# Patient Record
Sex: MALE | Race: WHITE | NOT HISPANIC OR LATINO | Employment: FULL TIME | URBAN - METROPOLITAN AREA
[De-identification: names, ages, dates, MRNs, and addresses within clinical notes are randomized per-mention and may not be internally consistent; named-entity substitution may affect disease eponyms.]

---

## 2017-12-18 ENCOUNTER — APPOINTMENT (OUTPATIENT)
Dept: RADIOLOGY | Facility: MEDICAL CENTER | Age: 47
End: 2017-12-18
Attending: ORTHOPAEDIC SURGERY
Payer: COMMERCIAL

## 2017-12-18 DIAGNOSIS — M75.122 COMPLETE ROTATOR CUFF TEAR OF LEFT SHOULDER: ICD-10-CM

## 2017-12-21 ENCOUNTER — HOSPITAL ENCOUNTER (OUTPATIENT)
Dept: RADIOLOGY | Facility: MEDICAL CENTER | Age: 47
End: 2017-12-21
Attending: ORTHOPAEDIC SURGERY
Payer: COMMERCIAL

## 2017-12-21 PROCEDURE — 73221 MRI JOINT UPR EXTREM W/O DYE: CPT | Mod: LT

## 2018-02-06 ENCOUNTER — APPOINTMENT (OUTPATIENT)
Dept: ADMISSIONS | Facility: MEDICAL CENTER | Age: 48
End: 2018-02-06
Attending: ORTHOPAEDIC SURGERY
Payer: COMMERCIAL

## 2018-02-06 RX ORDER — ZOLPIDEM TARTRATE 10 MG/1
10 TABLET ORAL NIGHTLY PRN
COMMUNITY
Start: 2018-10-08 | End: 2018-10-28

## 2018-02-07 ENCOUNTER — HOSPITAL ENCOUNTER (OUTPATIENT)
Facility: MEDICAL CENTER | Age: 48
End: 2018-02-07
Attending: ORTHOPAEDIC SURGERY | Admitting: ORTHOPAEDIC SURGERY
Payer: COMMERCIAL

## 2018-02-07 VITALS
WEIGHT: 205.25 LBS | OXYGEN SATURATION: 94 % | TEMPERATURE: 97.2 F | HEART RATE: 82 BPM | HEIGHT: 69 IN | SYSTOLIC BLOOD PRESSURE: 119 MMHG | DIASTOLIC BLOOD PRESSURE: 81 MMHG | BODY MASS INDEX: 30.4 KG/M2 | RESPIRATION RATE: 14 BRPM

## 2018-02-07 PROCEDURE — 500123 HCHG BOVIE, CONTROL W/BLADE: Performed by: ORTHOPAEDIC SURGERY

## 2018-02-07 PROCEDURE — 160029 HCHG SURGERY MINUTES - 1ST 30 MINS LEVEL 4: Performed by: ORTHOPAEDIC SURGERY

## 2018-02-07 PROCEDURE — 700102 HCHG RX REV CODE 250 W/ 637 OVERRIDE(OP)

## 2018-02-07 PROCEDURE — A6222 GAUZE <=16 IN NO W/SAL W/O B: HCPCS | Performed by: ORTHOPAEDIC SURGERY

## 2018-02-07 PROCEDURE — 502240 HCHG MISC OR SUPPLY RC 0272: Performed by: ORTHOPAEDIC SURGERY

## 2018-02-07 PROCEDURE — 700111 HCHG RX REV CODE 636 W/ 250 OVERRIDE (IP)

## 2018-02-07 PROCEDURE — 502581 HCHG PACK, SHOULDER ARTHROSCOPY: Performed by: ORTHOPAEDIC SURGERY

## 2018-02-07 PROCEDURE — 160035 HCHG PACU - 1ST 60 MINS PHASE I: Performed by: ORTHOPAEDIC SURGERY

## 2018-02-07 PROCEDURE — 160002 HCHG RECOVERY MINUTES (STAT): Performed by: ORTHOPAEDIC SURGERY

## 2018-02-07 PROCEDURE — 700105 HCHG RX REV CODE 258: Performed by: ORTHOPAEDIC SURGERY

## 2018-02-07 PROCEDURE — C1713 ANCHOR/SCREW BN/BN,TIS/BN: HCPCS | Performed by: ORTHOPAEDIC SURGERY

## 2018-02-07 PROCEDURE — 160036 HCHG PACU - EA ADDL 30 MINS PHASE I: Performed by: ORTHOPAEDIC SURGERY

## 2018-02-07 PROCEDURE — 160048 HCHG OR STATISTICAL LEVEL 1-5: Performed by: ORTHOPAEDIC SURGERY

## 2018-02-07 PROCEDURE — 502000 HCHG MISC OR IMPLANTS RC 0278: Performed by: ORTHOPAEDIC SURGERY

## 2018-02-07 PROCEDURE — 160041 HCHG SURGERY MINUTES - EA ADDL 1 MIN LEVEL 4: Performed by: ORTHOPAEDIC SURGERY

## 2018-02-07 PROCEDURE — 160022 HCHG BLOCK: Performed by: ORTHOPAEDIC SURGERY

## 2018-02-07 PROCEDURE — 160046 HCHG PACU - 1ST 60 MINS PHASE II: Performed by: ORTHOPAEDIC SURGERY

## 2018-02-07 PROCEDURE — A9270 NON-COVERED ITEM OR SERVICE: HCPCS

## 2018-02-07 PROCEDURE — 501838 HCHG SUTURE GENERAL: Performed by: ORTHOPAEDIC SURGERY

## 2018-02-07 PROCEDURE — 160025 RECOVERY II MINUTES (STATS): Performed by: ORTHOPAEDIC SURGERY

## 2018-02-07 PROCEDURE — 700101 HCHG RX REV CODE 250

## 2018-02-07 PROCEDURE — 160009 HCHG ANES TIME/MIN: Performed by: ORTHOPAEDIC SURGERY

## 2018-02-07 DEVICE — ANCHOR ICONIX 1-#2 FORCEFIBER 1.4MM (5EA/BX): Type: IMPLANTABLE DEVICE | Status: FUNCTIONAL

## 2018-02-07 DEVICE — ANCHOR ICONIX 2 TT W/2 2MM (5EA/BX): Type: IMPLANTABLE DEVICE | Status: FUNCTIONAL

## 2018-02-07 RX ORDER — SCOLOPAMINE TRANSDERMAL SYSTEM 1 MG/1
1 PATCH, EXTENDED RELEASE TRANSDERMAL
Status: DISCONTINUED | OUTPATIENT
Start: 2018-02-07 | End: 2018-02-07 | Stop reason: HOSPADM

## 2018-02-07 RX ORDER — DIPHENHYDRAMINE HYDROCHLORIDE 50 MG/ML
25 INJECTION INTRAMUSCULAR; INTRAVENOUS EVERY 6 HOURS PRN
Status: DISCONTINUED | OUTPATIENT
Start: 2018-02-07 | End: 2018-02-07 | Stop reason: HOSPADM

## 2018-02-07 RX ORDER — MORPHINE SULFATE 4 MG/ML
4 INJECTION, SOLUTION INTRAMUSCULAR; INTRAVENOUS
Status: DISCONTINUED | OUTPATIENT
Start: 2018-02-07 | End: 2018-02-07 | Stop reason: HOSPADM

## 2018-02-07 RX ORDER — GABAPENTIN 300 MG/1
CAPSULE ORAL
Status: COMPLETED
Start: 2018-02-07 | End: 2018-02-07

## 2018-02-07 RX ORDER — POLYETHYLENE GLYCOL 3350 17 G/17G
1 POWDER, FOR SOLUTION ORAL 2 TIMES DAILY PRN
Status: DISCONTINUED | OUTPATIENT
Start: 2018-02-07 | End: 2018-02-07 | Stop reason: HOSPADM

## 2018-02-07 RX ORDER — ENEMA 19; 7 G/133ML; G/133ML
1 ENEMA RECTAL
Status: DISCONTINUED | OUTPATIENT
Start: 2018-02-07 | End: 2018-02-07 | Stop reason: HOSPADM

## 2018-02-07 RX ORDER — HALOPERIDOL 5 MG/ML
1 INJECTION INTRAMUSCULAR EVERY 6 HOURS PRN
Status: DISCONTINUED | OUTPATIENT
Start: 2018-02-07 | End: 2018-02-07 | Stop reason: HOSPADM

## 2018-02-07 RX ORDER — ROPIVACAINE HYDROCHLORIDE 5 MG/ML
INJECTION, SOLUTION EPIDURAL; INFILTRATION; PERINEURAL
Status: DISCONTINUED | OUTPATIENT
Start: 2018-02-07 | End: 2018-02-07 | Stop reason: HOSPADM

## 2018-02-07 RX ORDER — AMOXICILLIN 250 MG
1 CAPSULE ORAL NIGHTLY
Status: DISCONTINUED | OUTPATIENT
Start: 2018-02-07 | End: 2018-02-07 | Stop reason: HOSPADM

## 2018-02-07 RX ORDER — DOCUSATE SODIUM 100 MG/1
100 CAPSULE, LIQUID FILLED ORAL 2 TIMES DAILY
Status: DISCONTINUED | OUTPATIENT
Start: 2018-02-07 | End: 2018-02-07 | Stop reason: HOSPADM

## 2018-02-07 RX ORDER — ACETAMINOPHEN 500 MG
TABLET ORAL
Status: COMPLETED
Start: 2018-02-07 | End: 2018-02-07

## 2018-02-07 RX ORDER — SODIUM CHLORIDE, SODIUM LACTATE, POTASSIUM CHLORIDE, CALCIUM CHLORIDE 600; 310; 30; 20 MG/100ML; MG/100ML; MG/100ML; MG/100ML
INJECTION, SOLUTION INTRAVENOUS CONTINUOUS
Status: DISCONTINUED | OUTPATIENT
Start: 2018-02-07 | End: 2018-02-07 | Stop reason: HOSPADM

## 2018-02-07 RX ORDER — EPINEPHRINE 1 MG/ML(1)
AMPUL (ML) INJECTION
Status: DISCONTINUED | OUTPATIENT
Start: 2018-02-07 | End: 2018-02-07 | Stop reason: HOSPADM

## 2018-02-07 RX ORDER — BISACODYL 10 MG
10 SUPPOSITORY, RECTAL RECTAL
Status: DISCONTINUED | OUTPATIENT
Start: 2018-02-07 | End: 2018-02-07 | Stop reason: HOSPADM

## 2018-02-07 RX ORDER — BACITRACIN 50000 [IU]/1
INJECTION, POWDER, FOR SOLUTION INTRAMUSCULAR
Status: DISCONTINUED | OUTPATIENT
Start: 2018-02-07 | End: 2018-02-07 | Stop reason: HOSPADM

## 2018-02-07 RX ORDER — DEXAMETHASONE SODIUM PHOSPHATE 4 MG/ML
4 INJECTION, SOLUTION INTRA-ARTICULAR; INTRALESIONAL; INTRAMUSCULAR; INTRAVENOUS; SOFT TISSUE
Status: DISCONTINUED | OUTPATIENT
Start: 2018-02-07 | End: 2018-02-07 | Stop reason: HOSPADM

## 2018-02-07 RX ORDER — AMOXICILLIN 250 MG
1 CAPSULE ORAL
Status: DISCONTINUED | OUTPATIENT
Start: 2018-02-07 | End: 2018-02-07 | Stop reason: HOSPADM

## 2018-02-07 RX ORDER — CELECOXIB 200 MG/1
CAPSULE ORAL
Status: COMPLETED
Start: 2018-02-07 | End: 2018-02-07

## 2018-02-07 RX ORDER — ONDANSETRON 2 MG/ML
4 INJECTION INTRAMUSCULAR; INTRAVENOUS EVERY 4 HOURS PRN
Status: DISCONTINUED | OUTPATIENT
Start: 2018-02-07 | End: 2018-02-07 | Stop reason: HOSPADM

## 2018-02-07 RX ORDER — ACETAMINOPHEN 500 MG
1000 TABLET ORAL EVERY 6 HOURS
Status: DISCONTINUED | OUTPATIENT
Start: 2018-02-07 | End: 2018-02-07 | Stop reason: HOSPADM

## 2018-02-07 RX ADMIN — SODIUM CHLORIDE, POTASSIUM CHLORIDE, SODIUM LACTATE AND CALCIUM CHLORIDE 1000 ML: 600; 310; 30; 20 INJECTION, SOLUTION INTRAVENOUS at 10:45

## 2018-02-07 RX ADMIN — GABAPENTIN 600 MG: 300 CAPSULE ORAL at 10:15

## 2018-02-07 RX ADMIN — CELECOXIB 400 MG: 200 CAPSULE ORAL at 10:15

## 2018-02-07 RX ADMIN — ACETAMINOPHEN 1000 MG: 500 TABLET, COATED ORAL at 10:15

## 2018-02-07 ASSESSMENT — PAIN SCALES - GENERAL
PAINLEVEL_OUTOF10: 0
PAINLEVEL_OUTOF10: ASSUMED PAIN PRESENT
PAINLEVEL_OUTOF10: 3
PAINLEVEL_OUTOF10: 0

## 2018-02-07 NOTE — OP REPORT
DATE OF SERVICE:  02/07/2018    PREOPERATIVE DIAGNOSES:  Left shoulder superior labral anterior posterior   tear, left shoulder rotator cuff tear.    POSTOPERATIVE DIAGNOSES:  Left shoulder superior labral anterior posterior   tear, left shoulder rotator cuff tear.    PROCEDURES PERFORMED:  Evaluation of shoulder under anesthesia, arthroscopic   evaluation of the shoulder, arthroscopic rotator cuff repair, arthroscopic   biceps tenotomy with subsequent open biceps tenodesis, arthroscopic   debridement of bicipital stump.    SURGEON:  Kiet Ramirez MD    ASSISTANT:  ZOIE Thapa    ANESTHESIA:  General.  There was also regional anesthesia to the general   anesthesia for postoperative analgesia.    ANESTHESIOLOGIST:  Hang Beasley MD    DRAINS:  None.    SPECIMENS:  None.    COMPLICATIONS:  None known.    HISTORY:  Active 47-year-old gentleman who has had a traction-type injury to   his arm while falling some 3-4 months ago.  He went on to have persistent pain   and weakness in the shoulder.  Subsequent history, physical, and MRI raised   the possibility of labral and biceps tendon tear and rotator cuff tear.  Given   those findings and his significant and persistent symptoms, he is felt to be   a candidate for shoulder arthroscopy with arthroscopic versus open repairs   and/or debridement as indicated.  Therefore, appropriate laboratory studies   and consents, he was brought today to the operating room, placed supine on the   operating table where general anesthesia obtained.  Ancef was administered   preoperatively.  The patient was ultimately placed in lateral decubitus   position.  All bony prominences padded and axillary roll placed.  His forward   elevation appeared to be full passively, abduction to full passively.  Load   and shift revealed no gross instability about the glenohumeral joint.  The   limb was then sterilely prepped and draped in the usual manner, supported in   arthroscopic  padded arm burrell.  Standard arthroscopy portals were then   created.  Scope was introduced in the glenohumeral joint.  The superior labrum   had tearing and wide separation off the glenoid rim.  Gentle traction across   the biceps revealed wide separation not only in the superior labrum, but into   the bicipital anchor.    The undersurface of the supraspinatus and back towards the infraspinatus had   undersurface fraying.  That area was marked with a PDS suture.  Subscap tendon   was inspected and probed and did not appear at least on it is lateral   superior edge to have significant fraying, tearing or retraction.  Anterior   labrum appeared to be intact.  Anterior inferior glenohumeral ligaments   intact.  Articular surface of the glenoid and humeral head without significant   softening or fissuring.  Posterior and posterior inferior labrum appeared to   be intact.  Given the disease around the superior labrum and the biceps tendon   anchor, the biceps tendon was tenotomized using arthroscopic scissors.    Tendon was allowed to retract into the proximal arm.  That bicipital stump was   then debrided back to a smooth edge using a shaver.  Redundant fluid was   milked from the glenohumeral joint.  The joint was injected with ropivacaine.    Scope was removed and inserted in the subacromial space.  Thickened prominent   bursal tissue was resected using a shaver.  Accessory lateral portals were   created.  The area of the PDS suture marker and just posterior and inferior   to, was visualized.  There were disorganized fibers of the cuff tendon at that   level with easy penetration of the cuff remnant at that level using a blunt   tipped obturator.  As such that diseased tendon and that area was debrided   back to more healthy tendon edge.  The articular margin of the humerus was   identified.  The bone lateral to it was roughened and then multiple   perforations were created to promote bleeding bone.    An [employer]  suture anchor was placed on that edge of the articular margin.  This   was loaded with 2 pairs of sutures.  Each pair was passed into the leading   edge of the rotator cuff tear, in addition a single cinch stitch was also   placed free into the central leading edge of the tear.  Larger diameter tape   tails were then brought out laterally along with the cinch stitch.  They were   fixed to an Apollo suture anchor.  Perforation was created in the lateral base   of the greater tuberosity.  Perforation was then filled with the Apollo   anchor and sutures.  This created lateral row repair and ultimately a 2-row   rotator cuff repair.  Attention was then directed medially.  The remaining 2   strands of suture were mated.  Arthroscopic sliding knots were then tied to   secure the medial row portion of the repair.  Repair was then visualized and   probed, felt to be acceptable position and tension.  Scope was removed,   briefly reinserted in the glenohumeral joint.  The undersurface of the rotator   cuff repair was inspected, felt to have good position.  Scope was removed.    Arthroscopy portals closed in usual manner.  The arm was then removed from the   arthroscopic arm burrell and placed in a mechanical arm burrell.  A   longitudinal incision in the proximal medial arm was created.  Skin,   subcutaneous tissue incised sharply, then blunt dissection down to the   inferior border of the pectoralis major tendon.  That tendon was traced to its   insertion on the proximal humerus.  Carefully placed Hohmann retractor on the   lateral cortex of the proximal humerus performed by myself.  An [employer]   retractor was used to gently pull the tissue medially.  Lower portion of the   bicipital groove was identified.  The fascia over the biceps was incised with   scissors.  Biceps tendon remnant was then delivered in the mouth of the   incision.  Roughly at the level of the pec tendon insertion lower border, 2    holes were  created and then filled with [employer] suture anchor.  Those   suture anchors had their sutures passed in the biceps tendon remnant in a   locking stitch configuration.  Multiple knots were then tied securing the   biceps up against the proximal humerus.  The biceps tendon was incised   sharply.  The retractors were carefully removed by myself and then copious   irrigation using antibiotic treated saline was performed.  Skin edges were   then approximated in the usual manner and skin closed in the usual manner.    Sterile dressing was then applied and the arm was placed in a shoulder   immobilizer and the patient was transferred from the OR table to his hospital   bed and now taken to recovery room in stable condition.       ____________________________________     MD BRITTNI MISHRA / LAURA    DD:  02/07/2018 13:30:58  DT:  02/07/2018 14:22:00    D#:  [id number]  Job#:  [id number]

## 2018-02-07 NOTE — OR NURSING
1329- Pt to pacu via gurney with side rails up. Pt arouses to voice.  VSS.  Respirations spontaneous and unlabored.  L shoulder dressing cdi, LUE in immobilizer.  L radial pulse 2+, brisk cap refill to <3sec to fingers. Unable to assess sensation/movement, pt too sleepy.  1342- Sensation/movement intact L fingers. Denies pain/nausea.  1414- Pt sleeping, arouses to voice.  Denies pain/nausea.  1430-Pt more alert, VSS.  Denies pain/nausea. Sitting up in bed. Wife updated via phone.  1453- Pt meets stage 2 criteria, report to Heather HUSAIN.

## 2018-02-07 NOTE — DISCHARGE INSTRUCTIONS
ACTIVITY: Rest and take it easy for the first 24 hours.  A responsible adult is recommended to remain with you during that time.  It is normal to feel sleepy.  We encourage you to not do anything that requires balance, judgment or coordination.    MILD FLU-LIKE SYMPTOMS ARE NORMAL. YOU MAY EXPERIENCE GENERALIZED MUSCLE ACHES, THROAT IRRITATION, HEADACHE AND/OR SOME NAUSEA.    FOR 24 HOURS DO NOT:  Drive, operate machinery or run household appliances.  Drink beer or alcoholic beverages.   Make important decisions or sign legal documents.    SPECIAL INSTRUCTIONS: Non weight bearing Left upper extremity.  Wear immobilizer at all times until advised otherwise by your surgeon.  Ice pack when in bed or in chair to Left shoulder. Cough and deep breath every hour while awake.    DIET: To avoid nausea, slowly advance diet as tolerated, avoiding spicy or greasy foods for the first day.  Add more substantial food to your diet according to your physician's instructions.  Babies can be fed formula or breast milk as soon as they are hungry.  INCREASE FLUIDS AND FIBER TO AVOID CONSTIPATION.    SURGICAL DRESSING/BATHING: May shower Post op day next Wednesday - covr dressings well to shower, DO NOT REMOVE DRESSINGS    FOLLOW-UP APPOINTMENT:  A follow-up appointment should be arranged with your doctor; call to schedule.    You should CALL YOUR PHYSICIAN if you develop:  Fever greater than 101 degrees F.  Pain not relieved by medication, or persistent nausea or vomiting.  Excessive bleeding (blood soaking through dressing) or unexpected drainage from the wound.  Extreme redness or swelling around the incision site, drainage of pus or foul smelling drainage.  Inability to urinate or empty your bladder within 8 hours.  Problems with breathing or chest pain.    You should call 911 if you develop problems with breathing or chest pain.  If you are unable to contact your doctor or surgical center, you should go to the nearest emergency  room or urgent care center.  Physician's telephone #: Dr Ramirez [phone]    If any questions arise, call your doctor.  If your doctor is not available, please feel free to call the Surgical Center at [phone].  The Center is open Monday through Friday from 7AM to 7PM.  You can also call the HEALTH HOTLINE open 24 hours/day, 7 days/week and speak to a nurse at [phone], or toll free at [phone].    A registered nurse may call you a few days after your surgery to see how you are doing after your procedure.    MEDICATIONS: Resume taking daily medication.  Take prescribed pain medication with food.  If no medication is prescribed, you may take non-aspirin pain medication if needed.  PAIN MEDICATION CAN BE VERY CONSTIPATING.  Take a stool softener or laxative such as senokot, pericolace, or milk of magnesia if needed.    Prescription given for N/A.  Last pain medication given at N/A.    If your physician has prescribed pain medication that includes Acetaminophen (Tylenol), do not take additional Acetaminophen (Tylenol) while taking the prescribed medication.    Depression / Suicide Risk    As you are discharged from this [facility] facility, it is important to learn how to keep safe from harming yourself.    Recognize the warning signs:  · Abrupt changes in personality, positive or negative- including increase in energy   · Giving away possessions  · Change in eating patterns- significant weight changes-  positive or negative  · Change in sleeping patterns- unable to sleep or sleeping all the time   · Unwillingness or inability to communicate  · Depression  · Unusual sadness, discouragement and loneliness  · Talk of wanting to die  · Neglect of personal appearance   · Rebelliousness- reckless behavior  · Withdrawal from people/activities they love  · Confusion- inability to concentrate     If you or a loved one observes any of these behaviors or has concerns about self-harm, here's what you can  do:  · Talk about it- your feelings and reasons for harming yourself  · Remove any means that you might use to hurt yourself (examples: pills, rope, extension cords, firearm)  · Get professional help from the community (Mental Health, Substance Abuse, psychological counseling)  · Do not be alone:Call your Safe Contact- someone whom you trust who will be there for you.  · Call your local CRISIS HOTLINE 718-2130 or 260-581-8110  · Call your local Children's Mobile Crisis Response Team [place] (832) 877-1834 or [employer]  · Call the toll free National Suicide Prevention Hotlines   · National Suicide Prevention Lifeline 371-966-OGUT (7356)  · National [employer] Line Network 800-SUICIDE (611-5346)        Peripheral Nerve Block Discharge Instructions from Same Day Surgery and Inpatient :    What to Expect - Upper Extremity  · You may experience numbness and weakness in arm and hand  on the same side as your surgery  · This is normal. For some people, this may be an unpleasant sensation. Be very careful with your numb limb  · Ask for help when you need it  Shoulder Surgery Side Effects  · In addition to numbness and weakness you may experience other symptoms  · Other nerves that are close to those nerves injected can also be affected by local anesthesia  · You may experience a hoarseness in your voice  · Your breathing may feel different  · You may also notice drooping of your eyelid, pupil constriction, and decreased sweating, on the side of your surgery  · All of these side effects are normal and will resolve when the local anesthetic wears off   Prevent Injury  · Protect the limb like a baby  · Beware of exposing your limb to extreme heat or cold or trauma  · The limb may be injured without you noticing because it is numb  · Keep the limb elevated whenever possible  · Do not sleep on the limb  · Change the position of the limb regularly  · Avoid putting pressure on your surgical limb  Pain Control  · The  "initial block on the day of surgery will make your extremity feel \"numb\"  · Any consecutive injection including prior to discharge from the hospital will make your extremity feel \"numb\"  · You may feel an aching or burning when the local anesthesia starts to wear off  · Take pain pills as prescribed by your surgeon  · Call your surgeon or anesthesiologist if you do not have adequate pain control      Discharge Education for patients on KACY (Obstructive Sleep Apnea) Protocol    Prior to receiving sedation or anesthesia, we screen all patients for Obstructive Sleep Apnea.  During your screening, you were identified as having suspected, but not confirmed Obstructive Sleep Apnea(KACY).    What is Obstructive Sleep Apnea?  Sleep apnea (AP-ne-ah) is a common disorder which involves breathing pauses that occur during sleep.  These can last from 10 seconds to a minute or longer.  Normal breathing resumes often with a loud snort or choking sound.    Sleep apnea occurs in all age groups and both genders but is more common in men and people over 40 years of age.  It has been estimated that as many as 18 million Americans have sleep apnea.  Most people who have sleep apnea don’t know they have it because it only occurs during sleep.  A family member and/or bed partner may first notice the signs of sleep apnea.  Sleep apnea is a chronic (ongoing) condition that disrupts the quality and quantity of your sleep repeatedly throughout the night.  This often results in excessive daytime sleepiness or fatigue during the day.  It may also contribute to high blood pressure, heart problems, and complications following medications used for surgery and procedures.    To establish a definitive diagnosis, further testing from a specialist would be needed.  We recommend that you follow up with your primary care physician.    We recommend that you should be with an adult observer for at least 24 hours after your sedation/anesthesia.  If you have " a CPAP machine, you should wear it during any sleep period (day or night) for the week following your procedure.  We encourage you to sleep on your side or in a sitting position, even with napping.  Lying flat on your back increases the risk of apnea and airway obstruction during your post procedure recovery period.    It is important to prevent over-sedation that could increase your risk for apnea.  Please take all pain medication as directed by your physician.  If you are not getting pain relief, please contact your physician to discuss possible approaches to relieving pain while minimizing medications that can affect your breathing and oxygen levels.

## 2018-02-09 ENCOUNTER — APPOINTMENT (OUTPATIENT)
Dept: SLEEP MEDICINE | Facility: MEDICAL CENTER | Age: 48
End: 2018-02-09
Payer: COMMERCIAL

## 2018-05-11 ENCOUNTER — SLEEP CENTER VISIT (OUTPATIENT)
Dept: SLEEP MEDICINE | Facility: MEDICAL CENTER | Age: 48
End: 2018-05-11
Payer: COMMERCIAL

## 2018-05-11 VITALS
OXYGEN SATURATION: 97 % | SYSTOLIC BLOOD PRESSURE: 122 MMHG | DIASTOLIC BLOOD PRESSURE: 82 MMHG | HEART RATE: 74 BPM | BODY MASS INDEX: 30.36 KG/M2 | HEIGHT: 69 IN | WEIGHT: 205 LBS | TEMPERATURE: 98.2 F | RESPIRATION RATE: 16 BRPM

## 2018-05-11 DIAGNOSIS — G47.33 OSA (OBSTRUCTIVE SLEEP APNEA): ICD-10-CM

## 2018-05-11 DIAGNOSIS — F51.01 PRIMARY INSOMNIA: ICD-10-CM

## 2018-05-11 PROCEDURE — 99245 OFF/OP CONSLTJ NEW/EST HI 55: CPT | Performed by: INTERNAL MEDICINE

## 2018-05-11 RX ORDER — NAPROXEN 500 MG/1
TABLET ORAL
COMMUNITY
Start: 2018-03-31 | End: 2018-10-28

## 2018-05-11 RX ORDER — HYDROCODONE BITARTRATE AND ACETAMINOPHEN 5; 325 MG/1; MG/1
TABLET ORAL
COMMUNITY
Start: 2018-02-15 | End: 2018-10-28

## 2018-05-11 RX ORDER — CYCLOBENZAPRINE HCL 10 MG
TABLET ORAL
Refills: 0 | COMMUNITY
Start: 2018-03-24 | End: 2018-10-28

## 2018-05-11 RX ORDER — TRAZODONE HYDROCHLORIDE 100 MG/1
TABLET ORAL
COMMUNITY
Start: 2018-02-14 | End: 2018-10-28

## 2018-05-11 NOTE — PROGRESS NOTES
Chief Complaint   Patient presents with   • New Patient     Evaluation for KACY       HPI:    The patient is a 48-year-old man who has obstructive sleep apnea and a component of insomnia.  He has had a previous UPPP.  Prior sleep studies in 2011 and 2012 showed evidence of obstructive sleep apnea with an apnea hypopnea index in the teens and evidence of oxygen desaturation down to 81%.  A titration study during that time showed an excellent response to CPAP at 8 cm of water pressure.  However, the patient could not tolerate CPAP and stopped using it.  He is still troubled with poor sleep quality and excessive daytime somnolence.  He continues to use Ambien 10 mg every night to get to sleep.  His sleep continues to be non-refreshing.  He says that he is tired all the time.  His wife confirms snoring and apneic episodes.  He feels that his obstructive sleep apnea is getting worse.  He is willing to once again try airway pressurization therapy.    Past Medical History:   Diagnosis Date   • Apnea, sleep    • Back pain    • Chickenpox    • Cough    • Daytime sleepiness    • Dizziness    • Frequent headaches    • Gasping for breath    • Heart burn    • Heartburn    • Morning headache    • Pain     right knee   • Shortness of breath    • Sleep apnea     no CPAP had ENT surgery   • Snoring    • Sore muscles    • Swelling of lower extremity    • Wears glasses    • Weight loss        ROS:   Constitutional: Denies fevers, chills, night sweats, fatigue or weight loss  Eyes: Denies vision loss, pain, drainage, double vision  Ears, Nose, Throat: Denies earache, tinnitus, hoarseness  Cardiovascular: Denies chest pain, tightness, palpitations  Respiratory: Denies shortness of breath, sputum production, cough, hemoptysis  Sleep: See HPI  GI: Denies abdominal pain, nausea, vomiting, diarrhea  : Denies frequent urination, hematuria, painful urination  Musculoskeletal: Denies back pain, painful joints, sore muscles  Neurological:  "Denies headaches, seizures  Skin: Denies rashes, color changes  Psychiatric: Denies depression or thoughts of suicide  Hematologic: Denies bleeding tendency or clotting tendency  Allergic/Immunologic: Denies rhinitis, skin sensitivity    Social History     Social History   • Marital status:      Spouse name: N/A   • Number of children: N/A   • Years of education: N/A     Occupational History   • Not on file.     Social History Main Topics   • Smoking status: Former Smoker     Packs/day: 0.50     Years: 17.00     Types: Cigarettes     Quit date: 9/1/2011   • Smokeless tobacco: Never Used      Comment: 1/2 ffor 22yrs   • Alcohol use Yes      Comment: 5/month social   • Drug use: No   • Sexual activity: Yes     Partners: Female     Birth control/ protection: Condom     Other Topics Concern   • Not on file     Social History Narrative   • No narrative on file     Iodine and Percocet [oxycodone-acetaminophen]  Current Outpatient Prescriptions on File Prior to Visit   Medication Sig Dispense Refill   • zolpidem (AMBIEN) 10 MG Tab Take 10 mg by mouth at bedtime as needed for Sleep.       No current facility-administered medications on file prior to visit.      Blood pressure 122/82, pulse 74, temperature 36.8 °C (98.2 °F), resp. rate 16, height 1.753 m (5' 9\"), weight 93 kg (205 lb), SpO2 97 %.  History reviewed. No pertinent family history.    Physical Exam:    HEENT: PERRLA, EOMI, no scleral icterus, no nasal or oral lesions  Neck: No thyromegaly, no adenopathy, no bruits  [place]atti: Status post UPPP  Lungs: Equal breath sounds, no wheezes or crackles  Heart: Regular rate and rhythm, no gallops or murmurs  Abdomen: Soft, benign, no organomegaly  Extremities: No clubbing, cyanosis, or edema  Neurologic: Cranial nerve, motor, and sensory exam are normal    1. KACY (obstructive sleep apnea)    2. Primary insomnia    3. BMI 30.0-30.9,adult        He has an established diagnosis of obstructive sleep apnea.  He has not " been on CPAP in a number of years.  We will obtain a home sleep study to requalify him for CPAP therapy.  Previously he had been on CPAP at about 8 cm of water pressure.  If his home study redemonstrates obstructive sleep apnea we should be able to start him on auto CPAP in the range of 6-12 cm of water pressure and then follow-up clinically, with overnight oximetry, and with compliance data.  A separate issue will be trying to treat his insomnia once his obstructive sleep apnea has been treated.

## 2018-05-29 ENCOUNTER — HOME STUDY (OUTPATIENT)
Dept: SLEEP MEDICINE | Facility: MEDICAL CENTER | Age: 48
End: 2018-05-29
Attending: INTERNAL MEDICINE
Payer: COMMERCIAL

## 2018-05-29 DIAGNOSIS — G47.33 OSA (OBSTRUCTIVE SLEEP APNEA): ICD-10-CM

## 2018-05-29 PROCEDURE — 95806 SLEEP STUDY UNATT&RESP EFFT: CPT | Performed by: FAMILY MEDICINE

## 2018-05-30 NOTE — PROCEDURES
DIAGNOSTIC HOME SLEEP TEST (HST) REPORT       PATIENT ID:  NAME:  Abhishek Pacheco  MRN:               [id number]  YOB: 1970  DATE OF STUDY: 5/29/18      Impression:     This study shows evidence of:     1.Sever obstructive sleep apnea with  Respiratory Event Index (LACEY) of 67.8 per hour and worse in supine sleep with LACEY at 70/hr. These findings are based on the recording time (flow evaluation time). It is not possible with this device to determine a traditional apnea+hypopnea index (AHI) for total sleep time since EEG channels are not available.   2. Sever Nocturnal hypoxia: O2 Sat. lynette was 71% and mean O2 sat was 85% and baseline O2 at 90 %. O2 sat was below 90% for 90% of the flow evaluation time. Oxygen Desaturation (>=3%) Index was elevated at 70/hr.       TECHNICAL DESCRIPTION:  Data[employer]ia Device used was a type-III home study device. Home sleep study recording included: Airflow recording by nasal pressure transducer; Respiratory Effort by abdominal Respiratory Bands; O2 by finger oximetry. A position sensor and a snore channel was also used.    Scoring Criteria: A modification of the the AASM Manual for the Scoring of Sleep and Associated Events, 2012, was used.   Obstructive apnea was scored by cessation of airflow for at least 10 seconds with continuing respiratory effort.  Central apnea was scored by cessation of airflow for at least 10 seconds with no effort.  Hypopnea was scored by a 30% or more reduction in airflow for at least 10 seconds accompanied by an arterial oxygen desaturation of 3% or more.  (For Medicare patients, hypopneas were scored by a 30% or more reduction in airflow for at least 10 seconds accompanied by an arterial oxygen saturation of 4% or more, as required by their insurance, CMS.        General sleep summary: . Total recording time is 9 hours and 55 minutes and total flow evaluation time is 4 hours and 06 minutes. The patient spent 0 hours and 28 minutes in  the supine position and 2 hours and 58 minutes in the nonsupine position.    Respiratory events:    Apneas: 42 (Obstructive apnea index 7.2/hr, Central apnea index 2.4 /hr, mixed 0 /hour)  Hypopneas: 237    Recommendations:    1. CPAP titration study vs Auto CPAP trial .   2.   In general patients with sleep apnea are advised to avoid alcohol and sedatives and to not operate a motor vehicle while drowsy. In some cases alternative treatment options may prove effective in resolving sleep apnea in these options include upper airway surgery, the use of a dental orthotic or weight loss and positional therapy. Clinical correlation is required.         Kenton Alonso MD

## 2018-06-26 ENCOUNTER — SLEEP CENTER VISIT (OUTPATIENT)
Dept: SLEEP MEDICINE | Facility: MEDICAL CENTER | Age: 48
End: 2018-06-26
Payer: COMMERCIAL

## 2018-06-26 VITALS
WEIGHT: 209 LBS | SYSTOLIC BLOOD PRESSURE: 134 MMHG | HEART RATE: 74 BPM | DIASTOLIC BLOOD PRESSURE: 92 MMHG | OXYGEN SATURATION: 94 % | RESPIRATION RATE: 16 BRPM | HEIGHT: 69 IN | BODY MASS INDEX: 30.96 KG/M2

## 2018-06-26 DIAGNOSIS — G47.33 OSA (OBSTRUCTIVE SLEEP APNEA): ICD-10-CM

## 2018-06-26 DIAGNOSIS — R63.8 INCREASED BMI: ICD-10-CM

## 2018-06-26 DIAGNOSIS — Z87.891 HISTORY OF TOBACCO ABUSE: ICD-10-CM

## 2018-06-26 PROCEDURE — 99214 OFFICE O/P EST MOD 30 MIN: CPT | Performed by: INTERNAL MEDICINE

## 2018-06-26 RX ORDER — ZOLPIDEM TARTRATE 5 MG/1
5 TABLET ORAL NIGHTLY PRN
Qty: 3 TAB | Refills: 0 | Status: SHIPPED | OUTPATIENT
Start: 2018-06-26 | End: 2018-06-26

## 2018-07-16 ENCOUNTER — SLEEP STUDY (OUTPATIENT)
Dept: SLEEP MEDICINE | Facility: MEDICAL CENTER | Age: 48
End: 2018-07-16
Attending: INTERNAL MEDICINE
Payer: COMMERCIAL

## 2018-07-16 DIAGNOSIS — G47.33 OSA (OBSTRUCTIVE SLEEP APNEA): ICD-10-CM

## 2018-07-16 PROCEDURE — 95811 POLYSOM 6/>YRS CPAP 4/> PARM: CPT | Performed by: FAMILY MEDICINE

## 2018-07-17 NOTE — PROCEDURES
Comments:  The patient underwent a diagnostic polysomnogram using the standard montage for measurement of parameters of sleep, respiratory events, movement abnormalities, and heart rate and rhythm.    A microphone was used to monitor snoring.  Interpretation:  Study start time was 09:35:26 PM.  Diagnostic recording time was 8h 16.0m with a total sleep time of 7h 8.5m resulting in a sleep efficiency of 86.39%%.    Sleep latency from the start of the study was 60 minutes and the latency from sleep to REM was 173 minutes.  In total,79  arousals were scored for an arousal index of 11.1.  Respiratory:  There were a total of 17 apneas consisting of 13 obstructive apneas, 0 mixed apneas, and 4 central apneas.  A total of 39 hypopneas were scored.  The apnea index was 2.38 per hour and the hypopnea index was 5.46 per hour resulting in an overall AHI of 7.84.  AHI during rem was 4.3 and AHI while supine was 36.84.  Oximetry:  There was a mean oxygen saturation of 94.0% with a minimum oxygen saturation of 82.0%.  Time spent with oxygen saturations below 89% was 4.0 minutes.  Cardiac:  The highest heart rate seen while awake was 87 BPM while the highest heart rate during sleep was 86 BPM with an average sleeping heart rate of 63 BPM.  Limb Movements:  There were a total of 85 PLMs during sleep, of which 21 were PLMS arousals.  This resulted in a PLMS index of 11.9 and a PLMS arousal index of 2.9.    CPAP was tried from 5 to 9 cm H2O.    Technical summary: The patient underwent a CPAP titration.  This was a 16 channel montage study to include a 6 channel EEG, a 2 channel EOG, and chin EMG, left and right leg EMG, a snore channel, and a CFLOW pressure transducer.   Respiratory effort was assessed with the use of a thoracic and abdominal monitor and overnight oximetry was obtained. Audio and video recordings were reviewed. This was a fully attended study and sleep stage scoring was performed. The test was technically  adequate.    General sleep summary:  During the overnight study, the sleep latency was 60 min which is prolonged. The REM latency was 173 min which is  increased. The total sleep time was 428 min and sleep efficiency was 86 % which is normal.  Sleep stage proportions showed normal sleep architecture except increased WASo of 67 min.  In regards to sleep quality there was a mild degree of sleep fragmentation as shown by the arousal index of 11 an hour. The arousals were due to PLMS and respiratory events.    CPAP Titration:  The PAP titration was initiated with CPAP 5 cm of water and the pressure which was slowly titrated up in an attempt to eliminate sleep disordered breathing and snoring. The final pressure tested during the study was CPAP 9 cm water and at this final pressure the patient was observed in the non supine REM sleep stage. The apnea hypopnea index improved to 4.1 per hour and O2 lynette 91%. The average O2 stauration was 94%. he spent 2 % of sleep time below 89% O2 saturation. Snoring was resolved. There were no significant periodic limb movements.  The patient demonstrated NSR and an average heart rate of 63 beats per minute.  There was no ventricular ectopy or tachyarrhythmias. The patient utilized medium F20 mask with heated humidification. The CPAP was well-tolerated and there were minimal air leaks. No supplemental oxygen was required.    Impression:  1.  KACY  2.  Successful CPAP titration       Recommendations:  I recommend CPAP 9 cm with medium F20 mask. Recommended 30 day compliance download to assess the efficacy of the recommended pressure and compliance for further outpatient monitoring and management of CPAP therapy. In some cases alternative treatment options may prove effective in resolving sleep apnea and these options include upper airway surgery, the use of a dental orthotic or weight loss and positional therapy. Clinical correlation is required. In general patients with sleep apnea are  advised to avoid alcohol and sedatives and to not operate a motor vehicle while drowsy and are at a greater risk for cardiovascular disease.

## 2018-07-26 NOTE — PROGRESS NOTES
CC: Follow up for PAP titration results    HPI:  Mr. Abhishek Pacheco is a 48-year-old man who was last seen on 6/26/2018.   His note from that day:  Mr. Abhishek Pacheco is a 49 y/o male who was last seen on 5/11/2018.  At that time Dr. Boyer elicited a history of obstructive sleep apnea, insomnia, and a prior UPPP.  His prior sleep studies in 2011 and 2012 showed a moderate degree of obstructive sleep apnea with a saturation as low as 81%.  He had a CPAP titration and was titrated to 8 cm but was CPAP intolerant and therefore stopped using it.  He uses Ambien nightly to assist with sleep onset and maintenance.  He complained of nonrestorative sleep and excessive tiredness.  His wife confirms snoring and apnea.      His home sleep test was performed 5/29/2018 and showed severe obstructive sleep apnea with an RDI of 67.8 and a lynette saturation of 71%.  The saturations were less than 90% for 90% of the flow evaluation time      He had a Pap titration PSG done 7/16/2018:  The PAP titration was initiated with CPAP 5 cm of water and the pressure which was slowly titrated up in an attempt to eliminate sleep disordered breathing and snoring. The final pressure tested during the study was CPAP 9 cm water and at this final pressure the patient was observed in the non supine REM sleep stage. The apnea hypopnea index improved to 4.1 per hour and O2 lynette 91%. The average O2 stauration was 94%. he spent 2 % of sleep time below 89% O2 saturation. Snoring was resolved. There were no significant periodic limb movements.  The patient demonstrated NSR and an average heart rate of 63 beats per minute.  There was no ventricular ectopy or tachyarrhythmias. The patient utilized medium F20 mask with heated humidification. The CPAP was well-tolerated and there were minimal air leaks. No supplemental oxygen was required.      There are no active problems to display for this patient.      Past Medical History:   Diagnosis Date   •  Apnea, sleep    • Back pain    • Chickenpox    • Cough    • Daytime sleepiness    • Dizziness    • Frequent headaches    • Gasping for breath    • Heart burn    • Heartburn    • Morning headache    • Pain     right knee   • Shortness of breath    • Sleep apnea     no CPAP had ENT surgery   • Snoring    • Sore muscles    • Swelling of lower extremity    • Wears glasses    • Weight loss         Past Surgical History:   Procedure Laterality Date   • SHOULDER ARTHROSCOPY W/ ROTATOR CUFF REPAIR Left 2/7/2018    Procedure: SHOULDER ARTHROSCOPY W/ ROTATOR CUFF REPAIR;  Surgeon: Kiet Ramirez M.D.;  Location: SURGERY [facility];  Service: Orthopedics   • SHOULDER ARTHROSCOPY W/ BICIPITAL TENODESIS REPAIR  2/7/2018    Procedure: SHOULDER ARTHROSCOPY with open BICIPITAL TENODESIS REPAIR;  Surgeon: Kiet Ramirez M.D.;  Location: SURGERY [facility];  Service: Orthopedics   • TONSILLECTOMY AND ADENOIDECTOMY  10/12/2011    Performed by ROBERTO ALEMAN at SURGERY SAME DAY [facility] ORS   • SEPTOTURBINOPLASTY  10/12/2011    Performed by ROBERTO ALEMAN at SURGERY SAME DAY [facility] ORS   • UVULOPHARYNGOPALATOPLASTY  10/12/2011    Performed by ROBERTO ALEMAN at SURGERY SAME DAY [facility] ORS   • DEBRIDEMENT  11/2/2010    Performed by BAUTISTA KURTZ at SURGERY SAME DAY [facility] ORS   • CYST EXCISION  11/2/2010    Performed by BAUTISTA KURTZ at SURGERY SAME DAY [facility] ORS   • KNEE ARTHROSCOPY  11/2/2010    Performed by BAUTISTA KURTZ at SURGERY SAME DAY [facility] ORS   • APPENDECTOMY  2009   • OTHER ORTHOPEDIC SURGERY  2007    right shoulder   • ACL RECONSTRUCTION Right 2003   • NERVE ULNAR TRANSFER  2001   • ARTHROSCOPY, KNEE     • LAMINOTOMY     • OTHER ORTHOPEDIC SURGERY      spine c2 c3 nerves clipped   • TONSILLECTOMY         History reviewed. No pertinent family history.    Social History     Social History   • Marital status:      Spouse name: N/A   • Number of children: N/A   • Years of  "education: N/A     Occupational History   • Not on file.     Social History Main Topics   • Smoking status: Former Smoker     Packs/day: 0.50     Years: 17.00     Types: Cigarettes     Quit date: 9/1/2011   • Smokeless tobacco: Never Used      Comment: 1/2 ffor 22yrs   • Alcohol use Yes      Comment: 5/month social   • Drug use: No   • Sexual activity: Yes     Partners: Female     Birth control/ protection: Condom     Other Topics Concern   • Not on file     Social History Narrative   • No narrative on file       Current Outpatient Prescriptions   Medication Sig Dispense Refill   • zolpidem (AMBIEN) 10 MG Tab Take 10 mg by mouth at bedtime as needed for Sleep.     • cyclobenzaprine (FLEXERIL) 10 MG Tab TK 1/2 TO 1 T PO QHS PRN FOR MUSCLE SPASM/SLEEP  0   • HYDROcodone-acetaminophen (NORCO) 5-325 MG Tab per tablet      • naproxen (NAPROSYN) 500 MG Tab      • traZODone (DESYREL) 100 MG Tab        No current facility-administered medications for this visit.     \"CURRENT RX\"    ALLERGIES: Iodine and Percocet [oxycodone-acetaminophen]    ROS    Constitutional: Denies fever, chills, sweats,  weight loss, fatigue  Cardiovascular: Denies chest pain, tightness, palpitations, swelling in legs/feet, fainting, difficulty breathing when lying down but gets better when sitting up.   Respiratory: Denies shortness of breath, cough, sputum, wheezing, painful breathing, coughing up blood.   Sleep: per HPI  Gastrointestinal: Denies  difficulty swallowing, nausea, abdominal pain, diarrhea, constipation, heartburn..   Musculoskeletal: Denies painful joints, sore muscles, back pain.        PHYSICAL EXAM      /88   Pulse 75   Resp 16   Ht 1.753 m (5' 9\")   Wt 94.8 kg (209 lb)   SpO2 96%   BMI 30.86 kg/m²   Appearance: Well-nourished, well-developed, no acute distress  Eyes:  PERRLA, EOMI  ENMT: without lesions, deformities;hearing grossly intact; tongue normal, posterior pharynx without erythema or exudate; Mallampati " classification:   Neck: Supple, trachea midline, no masses  Respiratory effort:  No intercostal retractions or use of accessory muscles  Lung auscultation:  No wheezes rhonchi rubs or rales  Cardiac: No murmurs, rubs, or gallops; regular rhythm, normal rate; no edema  Abdomen:  No tenderness, no organomegaly  Musculoskeletal:  Grossly normal; gait and station normal; digits and nails normal  Skin:  No rashes, petechiae, cyanosis  Neurologic: without focal signs; oriented to person, time, place, and purpose; judgement intact  Psychiatric:  No depression, anxiety, agitation        PROBLEMS:  1. KACY (obstructive sleep apnea)    - DME CPAP    2. History of tobacco abuse      3. Class 1 obesity without serious comorbidity with body mass index (BMI) of 30.0 to 30.9 in adult, unspecified obesity type  - OBESITY COUNSELING (No Charge): Patient identified as having weight management issue.  Appropriate orders and counseling given.      PLAN:     He had a Pap titration PSG done 7/16/2018:  The PAP titration was initiated with CPAP 5 cm of water and the pressure which was slowly titrated up in an attempt to eliminate sleep disordered breathing and snoring. The final pressure tested during the study was CPAP 9 cm water and at this final pressure the patient was observed in the non supine REM sleep stage. The apnea hypopnea index improved to 4.1 per hour and O2 lynette 91%. The average O2 stauration was 94%. he spent 2 % of sleep time below 89% O2 saturation. Snoring was resolved. There were no significant periodic limb movements.  The patient demonstrated NSR and an average heart rate of 63 beats per minute.  There was no ventricular ectopy or tachyarrhythmias. The patient utilized medium F20 mask with heated humidification. The CPAP was well-tolerated and there were minimal air leaks. No supplemental oxygen was required.    We will prescribe CPAP 9 cm H2O using a medium air fit f 20 mask and heated humidification followed by  clinical and compliance card review and 2-3 months. He requested ambien to assist with getting used to the unit and mask. Will give him 15 5 mg ambien pills.    Return in about 3 months (around 10/27/2018).

## 2018-07-27 ENCOUNTER — SLEEP CENTER VISIT (OUTPATIENT)
Dept: SLEEP MEDICINE | Facility: MEDICAL CENTER | Age: 48
End: 2018-07-27
Payer: COMMERCIAL

## 2018-07-27 VITALS
HEIGHT: 69 IN | RESPIRATION RATE: 16 BRPM | OXYGEN SATURATION: 96 % | DIASTOLIC BLOOD PRESSURE: 88 MMHG | BODY MASS INDEX: 30.96 KG/M2 | SYSTOLIC BLOOD PRESSURE: 128 MMHG | HEART RATE: 75 BPM | WEIGHT: 209 LBS

## 2018-07-27 DIAGNOSIS — Z87.891 HISTORY OF TOBACCO ABUSE: ICD-10-CM

## 2018-07-27 DIAGNOSIS — G47.33 OSA (OBSTRUCTIVE SLEEP APNEA): ICD-10-CM

## 2018-07-27 DIAGNOSIS — E66.9 CLASS 1 OBESITY WITHOUT SERIOUS COMORBIDITY WITH BODY MASS INDEX (BMI) OF 30.0 TO 30.9 IN ADULT, UNSPECIFIED OBESITY TYPE: ICD-10-CM

## 2018-07-27 PROCEDURE — 99214 OFFICE O/P EST MOD 30 MIN: CPT | Performed by: INTERNAL MEDICINE

## 2018-07-27 RX ORDER — ZOLPIDEM TARTRATE 5 MG/1
5 TABLET ORAL NIGHTLY PRN
Qty: 30 TAB | Refills: 0 | Status: SHIPPED | OUTPATIENT
Start: 2018-07-27 | End: 2018-08-27

## 2018-10-28 PROBLEM — G47.33 OSA (OBSTRUCTIVE SLEEP APNEA): Status: ACTIVE | Noted: 2018-10-28

## 2018-10-28 PROBLEM — E66.9 OBESITY: Status: ACTIVE | Noted: 2018-10-28

## 2018-10-28 NOTE — PROGRESS NOTES
CC: Follow up for sleep disturbance    HPI: Mr.Richard Carole Pacheco is a 49 y/o M last seen 7/27/18. His HST doen 5/29/18 showed severe kacy, ahi 67.8, lynette saturation of 71% with sats <90% for 90% of the recording. Cpap 9 cm initiated.    He is 30-day compliance is 100% for 4 hours and 40 minutes.  He has a residual apnea hypopnea index of 7.6 his average time in large leak per day is 4 minutes and 24 seconds.  During his CPAP titration he had an apnea hypopnea index of 4.1 on CPAP at 9 cm H2O.    He continues to generate a significant benefit from the use of his CPAP.    He is not having any significant problems with mask fit, mask leak, pressure tolerance, excessive airway dryness, aerophagia, or nasal stuffiness.    Main issue is that the water reservoir is too small and the water runs out by the morning.          Patient Active Problem List    Diagnosis Date Noted   • KACY (obstructive sleep apnea) 10/28/2018   • Obesity 10/28/2018       Past Medical History:   Diagnosis Date   • Apnea, sleep    • Back pain    • Chickenpox    • Cough    • Daytime sleepiness    • Dizziness    • Frequent headaches    • Gasping for breath    • Heart burn    • Heartburn    • Morning headache    • Pain     right knee   • Shortness of breath    • Sleep apnea     no CPAP had ENT surgery   • Snoring    • Sore muscles    • Swelling of lower extremity    • Wears glasses    • Weight loss         Past Surgical History:   Procedure Laterality Date   • SHOULDER ARTHROSCOPY W/ ROTATOR CUFF REPAIR Left 2/7/2018    Procedure: SHOULDER ARTHROSCOPY W/ ROTATOR CUFF REPAIR;  Surgeon: Kiet Ramirez M.D.;  Location: SURGERY [facility];  Service: Orthopedics   • SHOULDER ARTHROSCOPY W/ BICIPITAL TENODESIS REPAIR  2/7/2018    Procedure: SHOULDER ARTHROSCOPY with open BICIPITAL TENODESIS REPAIR;  Surgeon: Kiet Ramirez M.D.;  Location: [facility];  Service: Orthopedics   • TONSILLECTOMY AND ADENOIDECTOMY  10/12/2011     "Performed by ROBERTO ALEMAN at SURGERY SAME DAY [facility] ORS   • SEPTOTURBINOPLASTY  10/12/2011    Performed by ROBERTO ALEMAN at SURGERY SAME DAY [facility] ORS   • UVULOPHARYNGOPALATOPLASTY  10/12/2011    Performed by ROBERTO ALEMAN at SURGERY SAME DAY [facility] ORS   • DEBRIDEMENT  11/2/2010    Performed by BAUTISTA KURTZ at SURGERY SAME DAY [facility] ORS   • CYST EXCISION  11/2/2010    Performed by BAUTISTA KURTZ at SURGERY SAME DAY [facility] ORS   • KNEE ARTHROSCOPY  11/2/2010    Performed by BAUTISTA KURTZ at SURGERY SAME DAY [facility] ORS   • APPENDECTOMY  2009   • OTHER ORTHOPEDIC SURGERY  2007    right shoulder   • ACL RECONSTRUCTION Right 2003   • NERVE ULNAR TRANSFER  2001   • ARTHROSCOPY, KNEE     • LAMINOTOMY     • OTHER ORTHOPEDIC SURGERY      spine c2 c3 nerves clipped   • TONSILLECTOMY         History reviewed. No pertinent family history.    Social History     Social History   • Marital status:      Spouse name: N/A   • Number of children: N/A   • Years of education: N/A     Occupational History   • Not on file.     Social History Main Topics   • Smoking status: Former Smoker     Packs/day: 0.50     Years: 17.00     Types: Cigarettes     Quit date: 9/1/2011   • Smokeless tobacco: Never Used      Comment: 1/2 ffor 22yrs   • Alcohol use Yes      Comment: 5/month social   • Drug use: No   • Sexual activity: Yes     Partners: Female     Birth control/ protection: Condom     Other Topics Concern   • Not on file     Social History Narrative   • No narrative on file       Current Outpatient Prescriptions   Medication Sig Dispense Refill   • temazepam (RESTORIL) 30 MG capsule Take 30 mg by mouth at bedtime as needed for Sleep.       No current facility-administered medications for this visit.     \"CURRENT RX\"    ALLERGIES: Iodine and Percocet [oxycodone-acetaminophen]    ROS    Constitutional: Denies fever, chills, sweats,  weight loss, fatigue  Cardiovascular: Denies chest pain, " "tightness, palpitations, swelling in legs/feet, fainting, difficulty breathing when lying down but gets better when sitting up.   Respiratory: Denies shortness of breath, cough, sputum, wheezing, painful breathing, coughing up blood.   Sleep: per HPI  Gastrointestinal: Denies  difficulty swallowing, nausea, abdominal pain, diarrhea, constipation, heartburn..   Musculoskeletal: Denies painful joints, sore muscles, back pain.        PHYSICAL EXAM  MSEW    /72 (BP Location: Right arm, Patient Position: Sitting, BP Cuff Size: Adult)   Pulse 82   Resp 16   Ht 1.753 m (5' 9\")   Wt 95.3 kg (210 lb)   SpO2 95%   BMI 31.01 kg/m²   Appearance: Well-nourished, well-developed, no acute distress  Eyes:  PERRLA, EOMI  ENMT: without lesions, deformities;hearing grossly intact; tongue normal, posterior pharynx without erythema or exudate; Mallampati classification:   Neck: Supple, trachea midline, no masses  Respiratory effort:  No intercostal retractions or use of accessory muscles  Lung auscultation:  No wheezes rhonchi rubs or rales  Cardiac: No murmurs, rubs, or gallops; regular rhythm, normal rate; no edema  Abdomen:  No tenderness, no organomegaly.  Obese  Musculoskeletal:  Grossly normal; gait and station normal; digits and nails normal  Skin:  No rashes, petechiae, cyanosis  Neurologic: without focal signs; oriented to person, time, place, and purpose; judgement intact  Psychiatric:  No depression, anxiety, agitation        PROBLEMS:  1. KACY (obstructive sleep apnea)      2. Class 1 obesity without serious comorbidity with body mass index (BMI) of 32.0 to 32.9 in adult, unspecified obesity type      3. History of tobacco abuse      4. Need for influenza vaccination - got his shot already        PLAN:     Return in about 6 months (around 4/29/2019).    Has been advised to continue the current CPAP 9 cm H2O, clean equipment frequently, and get new mask and supplies as allowed by insurance and DME. Advised about " potential problems including nasal obstruction/stuffiness, mask leak or discomfort , frequent awakenings, chill or dryness of the upper airway, noise, inconvenience, and lack of benefit. Recommend an earlier appointment, if significant treatment barriers develop.

## 2018-10-29 ENCOUNTER — SLEEP CENTER VISIT (OUTPATIENT)
Dept: SLEEP MEDICINE | Facility: MEDICAL CENTER | Age: 48
End: 2018-10-29
Payer: COMMERCIAL

## 2018-10-29 VITALS
RESPIRATION RATE: 16 BRPM | BODY MASS INDEX: 31.1 KG/M2 | SYSTOLIC BLOOD PRESSURE: 116 MMHG | WEIGHT: 210 LBS | OXYGEN SATURATION: 95 % | HEIGHT: 69 IN | HEART RATE: 82 BPM | DIASTOLIC BLOOD PRESSURE: 72 MMHG

## 2018-10-29 DIAGNOSIS — Z23 NEED FOR INFLUENZA VACCINATION: ICD-10-CM

## 2018-10-29 DIAGNOSIS — E66.9 CLASS 1 OBESITY WITHOUT SERIOUS COMORBIDITY WITH BODY MASS INDEX (BMI) OF 32.0 TO 32.9 IN ADULT, UNSPECIFIED OBESITY TYPE: ICD-10-CM

## 2018-10-29 DIAGNOSIS — G47.33 OSA (OBSTRUCTIVE SLEEP APNEA): ICD-10-CM

## 2018-10-29 DIAGNOSIS — Z87.891 HISTORY OF TOBACCO ABUSE: ICD-10-CM

## 2018-10-29 PROCEDURE — 99214 OFFICE O/P EST MOD 30 MIN: CPT | Performed by: INTERNAL MEDICINE

## 2018-10-29 RX ORDER — TEMAZEPAM 30 MG/1
30 CAPSULE ORAL
COMMUNITY
End: 2023-05-23

## 2019-11-09 ENCOUNTER — APPOINTMENT (OUTPATIENT)
Dept: RADIOLOGY | Facility: MEDICAL CENTER | Age: 49
End: 2019-11-09
Attending: EMERGENCY MEDICINE
Payer: COMMERCIAL

## 2019-11-09 ENCOUNTER — HOSPITAL ENCOUNTER (EMERGENCY)
Facility: MEDICAL CENTER | Age: 49
End: 2019-11-09
Attending: EMERGENCY MEDICINE
Payer: COMMERCIAL

## 2019-11-09 VITALS
WEIGHT: 212.52 LBS | OXYGEN SATURATION: 94 % | RESPIRATION RATE: 20 BRPM | TEMPERATURE: 97.3 F | HEIGHT: 69 IN | BODY MASS INDEX: 31.48 KG/M2 | DIASTOLIC BLOOD PRESSURE: 89 MMHG | HEART RATE: 97 BPM | SYSTOLIC BLOOD PRESSURE: 127 MMHG

## 2019-11-09 DIAGNOSIS — R10.32 LEFT LOWER QUADRANT ABDOMINAL PAIN: ICD-10-CM

## 2019-11-09 LAB
ALBUMIN SERPL BCP-MCNC: 5 G/DL (ref 3.2–4.9)
ALBUMIN/GLOB SERPL: 1.7 G/DL
ALP SERPL-CCNC: 39 U/L (ref 30–99)
ALT SERPL-CCNC: 65 U/L (ref 2–50)
ANION GAP SERPL CALC-SCNC: 12 MMOL/L (ref 0–11.9)
AST SERPL-CCNC: 34 U/L (ref 12–45)
BASOPHILS # BLD AUTO: 0.2 % (ref 0–1.8)
BASOPHILS # BLD: 0.03 K/UL (ref 0–0.12)
BILIRUB SERPL-MCNC: 1.2 MG/DL (ref 0.1–1.5)
BUN SERPL-MCNC: 30 MG/DL (ref 8–22)
CALCIUM SERPL-MCNC: 9.4 MG/DL (ref 8.5–10.5)
CHLORIDE SERPL-SCNC: 107 MMOL/L (ref 96–112)
CO2 SERPL-SCNC: 21 MMOL/L (ref 20–33)
CREAT SERPL-MCNC: 1.03 MG/DL (ref 0.5–1.4)
EOSINOPHIL # BLD AUTO: 0.01 K/UL (ref 0–0.51)
EOSINOPHIL NFR BLD: 0.1 % (ref 0–6.9)
ERYTHROCYTE [DISTWIDTH] IN BLOOD BY AUTOMATED COUNT: 41.1 FL (ref 35.9–50)
GLOBULIN SER CALC-MCNC: 2.9 G/DL (ref 1.9–3.5)
GLUCOSE SERPL-MCNC: 125 MG/DL (ref 65–99)
HCT VFR BLD AUTO: 52.2 % (ref 42–52)
HGB BLD-MCNC: 18.2 G/DL (ref 14–18)
IMM GRANULOCYTES # BLD AUTO: 0.11 K/UL (ref 0–0.11)
IMM GRANULOCYTES NFR BLD AUTO: 0.8 % (ref 0–0.9)
LIPASE SERPL-CCNC: 23 U/L (ref 11–82)
LYMPHOCYTES # BLD AUTO: 0.59 K/UL (ref 1–4.8)
LYMPHOCYTES NFR BLD: 4.3 % (ref 22–41)
MCH RBC QN AUTO: 29.9 PG (ref 27–33)
MCHC RBC AUTO-ENTMCNC: 34.9 G/DL (ref 33.7–35.3)
MCV RBC AUTO: 85.9 FL (ref 81.4–97.8)
MONOCYTES # BLD AUTO: 0.45 K/UL (ref 0–0.85)
MONOCYTES NFR BLD AUTO: 3.3 % (ref 0–13.4)
NEUTROPHILS # BLD AUTO: 12.57 K/UL (ref 1.82–7.42)
NEUTROPHILS NFR BLD: 91.3 % (ref 44–72)
NRBC # BLD AUTO: 0 K/UL
NRBC BLD-RTO: 0 /100 WBC
PLATELET # BLD AUTO: 244 K/UL (ref 164–446)
PMV BLD AUTO: 10 FL (ref 9–12.9)
POTASSIUM SERPL-SCNC: 4.5 MMOL/L (ref 3.6–5.5)
PROT SERPL-MCNC: 7.9 G/DL (ref 6–8.2)
RBC # BLD AUTO: 6.08 M/UL (ref 4.7–6.1)
SODIUM SERPL-SCNC: 140 MMOL/L (ref 135–145)
WBC # BLD AUTO: 13.8 K/UL (ref 4.8–10.8)

## 2019-11-09 PROCEDURE — 74176 CT ABD & PELVIS W/O CONTRAST: CPT

## 2019-11-09 PROCEDURE — 700111 HCHG RX REV CODE 636 W/ 250 OVERRIDE (IP): Performed by: EMERGENCY MEDICINE

## 2019-11-09 PROCEDURE — 80053 COMPREHEN METABOLIC PANEL: CPT

## 2019-11-09 PROCEDURE — 36415 COLL VENOUS BLD VENIPUNCTURE: CPT

## 2019-11-09 PROCEDURE — 96374 THER/PROPH/DIAG INJ IV PUSH: CPT

## 2019-11-09 PROCEDURE — 99285 EMERGENCY DEPT VISIT HI MDM: CPT

## 2019-11-09 PROCEDURE — 83690 ASSAY OF LIPASE: CPT

## 2019-11-09 PROCEDURE — 85025 COMPLETE CBC W/AUTO DIFF WBC: CPT

## 2019-11-09 RX ORDER — ONDANSETRON 4 MG/1
4 TABLET, FILM COATED ORAL EVERY 8 HOURS PRN
Qty: 6 TAB | Refills: 1 | Status: SHIPPED | OUTPATIENT
Start: 2019-11-09 | End: 2021-02-08

## 2019-11-09 RX ORDER — ONDANSETRON 2 MG/ML
4 INJECTION INTRAMUSCULAR; INTRAVENOUS ONCE
Status: COMPLETED | OUTPATIENT
Start: 2019-11-09 | End: 2019-11-09

## 2019-11-09 RX ADMIN — ONDANSETRON 4 MG: 2 INJECTION INTRAMUSCULAR; INTRAVENOUS at 13:47

## 2019-11-09 NOTE — ED NOTES
Ambulates to room w/steady gait. Vomited few times in triage.  Also c/o diarrhea. Changing into hospital gown. Chart up for erp to see.

## 2019-11-09 NOTE — ED PROVIDER NOTES
ED Provider Note    Scribed for Tammy Lim M.D. by Charley Grove. 11/9/2019, 1:41 PM.    Primary care provider: Will White M.D.  Means of arrival: Walk-in  History obtained from: Patient  History limited by: None    CHIEF COMPLAINT  Chief Complaint   Patient presents with   • Abdominal Pain   • Nausea/Vomiting/Diarrhea       HPI  Abhishek Pacheco is a 49 y.o. male who presents to the Emergency Department for evaluation of acute, constant, non-radiating left lower quadrant abdominal pain onset this morning at 3 AM. The patient reports that he woke up this morning secondary to his moderate abdominal pain. No exacerbating or alleviating factors were reported. He notes that his abdominal pain is not exacerbated upon coughing. The patient does not report taking any over the counter medications for pain control. After the onset of his abdominal pain, he began to experienced symptoms of nausea, vomiting and diarrhea. He notes that his emesis and diarrhea were non-bloody and non-bilious. The patient additionally notes that his last bowel movement was one day ago and appeared to have a light red tint, but he states that he is unsure if he is experiencing bloody stools. Negative recent fevers. He notes that he experienced a similar episode of abdominal pain a few weeks ago that resolved with duration.       REVIEW OF SYSTEMS  Pertinent positives include left lower quadrant abdominal pain, nausea, vomiting, diarrhea, possible bloody stools. Pertinent negatives include no recent fevers. All other systems reviewed and negative.     PAST MEDICAL HISTORY   has a past medical history of Apnea, sleep, Back pain, Chickenpox, Cough, Daytime sleepiness, Dizziness, Frequent headaches, Gasping for breath, Heart burn, Heartburn, Morning headache, Pain, Shortness of breath, Sleep apnea, Snoring, Sore muscles, Swelling of lower extremity, Wears glasses, and Weight loss.    SURGICAL HISTORY   has a past surgical history  "that includes appendectomy (); nerve ulnar transfer (); debridement (2010); cyst excision (2010); other orthopedic surgery (); other orthopedic surgery; knee arthroscopy (2010); tonsillectomy and adenoidectomy (10/12/2011); septoturbinoplasty (10/12/2011); uvulopharyngopalatoplasty (10/12/2011); acl reconstruction (Right, ); shoulder arthroscopy w/ rotator cuff repair (Left, 2018); shoulder arthroscopy w/ bicipital tenodesis repair (2018); laminotomy; arthroscopy, knee; and tonsillectomy.    SOCIAL HISTORY  Social History     Tobacco Use   • Smoking status: Former Smoker     Packs/day: 0.50     Years: 17.00     Pack years: 8.50     Types: Cigarettes     Last attempt to quit: 2011     Years since quittin.1   • Smokeless tobacco: Never Used   • Tobacco comment:  ffor 22yrs   Substance Use Topics   • Alcohol use: Yes     Comment: /month social   • Drug use: No      Social History     Substance and Sexual Activity   Drug Use No       FAMILY HISTORY  No family history reported.     CURRENT MEDICATIONS  Current Outpatient Medications:   •  temazepam (RESTORIL) 30 MG capsule, Take 30 mg by mouth at bedtime as needed for Sleep., Disp: , Rfl:     ALLERGIES  Allergies   Allergen Reactions   • Iodine Hives and Itching     After receiving iodine contrast for CT pt developed hives and itching   • Percocet [Oxycodone-Acetaminophen] Nausea     And dizziness       PHYSICAL EXAM  VITAL SIGNS: /90   Pulse (!) 114   Temp 36.3 °C (97.3 °F) (Temporal)   Resp 16   Ht 1.753 m (5' 9\")   Wt 96.4 kg (212 lb 8.4 oz)   SpO2 97%   BMI 31.38 kg/m²     Constitutional:   male with glasses laying supine in gurney with no apparently distress , able to answer questions  HENT: Nose is normal in appearance without rhinorrhea, external ears are normal,  moist mucous membranes  Eyes: Anicteric,  pupils are equal round and reactive, there is no conjunctival drainage or pallor   Neck: " The trachea is midline, there is no obvious mass or meningeal signs  Cardiovascular: Equal radial pulsation.   Thorax & Lungs: Respiratory rate and effort are normal. There is normal chest excursion with respiration.  noted.  Abdomen: Abdomen is normal in appearance, left lower quadrant abdominal tenderness. Normal bowel sounds, no pain with cough, nonpulsatile  :  No CVA tenderness to palpation  Musculoskeletal: No deformities noted in all 4 extremities. Actively moves all 4 extremities  Skin: Visualized skin is warm, no erythema, no rash.  Neurologic:  Cranial nerves II through XII are intact there is no focal abnormality noted.  Psychiatric: Normal mood and mentation    DIAGNOSTIC STUDIES / PROCEDURES    LABS  Results for orders placed or performed during the hospital encounter of 11/09/19   CBC WITH DIFFERENTIAL   Result Value Ref Range    WBC 13.8 (H) 4.8 - 10.8 K/uL    RBC 6.08 4.70 - 6.10 M/uL    Hemoglobin 18.2 (H) 14.0 - 18.0 g/dL    Hematocrit 52.2 (H) 42.0 - 52.0 %    MCV 85.9 81.4 - 97.8 fL    MCH 29.9 27.0 - 33.0 pg    MCHC 34.9 33.7 - 35.3 g/dL    RDW 41.1 35.9 - 50.0 fL    Platelet Count 244 164 - 446 K/uL    MPV 10.0 9.0 - 12.9 fL    Neutrophils-Polys 91.30 (H) 44.00 - 72.00 %    Lymphocytes 4.30 (L) 22.00 - 41.00 %    Monocytes 3.30 0.00 - 13.40 %    Eosinophils 0.10 0.00 - 6.90 %    Basophils 0.20 0.00 - 1.80 %    Immature Granulocytes 0.80 0.00 - 0.90 %    Nucleated RBC 0.00 /100 WBC    Neutrophils (Absolute) 12.57 (H) 1.82 - 7.42 K/uL    Lymphs (Absolute) 0.59 (L) 1.00 - 4.80 K/uL    Monos (Absolute) 0.45 0.00 - 0.85 K/uL    Eos (Absolute) 0.01 0.00 - 0.51 K/uL    Baso (Absolute) 0.03 0.00 - 0.12 K/uL    Immature Granulocytes (abs) 0.11 0.00 - 0.11 K/uL    NRBC (Absolute) 0.00 K/uL   Comp Metabolic Panel   Result Value Ref Range    Sodium 140 135 - 145 mmol/L    Potassium 4.5 3.6 - 5.5 mmol/L    Chloride 107 96 - 112 mmol/L    Co2 21 20 - 33 mmol/L    Anion Gap 12.0 (H) 0.0 - 11.9    Glucose  "125 (H) 65 - 99 mg/dL    Bun 30 (H) 8 - 22 mg/dL    Creatinine 1.03 0.50 - 1.40 mg/dL    Calcium 9.4 8.5 - 10.5 mg/dL    AST(SGOT) 34 12 - 45 U/L    ALT(SGPT) 65 (H) 2 - 50 U/L    Alkaline Phosphatase 39 30 - 99 U/L    Total Bilirubin 1.2 0.1 - 1.5 mg/dL    Albumin 5.0 (H) 3.2 - 4.9 g/dL    Total Protein 7.9 6.0 - 8.2 g/dL    Globulin 2.9 1.9 - 3.5 g/dL    A-G Ratio 1.7 g/dL   LIPASE   Result Value Ref Range    Lipase 23 11 - 82 U/L   ESTIMATED GFR   Result Value Ref Range    GFR If African American >60 >60 mL/min/1.73 m 2    GFR If Non African American >60 >60 mL/min/1.73 m 2       All labs reviewed by me.    RADIOLOGY  CT-RENAL COLIC EVALUATION(A/P W/O)   Final Result      1.  No evidence of renal or ureteral stone or evidence of hydronephrosis. No evidence of inflammatory change.      2.  Small bilateral inguinal hernias which contain fat.        The radiologist's interpretation of all radiological studies have been reviewed by me.    COURSE & MEDICAL DECISION MAKING  Nursing notes and vital signs were reviewed. (See chart for details) History was obtained from the patient.     The patient presents with left lower quadrant abdominal pain, and the differential diagnosis includes but is not limited to diverticulitis, colitis gastroenteritis would be less likely as it is recurrent.       1:41 PM Patient seen and evaluated at bedside. Discussed plan of care with patient which includes ordering lab work and performing a CT scan to rule out diverticulitis and colitis. Initial orders in the Emergency Department included Estimated GFR, CMP, CBC with differential, lipase and CT renal colic evaluation. initial treatment in the Emergency Department included Zofran 4 mg. The patient's vitals are /90   Pulse (!) 114   Temp 36.3 °C (97.3 °F) (Temporal)   Resp 16   Ht 1.753 m (5' 9\")   Wt 96.4 kg (212 lb 8.4 oz)   SpO2 97%   BMI 31.38 kg/m²     ED testing reveals that his white blood cell count is slightly " "elevated. His CT scan shows no evidence of renal or ureteral stone. There is no evidence of hydronephrosis or inflammatory change.     3:06 PM On repeat evaluation of the patient, there was a  positive  response to Zofran with alleviation of his nausea. The patient's repeat exam was benign and reassuring. I informed the patient that his CT scan did not show any concerning findings and that his white blood cell count was sightly elevated due to a possible infection. The patient was informed that he is stable for discharge and will be discharged with a prescription for Zofran for alleviation of recurrent episode of nausea.  He was given discharge instructions. The patient will be discharged with a prescription for Zofran 4 mg which is to be taken every eight hours as needed. He was given a referral to Dr. White and instructed to immediately return to the ED if his symptoms worsen. Patient verbalizes their understanding and agreement to plan of discharge. His discharge vitals are /82   Pulse 94   Temp 36.3 °C (97.3 °F) (Temporal)   Resp 20   Ht 1.753 m (5' 9\")   Wt 96.4 kg (212 lb 8.4 oz)   SpO2 93%   BMI 31.38 kg/m²     The patient was discharged home with an information sheet on abdominal pain and told to return immediately for any signs or symptoms listed.  The patient agreed to the discharge precautions and follow-up plan which is documented in EPIC.    The patient will return for new or worsening symptoms and is stable at the time of discharge.    DISPOSITION:  Patient will be discharged home in stable condition.    FOLLOW UP:  Will White M.D.  [address]  [address]  [facility] [address]  [phone]          Will White M.D.  [address]  [address]  [facility] [address]  [phone]            OUTPATIENT MEDICATIONS:  New Prescriptions    ONDANSETRON (ZOFRAN) 4 MG TAB TABLET    Take 1 Tab by mouth every 8 hours as needed for Nausea/Vomiting.          FINAL IMPRESSION  1. Left lower " quadrant abdominal pain          I, Charley Grove (Scribe), am scribing for, and in the presence of, Tammy Lim M.D..    Electronically signed by: Charley Grove (Scribe), 11/9/2019    I, Tammy Lim M.D. personally performed the services described in this documentation, as scribed by Charley Grove in my presence, and it is both accurate and complete.    C    The note accurately reflects work and decisions made by me.  Tammy Lim  11/9/2019  4:52 PM

## 2019-11-09 NOTE — ED NOTES
Discharge instructions given to pt including follow up with PCP or returning if no improvement of symptoms or to return if worse. Prescription x 1 provided to pt. Questions answered by RN. Denies any new complaints. Discharged w/stable vitals and able to ambulate w/steady gait. Verbalized relief of nausea. No episodes of n/v/d.

## 2019-11-25 ENCOUNTER — HOSPITAL ENCOUNTER (OUTPATIENT)
Dept: LAB | Facility: MEDICAL CENTER | Age: 49
End: 2019-11-25
Attending: INTERNAL MEDICINE
Payer: COMMERCIAL

## 2019-11-25 ENCOUNTER — HOSPITAL ENCOUNTER (OUTPATIENT)
Dept: RADIOLOGY | Facility: MEDICAL CENTER | Age: 49
End: 2019-11-25
Attending: INTERNAL MEDICINE
Payer: COMMERCIAL

## 2019-11-25 DIAGNOSIS — R63.0 DECREASED APPETITE: ICD-10-CM

## 2019-11-25 DIAGNOSIS — R11.2 NAUSEA AND VOMITING, INTRACTABILITY OF VOMITING NOT SPECIFIED, UNSPECIFIED VOMITING TYPE: ICD-10-CM

## 2019-11-25 DIAGNOSIS — K59.00 CONSTIPATION, UNSPECIFIED CONSTIPATION TYPE: ICD-10-CM

## 2019-11-25 DIAGNOSIS — R10.30 LOWER ABDOMINAL PAIN: ICD-10-CM

## 2019-11-25 LAB
ALBUMIN SERPL BCP-MCNC: 4.2 G/DL (ref 3.2–4.9)
ALBUMIN/GLOB SERPL: 1.4 G/DL
ALP SERPL-CCNC: 39 U/L (ref 30–99)
ALT SERPL-CCNC: 42 U/L (ref 2–50)
ANION GAP SERPL CALC-SCNC: 7 MMOL/L (ref 0–11.9)
APPEARANCE UR: CLEAR
AST SERPL-CCNC: 21 U/L (ref 12–45)
BASOPHILS # BLD AUTO: 0.7 % (ref 0–1.8)
BASOPHILS # BLD: 0.08 K/UL (ref 0–0.12)
BILIRUB SERPL-MCNC: 0.5 MG/DL (ref 0.1–1.5)
BILIRUB UR QL STRIP.AUTO: NEGATIVE
BUN SERPL-MCNC: 17 MG/DL (ref 8–22)
CALCIUM SERPL-MCNC: 9 MG/DL (ref 8.5–10.5)
CHLORIDE SERPL-SCNC: 105 MMOL/L (ref 96–112)
CO2 SERPL-SCNC: 25 MMOL/L (ref 20–33)
COLOR UR: YELLOW
CREAT SERPL-MCNC: 0.87 MG/DL (ref 0.5–1.4)
CRP SERPL HS-MCNC: 2.38 MG/DL (ref 0–0.75)
EOSINOPHIL # BLD AUTO: 0.13 K/UL (ref 0–0.51)
EOSINOPHIL NFR BLD: 1.1 % (ref 0–6.9)
ERYTHROCYTE [DISTWIDTH] IN BLOOD BY AUTOMATED COUNT: 41 FL (ref 35.9–50)
GLOBULIN SER CALC-MCNC: 3 G/DL (ref 1.9–3.5)
GLUCOSE SERPL-MCNC: 81 MG/DL (ref 65–99)
GLUCOSE UR STRIP.AUTO-MCNC: NEGATIVE MG/DL
HCT VFR BLD AUTO: 47.4 % (ref 42–52)
HGB BLD-MCNC: 15.9 G/DL (ref 14–18)
IMM GRANULOCYTES # BLD AUTO: 0.05 K/UL (ref 0–0.11)
IMM GRANULOCYTES NFR BLD AUTO: 0.4 % (ref 0–0.9)
KETONES UR STRIP.AUTO-MCNC: NEGATIVE MG/DL
LEUKOCYTE ESTERASE UR QL STRIP.AUTO: NEGATIVE
LYMPHOCYTES # BLD AUTO: 4.16 K/UL (ref 1–4.8)
LYMPHOCYTES NFR BLD: 35.7 % (ref 22–41)
MCH RBC QN AUTO: 29.1 PG (ref 27–33)
MCHC RBC AUTO-ENTMCNC: 33.5 G/DL (ref 33.7–35.3)
MCV RBC AUTO: 86.8 FL (ref 81.4–97.8)
MICRO URNS: NORMAL
MONOCYTES # BLD AUTO: 0.76 K/UL (ref 0–0.85)
MONOCYTES NFR BLD AUTO: 6.5 % (ref 0–13.4)
NEUTROPHILS # BLD AUTO: 6.47 K/UL (ref 1.82–7.42)
NEUTROPHILS NFR BLD: 55.6 % (ref 44–72)
NITRITE UR QL STRIP.AUTO: NEGATIVE
NRBC # BLD AUTO: 0 K/UL
NRBC BLD-RTO: 0 /100 WBC
PH UR STRIP.AUTO: 5.5 [PH] (ref 5–8)
PLATELET # BLD AUTO: 294 K/UL (ref 164–446)
PMV BLD AUTO: 10.6 FL (ref 9–12.9)
POTASSIUM SERPL-SCNC: 4 MMOL/L (ref 3.6–5.5)
PROT SERPL-MCNC: 7.2 G/DL (ref 6–8.2)
PROT UR QL STRIP: NEGATIVE MG/DL
RBC # BLD AUTO: 5.46 M/UL (ref 4.7–6.1)
RBC UR QL AUTO: NEGATIVE
SODIUM SERPL-SCNC: 137 MMOL/L (ref 135–145)
SP GR UR STRIP.AUTO: 1.02
TSH SERPL DL<=0.005 MIU/L-ACNC: 1.48 UIU/ML (ref 0.38–5.33)
UROBILINOGEN UR STRIP.AUTO-MCNC: 0.2 MG/DL
WBC # BLD AUTO: 11.7 K/UL (ref 4.8–10.8)

## 2019-11-25 PROCEDURE — 85025 COMPLETE CBC W/AUTO DIFF WBC: CPT

## 2019-11-25 PROCEDURE — 36415 COLL VENOUS BLD VENIPUNCTURE: CPT

## 2019-11-25 PROCEDURE — 84443 ASSAY THYROID STIM HORMONE: CPT

## 2019-11-25 PROCEDURE — 80053 COMPREHEN METABOLIC PANEL: CPT

## 2019-11-25 PROCEDURE — 86140 C-REACTIVE PROTEIN: CPT

## 2019-11-25 PROCEDURE — 74018 RADEX ABDOMEN 1 VIEW: CPT

## 2019-11-25 PROCEDURE — 81003 URINALYSIS AUTO W/O SCOPE: CPT

## 2019-12-10 ENCOUNTER — HOSPITAL ENCOUNTER (OUTPATIENT)
Dept: RADIOLOGY | Facility: MEDICAL CENTER | Age: 49
End: 2019-12-10
Attending: INTERNAL MEDICINE
Payer: COMMERCIAL

## 2019-12-10 DIAGNOSIS — R11.2 NAUSEA AND VOMITING, INTRACTABILITY OF VOMITING NOT SPECIFIED, UNSPECIFIED VOMITING TYPE: ICD-10-CM

## 2019-12-10 DIAGNOSIS — R10.30 ABDOMINAL PAIN, LOWER: ICD-10-CM

## 2019-12-10 PROCEDURE — 74177 CT ABD & PELVIS W/CONTRAST: CPT

## 2019-12-10 PROCEDURE — 700117 HCHG RX CONTRAST REV CODE 255: Performed by: INTERNAL MEDICINE

## 2019-12-10 RX ADMIN — IOHEXOL 100 ML: 350 INJECTION, SOLUTION INTRAVENOUS at 09:16

## 2019-12-10 RX ADMIN — IOHEXOL 25 ML: 240 INJECTION, SOLUTION INTRATHECAL; INTRAVASCULAR; INTRAVENOUS; ORAL at 09:16

## 2021-02-08 ENCOUNTER — HOSPITAL ENCOUNTER (OUTPATIENT)
Dept: RADIOLOGY | Facility: MEDICAL CENTER | Age: 51
End: 2021-02-08
Attending: FAMILY MEDICINE
Payer: COMMERCIAL

## 2021-02-08 ENCOUNTER — OFFICE VISIT (OUTPATIENT)
Dept: URGENT CARE | Facility: CLINIC | Age: 51
End: 2021-02-08
Payer: COMMERCIAL

## 2021-02-08 VITALS
HEART RATE: 100 BPM | OXYGEN SATURATION: 98 % | BODY MASS INDEX: 31.84 KG/M2 | SYSTOLIC BLOOD PRESSURE: 122 MMHG | DIASTOLIC BLOOD PRESSURE: 84 MMHG | TEMPERATURE: 96.8 F | RESPIRATION RATE: 14 BRPM | HEIGHT: 69 IN | WEIGHT: 215 LBS

## 2021-02-08 DIAGNOSIS — M54.50 ACUTE BILATERAL LOW BACK PAIN WITHOUT SCIATICA: ICD-10-CM

## 2021-02-08 DIAGNOSIS — N50.89 CHYLOCELE OF TUNICA VAGINALIS: ICD-10-CM

## 2021-02-08 PROCEDURE — 99999 PR NO CHARGE: CPT | Performed by: PHYSICIAN ASSISTANT

## 2021-02-08 PROCEDURE — 99213 OFFICE O/P EST LOW 20 MIN: CPT | Mod: 25 | Performed by: PHYSICIAN ASSISTANT

## 2021-02-08 PROCEDURE — 76870 US EXAM SCROTUM: CPT

## 2021-02-08 RX ORDER — METHYLPREDNISOLONE 4 MG/1
TABLET ORAL
Qty: 1 EACH | Refills: 0 | Status: ON HOLD | OUTPATIENT
Start: 2021-02-08 | End: 2021-05-08

## 2021-02-08 RX ORDER — VALACYCLOVIR HYDROCHLORIDE 500 MG/1
500 TABLET, FILM COATED ORAL 3 TIMES DAILY PRN
COMMUNITY
Start: 2021-01-08 | End: 2022-02-28

## 2021-02-08 RX ORDER — CYCLOBENZAPRINE HCL 10 MG
10 TABLET ORAL 3 TIMES DAILY PRN
Qty: 30 TAB | Refills: 0 | Status: ON HOLD | OUTPATIENT
Start: 2021-02-08 | End: 2021-05-08

## 2021-02-08 RX ORDER — KETOROLAC TROMETHAMINE 30 MG/ML
30 INJECTION, SOLUTION INTRAMUSCULAR; INTRAVENOUS ONCE
Status: COMPLETED | OUTPATIENT
Start: 2021-02-08 | End: 2021-02-08

## 2021-02-08 RX ORDER — TRAMADOL HYDROCHLORIDE 50 MG/1
50 TABLET ORAL EVERY 6 HOURS PRN
Qty: 12 TAB | Refills: 0 | Status: SHIPPED | OUTPATIENT
Start: 2021-02-08 | End: 2021-02-13

## 2021-02-08 RX ORDER — SUMATRIPTAN 100 MG/1
100 TABLET, FILM COATED ORAL
COMMUNITY
Start: 2021-01-08 | End: 2022-02-28

## 2021-02-08 RX ADMIN — KETOROLAC TROMETHAMINE 30 MG: 30 INJECTION, SOLUTION INTRAMUSCULAR; INTRAVENOUS at 19:11

## 2021-02-08 ASSESSMENT — ENCOUNTER SYMPTOMS
TINGLING: 0
FEVER: 0
SHORTNESS OF BREATH: 0
CHILLS: 0
PERIANAL NUMBNESS: 0
ABDOMINAL PAIN: 0
DIZZINESS: 0
LEG PAIN: 0
NUMBNESS: 0
NAUSEA: 0
VOMITING: 0
BACK PAIN: 1
DIARRHEA: 0

## 2021-02-08 ASSESSMENT — FIBROSIS 4 INDEX: FIB4 SCORE: 0.54

## 2021-02-08 NOTE — LETTER
February 8, 2021         Patient: Abhishek Pacheco   YOB: 1970   Date of Visit: 2/8/2021           To Whom it May Concern:    Abhishek Pacheco was seen in my clinic on 2/8/2021. Please excuse his absence on 2/9/21.     If you have any questions or concerns, please don't hesitate to call.        Sincerely,           Mary Prince P.A.-C.  Electronically Signed

## 2021-02-09 NOTE — PROGRESS NOTES
"Subjective:      Abhishek Pacheco is a 50 y.o. male who presents with Back Pain (lifted a pile of hay wrong and has difficulty walking with pain while sitting x1 day)            Back Pain  This is a new problem. The current episode started yesterday. The problem occurs constantly. The problem is unchanged. The pain is present in the lumbar spine. The quality of the pain is described as stabbing and aching. The pain does not radiate. The pain is at a severity of 8/10. The pain is severe. The symptoms are aggravated by bending, twisting, standing and sitting. Pertinent negatives include no abdominal pain, bladder incontinence, chest pain, dysuria, fever, leg pain, numbness, perianal numbness or tingling. He has tried nothing for the symptoms.     Patient presents to urgent care reporting a 1 day history of low back pain, starting yesterday that started suddenly while lifting a bail of hay. He denies any falls or trauma. No bowel/bladder incontinence, urinary symptoms, distal numbness/tingling, or radiating pain down the leg. No history of prior injuries. He has tried taking OTC ibuprofen without significant relief.        Review of Systems   Constitutional: Negative for chills and fever.   HENT: Negative for congestion.    Respiratory: Negative for shortness of breath.    Cardiovascular: Negative for chest pain.   Gastrointestinal: Negative for abdominal pain, diarrhea, nausea and vomiting.   Genitourinary: Negative.  Negative for bladder incontinence and dysuria.   Musculoskeletal: Positive for back pain.   Skin: Negative for rash.   Neurological: Negative for dizziness, tingling and numbness.        Objective:     /84   Pulse 100   Temp 36 °C (96.8 °F) (Temporal)   Resp 14   Ht 1.753 m (5' 9\")   Wt 97.5 kg (215 lb)   SpO2 98%   BMI 31.75 kg/m²        Physical Exam  Vitals signs and nursing note reviewed.   Constitutional:       General: He is in acute distress.      Appearance: Normal appearance. " He is well-developed. He is not ill-appearing.   HENT:      Head: Normocephalic and atraumatic.      Right Ear: External ear normal.      Left Ear: External ear normal.   Eyes:      Conjunctiva/sclera: Conjunctivae normal.   Neck:      Musculoskeletal: Normal range of motion.   Cardiovascular:      Rate and Rhythm: Normal rate.      Heart sounds: No murmur.   Pulmonary:      Effort: Pulmonary effort is normal.   Musculoskeletal:      Lumbar back: He exhibits decreased range of motion, tenderness, bony tenderness and pain.        Back:       Comments: Straight leg raise negative.    Skin:     General: Skin is warm and dry.   Neurological:      Mental Status: He is alert and oriented to person, place, and time.   Psychiatric:         Behavior: Behavior normal.          PMH:  has a past medical history of Apnea, sleep, Back pain, Chickenpox, Cough, Daytime sleepiness, Dizziness, Frequent headaches, Gasping for breath, Heart burn, Heartburn, Morning headache, Pain, Shortness of breath, Sleep apnea, Snoring, Sore muscles, Swelling of lower extremity, Wears glasses, and Weight loss.  MEDS:   Current Outpatient Medications:   •  cyclobenzaprine (FLEXERIL) 10 mg Tab, Take 1 Tab by mouth 3 times a day as needed., Disp: 30 Tab, Rfl: 0  •  methylPREDNISolone (MEDROL DOSEPAK) 4 MG Tablet Therapy Pack, Take as directed, Disp: 1 Each, Rfl: 0  •  traMADol (ULTRAM) 50 MG Tab, Take 1 Tab by mouth every 6 hours as needed for up to 5 days., Disp: 12 Tab, Rfl: 0  •  temazepam (RESTORIL) 30 MG capsule, Take 30 mg by mouth at bedtime as needed for Sleep., Disp: , Rfl:   •  sumatriptan (IMITREX) 100 MG tablet, TAKE 1 TABLET BY MOUTH ONCE A DAY AT ONSET OF HEADACHE, Disp: , Rfl:   •  valACYclovir (VALTREX) 500 MG Tab, Take 500 mg by mouth 3 times a day., Disp: , Rfl:   ALLERGIES:   Allergies   Allergen Reactions   • Iodine Hives and Itching     After receiving iodine contrast for CT pt developed hives and itching    Pt developed  breakthrough reaction of hives after being premedicated for contrast injection on 12/10/19   • Percocet [Oxycodone-Acetaminophen] Nausea     And dizziness     SURGHX:   Past Surgical History:   Procedure Laterality Date   • SHOULDER ARTHROSCOPY W/ ROTATOR CUFF REPAIR Left 2/7/2018    Procedure: SHOULDER ARTHROSCOPY W/ ROTATOR CUFF REPAIR;  Surgeon: Kiet Ramirez M.D.;  Location: SURGERY [facility];  Service: Orthopedics   • SHOULDER ARTHROSCOPY W/ BICIPITAL TENODESIS REPAIR  2/7/2018    Procedure: SHOULDER ARTHROSCOPY with open BICIPITAL TENODESIS REPAIR;  Surgeon: Kiet Ramirez M.D.;  Location: SURGERY [facility];  Service: Orthopedics   • TONSILLECTOMY AND ADENOIDECTOMY  10/12/2011    Performed by ROBERTO ALEMAN at SURGERY SAME DAY [facility] ORS   • SEPTOTURBINOPLASTY  10/12/2011    Performed by ROBERTO ALEMAN at SURGERY SAME DAY [facility] ORS   • UVULOPHARYNGOPALATOPLASTY  10/12/2011    Performed by ROBERTO ALEMAN at SURGERY SAME DAY [facility] ORS   • DEBRIDEMENT  11/2/2010    Performed by BAUTISTA KURTZ at SURGERY SAME DAY [facility] ORS   • CYST EXCISION  11/2/2010    Performed by BAUTISTA KURTZ at SURGERY SAME DAY [facility] ORS   • KNEE ARTHROSCOPY  11/2/2010    Performed by BAUTISTA KURTZ at SURGERY SAME DAY [facility] ORS   • APPENDECTOMY  2009   • OTHER ORTHOPEDIC SURGERY  2007    right shoulder   • ACL RECONSTRUCTION Right 2003   • NERVE ULNAR TRANSFER  2001   • ARTHROSCOPY, KNEE     • LAMINOTOMY     • OTHER ORTHOPEDIC SURGERY      spine c2 c3 nerves clipped   • TONSILLECTOMY       SOCHX:  reports that he quit smoking about 9 years ago. His smoking use included cigarettes. He has a 8.50 pack-year smoking history. He has never used smokeless tobacco. He reports current alcohol use. He reports that he does not use drugs.  FH: family history is not on file.       Assessment/Plan:        1. Acute bilateral low back pain without sciatica    - ketorolac (TORADOL) injection 30 mg   -  given in urgent care with mild relief of pain after 15 minutes of monitoring   - cyclobenzaprine (FLEXERIL) 10 mg Tab; Take 1 Tab by mouth 3 times a day as needed.  Dispense: 30 Tab; Refill: 0   - Will cause sedation, avoid driving, operating heavy machinery, and drinking alcohol  - methylPREDNISolone (MEDROL DOSEPAK) 4 MG Tablet Therapy Pack; Take as directed  Dispense: 1 Each; Refill: 0  - traMADol (ULTRAM) 50 MG Tab; Take 1 Tab by mouth every 6 hours as needed for up to 5 days.  Dispense: 12 Tab; Refill: 0   - Will cause sedation, avoid driving, operating heavy machinery, and drinking alcohol    - Consent for Opiate Prescription    Encouraged icing/heating 2-3 times daily followed by gentle massage and stretching. Avoid use of OTC nsaids while taking course of oral steroids due to increased risk of GI bleed. OTC tylenol as needed for mild-moderate pain. Tramadol provided to take as needed for severe pain. Call or return to office if symptoms persist or worsen. Work note provided today. The patient demonstrated a good understanding and agreed with the treatment plan.

## 2021-04-12 ENCOUNTER — PRE-ADMISSION TESTING (OUTPATIENT)
Dept: ADMISSIONS | Facility: MEDICAL CENTER | Age: 51
End: 2021-04-12
Attending: UROLOGY
Payer: COMMERCIAL

## 2021-04-12 ASSESSMENT — FIBROSIS 4 INDEX: FIB4 SCORE: 0.54

## 2021-04-26 ENCOUNTER — PRE-ADMISSION TESTING (OUTPATIENT)
Dept: ADMISSIONS | Facility: MEDICAL CENTER | Age: 51
End: 2021-04-26
Attending: UROLOGY
Payer: COMMERCIAL

## 2021-04-26 DIAGNOSIS — Z01.812 PRE-OPERATIVE LABORATORY EXAMINATION: ICD-10-CM

## 2021-04-26 LAB
COVID ORDER STATUS COVID19: NORMAL
SARS-COV-2 RNA RESP QL NAA+PROBE: NOTDETECTED
SPECIMEN SOURCE: NORMAL

## 2021-04-26 PROCEDURE — U0003 INFECTIOUS AGENT DETECTION BY NUCLEIC ACID (DNA OR RNA); SEVERE ACUTE RESPIRATORY SYNDROME CORONAVIRUS 2 (SARS-COV-2) (CORONAVIRUS DISEASE [COVID-19]), AMPLIFIED PROBE TECHNIQUE, MAKING USE OF HIGH THROUGHPUT TECHNOLOGIES AS DESCRIBED BY CMS-2020-01-R: HCPCS

## 2021-04-26 PROCEDURE — U0005 INFEC AGEN DETEC AMPLI PROBE: HCPCS

## 2021-04-26 PROCEDURE — C9803 HOPD COVID-19 SPEC COLLECT: HCPCS

## 2021-04-29 ENCOUNTER — HOSPITAL ENCOUNTER (OUTPATIENT)
Facility: MEDICAL CENTER | Age: 51
End: 2021-04-29
Attending: UROLOGY | Admitting: UROLOGY
Payer: COMMERCIAL

## 2021-04-29 ENCOUNTER — ANESTHESIA (OUTPATIENT)
Dept: SURGERY | Facility: MEDICAL CENTER | Age: 51
End: 2021-04-29
Payer: COMMERCIAL

## 2021-04-29 ENCOUNTER — ANESTHESIA EVENT (OUTPATIENT)
Dept: SURGERY | Facility: MEDICAL CENTER | Age: 51
End: 2021-04-29
Payer: COMMERCIAL

## 2021-04-29 VITALS
WEIGHT: 220.46 LBS | TEMPERATURE: 96.8 F | BODY MASS INDEX: 32.65 KG/M2 | DIASTOLIC BLOOD PRESSURE: 95 MMHG | RESPIRATION RATE: 18 BRPM | OXYGEN SATURATION: 96 % | SYSTOLIC BLOOD PRESSURE: 136 MMHG | HEART RATE: 86 BPM | HEIGHT: 69 IN

## 2021-04-29 DIAGNOSIS — G89.18 POSTOPERATIVE PAIN: ICD-10-CM

## 2021-04-29 PROBLEM — Z72.0 TOBACCO ABUSE: Status: ACTIVE | Noted: 2021-04-29

## 2021-04-29 LAB — PATHOLOGY CONSULT NOTE: NORMAL

## 2021-04-29 PROCEDURE — 160039 HCHG SURGERY MINUTES - EA ADDL 1 MIN LEVEL 3: Performed by: UROLOGY

## 2021-04-29 PROCEDURE — A6403 STERILE GAUZE>16 <= 48 SQ IN: HCPCS | Performed by: UROLOGY

## 2021-04-29 PROCEDURE — 160046 HCHG PACU - 1ST 60 MINS PHASE II: Performed by: UROLOGY

## 2021-04-29 PROCEDURE — 160009 HCHG ANES TIME/MIN: Performed by: UROLOGY

## 2021-04-29 PROCEDURE — 160028 HCHG SURGERY MINUTES - 1ST 30 MINS LEVEL 3: Performed by: UROLOGY

## 2021-04-29 PROCEDURE — 500002 HCHG ADHESIVE, DERMABOND: Performed by: UROLOGY

## 2021-04-29 PROCEDURE — 700105 HCHG RX REV CODE 258: Performed by: UROLOGY

## 2021-04-29 PROCEDURE — 700102 HCHG RX REV CODE 250 W/ 637 OVERRIDE(OP): Performed by: ANESTHESIOLOGY

## 2021-04-29 PROCEDURE — 88304 TISSUE EXAM BY PATHOLOGIST: CPT

## 2021-04-29 PROCEDURE — 160035 HCHG PACU - 1ST 60 MINS PHASE I: Performed by: UROLOGY

## 2021-04-29 PROCEDURE — 501838 HCHG SUTURE GENERAL: Performed by: UROLOGY

## 2021-04-29 PROCEDURE — 700111 HCHG RX REV CODE 636 W/ 250 OVERRIDE (IP): Performed by: UROLOGY

## 2021-04-29 PROCEDURE — 700102 HCHG RX REV CODE 250 W/ 637 OVERRIDE(OP): Performed by: UROLOGY

## 2021-04-29 PROCEDURE — 700111 HCHG RX REV CODE 636 W/ 250 OVERRIDE (IP): Performed by: ANESTHESIOLOGY

## 2021-04-29 PROCEDURE — 160002 HCHG RECOVERY MINUTES (STAT): Performed by: UROLOGY

## 2021-04-29 PROCEDURE — 700101 HCHG RX REV CODE 250: Performed by: UROLOGY

## 2021-04-29 PROCEDURE — 700101 HCHG RX REV CODE 250: Performed by: ANESTHESIOLOGY

## 2021-04-29 PROCEDURE — A9270 NON-COVERED ITEM OR SERVICE: HCPCS | Performed by: UROLOGY

## 2021-04-29 PROCEDURE — A9270 NON-COVERED ITEM OR SERVICE: HCPCS | Performed by: ANESTHESIOLOGY

## 2021-04-29 PROCEDURE — 160025 RECOVERY II MINUTES (STATS): Performed by: UROLOGY

## 2021-04-29 PROCEDURE — 160048 HCHG OR STATISTICAL LEVEL 1-5: Performed by: UROLOGY

## 2021-04-29 RX ORDER — SODIUM CHLORIDE, SODIUM LACTATE, POTASSIUM CHLORIDE, CALCIUM CHLORIDE 600; 310; 30; 20 MG/100ML; MG/100ML; MG/100ML; MG/100ML
INJECTION, SOLUTION INTRAVENOUS CONTINUOUS
Status: DISCONTINUED | OUTPATIENT
Start: 2021-04-29 | End: 2021-04-29 | Stop reason: HOSPADM

## 2021-04-29 RX ORDER — TRAMADOL HYDROCHLORIDE 50 MG/1
50 TABLET ORAL
Status: DISCONTINUED | OUTPATIENT
Start: 2021-04-29 | End: 2021-04-29 | Stop reason: HOSPADM

## 2021-04-29 RX ORDER — BACITRACIN ZINC 500 [USP'U]/G
OINTMENT TOPICAL
Status: DISCONTINUED | OUTPATIENT
Start: 2021-04-29 | End: 2021-04-29 | Stop reason: HOSPADM

## 2021-04-29 RX ORDER — LIDOCAINE HYDROCHLORIDE 20 MG/ML
INJECTION, SOLUTION EPIDURAL; INFILTRATION; INTRACAUDAL; PERINEURAL PRN
Status: DISCONTINUED | OUTPATIENT
Start: 2021-04-29 | End: 2021-04-29 | Stop reason: SURG

## 2021-04-29 RX ORDER — MEPERIDINE HYDROCHLORIDE 25 MG/ML
12.5 INJECTION INTRAMUSCULAR; INTRAVENOUS; SUBCUTANEOUS
Status: DISCONTINUED | OUTPATIENT
Start: 2021-04-29 | End: 2021-04-29 | Stop reason: HOSPADM

## 2021-04-29 RX ORDER — SODIUM CHLORIDE, SODIUM LACTATE, POTASSIUM CHLORIDE, CALCIUM CHLORIDE 600; 310; 30; 20 MG/100ML; MG/100ML; MG/100ML; MG/100ML
INJECTION, SOLUTION INTRAVENOUS CONTINUOUS
Status: ACTIVE | OUTPATIENT
Start: 2021-04-29 | End: 2021-04-29

## 2021-04-29 RX ORDER — KETOROLAC TROMETHAMINE 30 MG/ML
INJECTION, SOLUTION INTRAMUSCULAR; INTRAVENOUS PRN
Status: DISCONTINUED | OUTPATIENT
Start: 2021-04-29 | End: 2021-04-29 | Stop reason: SURG

## 2021-04-29 RX ORDER — DEXAMETHASONE SODIUM PHOSPHATE 4 MG/ML
INJECTION, SOLUTION INTRA-ARTICULAR; INTRALESIONAL; INTRAMUSCULAR; INTRAVENOUS; SOFT TISSUE PRN
Status: DISCONTINUED | OUTPATIENT
Start: 2021-04-29 | End: 2021-04-29 | Stop reason: SURG

## 2021-04-29 RX ORDER — TRAMADOL HYDROCHLORIDE 50 MG/1
100 TABLET ORAL
Status: COMPLETED | OUTPATIENT
Start: 2021-04-29 | End: 2021-04-29

## 2021-04-29 RX ORDER — MIDAZOLAM HYDROCHLORIDE 1 MG/ML
INJECTION INTRAMUSCULAR; INTRAVENOUS PRN
Status: DISCONTINUED | OUTPATIENT
Start: 2021-04-29 | End: 2021-04-29 | Stop reason: SURG

## 2021-04-29 RX ORDER — CEFAZOLIN SODIUM 1 G/3ML
INJECTION, POWDER, FOR SOLUTION INTRAMUSCULAR; INTRAVENOUS PRN
Status: DISCONTINUED | OUTPATIENT
Start: 2021-04-29 | End: 2021-04-29 | Stop reason: SURG

## 2021-04-29 RX ORDER — HYDROCODONE BITARTRATE AND ACETAMINOPHEN 5; 325 MG/1; MG/1
1 TABLET ORAL EVERY 4 HOURS PRN
Qty: 10 TABLET | Refills: 0 | Status: SHIPPED | OUTPATIENT
Start: 2021-04-29 | End: 2021-05-02

## 2021-04-29 RX ORDER — ONDANSETRON 2 MG/ML
4 INJECTION INTRAMUSCULAR; INTRAVENOUS
Status: DISCONTINUED | OUTPATIENT
Start: 2021-04-29 | End: 2021-04-29 | Stop reason: HOSPADM

## 2021-04-29 RX ORDER — ACETAMINOPHEN 500 MG
1000 TABLET ORAL ONCE
Status: COMPLETED | OUTPATIENT
Start: 2021-04-29 | End: 2021-04-29

## 2021-04-29 RX ORDER — HALOPERIDOL 5 MG/ML
1 INJECTION INTRAMUSCULAR
Status: DISCONTINUED | OUTPATIENT
Start: 2021-04-29 | End: 2021-04-29 | Stop reason: HOSPADM

## 2021-04-29 RX ORDER — BUPIVACAINE HYDROCHLORIDE 2.5 MG/ML
INJECTION, SOLUTION EPIDURAL; INFILTRATION; INTRACAUDAL
Status: DISCONTINUED | OUTPATIENT
Start: 2021-04-29 | End: 2021-04-29 | Stop reason: HOSPADM

## 2021-04-29 RX ADMIN — PROPOFOL 20 MG: 10 INJECTION, EMULSION INTRAVENOUS at 09:06

## 2021-04-29 RX ADMIN — DEXAMETHASONE SODIUM PHOSPHATE 8 MG: 4 INJECTION, SOLUTION INTRA-ARTICULAR; INTRALESIONAL; INTRAMUSCULAR; INTRAVENOUS; SOFT TISSUE at 08:53

## 2021-04-29 RX ADMIN — CEFAZOLIN 2 G: 330 INJECTION, POWDER, FOR SOLUTION INTRAMUSCULAR; INTRAVENOUS at 08:50

## 2021-04-29 RX ADMIN — SODIUM CHLORIDE, POTASSIUM CHLORIDE, SODIUM LACTATE AND CALCIUM CHLORIDE: 600; 310; 30; 20 INJECTION, SOLUTION INTRAVENOUS at 09:51

## 2021-04-29 RX ADMIN — KETOROLAC TROMETHAMINE 30 MG: 30 INJECTION, SOLUTION INTRAMUSCULAR at 10:02

## 2021-04-29 RX ADMIN — SODIUM CHLORIDE, POTASSIUM CHLORIDE, SODIUM LACTATE AND CALCIUM CHLORIDE: 600; 310; 30; 20 INJECTION, SOLUTION INTRAVENOUS at 08:02

## 2021-04-29 RX ADMIN — MIDAZOLAM HYDROCHLORIDE 2 MG: 1 INJECTION, SOLUTION INTRAMUSCULAR; INTRAVENOUS at 08:45

## 2021-04-29 RX ADMIN — FENTANYL CITRATE 25 MCG: 50 INJECTION, SOLUTION INTRAMUSCULAR; INTRAVENOUS at 10:02

## 2021-04-29 RX ADMIN — ACETAMINOPHEN 1000 MG: 500 TABLET ORAL at 07:50

## 2021-04-29 RX ADMIN — LIDOCAINE HYDROCHLORIDE 100 MG: 20 INJECTION, SOLUTION EPIDURAL; INFILTRATION; INTRACAUDAL at 08:50

## 2021-04-29 RX ADMIN — LIDOCAINE HYDROCHLORIDE 0.5 ML: 10 INJECTION, SOLUTION EPIDURAL; INFILTRATION; INTRACAUDAL at 07:50

## 2021-04-29 RX ADMIN — PROPOFOL 180 MG: 10 INJECTION, EMULSION INTRAVENOUS at 08:50

## 2021-04-29 RX ADMIN — FENTANYL CITRATE 50 MCG: 50 INJECTION, SOLUTION INTRAMUSCULAR; INTRAVENOUS at 09:04

## 2021-04-29 RX ADMIN — FENTANYL CITRATE 50 MCG: 50 INJECTION, SOLUTION INTRAMUSCULAR; INTRAVENOUS at 08:50

## 2021-04-29 RX ADMIN — FENTANYL CITRATE 50 MCG: 50 INJECTION, SOLUTION INTRAMUSCULAR; INTRAVENOUS at 09:06

## 2021-04-29 RX ADMIN — TRAMADOL HYDROCHLORIDE 100 MG: 50 TABLET, FILM COATED ORAL at 11:39

## 2021-04-29 ASSESSMENT — PAIN DESCRIPTION - PAIN TYPE
TYPE: SURGICAL PAIN

## 2021-04-29 ASSESSMENT — FIBROSIS 4 INDEX: FIB4 SCORE: 0.54

## 2021-04-29 ASSESSMENT — PAIN SCALES - GENERAL: PAIN_LEVEL: 0

## 2021-04-29 NOTE — ANESTHESIA PREPROCEDURE EVALUATION
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    Relevant Problems   ANESTHESIA   (+) KACY (obstructive sleep apnea)      Other   (+) Obesity   (+) Tobacco abuse     GERD c/w meds    Denies CAD, CP/SOB, CVA, HTN, DM, LIVER/KIDNEY DZ, URI    Physical Exam    Airway   Mallampati: I  TM distance: >3 FB  Neck ROM: full    Comments: No uvula   Cardiovascular   Rhythm: regular  Rate: normal  (-) murmur     Dental - normal exam           Pulmonary   Breath sounds clear to auscultation     Abdominal    Neurological - normal exam                 Anesthesia Plan    ASA 2       Plan - general       Airway plan will be LMA          Induction: intravenous    Postoperative Plan: Postoperative administration of opioids is intended.    Pertinent diagnostic labs and testing reviewed    Informed Consent:

## 2021-04-29 NOTE — ANESTHESIA TIME REPORT
Anesthesia Start and Stop Event Times     Date Time Event    4/29/2021 0831 Ready for Procedure     0845 Anesthesia Start     1019 Anesthesia Stop        Responsible Staff  04/29/21    Name Role Begin End    Cheli Bolivar M.D. Anesth 0845 1019        Preop Diagnosis (Free Text):  Pre-op Diagnosis     LEFT SPERMATIC CORD MASS        Preop Diagnosis (Codes):    Post op Diagnosis  Spermatic cord mass  desired sterility    Premium Reason  Non-Premium    Comments:

## 2021-04-29 NOTE — OR NURSING
Patient to Phase II in stable condition. VSS. Surgical dressing clean dry intact. Aox4 and on RA. No further needs.

## 2021-04-29 NOTE — OR NURSING
Patient in PACU in stable condition. VSS. Surgical dressing clean dry intact. Will continue to monitor and medicate appropriately.

## 2021-04-29 NOTE — OP REPORT
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     PROCEDURES PERFORMED:  Excision of left spermatic cord mass and bilateral   vasectomy.     SURGEON:  Anant Riddle MD     ANESTHESIOLOGIST:  Cheli Bolivar MD     ANESTHESIA:  General.     INDICATIONS FOR PROCEDURE:  The patient is a 50-year-old male with a recent   left-sided testicular pain, exam showing a soft mass in the area of the left   spermatic cord.  The patient also desires a vasectomy for sterilization.     DESCRIPTION OF PROCEDURE:  After obtaining informed consent, the patient was   brought to the operating room.  After the induction of general anesthesia, he   was placed in a supine position.  His genitalia were prepped and draped in   sterile fashion.  He received Ancef as antibiotic prophylaxis prior to   starting the procedure.  An incision was made along the midline raphae of the   scrotum approximately 4 cm in length.  Dissection was continued into the left   hemiscrotum and the left testicle and spermatic cord were pulled out through   the incision.  Testicle was normal in appearance as was the epididymis above   that and the spermatic cord was a large area of fatty tissue, so I began to   dissect that free from the spermatic cord.  Some of the fatty tissue was sent   off as a specimen.  As I dissected further and further up the spermatic cord,   I felt to see if this was a communicating through a hernia and it did appear   that this was communicating through hernia.  I examined the tissue there   closely  after opening up the sac surrounding it and no bowel was in it, but   it did appear might be omentum into that hernia that was reduced back up into   the abdomen.  The rest of the spermatic cord felt normal.  So then performed a   vasectomy on the left side by isolating the vas deferens and  removing a   section of the vas deferens and cauterizing clipping both ends and then   reorienting the two ends in 2 different planes.  The left testicle and   spermatic cord were then replaced into the left hemiscrotum and the right vas   deferens was palpated through this midline incision and dissection was   continued down onto the vas tube.  The vas itself was dissected free from the   surrounding tissue and a section of vas deferens was excised.  Both ends were   cauterized and clipped and oriented in two different planes before placing   into the right hemiscrotum.  The dartos layer was then oversewn with a 3-0   Vicryl in running fashion after performing bilateral spermatic cord blocks and   injecting local into the skin.  Skin was then closed using a 3-0 chromic in   running vertical mattress fashion.  After this, the area was cleaned and   dried.  Antibiotic ointment, fluffs and a scrotal support were placed.  The   patient was then awoken from anesthesia and taken to recovery room in stable   condition.     COMPLICATIONS:  None.     ESTIMATED BLOOD LOSS:  20 mL     SPECIMENS:  Left spermatic cord mass.     DRAINS:  None.        ______________________________  MD LIZ Johnson/SHASHI    DD:  04/29/2021 10:25  DT:  04/29/2021 11:06    Job#:  [id number]

## 2021-04-29 NOTE — DISCHARGE INSTRUCTIONS
ACTIVITY: Rest and take it easy for the first 24 hours.  A responsible adult is recommended to remain with you during that time.  It is normal to feel sleepy.  We encourage you to not do anything that requires balance, judgment or coordination.    MILD FLU-LIKE SYMPTOMS ARE NORMAL. YOU MAY EXPERIENCE GENERALIZED MUSCLE ACHES, THROAT IRRITATION, HEADACHE AND/OR SOME NAUSEA.    FOR 24 HOURS DO NOT:  Drive, operate machinery or run household appliances.  Drink beer or alcoholic beverages.   Make important decisions or sign legal documents.    SPECIAL INSTRUCTIONS: Follow surgeon's instructions.    DIET: To avoid nausea, slowly advance diet as tolerated, avoiding spicy or greasy foods for the first day.  Add more substantial food to your diet according to your physician's instructions.  INCREASE FLUIDS AND FIBER TO AVOID CONSTIPATION.    SURGICAL DRESSING/BATHING: Ok to shower post op day one. No baths, hot tubs, or soaks until cleared by MD.    FOLLOW-UP APPOINTMENT:  A follow-up appointment should be arranged with your doctor; call to schedule.    You should CALL YOUR PHYSICIAN if you develop:  Fever greater than 101 degrees F.  Pain not relieved by medication, or persistent nausea or vomiting.  Excessive bleeding (blood soaking through dressing) or unexpected drainage from the wound.  Extreme redness or swelling around the incision site, drainage of pus or foul smelling drainage.  Inability to urinate or empty your bladder within 8 hours.  Problems with breathing or chest pain.    You should call 911 if you develop problems with breathing or chest pain.  If you are unable to contact your doctor or surgical center, you should go to the nearest emergency room or urgent care center.  Physician's telephone #: Dr. Riddle [phone].    If any questions arise, call your doctor.  If your doctor is not available, please feel free to call the Surgical Center at [phone]. The Contact Center is open Monday through  Friday 7AM to 5PM and may speak to a nurse at [phone], or toll free at [phone].     A registered nurse may call you a few days after your surgery to see how you are doing after your procedure.    MEDICATIONS: Resume taking daily medication.  Take prescribed pain medication with food.  If no medication is prescribed, you may take non-aspirin pain medication if needed.  PAIN MEDICATION CAN BE VERY CONSTIPATING.  Take a stool softener or laxative such as senokot, pericolace, or milk of magnesia if needed.    Prescription sent electronically.  Last pain medication given: 1140am.    If your physician has prescribed pain medication that includes Acetaminophen (Tylenol), do not take additional Acetaminophen (Tylenol) while taking the prescribed medication.    Depression / Suicide Risk    As you are discharged from this [facility] facility, it is important to learn how to keep safe from harming yourself.    Recognize the warning signs:  · Abrupt changes in personality, positive or negative- including increase in energy   · Giving away possessions  · Change in eating patterns- significant weight changes-  positive or negative  · Change in sleeping patterns- unable to sleep or sleeping all the time   · Unwillingness or inability to communicate  · Depression  · Unusual sadness, discouragement and loneliness  · Talk of wanting to die  · Neglect of personal appearance   · Rebelliousness- reckless behavior  · Withdrawal from people/activities they love  · Confusion- inability to concentrate     If you or a loved one observes any of these behaviors or has concerns about self-harm, here's what you can do:  · Talk about it- your feelings and reasons for harming yourself  · Remove any means that you might use to hurt yourself (examples: pills, rope, extension cords, firearm)  · Get professional help from the community (Mental Health, Substance Abuse, psychological counseling)  · Do not be alone:Call your Safe Contact-  someone whom you trust who will be there for you.  · Call your local CRISIS HOTLINE 679-1816 or 687-875-6687  · Call your local Children's Mobile Crisis Response Team [place] (546) 188-1160 or www.[employer]  · Call the toll free National Suicide Prevention Hotlines   · National Suicide Prevention Lifeline 262-556-JVKH (8839)  · [employer] Network 800-SUICIDE (644-2013)

## 2021-04-29 NOTE — OR NURSING
Patient ready to go, last vital signs in flow sheet. Discharge instructions reviewed, all questions answered. PIV removed. Pain is better controlled and tolerable for patient to go home. Patient taken to car in wheelchair. Patient safe to discharge home with family.

## 2021-04-29 NOTE — ANESTHESIA PROCEDURE NOTES
Airway    Date/Time: 4/29/2021 8:51 AM  Performed by: Cheli Bolivar M.D.  Authorized by: Cheli Bolivar M.D.     Location:  OR  Urgency:  Elective  Indications for Airway Management:  Anesthesia      Spontaneous Ventilation: absent    Sedation Level:  Deep  Preoxygenated: Yes    Mask Difficulty Assessment:  0 - not attempted  Final Airway Type:  Supraglottic airway  Final Supraglottic Airway:  Standard LMA    SGA Size:  5  Number of Attempts at Approach:  1

## 2021-04-29 NOTE — ANESTHESIA POSTPROCEDURE EVALUATION
Patient: Abhishek Pacheco    Procedure Summary     Date: 04/29/21 Room / Location: [place] [address] / SURGERY [facility]    Anesthesia Start: 0845 Anesthesia Stop: 1019    Procedures:       EXCISION,LESION OR LIPOMA,SPERMATIC CORD - SPERMATIC CORD MASS. (Left )      VASECTOMY (Bilateral ) Diagnosis: (LEFT SPERMATIC CORD MASS)    Surgeons: Anant Riddle M.D. Responsible Provider: Cheli Bolivar M.D.    Anesthesia Type: general ASA Status: 2          Final Anesthesia Type: general  Last vitals  BP   Blood Pressure: 123/82    Temp   36.5 °C (97.7 °F)    Pulse   72   Resp   12    SpO2   100 %      Anesthesia Post Evaluation    Patient location during evaluation: PACU  Patient participation: complete - patient participated  Level of consciousness: awake  Pain score: 0    Airway patency: patent  Anesthetic complications: no  Cardiovascular status: adequate  Respiratory status: acceptable  Hydration status: acceptable    PONV: none          No complications documented.     Nurse Pain Score: 0 (NPRS)

## 2021-04-29 NOTE — OR SURGEON
Immediate Post OP Note    PreOp Diagnosis: L spermatic cord mass; undesired fertility      PostOp Diagnosis: L spermatic cord mass; L inguinal hernia with omentum in scrotum; undesired fertility      Procedure(s):  EXCISION OF LESION OF SPERMATIC CORD - Wound Class: Clean  VASECTOMY - Wound Class: Clean    Surgeon(s):  Anant Riddle M.D.    Anesthesiologist/Type of Anesthesia:  Anesthesiologist: Cheli Bolivar M.D./General    Surgical Staff:  Circulator: Preeti Ecketr R.N.  Monitoring Nurse: Chele Doshi R.N.  Relief Scrub: Kiet Huizar R.N.  Scrub Person: Daljit Gr    Specimens removed if any:  ID Type Source Tests Collected by Time Destination   A : LEFT SPERMATIC CORD MASS Other Other PATHOLOGY SPECIMEN Anant Riddle M.D. 4/29/2021 10:03 AM        Estimated Blood Loss: 20ml    Findings: fatty appearing tissue along spermatic cord; left inguinal hernia with omentum in the hernia    Complications: none      4/29/2021 10:17 AM Anant Riddle M.D.

## 2021-05-05 PROCEDURE — 99285 EMERGENCY DEPT VISIT HI MDM: CPT

## 2021-05-06 ENCOUNTER — HOSPITAL ENCOUNTER (INPATIENT)
Facility: MEDICAL CENTER | Age: 51
LOS: 2 days | DRG: 921 | End: 2021-05-08
Attending: EMERGENCY MEDICINE | Admitting: STUDENT IN AN ORGANIZED HEALTH CARE EDUCATION/TRAINING PROGRAM
Payer: COMMERCIAL

## 2021-05-06 ENCOUNTER — APPOINTMENT (OUTPATIENT)
Dept: RADIOLOGY | Facility: MEDICAL CENTER | Age: 51
DRG: 921 | End: 2021-05-06
Attending: EMERGENCY MEDICINE
Payer: COMMERCIAL

## 2021-05-06 DIAGNOSIS — G89.18 POST-OP PAIN: ICD-10-CM

## 2021-05-06 DIAGNOSIS — T81.31XA SURGICAL WOUND DEHISCENCE, INITIAL ENCOUNTER: ICD-10-CM

## 2021-05-06 DIAGNOSIS — T81.30XA WOUND DEHISCENCE: ICD-10-CM

## 2021-05-06 DIAGNOSIS — R52 PAIN: ICD-10-CM

## 2021-05-06 PROBLEM — G47.33 OSA (OBSTRUCTIVE SLEEP APNEA): Status: RESOLVED | Noted: 2018-10-28 | Resolved: 2021-05-06

## 2021-05-06 LAB
ANION GAP SERPL CALC-SCNC: 14 MMOL/L (ref 7–16)
APPEARANCE UR: CLEAR
BASOPHILS # BLD AUTO: 0 % (ref 0–1.8)
BASOPHILS # BLD: 0 K/UL (ref 0–0.12)
BILIRUB UR QL STRIP.AUTO: NEGATIVE
BUN SERPL-MCNC: 19 MG/DL (ref 8–22)
CALCIUM SERPL-MCNC: 8.7 MG/DL (ref 8.5–10.5)
CHLORIDE SERPL-SCNC: 103 MMOL/L (ref 96–112)
CHOLEST SERPL-MCNC: 193 MG/DL (ref 100–199)
CO2 SERPL-SCNC: 22 MMOL/L (ref 20–33)
COLOR UR: YELLOW
CREAT SERPL-MCNC: 0.94 MG/DL (ref 0.5–1.4)
EOSINOPHIL # BLD AUTO: 0 K/UL (ref 0–0.51)
EOSINOPHIL NFR BLD: 0 % (ref 0–6.9)
ERYTHROCYTE [DISTWIDTH] IN BLOOD BY AUTOMATED COUNT: 42.5 FL (ref 35.9–50)
GLUCOSE SERPL-MCNC: 95 MG/DL (ref 65–99)
GLUCOSE UR STRIP.AUTO-MCNC: NEGATIVE MG/DL
HCT VFR BLD AUTO: 44.1 % (ref 42–52)
HDLC SERPL-MCNC: 25 MG/DL
HGB BLD-MCNC: 15.3 G/DL (ref 14–18)
KETONES UR STRIP.AUTO-MCNC: ABNORMAL MG/DL
LDLC SERPL CALC-MCNC: 89 MG/DL
LEUKOCYTE ESTERASE UR QL STRIP.AUTO: NEGATIVE
LYMPHOCYTES # BLD AUTO: 4.96 K/UL (ref 1–4.8)
LYMPHOCYTES NFR BLD: 44.7 % (ref 22–41)
MAGNESIUM SERPL-MCNC: 2.1 MG/DL (ref 1.5–2.5)
MANUAL DIFF BLD: NORMAL
MCH RBC QN AUTO: 30.1 PG (ref 27–33)
MCHC RBC AUTO-ENTMCNC: 34.7 G/DL (ref 33.7–35.3)
MCV RBC AUTO: 86.8 FL (ref 81.4–97.8)
MICRO URNS: ABNORMAL
MONOCYTES # BLD AUTO: 0.59 K/UL (ref 0–0.85)
MONOCYTES NFR BLD AUTO: 5.3 % (ref 0–13.4)
MORPHOLOGY BLD-IMP: NORMAL
MYELOCYTES NFR BLD MANUAL: 1.8 %
NEUTROPHILS # BLD AUTO: 5.35 K/UL (ref 1.82–7.42)
NEUTROPHILS NFR BLD: 48.2 % (ref 44–72)
NITRITE UR QL STRIP.AUTO: NEGATIVE
NRBC # BLD AUTO: 0 K/UL
NRBC BLD-RTO: 0 /100 WBC
PH UR STRIP.AUTO: 5 [PH] (ref 5–8)
PLATELET # BLD AUTO: 273 K/UL (ref 164–446)
PLATELET BLD QL SMEAR: NORMAL
PMV BLD AUTO: 9.6 FL (ref 9–12.9)
POTASSIUM SERPL-SCNC: 4 MMOL/L (ref 3.6–5.5)
PROT UR QL STRIP: NEGATIVE MG/DL
RBC # BLD AUTO: 5.08 M/UL (ref 4.7–6.1)
RBC BLD AUTO: NORMAL
RBC UR QL AUTO: NEGATIVE
SARS-COV+SARS-COV-2 AG RESP QL IA.RAPID: NOTDETECTED
SARS-COV-2 RNA RESP QL NAA+PROBE: NOTDETECTED
SODIUM SERPL-SCNC: 139 MMOL/L (ref 135–145)
SP GR UR STRIP.AUTO: 1.02
SPECIMEN SOURCE: NORMAL
SPECIMEN SOURCE: NORMAL
TRIGL SERPL-MCNC: 397 MG/DL (ref 0–149)
UROBILINOGEN UR STRIP.AUTO-MCNC: 0.2 MG/DL
WBC # BLD AUTO: 11.1 K/UL (ref 4.8–10.8)

## 2021-05-06 PROCEDURE — U0003 INFECTIOUS AGENT DETECTION BY NUCLEIC ACID (DNA OR RNA); SEVERE ACUTE RESPIRATORY SYNDROME CORONAVIRUS 2 (SARS-COV-2) (CORONAVIRUS DISEASE [COVID-19]), AMPLIFIED PROBE TECHNIQUE, MAKING USE OF HIGH THROUGHPUT TECHNOLOGIES AS DESCRIBED BY CMS-2020-01-R: HCPCS

## 2021-05-06 PROCEDURE — 99222 1ST HOSP IP/OBS MODERATE 55: CPT | Performed by: STUDENT IN AN ORGANIZED HEALTH CARE EDUCATION/TRAINING PROGRAM

## 2021-05-06 PROCEDURE — 80061 LIPID PANEL: CPT

## 2021-05-06 PROCEDURE — 87426 SARSCOV CORONAVIRUS AG IA: CPT

## 2021-05-06 PROCEDURE — 96375 TX/PRO/DX INJ NEW DRUG ADDON: CPT

## 2021-05-06 PROCEDURE — 81003 URINALYSIS AUTO W/O SCOPE: CPT

## 2021-05-06 PROCEDURE — 770006 HCHG ROOM/CARE - MED/SURG/GYN SEMI*

## 2021-05-06 PROCEDURE — C9803 HOPD COVID-19 SPEC COLLECT: HCPCS | Performed by: EMERGENCY MEDICINE

## 2021-05-06 PROCEDURE — 80048 BASIC METABOLIC PNL TOTAL CA: CPT

## 2021-05-06 PROCEDURE — A9270 NON-COVERED ITEM OR SERVICE: HCPCS | Performed by: STUDENT IN AN ORGANIZED HEALTH CARE EDUCATION/TRAINING PROGRAM

## 2021-05-06 PROCEDURE — 700102 HCHG RX REV CODE 250 W/ 637 OVERRIDE(OP): Performed by: STUDENT IN AN ORGANIZED HEALTH CARE EDUCATION/TRAINING PROGRAM

## 2021-05-06 PROCEDURE — U0005 INFEC AGEN DETEC AMPLI PROBE: HCPCS

## 2021-05-06 PROCEDURE — 85007 BL SMEAR W/DIFF WBC COUNT: CPT

## 2021-05-06 PROCEDURE — 700105 HCHG RX REV CODE 258: Performed by: EMERGENCY MEDICINE

## 2021-05-06 PROCEDURE — 96376 TX/PRO/DX INJ SAME DRUG ADON: CPT

## 2021-05-06 PROCEDURE — 700111 HCHG RX REV CODE 636 W/ 250 OVERRIDE (IP): Performed by: EMERGENCY MEDICINE

## 2021-05-06 PROCEDURE — 87040 BLOOD CULTURE FOR BACTERIA: CPT | Mod: 91

## 2021-05-06 PROCEDURE — 96365 THER/PROPH/DIAG IV INF INIT: CPT

## 2021-05-06 PROCEDURE — 83735 ASSAY OF MAGNESIUM: CPT

## 2021-05-06 PROCEDURE — 76870 US EXAM SCROTUM: CPT

## 2021-05-06 PROCEDURE — 85027 COMPLETE CBC AUTOMATED: CPT

## 2021-05-06 PROCEDURE — 700111 HCHG RX REV CODE 636 W/ 250 OVERRIDE (IP): Performed by: STUDENT IN AN ORGANIZED HEALTH CARE EDUCATION/TRAINING PROGRAM

## 2021-05-06 RX ORDER — KETOROLAC TROMETHAMINE 30 MG/ML
15 INJECTION, SOLUTION INTRAMUSCULAR; INTRAVENOUS ONCE
Status: COMPLETED | OUTPATIENT
Start: 2021-05-06 | End: 2021-05-06

## 2021-05-06 RX ORDER — IBUPROFEN 800 MG/1
800 TABLET ORAL 3 TIMES DAILY PRN
Status: DISCONTINUED | OUTPATIENT
Start: 2021-05-09 | End: 2021-05-07

## 2021-05-06 RX ORDER — LABETALOL HYDROCHLORIDE 5 MG/ML
10 INJECTION, SOLUTION INTRAVENOUS EVERY 4 HOURS PRN
Status: DISCONTINUED | OUTPATIENT
Start: 2021-05-06 | End: 2021-05-08 | Stop reason: HOSPADM

## 2021-05-06 RX ORDER — AMOXICILLIN 250 MG
2 CAPSULE ORAL 2 TIMES DAILY
Status: DISCONTINUED | OUTPATIENT
Start: 2021-05-06 | End: 2021-05-08 | Stop reason: HOSPADM

## 2021-05-06 RX ORDER — IBUPROFEN 200 MG
400 TABLET ORAL EVERY 8 HOURS PRN
COMMUNITY
End: 2021-09-15

## 2021-05-06 RX ORDER — NICOTINE 21 MG/24HR
1 PATCH, TRANSDERMAL 24 HOURS TRANSDERMAL
COMMUNITY
End: 2022-02-28

## 2021-05-06 RX ORDER — POLYETHYLENE GLYCOL 3350 17 G/17G
1 POWDER, FOR SOLUTION ORAL
Status: DISCONTINUED | OUTPATIENT
Start: 2021-05-06 | End: 2021-05-08 | Stop reason: HOSPADM

## 2021-05-06 RX ORDER — ONDANSETRON 4 MG/1
4 TABLET, ORALLY DISINTEGRATING ORAL EVERY 4 HOURS PRN
Status: DISCONTINUED | OUTPATIENT
Start: 2021-05-06 | End: 2021-05-08 | Stop reason: HOSPADM

## 2021-05-06 RX ORDER — MORPHINE SULFATE 4 MG/ML
4 INJECTION, SOLUTION INTRAMUSCULAR; INTRAVENOUS ONCE
Status: COMPLETED | OUTPATIENT
Start: 2021-05-06 | End: 2021-05-06

## 2021-05-06 RX ORDER — PROMETHAZINE HYDROCHLORIDE 25 MG/1
12.5-25 TABLET ORAL EVERY 4 HOURS PRN
Status: DISCONTINUED | OUTPATIENT
Start: 2021-05-06 | End: 2021-05-08 | Stop reason: HOSPADM

## 2021-05-06 RX ORDER — ACETAMINOPHEN 325 MG/1
650 TABLET ORAL EVERY 6 HOURS PRN
Status: DISCONTINUED | OUTPATIENT
Start: 2021-05-06 | End: 2021-05-08 | Stop reason: HOSPADM

## 2021-05-06 RX ORDER — CEFAZOLIN SODIUM 1 G/50ML
1 INJECTION, SOLUTION INTRAVENOUS ONCE
Status: COMPLETED | OUTPATIENT
Start: 2021-05-06 | End: 2021-05-06

## 2021-05-06 RX ORDER — PROCHLORPERAZINE EDISYLATE 5 MG/ML
5-10 INJECTION INTRAMUSCULAR; INTRAVENOUS EVERY 4 HOURS PRN
Status: DISCONTINUED | OUTPATIENT
Start: 2021-05-06 | End: 2021-05-08 | Stop reason: HOSPADM

## 2021-05-06 RX ORDER — SULFAMETHOXAZOLE AND TRIMETHOPRIM 800; 160 MG/1; MG/1
1 TABLET ORAL EVERY 12 HOURS
Status: ON HOLD | COMMUNITY
Start: 2021-05-05 | End: 2021-05-08

## 2021-05-06 RX ORDER — PROMETHAZINE HYDROCHLORIDE 25 MG/1
12.5-25 SUPPOSITORY RECTAL EVERY 4 HOURS PRN
Status: DISCONTINUED | OUTPATIENT
Start: 2021-05-06 | End: 2021-05-08 | Stop reason: HOSPADM

## 2021-05-06 RX ORDER — KETOROLAC TROMETHAMINE 30 MG/ML
30 INJECTION, SOLUTION INTRAMUSCULAR; INTRAVENOUS EVERY 6 HOURS
Status: DISCONTINUED | OUTPATIENT
Start: 2021-05-06 | End: 2021-05-08 | Stop reason: HOSPADM

## 2021-05-06 RX ORDER — ONDANSETRON 2 MG/ML
4 INJECTION INTRAMUSCULAR; INTRAVENOUS EVERY 4 HOURS PRN
Status: DISCONTINUED | OUTPATIENT
Start: 2021-05-06 | End: 2021-05-08 | Stop reason: HOSPADM

## 2021-05-06 RX ORDER — MORPHINE SULFATE 4 MG/ML
4 INJECTION, SOLUTION INTRAMUSCULAR; INTRAVENOUS
Status: DISCONTINUED | OUTPATIENT
Start: 2021-05-06 | End: 2021-05-07

## 2021-05-06 RX ORDER — BISACODYL 10 MG
10 SUPPOSITORY, RECTAL RECTAL
Status: DISCONTINUED | OUTPATIENT
Start: 2021-05-06 | End: 2021-05-08 | Stop reason: HOSPADM

## 2021-05-06 RX ORDER — SODIUM CHLORIDE 9 MG/ML
1000 INJECTION, SOLUTION INTRAVENOUS ONCE
Status: COMPLETED | OUTPATIENT
Start: 2021-05-06 | End: 2021-05-06

## 2021-05-06 RX ORDER — NICOTINE 21 MG/24HR
14 PATCH, TRANSDERMAL 24 HOURS TRANSDERMAL
Status: DISCONTINUED | OUTPATIENT
Start: 2021-05-06 | End: 2021-05-08 | Stop reason: HOSPADM

## 2021-05-06 RX ORDER — CEFAZOLIN SODIUM 2 G/100ML
2 INJECTION, SOLUTION INTRAVENOUS EVERY 8 HOURS
Status: DISCONTINUED | OUTPATIENT
Start: 2021-05-06 | End: 2021-05-06

## 2021-05-06 RX ADMIN — CEFAZOLIN SODIUM 2 G: 2 INJECTION, SOLUTION INTRAVENOUS at 14:50

## 2021-05-06 RX ADMIN — MORPHINE SULFATE 4 MG: 4 INJECTION INTRAVENOUS at 14:50

## 2021-05-06 RX ADMIN — MORPHINE SULFATE 4 MG: 4 INJECTION INTRAVENOUS at 01:11

## 2021-05-06 RX ADMIN — MORPHINE SULFATE 4 MG: 4 INJECTION INTRAVENOUS at 01:30

## 2021-05-06 RX ADMIN — KETOROLAC TROMETHAMINE 15 MG: 30 INJECTION, SOLUTION INTRAMUSCULAR; INTRAVENOUS at 03:37

## 2021-05-06 RX ADMIN — KETOROLAC TROMETHAMINE 30 MG: 30 INJECTION, SOLUTION INTRAMUSCULAR; INTRAVENOUS at 10:16

## 2021-05-06 RX ADMIN — MORPHINE SULFATE 4 MG: 4 INJECTION INTRAVENOUS at 21:23

## 2021-05-06 RX ADMIN — SODIUM CHLORIDE 1000 ML: 9 INJECTION, SOLUTION INTRAVENOUS at 03:37

## 2021-05-06 RX ADMIN — CEFAZOLIN SODIUM 1 G: 1 INJECTION, SOLUTION INTRAVENOUS at 03:37

## 2021-05-06 RX ADMIN — NICOTINE 14 MG: 14 PATCH TRANSDERMAL at 06:00

## 2021-05-06 RX ADMIN — DOCUSATE SODIUM 50 MG AND SENNOSIDES 8.6 MG 2 TABLET: 8.6; 5 TABLET, FILM COATED ORAL at 17:11

## 2021-05-06 RX ADMIN — MORPHINE SULFATE 4 MG: 4 INJECTION INTRAVENOUS at 06:32

## 2021-05-06 RX ADMIN — MORPHINE SULFATE 4 MG: 4 INJECTION INTRAVENOUS at 09:04

## 2021-05-06 RX ADMIN — ONDANSETRON 4 MG: 4 TABLET, ORALLY DISINTEGRATING ORAL at 13:13

## 2021-05-06 RX ADMIN — MORPHINE SULFATE 4 MG: 4 INJECTION INTRAVENOUS at 03:37

## 2021-05-06 ASSESSMENT — COGNITIVE AND FUNCTIONAL STATUS - GENERAL
DAILY ACTIVITIY SCORE: 24
SUGGESTED CMS G CODE MODIFIER MOBILITY: CH
MOBILITY SCORE: 24
SUGGESTED CMS G CODE MODIFIER DAILY ACTIVITY: CH

## 2021-05-06 ASSESSMENT — PAIN DESCRIPTION - PAIN TYPE
TYPE: ACUTE PAIN

## 2021-05-06 ASSESSMENT — LIFESTYLE VARIABLES
AVERAGE NUMBER OF DAYS PER WEEK YOU HAVE A DRINK CONTAINING ALCOHOL: 5
ON A TYPICAL DAY WHEN YOU DRINK ALCOHOL HOW MANY DRINKS DO YOU HAVE: 2
DOES PATIENT WANT TO STOP DRINKING: NO
CONSUMPTION TOTAL: NEGATIVE
HOW MANY TIMES IN THE PAST YEAR HAVE YOU HAD 5 OR MORE DRINKS IN A DAY: 0
HAVE PEOPLE ANNOYED YOU BY CRITICIZING YOUR DRINKING: NO
EVER FELT BAD OR GUILTY ABOUT YOUR DRINKING: NO
ALCOHOL_USE: YES
EVER HAD A DRINK FIRST THING IN THE MORNING TO STEADY YOUR NERVES TO GET RID OF A HANGOVER: NO
TOTAL SCORE: 0
TOTAL SCORE: 0
HAVE YOU EVER FELT YOU SHOULD CUT DOWN ON YOUR DRINKING: NO
TOTAL SCORE: 0

## 2021-05-06 ASSESSMENT — ENCOUNTER SYMPTOMS
VOMITING: 0
FLANK PAIN: 0
FEVER: 0
NAUSEA: 0
CHILLS: 0

## 2021-05-06 ASSESSMENT — FIBROSIS 4 INDEX
FIB4 SCORE: 0.54
FIB4 SCORE: 0.58

## 2021-05-06 ASSESSMENT — PATIENT HEALTH QUESTIONNAIRE - PHQ9
1. LITTLE INTEREST OR PLEASURE IN DOING THINGS: NOT AT ALL
SUM OF ALL RESPONSES TO PHQ9 QUESTIONS 1 AND 2: 0
2. FEELING DOWN, DEPRESSED, IRRITABLE, OR HOPELESS: NOT AT ALL

## 2021-05-06 ASSESSMENT — PAIN DESCRIPTION - DESCRIPTORS: DESCRIPTORS: TENDER

## 2021-05-06 NOTE — CONSULTS
[facility] [place] Consult/H&P Note      Attending: Rafael Perez M.D.  Patient's Name/MRN: Abhishek Pacheco, [id number]    Admit Date:5/6/2021  Today's Date: 5/6/2021   Length of stay:  LOS: 0 days   Room #: YE 55/55 YEL      Reason for consult/chief complaint: scrotal incision opening  ID/HPI: Abhishek Pacheco is a 50 y.o. male patient consulted to our service for wound dehiscence. 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 Riddle last week. He experienced severe scrotal edema following his procedure. Last night at dinner, his midline scrotal incision opened up and drained blood. Today upon exam, the incision continues to drain clear yellow serous fluid. No signs of infection, gangrene, crepitus, fluctuance. Denies fevers/ chills.        Past Medical History:   Past Medical History:   Diagnosis Date   • Apnea, sleep    • Back pain    • Chickenpox    • Cough    • Daytime sleepiness    • Dizziness    • Frequent headaches    • Gasping for breath    • Heart burn    • Heartburn    • Morning headache    • Pain     right knee   • Shortness of breath    • Sleep apnea     no CPAP had ENT surgery   • Snoring    • Sore muscles    • Swelling of lower extremity    • Wears glasses    • Weight loss         Past Surgical History:   Past Surgical History:   Procedure Laterality Date   • PB REMOVAL OF SPERM CORD LESION Left 4/29/2021    Procedure: EXCISION,LESION OR LIPOMA,SPERMATIC CORD - SPERMATIC CORD MASS.;  Surgeon: Anant Riddle M.D.;  Location: [facility];  Service: Urology   • PB REMOVAL OF SPERM DUCT(S) Bilateral 4/29/2021    Procedure: VASECTOMY;  Surgeon: Anant Riddle M.D.;  Location: [facility];  Service: Urology   • SHOULDER ARTHROSCOPY W/ ROTATOR CUFF REPAIR Left 2/7/2018    Procedure: SHOULDER ARTHROSCOPY W/ ROTATOR CUFF REPAIR;  Surgeon: Kiet Ramirez M.D.;  Location: [facility];  Service: Orthopedics   • SHOULDER ARTHROSCOPY W/ BICIPITAL TENODESIS REPAIR   2018    Procedure: SHOULDER ARTHROSCOPY with open BICIPITAL TENODESIS REPAIR;  Surgeon: Kiet Ramirez M.D.;  Location: SURGERY [facility];  Service: Orthopedics   • TONSILLECTOMY AND ADENOIDECTOMY  10/12/2011    Performed by ROBEROT ALEMAN at SURGERY SAME DAY [facility] ORS   • SEPTOTURBINOPLASTY  10/12/2011    Performed by ROBERTO ALEMAN at SURGERY SAME DAY [facility] ORS   • UVULOPHARYNGOPALATOPLASTY  10/12/2011    Performed by ROBRETO ALEMAN at SURGERY SAME DAY [facility] ORS   • DEBRIDEMENT  2010    Performed by BAUTISTA KURTZ at SURGERY SAME DAY [facility] ORS   • CYST EXCISION  2010    Performed by BAUTISTA KURTZ at SURGERY SAME DAY [facility] ORS   • KNEE ARTHROSCOPY  2010    Performed by BAUTISTA KURTZ at SURGERY SAME DAY [facility] ORS   • APPENDECTOMY     • OTHER ORTHOPEDIC SURGERY      right shoulder   • ACL RECONSTRUCTION Right    • NERVE ULNAR TRANSFER     • ARTHROSCOPY, KNEE     • LAMINOTOMY     • OTHER ORTHOPEDIC SURGERY      spine c2 c3 nerves clipped   • TONSILLECTOMY          Family History:   History reviewed. No pertinent family history.      Social History:   Social History     Tobacco Use   • Smoking status: Current Every Day Smoker     Packs/day: 0.50     Years: 35.00     Pack years: 17.50     Types: Cigarettes     Last attempt to quit: 2011     Years since quittin.6   • Smokeless tobacco: Never Used   • Tobacco comment:  ffor 22yrs   Substance Use Topics   • Alcohol use: Yes     Comment: 1 daily   • Drug use: No      Social History     Social History Narrative   • Not on file        Allergies: he Iodine and Percocet [oxycodone-acetaminophen]    Medications:   (Not in a hospital admission)        Review of Systems  Review of Systems   Constitutional: Negative for chills and fever.   Gastrointestinal: Negative for nausea and vomiting.   Genitourinary: Negative for dysuria, flank pain and hematuria.   All other systems reviewed and  "are negative.       Physical Exam  VITAL SIGNS: /77   Pulse 74   Temp 36.7 °C (98.1 °F) (Oral)   Resp 16   Ht 1.753 m (5' 9\")   Wt 99.8 kg (220 lb)   SpO2 94%   BMI 32.49 kg/m²   Physical Exam   Constitutional: He is oriented to person, place, and time and well-developed, well-nourished, and in no distress.   HENT:   Head: Normocephalic and atraumatic.   Abdominal: Soft.   Genitourinary:    Genitourinary Comments: Midline scrotal incision open ~3cm. Draining clear yellow fluid. Scrotal contents visualized under incision. No sign of necrosis, fluctuance, gangrene, crepitus.      Musculoskeletal:      Cervical back: Normal range of motion.   Neurological: He is alert and oriented to person, place, and time.   Skin: Skin is warm and dry.   Psychiatric: Memory, affect and judgment normal.         Labs:  Recent Labs     05/06/21  0307   WBC 11.1*   RBC 5.08   HEMOGLOBIN 15.3   HEMATOCRIT 44.1   MCV 86.8   MCH 30.1   MCHC 34.7   RDW 42.5   PLATELETCT 273   MPV 9.6     Recent Labs     05/06/21  0307   SODIUM 139   POTASSIUM 4.0   CHLORIDE 103   CO2 22   GLUCOSE 95   BUN 19   CREATININE 0.94   CALCIUM 8.7         Glucose:  Recent Labs     05/06/21  0307   GLUCOSE 95     Coags:  No results for input(s): INR in the last 72 hours.      Urinalysis:   Recent Labs     05/06/21  0343   COLORURINE Yellow   CLARITY Clear   SPECGRAVITY 1.019   PHURINE 5.0   GLUCOSEUR Negative   KETONES Trace*   NITRITE Negative   OCCULTBLOOD Negative       Imaging:  [id number]   Final Result      1.  No testicular abnormality.   2.  Left scrotal wall thickening, likely on an inflammatory basis or possibly due to known recent surgery.   3.  Small right hydrocele.               Assessment/Recommendation   50 y.o.M s/p left spermatic cord lipoma removal now with open midline scrotal incision.     · Incision does not appear to require surgical intervention at this time. We do agree with consult to wound care. Does look like it would " be amenable to wet to dry dressings or wound vac at this time. Urology will continue to follow and would suggest daily scrotal exams.     Plan of care directed by Dr. Riddle. Case discussed with patient and urology. Thank you for this consult. Please contact us with questions.        Vikki Childress P.A.-C.   [address] [place] [id number]   [phone]

## 2021-05-06 NOTE — ED NOTES
Med Rec completed: per patient at bedside    Preferred [facility]acy: [facility]: [phone]      Pt confirmed following allergies:  Allergies   Allergen Reactions   • Iodine Hives and Itching     After receiving iodine contrast for CT pt developed hives and itching    Pt developed breakthrough reaction of hives after being premedicated for contrast injection on 12/10/19   • Percocet [Oxycodone-Acetaminophen] Nausea     And dizziness        Pt currently on Bactrim 3 day course, started last night, 5/5/21.

## 2021-05-06 NOTE — ED NOTES
Pt resting comfortably in bed, wife at bedside. No signs of distress, even rise and fall of chest.

## 2021-05-06 NOTE — ED PROVIDER NOTES
ED Provider Note    Scribed for Kristopher Muniz M.D. by Estee Stevenson. 5/6/2021,  12:41 AM.    Means of Arrival: Walk in  History obtained from: Patient  History limited by: None    CHIEF COMPLAINT  Chief Complaint   Patient presents with    Post-Op Complications     Pt had testicular surgery with Dr. Schafer on Thursday. Pt states his testicles were swollen, and the incision have opened up.     Post-Op Pain     Pain level 10/10.        HPI  Abhishek Pacheco is a 50 y.o. male who presents to the Emergency Department for post-op complications onset last night. Per the patient, he had testicular surgery with Dr. Schafer 1 week ago consisting of a vasectomy and excision of a lesion of the spermatic cord, and since then has experienced worsening swelling. He came to the ED tonight as his incision opened and he had clear, slightly bloody drainage from the wound. No other exacerbating or alleviating factors were noted. He was discharged following his surgery with a prescription for ibuprofen and hydrocodone.     REVIEW OF SYSTEMS  GENITOURINARY:  Post op complications following testicular surgery. Testicular swelling and pain.   See HPI for further details.   All other systems are negative.     PAST MEDICAL HISTORY  Past Medical History:   Diagnosis Date    Apnea, sleep     Back pain     Chickenpox     Cough     Daytime sleepiness     Dizziness     Frequent headaches     Gasping for breath     Heart burn     Heartburn     Morning headache     Pain     right knee    Shortness of breath     Sleep apnea     no CPAP had ENT surgery    Snoring     Sore muscles     Swelling of lower extremity     Wears glasses     Weight loss        FAMILY HISTORY  History reviewed. No pertinent family history.    SOCIAL HISTORY   reports that he has been smoking cigarettes. He has a 17.50 pack-year smoking history. He has never used smokeless tobacco. He reports current alcohol use. He reports that he does not use drugs.    SURGICAL  HISTORY  Past Surgical History:   Procedure Laterality Date    PB REMOVAL OF SPERM CORD LESION Left 4/29/2021    Procedure: EXCISION,LESION OR LIPOMA,SPERMATIC CORD - SPERMATIC CORD MASS.;  Surgeon: Anant Riddle M.D.;  Location: SURGERY [facility];  Service: Urology    PB REMOVAL OF SPERM DUCT(S) Bilateral 4/29/2021    Procedure: VASECTOMY;  Surgeon: Anant Riddle M.D.;  Location: SURGERY [facility];  Service: Urology    SHOULDER ARTHROSCOPY W/ ROTATOR CUFF REPAIR Left 2/7/2018    Procedure: SHOULDER ARTHROSCOPY W/ ROTATOR CUFF REPAIR;  Surgeon: Kiet Ramirez M.D.;  Location: [facility];  Service: Orthopedics    SHOULDER ARTHROSCOPY W/ BICIPITAL TENODESIS REPAIR  2/7/2018    Procedure: SHOULDER ARTHROSCOPY with open BICIPITAL TENODESIS REPAIR;  Surgeon: Kiet Ramirez M.D.;  Location: [facility];  Service: Orthopedics    TONSILLECTOMY AND ADENOIDECTOMY  10/12/2011    Performed by ROBERTO ALEMAN at SURGERY SAME DAY [facility]    SEPTOTURBINOPLASTY  10/12/2011    Performed by ROBERTO ALEMAN at SURGERY SAME DAY [facility]    UVULOPHARYNGOPALATOPLASTY  10/12/2011    Performed by ROBERTO ALEMAN at SURGERY SAME DAY [facility]    DEBRIDEMENT  11/2/2010    Performed by BAUTISTA KURTZ at SURGERY SAME DAY [facility]    CYST EXCISION  11/2/2010    Performed by BAUTISTA KURTZ at SURGERY SAME DAY [facility]    KNEE ARTHROSCOPY  11/2/2010    Performed by BAUTISTA KURTZ at SURGERY SAME DAY [facility]    APPENDECTOMY  2009    OTHER ORTHOPEDIC SURGERY  2007    right shoulder    ACL RECONSTRUCTION Right 2003    NERVE ULNAR TRANSFER  2001    ARTHROSCOPY, KNEE      LAMINOTOMY      OTHER ORTHOPEDIC SURGERY      spine c2 c3 nerves clipped    TONSILLECTOMY         CURRENT MEDICATIONS  Home Medications       Reviewed by Kayaln Bella R.N. (Registered Nurse) on 05/06/21 at 0034  Med List Status: Partial     Medication Last Dose Status   cyclobenzaprine  "(FLEXERIL) 10 mg Tab  Active   methylPREDNISolone (MEDROL DOSEPAK) 4 MG Tablet Therapy Pack  Active   sumatriptan (IMITREX) 100 MG tablet  Active   temazepam (RESTORIL) 30 MG capsule  Active   valACYclovir (VALTREX) 500 MG Tab  Active                    ALLERGIES  Allergies   Allergen Reactions    Iodine Hives and Itching     After receiving iodine contrast for CT pt developed hives and itching    Pt developed breakthrough reaction of hives after being premedicated for contrast injection on 12/10/19    Percocet [Oxycodone-Acetaminophen] Nausea     And dizziness       PHYSICAL EXAM  VITAL SIGNS: /86   Pulse 91   Temp 36.7 °C (98.1 °F) (Oral)   Resp 16   Ht 1.753 m (5' 9\")   Wt 99.8 kg (220 lb)   SpO2 94%   BMI 32.49 kg/m²    Gen: Alert, no acute distress  HEENT: ATNC  Eyes: PERRL, EOMI, normal conjunctiva.   Neck: trachea midline  Resp: no respiratory distress  CV: No JVD  Abd: non-distended  Ext: No deformities  : Swelling of the scrotum, left greater than right. 4 cm of wound dehiscence on the scrotum with no active drainage. The scrotum is erythematous with mild ecchymosis.   Psych: normal mood  Neuro: speech fluent     DIAGNOSTIC STUDIES / PROCEDURES   LABS  Labs Reviewed   CBC WITH DIFFERENTIAL - Abnormal; Notable for the following components:       Result Value    WBC 11.1 (*)     Lymphocytes 44.70 (*)     Lymphs (Absolute) 4.96 (*)     All other components within normal limits   URINALYSIS - Abnormal; Notable for the following components:    Ketones Trace (*)     All other components within normal limits    Narrative:     Have you been in close contact with a person who is suspected  or known to be positive for COVID-19 within the last 30 days  (e.g. last seen that person < 30 days ago)->Unknown   LIPID PROFILE - Abnormal; Notable for the following components:    Triglycerides 397 (*)     HDL 25 (*)     All other components within normal limits    Narrative:     Fasting   BASIC METABOLIC PANEL " "  BLOOD CULTURE    Narrative:     Per Hospital Policy: Only change Specimen Src: to \"Line\" if  specified by physician order.   BLOOD CULTURE    Narrative:     Per Hospital Policy: Only change Specimen Src: to \"Line\" if  specified by physician order.   SARS-COV ANTIGEN JOHANNE    Narrative:     Have you been in close contact with a person who is suspected  or known to be positive for COVID-19 within the last 30 days  (e.g. last seen that person < 30 days ago)->Unknown   SARS-COV-2, PCR (IN-HOUSE)    Narrative:     Have you been in close contact with a person who is suspected  or known to be positive for COVID-19 within the last 30 days  (e.g. last seen that person < 30 days ago)->Unknown   MAGNESIUM    Narrative:     Fasting   ESTIMATED GFR   DIFFERENTIAL MANUAL   PERIPHERAL SMEAR REVIEW   PLATELET ESTIMATE   MORPHOLOGY     All labs reviewed by me.    RADIOLOGY  [id number]   Final Result      1.  No testicular abnormality.   2.  Left scrotal wall thickening, likely on an inflammatory basis or possibly due to known recent surgery.   3.  Small right hydrocele.        The radiologist’s interpretation of all radiology studies have been reviewed by me.    COURSE & MEDICAL DECISION MAKING  Pertinent Labs & Imaging studies reviewed. (See chart for details)    12:41 AM Patient seen and examined at bedside. Ordered for labs and imaging to evaluate. Patient will be treated with morphine 4 mg for his symptoms.     1:20 AM Patient treated with morphine 4 mg.     2:45 AM Paged Urology for consult.     2:54 AM I discussed the patient's case and the above findings with Dr. Darling (Urology) who recommends pain control and antibiotics in the ED, hospitalization and will evaluate for surgery. At this time, the labs have not been sent.     2:56 AM The patient was treated with Toradol 15 mg, Ancef 1 g, morphine 4 mg, and NS infusion 1000 mL    3:03 AM Paged the hospitalist for consult    3:10 AM I discussed the patient's case and " the above findings with Dr. Sullivan (Hospitalist) who agrees to hospitalize the patient.     HYDRATION: Based on the patient's presentation of Dehydration the patient was given IV fluids. IV Hydration was used because oral hydration was not adequate alone. Upon recheck following hydration, the patient was slightly improved.    Medical Decision Making:  Patient presents with wound dehiscence and scrotal swelling after recent surgery.  No evidence of Denise's gangrene.  Ultrasound demonstrates no evidence of abscess.  Based on the deep dehiscence and concern for possible exposed deeper structures, the patient be hospitalized on antibiotics for potential surgical repair by urology.  No evidence of UTI.    PPE Note: I personally donned appropriate PPE for all patient encounters during this visit, including wearing a mask, gloves, and eye protection. Scribe remained outside the patient's room and did not have any contact with the patient for the duration of patient encounter.      DISPOSITION:  Patient will be hospitalized by Dr. Sullivan in guarded condition.    FINAL IMPRESSION  1. Post-op pain    2. Wound dehiscence            Estee TOLBERT (Scribe), am scribing for, and in the presence of, Kristopher Muniz M.D..    Electronically signed by: Estee Stevenson (Scribe), 5/6/2021    IKristopher M.D. personally performed the services described in this documentation, as scribed by Estee Stevenson in my presence, and it is both accurate and complete.    The note accurately reflects work and decisions made by me.  Kristopher Muniz M.D.  5/6/2021  6:08 AM    C    This dictation was created using voice recognition software. The accuracy of the dictation is limited to the abilities of the software. I expect there may be some errors of grammar and possibly content. The nursing notes were reviewed and certain aspects of this information were incorporated into this note.

## 2021-05-06 NOTE — PROGRESS NOTES
[facility] Short Progress Note    Pt was admitted past midnight. Dr. Sullivan's H&P, ED course, labs and imagings reviewed.     50 y.o.M s/p left spermatic cord lipoma removal now with open midline scrotal incision.    During my shift,  Vitals reviewed; afebrile.  The rest of the vitals within normal parameters.  Pain scale reported - 7 in scrotum    At bedside, appeared a bit unease with the current situation. He wondered why urology team would not close this opening. I explained to him about wound care and wound vac. If a repeated fluid accumulation on a close wound, then more swelling or another opening can occur. He reports urinating ok and normal BM.       Exam was notable for scrotal swelling with about 3cm x 1cm oval shape opening in ventral scrotal area; scrotal contents visible. No signs of necrosis, gangrene, fluctuance or crepitus.       Labs reviewed  UA neg  BCx x2 in progress  BMP wnl  No leukocytosis    Imagings reviewed      Plan:  -Agree with Dr. Sullivan's Assessment and Plan  Urology consult appreciated - no surgical intervention; wound care consult  Pain management  Would stop Ancef.

## 2021-05-06 NOTE — H&P
Hospital Medicine History & Physical Note    Date of Service  5/6/2021    Primary Care Physician  Calos Valle M.D.    Consultants  Urology: surg performed by Dr. Schafer 1 week ago by [facility], consulted Dr. Darling in ED upon admission from same group.    Code Status  Full Code    Chief Complaint  Chief Complaint   Patient presents with   • Post-Op Complications     Pt had testicular surgery with Dr. Schafer on Thursday. Pt states his testicles were swollen, and the incision have opened up.    • Post-Op Pain     Pain level 10/10.        History of Presenting Illness  Per ED Note (patient has no further details or complaints to offer)    50 y.o. male who presents to the Emergency Department for post-op complications onset last night. Per the patient, he had testicular surgery with Dr. Schafer 1 week ago consisting of a vasectomy and excision of a lesion of the spermatic cord, and since then has experienced worsening swelling. He came to the ED tonight as his incision opened and he had clear, slightly bloody drainage from the wound. No other exacerbating or alleviating factors were noted. He was discharged following his surgery with a prescription for ibuprofen and hydrocodone.     Patient smokes 1/2 ppd. Will admit for wound revision by [facility] group.    Review of Systems  ROS  10+ systems reviewed and negative except as noted in HPI.      Past Medical History   has a past medical history of Apnea, sleep, Back pain, Chickenpox, Cough, Daytime sleepiness, Dizziness, Frequent headaches, Gasping for breath, Heart burn, Heartburn, Morning headache, Pain, Shortness of breath, Sleep apnea, Snoring, Sore muscles, Swelling of lower extremity, Wears glasses, and Weight loss.    Surgical History   has a past surgical history that includes appendectomy (2009); nerve ulnar transfer (2001); debridement (11/2/2010); cyst excision (11/2/2010); other orthopedic surgery (2007); other orthopedic surgery; knee arthroscopy  (11/2/2010); tonsillectomy and adenoidectomy (10/12/2011); septoturbinoplasty (10/12/2011); uvulopharyngopalatoplasty (10/12/2011); acl reconstruction (Right, 2003); shoulder arthroscopy w/ rotator cuff repair (Left, 2/7/2018); shoulder arthroscopy w/ bicipital tenodesis repair (2/7/2018); laminotomy; arthroscopy, knee; tonsillectomy; pr removal of sperm cord lesion (Left, 4/29/2021); and pr removal of sperm duct(s) (Bilateral, 4/29/2021).     Family History  family history is not on file.     Social History   reports that he has been smoking cigarettes. He has a 17.50 pack-year smoking history. He has never used smokeless tobacco. He reports current alcohol use. He reports that he does not use drugs.    Allergies  Allergies   Allergen Reactions   • Iodine Hives and Itching     After receiving iodine contrast for CT pt developed hives and itching    Pt developed breakthrough reaction of hives after being premedicated for contrast injection on 12/10/19   • Percocet [Oxycodone-Acetaminophen] Nausea     And dizziness       Medications  Prior to Admission Medications   Prescriptions Last Dose Informant Patient Reported? Taking?   cyclobenzaprine (FLEXERIL) 10 mg Tab   No No   Sig: Take 1 Tab by mouth 3 times a day as needed.   Patient not taking: Reported on 4/12/2021   methylPREDNISolone (MEDROL DOSEPAK) 4 MG Tablet Therapy Pack   No No   Sig: Take as directed   Patient not taking: Reported on 4/12/2021   sumatriptan (IMITREX) 100 MG tablet   Yes No   Sig: TAKE 1 TABLET BY MOUTH ONCE A DAY AT ONSET OF HEADACHE   temazepam (RESTORIL) 30 MG capsule   Yes No   Sig: Take 30 mg by mouth at bedtime as needed for Sleep.   valACYclovir (VALTREX) 500 MG Tab   Yes No   Sig: Take 500 mg by mouth 3 times a day as needed.      Facility-Administered Medications: None       Physical Exam  Temp:  [36.7 °C (98.1 °F)] 36.7 °C (98.1 °F)  Pulse:  [84-98] 86  Resp:  [16] 16  BP: (105-144)/(73-96) 126/81  SpO2:  [92 %-95 %] 92 %    Physical  Exam  Physical Exam  Vitals and nursing note reviewed.  Constitutional:     General: Alert in no acute distress.    Appearance: Pt is not ill-appearing.     Comments:   HENT: No signs of trauma, Bilateral external ears normal, Nose normal.      Head: Normocephalic.     Mouth/Throat: Unremarkable. Moist Mucosa     Comments:   Eyes:      Pupils: Pupils are equal round and reactive to light, Conjunctiva normal, Non-icteric.   Neck: Normal range of motion, No tenderness, Supple, No stridor.   Cardiovascular:      Rate and Rhythm: Regular Rate and Rhythm     Heart sounds: No murmur, rubs, or gallops appreciated.  Pulmonary/Thorax & Lungs:      Effort: No respiratory distress.     Breath sounds: No stridor. No wheezing, No Rhonchi, no palpable chest tenderness     Comments:    Abdomen:     General: There is no distension.      Palpation/Auscultation: Soft, No tenderness, No masses, No pulsatile masses. Bowel sounds normal. No rebound/peritoneal signs. Negative Scruggs sign.     Hernia: No hernia is appreciated at this time.   : Swollen scrotum L>R 4cm wound dehiscence with no active drainage. The scrotum is erythematous with mild ecchymosis.  Skin: Warm, Dry, No erythema, No rash.       Coloration: Skin is not jaundiced or pale.   Back: No bony tenderness, No CVA tenderness.   Musculoskeletal:         General: No swelling or tenderness.   Extremities:       General: Intact distal pulses, No edema, No tenderness, No cyanosis      Vascular:   Neurologic/Psych: Alert, No focal deficits noted.       Comments:              Laboratory:  Recent Labs     05/06/21  0307   WBC 11.1*   RBC 5.08   HEMOGLOBIN 15.3   HEMATOCRIT 44.1   MCV 86.8   MCH 30.1   MCHC 34.7   RDW 42.5   PLATELETCT 273   MPV 9.6     Recent Labs     05/06/21  0307   SODIUM 139   POTASSIUM 4.0   CHLORIDE 103   CO2 22   GLUCOSE 95   BUN 19   CREATININE 0.94   CALCIUM 8.7     Recent Labs     05/06/21  0307   GLUCOSE 95         No results for input(s): NTPROBNP in  the last 72 hours.      No results for input(s): TROPONINT in the last 72 hours.    Imaging:  [id number]   Final Result      1.  No testicular abnormality.   2.  Left scrotal wall thickening, likely on an inflammatory basis or possibly due to known recent surgery.   3.  Small right hydrocele.            Assessment/Plan:  I anticipate this patient will require at least two midnights for appropriate medical management, necessitating inpatient admission.    Wound dehiscence- (present on admission)  Assessment & Plan  Scrotal wound dehiscence  No evidence of sepsis.  Urology consulted in ED, for revision: Uro Caitlyn Darling aware.  NPO  Ancef q8hrs  Wound care    Tobacco abuse- (present on admission)  Assessment & Plan  Patch  Half pack per day    Pain- (present on admission)  Assessment & Plan  Scrotal, severe  Aggressive pain management.

## 2021-05-06 NOTE — ED NOTES
Pt arrived to rm 55 from red 1.  Report rec'd from Bhavna HUSAIN.  Pt oriented to room, attached to monitors

## 2021-05-06 NOTE — ED TRIAGE NOTES
"Chief Complaint   Patient presents with   • Post-Op Complications     Pt had testicular surgery with Dr. Schafer on Thursday. Pt states his testicles were swollen, and the incision have opened up.    • Post-Op Pain     Pain level 10/10.      Patient's testicular incision is open, scrotum is visible, patient and wife states there was a lot of blood. Charge RN notified. Pain 10/10. Pt roomed to RD 01.    Pt is alert and oriented, speaking in full sentences, follows commands and responds appropriately to questions. NAD. Resp are even and unlabored.      Pt placed in lobby. Pt educated on triage process. Pt encouraged to alert staff for any changes.     Patient and staff wearing appropriate PPE    /86   Pulse 91   Temp 36.7 °C (98.1 °F) (Oral)   Resp 16   Ht 1.753 m (5' 9\")   Wt 99.8 kg (220 lb)   SpO2 94%   BMI 32.49 kg/m²   "

## 2021-05-06 NOTE — WOUND TEAM
[facility]  Inpatient Services  Initial Wound and Skin Care Evaluation    Admission Date: 5/6/2021     Last order of IP CONSULT TO WOUND CARE was found on 5/6/2021 from Hospital Encounter on 5/5/2021     HPI, PMH, SH: Reviewed    Past Surgical History:   Procedure Laterality Date   • PB REMOVAL OF SPERM CORD LESION Left 4/29/2021    Procedure: EXCISION,LESION OR LIPOMA,SPERMATIC CORD - SPERMATIC CORD MASS.;  Surgeon: Anant Riddle M.D.;  Location: SURGERY [facility];  Service: Urology   • PB REMOVAL OF SPERM DUCT(S) Bilateral 4/29/2021    Procedure: VASECTOMY;  Surgeon: Anant Riddel M.D.;  Location: SURGERY [facility];  Service: Urology   • SHOULDER ARTHROSCOPY W/ ROTATOR CUFF REPAIR Left 2/7/2018    Procedure: SHOULDER ARTHROSCOPY W/ ROTATOR CUFF REPAIR;  Surgeon: Kiet Ramirez M.D.;  Location: [facility];  Service: Orthopedics   • SHOULDER ARTHROSCOPY W/ BICIPITAL TENODESIS REPAIR  2/7/2018    Procedure: SHOULDER ARTHROSCOPY with open BICIPITAL TENODESIS REPAIR;  Surgeon: Kiet Ramirez M.D.;  Location: SURGERY [facility];  Service: Orthopedics   • TONSILLECTOMY AND ADENOIDECTOMY  10/12/2011    Performed by ROBERTO ALEMAN at SURGERY SAME DAY [facility] ORS   • SEPTOTURBINOPLASTY  10/12/2011    Performed by ROBERTO ALEMAN at SURGERY SAME DAY [facility] ORS   • UVULOPHARYNGOPALATOPLASTY  10/12/2011    Performed by ROBERTO ALEMAN at SURGERY SAME DAY [facility] ORS   • DEBRIDEMENT  11/2/2010    Performed by BAUTISTA KURTZ at SURGERY SAME DAY [facility] ORS   • CYST EXCISION  11/2/2010    Performed by BAUTISTA KURTZ at SURGERY SAME DAY [facility] ORS   • KNEE ARTHROSCOPY  11/2/2010    Performed by BAUTISTA KURTZ at SURGERY SAME DAY [facility] ORS   • APPENDECTOMY  2009   • OTHER ORTHOPEDIC SURGERY  2007    right shoulder   • ACL RECONSTRUCTION Right 2003   • NERVE ULNAR TRANSFER  2001   • ARTHROSCOPY, KNEE     • LAMINOTOMY     • OTHER ORTHOPEDIC SURGERY      spine  "c2 c3 nerves clipped   • TONSILLECTOMY       Social History     Tobacco Use   • Smoking status: Current Every Day Smoker     Packs/day: 0.50     Years: 35.00     Pack years: 17.50     Types: Cigarettes     Last attempt to quit: 2011     Years since quittin.6   • Smokeless tobacco: Never Used   • Tobacco comment:  ffor 22yrs   Substance Use Topics   • Alcohol use: Yes     Comment: 1 daily     Chief Complaint   Patient presents with   • Post-Op Complications     Pt had testicular surgery with Dr. Schafer on Thursday. Pt states his testicles were swollen, and the incision have opened up.    • Post-Op Pain     Pain level 10/10.      Diagnosis: Surgical wound dehiscence, initial encounter [T81.31XA]    Unit where seen by Wound Team: T431/02     WOUND CONSULT/FOLLOW UP RELATED TO:  Scrotal wound dehiscence.      WOUND HISTORY:  \"Abhishek Pacheco is a 50 y.o. male patient consulted to our service for wound dehiscence. 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 Riddle last week. He experienced severe scrotal edema following his procedure. Last night at dinner, his midline scrotal incision opened up and drained blood. Today upon exam, the incision continues to drain clear yellow serous fluid. No signs of infection, gangrene, crepitus, fluctuance. Denies fevers/ chills.\"  - from Urology RON Florez       Negative Pressure Wound Therapy 21 Surgical Scrotum (Active)   NPWT Pump Mode / Pressure Setting Ulta    Dressing Type Black Foam (Veraflo);Small    Number of Foam Pieces Used 2    Canister Changed No    NEXT Dressing Change/Treatment Date 21    VAC VeraFlo Irrigant Normal Saline    VAC VeraFlo Soak Time (mins) 10    VAC VeraFlo Instill Volume (ml) 6    VAC VeraFlo - Therapy Time (hrs) 2.5    VAC VeraFlo Pressure (mm/Hg) 125 mmHg;Intermittent            WOUND ASSESSMENT/LDA  Wound 21 Full Thickness Wound Scrotum complicated open surgical (Active)   Wound Image      Site " Assessment Pink;Red;Yellow    Periwound Assessment Clean;Dry;Intact;Edema    Margins Unattached edges;Defined edges    Closure Secondary intention    Drainage Amount Small    Drainage Description Serosanguineous    Treatments Cleansed    Wound Cleansing Approved Wound Cleanser    Periwound Protectant Skin Protectant Wipes to Periwound;Paste Ring;Drape    Dressing Cleansing/Solutions Normal Saline    Dressing Options Wound Vac    Dressing Changed New    Dressing Status Clean;Dry;Intact    Dressing Change/Treatment Frequency Monday, Wednesday, Friday, and As Needed    NEXT Dressing Change/Treatment Date 05/08/21    NEXT Weekly Photo (Inpatient Only) 05/12/21    Non-staged Wound Description Full thickness    Wound Length (cm) 4 cm    Wound Width (cm) 1.5 cm    Wound Depth (cm) 0.6 cm    Wound Surface Area (cm^2) 6 cm^2    Wound Volume (cm^3) 3.6 cm^3    Tunneling (cm) 0 cm    Undermining (cm) 0 cm    Shape ellipse    Wound Odor None    Exposed Structures teste    Number of days: 0            THERESA:   No results found.    Lab Values:    Lab Results   Component Value Date/Time    WBC 11.1 (H) 05/06/2021 03:07 AM    RBC 5.08 05/06/2021 03:07 AM    HEMOGLOBIN 15.3 05/06/2021 03:07 AM    HEMATOCRIT 44.1 05/06/2021 03:07 AM    CREACTPROT 2.38 (H) 11/25/2019 05:00 PM        Culture Results show:  No results found for this or any previous visit (from the past 720 hour(s)).    Pain Level/Medicated:  Patient stated 7/10 pain with IV pain medication prior to dressing application by staff RN       INTERVENTIONS BY WOUND TEAM:  Chart and images reviewed. Discussed with bedside RN. All areas of concern (based on picture review, LDA review and discussion with bedside RN) have been thoroughly assessed. Documentation of areas based on significant findings. This RN in to assess patient. Performed standard wound care which includes appropriate positioning, dressing removal and non-selective debridement. Pictures and measurements obtained  weekly if/when required.  Preparation for Dressing removal: wet to dry in place, no preparation needed  Cleansed with:  wound cleanser and gauze.  Sharp debridement: n/a  Deb wound: Cleansed with wound cleanser and gauze, Prepped with no sting skin prep, paste ring and drape  Primary Dressin half thickness piece of VF black foam to fill wound space, covered with 1 piece full thickness VF black foam for button for trac pad, all sealed with drape  Secondary (Outer) Dressing: non indicated    Interdisciplinary consultation: Patient, Bedside RN (Vidya), Lupe tech. Discussed POC and application of veraflo NPWT with Vkiki Maurice PA-C    EVALUATION / RATIONALE FOR TREATMENT:  Most Recent Date:  21: Patient admitted through ED today r/t wound dehiscence of scrotal incision. Teste is visible. Veraflo NPWT with NS instillation initiated to protect exposed structure, manage exudate and promote granular tissue growth.      Goals: Steady decrease in wound area and depth weekly.    WOUND TEAM PLAN OF CARE ([X] for frequency of wound follow up,):   Nursing to follow orders written for wound care. Contact wound team if area fails to progress, deteriorates or with any questions/concerns  Dressing changes by wound team:                   Follow up 3 times weekly:                NPWT change 3 times weekly: X    Follow up 1-2 times weekly:      Follow up Bi-Monthly:                   Follow up as needed:     Other (explain):     NURSING PLAN OF CARE ORDERS (X):  Dressing changes: See Dressing Care orders: X  Skin care: See Skin Care orders:   RN Prevention Protocol:   Rectal tube care: See Rectal Tube Care orders:   Other orders:    RSKIN:   CURRENTLY IN PLACE (X), APPLIED THIS VISIT (A), ORDERED (O):   Q shift Salvador:  X  Q shift pressure point assessments:  X    Surface/Positioning   Pressure redistribution mattress   X         Low Airloss          Bariatric foam      Bariatric KAITLIN     Waffle cushion        Waffle  Overlay          Reposition q 2 hours  Patient able to turn self in bed   TAPs Turning system     Z Viral Pillow     Offloading/Redistribution   Sacral Mepilex (Silicone dressing)     Heel Mepilex (Silicone dressing)         Heel float boots (Prevalon boot)             Float Heels off Bed with Pillows           Respiratory   Silicone O2 tubing         Gray Foam Ear protectors     Cannula fixation Device (Tender )          High flow offloading Clip    Elastic head band offloading device      Anchorfast                                                         Trach with Optifoam split foam             Containment/Moisture Prevention     Rectal tube or BMS    Purwick/Condom Cath        Barraza Catheter    Barrier wipes           Barrier paste       Antifungal tx      Interdry        Mobilization       Up to chair        Ambulate      PT/OT      Nutrition       Dietician        Diabetes Education      PO X    TF     TPN     NPO   # days     Other        Anticipated discharge plans:   LTACH:        SNF/Rehab:                  Home Health Care:           Outpatient Wound Center:  X and/or HH for wound care          Self/Family Care:        Other:

## 2021-05-06 NOTE — PROGRESS NOTES
Attending Hospitalist today is Dr Perez starting at 0700. Please call this Physician for orders, updates and questions.

## 2021-05-06 NOTE — PROGRESS NOTES
2 RN skin check complete.    Swollen scrotum w/ open wound.    No other skin issues noted.    Vidya Nguyen R.N.

## 2021-05-06 NOTE — ASSESSMENT & PLAN NOTE
Scrotal wound dehiscence  No evidence of sepsis.  No signs of necrosis, gangrene, fluctuance or crepitus. Without secondary clinical signs and symptoms of infection, Abx was stopped  Urology consulted - no surgical intervention  Wound care team following - to keep Veraflo for at least 48hrs  May need outpatient wound clinic follow up and regular wound vac upon DC

## 2021-05-06 NOTE — CARE PLAN
"Problem: Communication  Goal: The ability to communicate needs accurately and effectively will improve  Outcome: PROGRESSING AS EXPECTED  Note: Makes needs known.      Problem: Pain Management  Goal: Pain level will decrease to patient's comfort goal  Outcome: PROGRESSING AS EXPECTED  Note: Pain managed w/ rest and medication.      Pt alert and oriented x4.  Calm and cooperative.  Up independently.  Toilet voiding.  Wound vac in place on scrotum.  Will continue to monitor.     Blood Pressure: 117/78, NIBP MAP (Calculated): 94, Pulse: 73, Respiration: 18, Temperature: 36.4 °C (97.5 °F), Height: 175.3 cm (5' 9\"), Weight: 99.8 kg (220 lb), BMI (Calculated): 32.49, BSA (Calculated): 2.2, Pulse Oximetry: 94 %, O2 Delivery Device: None - Room Air    Vidya Nguyen R.N.    "

## 2021-05-06 NOTE — ED NOTES
Pt in wheelchair to red 1. Agree with triage note.  Pt in gown and placed on monitors.  ERP to see.

## 2021-05-07 ENCOUNTER — PATIENT OUTREACH (OUTPATIENT)
Dept: HEALTH INFORMATION MANAGEMENT | Facility: OTHER | Age: 51
End: 2021-05-07

## 2021-05-07 LAB
ERYTHROCYTE [DISTWIDTH] IN BLOOD BY AUTOMATED COUNT: 44.2 FL (ref 35.9–50)
HCT VFR BLD AUTO: 45.5 % (ref 42–52)
HGB BLD-MCNC: 14.8 G/DL (ref 14–18)
MCH RBC QN AUTO: 29.2 PG (ref 27–33)
MCHC RBC AUTO-ENTMCNC: 32.5 G/DL (ref 33.7–35.3)
MCV RBC AUTO: 89.7 FL (ref 81.4–97.8)
PLATELET # BLD AUTO: 266 K/UL (ref 164–446)
PMV BLD AUTO: 10.2 FL (ref 9–12.9)
RBC # BLD AUTO: 5.07 M/UL (ref 4.7–6.1)
WBC # BLD AUTO: 11.1 K/UL (ref 4.8–10.8)

## 2021-05-07 PROCEDURE — A9270 NON-COVERED ITEM OR SERVICE: HCPCS | Performed by: STUDENT IN AN ORGANIZED HEALTH CARE EDUCATION/TRAINING PROGRAM

## 2021-05-07 PROCEDURE — 700102 HCHG RX REV CODE 250 W/ 637 OVERRIDE(OP): Performed by: STUDENT IN AN ORGANIZED HEALTH CARE EDUCATION/TRAINING PROGRAM

## 2021-05-07 PROCEDURE — 700111 HCHG RX REV CODE 636 W/ 250 OVERRIDE (IP): Performed by: STUDENT IN AN ORGANIZED HEALTH CARE EDUCATION/TRAINING PROGRAM

## 2021-05-07 PROCEDURE — 99232 SBSQ HOSP IP/OBS MODERATE 35: CPT | Performed by: STUDENT IN AN ORGANIZED HEALTH CARE EDUCATION/TRAINING PROGRAM

## 2021-05-07 PROCEDURE — 770006 HCHG ROOM/CARE - MED/SURG/GYN SEMI*

## 2021-05-07 PROCEDURE — 85027 COMPLETE CBC AUTOMATED: CPT

## 2021-05-07 RX ORDER — HYDROCODONE BITARTRATE AND ACETAMINOPHEN 5; 325 MG/1; MG/1
1 TABLET ORAL EVERY 4 HOURS PRN
Status: DISCONTINUED | OUTPATIENT
Start: 2021-05-07 | End: 2021-05-08 | Stop reason: HOSPADM

## 2021-05-07 RX ORDER — TEMAZEPAM 15 MG/1
30 CAPSULE ORAL
Status: DISCONTINUED | OUTPATIENT
Start: 2021-05-07 | End: 2021-05-08 | Stop reason: HOSPADM

## 2021-05-07 RX ORDER — MORPHINE SULFATE 4 MG/ML
2 INJECTION, SOLUTION INTRAMUSCULAR; INTRAVENOUS EVERY 4 HOURS PRN
Status: DISCONTINUED | OUTPATIENT
Start: 2021-05-07 | End: 2021-05-08 | Stop reason: HOSPADM

## 2021-05-07 RX ORDER — HYDROCODONE BITARTRATE AND ACETAMINOPHEN 5; 325 MG/1; MG/1
2 TABLET ORAL EVERY 4 HOURS PRN
Status: DISCONTINUED | OUTPATIENT
Start: 2021-05-07 | End: 2021-05-08 | Stop reason: HOSPADM

## 2021-05-07 RX ADMIN — KETOROLAC TROMETHAMINE 30 MG: 30 INJECTION, SOLUTION INTRAMUSCULAR; INTRAVENOUS at 23:02

## 2021-05-07 RX ADMIN — HYDROCODONE BITARTRATE AND ACETAMINOPHEN 1 TABLET: 5; 325 TABLET ORAL at 22:05

## 2021-05-07 RX ADMIN — TEMAZEPAM 30 MG: 15 CAPSULE ORAL at 23:02

## 2021-05-07 RX ADMIN — KETOROLAC TROMETHAMINE 30 MG: 30 INJECTION, SOLUTION INTRAMUSCULAR; INTRAVENOUS at 05:04

## 2021-05-07 RX ADMIN — NICOTINE 14 MG: 14 PATCH TRANSDERMAL at 05:04

## 2021-05-07 RX ADMIN — KETOROLAC TROMETHAMINE 30 MG: 30 INJECTION, SOLUTION INTRAMUSCULAR; INTRAVENOUS at 17:44

## 2021-05-07 RX ADMIN — KETOROLAC TROMETHAMINE 30 MG: 30 INJECTION, SOLUTION INTRAMUSCULAR; INTRAVENOUS at 11:14

## 2021-05-07 RX ADMIN — HYDROCODONE BITARTRATE AND ACETAMINOPHEN 1 TABLET: 5; 325 TABLET ORAL at 10:18

## 2021-05-07 ASSESSMENT — ENCOUNTER SYMPTOMS
PALPITATIONS: 0
FLANK PAIN: 0
NAUSEA: 0
SORE THROAT: 0
HEARTBURN: 0
FOCAL WEAKNESS: 0
SHORTNESS OF BREATH: 0
CHILLS: 0
SENSORY CHANGE: 1
DIZZINESS: 0
NECK PAIN: 0
FEVER: 0
ABDOMINAL PAIN: 0
DEPRESSION: 0
MYALGIAS: 0
HEADACHES: 0
VOMITING: 0
DOUBLE VISION: 0

## 2021-05-07 ASSESSMENT — PAIN DESCRIPTION - PAIN TYPE
TYPE: ACUTE PAIN

## 2021-05-07 NOTE — PROGRESS NOTES
Note to reader: this note follows the APSO format rather than the historical SOAP format. Assessment and plan located at the top of the note for ease of use.    Chief Complaint  50 y.o. year old male here with post op wound dehiscence.     Assessment/Plan  Interval History   50 y.o.M s/p left spermatic cord lipoma removal now with open midline scrotal incision. Vac placement by wound care 5/6.     Plan:  - Wound care per wound team. Anticipate veraflow for 48 hrs inpatient then changes 3x per week as outpatient  - Will f/u with urology as outpatient in 2 weeks  - Urology signing off. We do not anticipate further urological intervention at this time. Please contact us with questions     Patient seen and examined    5/6. Midline scrotal wound vac in place. Pain well controlled. Labs stable. Anxious for discharge.          Review of Systems  Physical Exam   Review of Systems   Constitutional: Negative for chills and fever.   Gastrointestinal: Negative for nausea and vomiting.   Genitourinary: Negative for dysuria, flank pain, frequency, hematuria and urgency.   All other systems reviewed and are negative.    Vitals:    05/06/21 2356 05/07/21 0004 05/07/21 0445 05/07/21 0726   BP:  107/67 118/81 115/89   Pulse:  63 70 80   Resp:  18 18 18   Temp:  36.6 °C (97.9 °F) 36.3 °C (97.3 °F) 37.6 °C (99.6 °F)   TempSrc:  Temporal Temporal Temporal   SpO2:  93% 93% 96%   Weight: 100 kg (220 lb 14.4 oz)      Height:         Physical Exam   Constitutional: He is oriented to person, place, and time and well-developed, well-nourished, and in no distress.   HENT:   Head: Normocephalic and atraumatic.   Abdominal: Soft.   Genitourinary:    Genitourinary Comments: Midline scrotal wound vac in place     Musculoskeletal:      Cervical back: Normal range of motion.   Neurological: He is alert and oriented to person, place, and time. Gait normal.   Skin: Skin is warm and dry.   Psychiatric: Affect and judgment normal.   Nursing note and  vitals reviewed.       Hematology Chemistry   Lab Results   Component Value Date/Time    WBC 11.1 (H) 05/07/2021 04:50 AM    HEMOGLOBIN 14.8 05/07/2021 04:50 AM    HEMATOCRIT 45.5 05/07/2021 04:50 AM    PLATELETCT 266 05/07/2021 04:50 AM     Lab Results   Component Value Date/Time    SODIUM 139 05/06/2021 03:07 AM    POTASSIUM 4.0 05/06/2021 03:07 AM    CHLORIDE 103 05/06/2021 03:07 AM    CO2 22 05/06/2021 03:07 AM    GLUCOSE 95 05/06/2021 03:07 AM    BUN 19 05/06/2021 03:07 AM    CREATININE 0.94 05/06/2021 03:07 AM         Labs not explicitly included in this progress note were reviewed by the author.   Radiology/imaging not explicitly included in this progress note was reviewed by the author.     Labs reviewed and Medications reviewed

## 2021-05-07 NOTE — CARE PLAN
Problem: Communication  Goal: The ability to communicate needs accurately and effectively will improve  Outcome: PROGRESSING AS EXPECTED  Note: Makes needs known.      Problem: Pain Management  Goal: Pain level will decrease to patient's comfort goal  Outcome: PROGRESSING AS EXPECTED     Pt alert and oriented x4.  Calm and cooperative.  Pain managed w/ medication and rest.  Wound vac to scrotum.  Up self, ambulating halls.  Will continue to monitor.    Blood Pressure: 112/52, Pulse: 64, Respiration: 18, Temperature: 36.9 °C (98.4 °F), Weight: 100 kg (220 lb 14.4 oz), Pulse Oximetry: 94 %, O2 (LPM): 0, O2 Delivery Device: None - Room Air    Vidya Nguyen R.N.

## 2021-05-07 NOTE — DISCHARGE PLANNING
Anticipated Discharge Disposition: Home with Home Wound Vac, HHC, and Outpatient Wound Care.    Action: Per MD, this patient currently has a Veraflo Wound Vac and may transition tomorrow to a regular wound vac before discharging home.    MD signed the I Wound Vac Order Form. LSW faxed the [employer] Form and the clinical documentation to [facility]. LSW spoke with Promise, Liaison for [facility] regarding the incoming referral.     LSW spoke with Andrae at the [facility]. Per Andrae, the appointment is schedule for Thursday May 13, 2021 st 3:15pm.     LSW requested HHC Order and F2F for wound vac dressing changes as the patient will need to be seeing by the wound nurse prior to TH.    Barriers to Discharge: None.    Plan: As Above.     11:30am - HHC Order received. LSW met with this patient to issue choice for HHC. The patient declined HHC service as he will be out of town celebrating his birthday. This patient stated he prefers to have his wife do the dressing changes until he is seeing TH at the Outpatient [facility], MD notified via Volte by LSW.     Per MD, he will reach out to the Wound Team and clarify if its appropriate for this patient to wait until TH to have the vac change.    PLAN: LSW will await further instructions from MD.

## 2021-05-07 NOTE — FACE TO FACE
Face to Face Note  -  Durable Medical Equipment    Rafael Perez M.D. - NPI: [id number]  I certify that this patient is under my care and that they had a durable medical equipment(DME)face to face encounter by myself that meets the physician DME face-to-face encounter requirements with this patient on:    Date of encounter:   Patient:                    MRN:                       YOB: 2020  Abhishek Pacheco  [id number]  1970     The encounter with the patient was in whole, or in part, for the following medical condition, which is the primary reason for durable medical equipment:  Other - Wound dishescence post surgery    I certify that, based on my findings, the following durable medical equipment is medically necessary:  Other DME Equipment - Wound vac.      My Clinical findings support the need for the above equipment due to:  Wound/Incision    Supporting Symptoms: Midline scrotal sac opening    If patient feels more short of breath, they can go up to 6 liters per minute and contact healthcare provider.

## 2021-05-07 NOTE — DIETARY
NUTR[facility] SERVICES - Alert received for newly identified wound. Per Wound team note from 5/6, pt noted with complicated open surgical, full thickness wound to scrotum.  +Regular diet, 50-75% thus far.    RD to monitor per department policy.

## 2021-05-07 NOTE — CARE PLAN
Problem: Communication  Goal: The ability to communicate needs accurately and effectively will improve  Outcome: PROGRESSING AS EXPECTED  Note: All questions and concerns addressed with patient, patient verbalizes understanding of current plan of care      Problem: Pain Management  Goal: Pain level will decrease to patient's comfort goal  Outcome: PROGRESSING AS EXPECTED  Note: Pain managed with prescribed medications and alternative pain interventions. Education provided to patient about pain rating scale and pain medications, patient verbalizes understanding of education. Patient able to rest and sleep comfortably throughout the night

## 2021-05-07 NOTE — PROGRESS NOTES
Pt A&Ox4  Patient answers all questions appropriately and follows all instructions     Patient rates pain as 9/10, medicated per MAR, pain managed with prescribed medications and alternative pain interventions     Tolerating diet, denies n/v. + bowel sounds, + flatus, LBM 5/6  +void     Saturating >90% on room air  Patient denies SOB     Wound Vac in place to scrotum, device checked, device patent     Pt ambulates with standby assistance and a steady gait, patient verbalizes understanding to call when needing assistance     Updated on plan of care. Safety education provided. Bed locked in low. Call light within reach. Rounding in place.

## 2021-05-08 VITALS
WEIGHT: 220.9 LBS | OXYGEN SATURATION: 96 % | BODY MASS INDEX: 32.72 KG/M2 | HEART RATE: 80 BPM | RESPIRATION RATE: 18 BRPM | HEIGHT: 69 IN | TEMPERATURE: 97.2 F | DIASTOLIC BLOOD PRESSURE: 91 MMHG | SYSTOLIC BLOOD PRESSURE: 131 MMHG

## 2021-05-08 PROBLEM — T81.31XA SURGICAL WOUND DEHISCENCE, INITIAL ENCOUNTER: Status: ACTIVE | Noted: 2021-05-06

## 2021-05-08 PROCEDURE — 700102 HCHG RX REV CODE 250 W/ 637 OVERRIDE(OP): Performed by: STUDENT IN AN ORGANIZED HEALTH CARE EDUCATION/TRAINING PROGRAM

## 2021-05-08 PROCEDURE — A9270 NON-COVERED ITEM OR SERVICE: HCPCS | Performed by: STUDENT IN AN ORGANIZED HEALTH CARE EDUCATION/TRAINING PROGRAM

## 2021-05-08 PROCEDURE — 700111 HCHG RX REV CODE 636 W/ 250 OVERRIDE (IP): Performed by: STUDENT IN AN ORGANIZED HEALTH CARE EDUCATION/TRAINING PROGRAM

## 2021-05-08 PROCEDURE — 99239 HOSP IP/OBS DSCHRG MGMT >30: CPT | Performed by: STUDENT IN AN ORGANIZED HEALTH CARE EDUCATION/TRAINING PROGRAM

## 2021-05-08 RX ORDER — VARENICLINE TARTRATE 1 MG/1
1 TABLET, FILM COATED ORAL 2 TIMES DAILY
Qty: 60 TABLET | Refills: 0 | Status: SHIPPED | OUTPATIENT
Start: 2021-05-08 | End: 2021-09-15

## 2021-05-08 RX ORDER — HYDROCODONE BITARTRATE AND ACETAMINOPHEN 5; 325 MG/1; MG/1
1 TABLET ORAL EVERY 6 HOURS PRN
Qty: 20 TABLET | Refills: 0 | Status: SHIPPED | OUTPATIENT
Start: 2021-05-08 | End: 2021-05-13

## 2021-05-08 RX ADMIN — KETOROLAC TROMETHAMINE 30 MG: 30 INJECTION, SOLUTION INTRAMUSCULAR; INTRAVENOUS at 10:56

## 2021-05-08 RX ADMIN — MORPHINE SULFATE 2 MG: 4 INJECTION INTRAVENOUS at 08:46

## 2021-05-08 RX ADMIN — NICOTINE 14 MG: 14 PATCH TRANSDERMAL at 05:09

## 2021-05-08 RX ADMIN — MORPHINE SULFATE 2 MG: 4 INJECTION INTRAVENOUS at 00:04

## 2021-05-08 RX ADMIN — KETOROLAC TROMETHAMINE 30 MG: 30 INJECTION, SOLUTION INTRAMUSCULAR; INTRAVENOUS at 05:09

## 2021-05-08 ASSESSMENT — PAIN DESCRIPTION - PAIN TYPE
TYPE: ACUTE PAIN

## 2021-05-08 NOTE — PROGRESS NOTES
"Bedside report received.  Assessment complete.  A&O x 4. Patient calls appropriately.  Patient ambulates with no assist. Bed alarm ff.   Patient has 2/10 pain. Pain managed with prescribed medications.  Denies N&V. Tolerating regular diet.  Surgical dressing/wound vac CDI.  + void, + flatus, + BM.  Patient denies SOB.  SCD's off, patient ambulating.  Patient is calm and cooperative with careplan.  Review plan with of care with patient. Call light and personal belongings with in reach. Hourly rounding in place. All needs met at this time.  /77   Pulse 78   Temp 36.4 °C (97.6 °F) (Temporal)   Resp 18   Ht 1.753 m (5' 9\")   Wt 100 kg (220 lb 14.4 oz)   SpO2 91%   BMI 32.62 kg/m²     "

## 2021-05-08 NOTE — PROGRESS NOTES
Aaox4. Has wound vac equipment for appt. on Wed.  Instructions and rx info given. IV out. Able to wait for ride.

## 2021-05-08 NOTE — WOUND TEAM
[facility]  Inpatient Services   Wound and Skin Care Evaluation    Admission Date: 5/6/2021     Last order of IP CONSULT TO WOUND CARE was found on 5/6/2021 from Hospital Encounter on 5/5/2021     HPI, PMH, SH: Reviewed    Past Surgical History:   Procedure Laterality Date   • PB REMOVAL OF SPERM CORD LESION Left 4/29/2021    Procedure: EXCISION,LESION OR LIPOMA,SPERMATIC CORD - SPERMATIC CORD MASS.;  Surgeon: Anant Riddle M.D.;  Location: SURGERY [facility];  Service: Urology   • PB REMOVAL OF SPERM DUCT(S) Bilateral 4/29/2021    Procedure: VASECTOMY;  Surgeon: Anant Riddle M.D.;  Location: SURGERY [facility];  Service: Urology   • SHOULDER ARTHROSCOPY W/ ROTATOR CUFF REPAIR Left 2/7/2018    Procedure: SHOULDER ARTHROSCOPY W/ ROTATOR CUFF REPAIR;  Surgeon: Kiet Ramirez M.D.;  Location: [facility];  Service: Orthopedics   • SHOULDER ARTHROSCOPY W/ BICIPITAL TENODESIS REPAIR  2/7/2018    Procedure: SHOULDER ARTHROSCOPY with open BICIPITAL TENODESIS REPAIR;  Surgeon: Kiet Ramirez M.D.;  Location: SURGERY [facility];  Service: Orthopedics   • TONSILLECTOMY AND ADENOIDECTOMY  10/12/2011    Performed by ROBERTO ALEMAN at SURGERY SAME DAY [facility] ORS   • SEPTOTURBINOPLASTY  10/12/2011    Performed by ROBERTO ALEMAN at SURGERY SAME DAY [facility] ORS   • UVULOPHARYNGOPALATOPLASTY  10/12/2011    Performed by ROBERTO ALEMAN at SURGERY SAME DAY [facility] ORS   • DEBRIDEMENT  11/2/2010    Performed by BAUTISTA KURTZ at SURGERY SAME DAY [facility] ORS   • CYST EXCISION  11/2/2010    Performed by BAUTISTA KURTZ at SURGERY SAME DAY [facility] ORS   • KNEE ARTHROSCOPY  11/2/2010    Performed by BAUTISTA KURTZ at SURGERY SAME DAY [facility] ORS   • APPENDECTOMY  2009   • OTHER ORTHOPEDIC SURGERY  2007    right shoulder   • ACL RECONSTRUCTION Right 2003   • NERVE ULNAR TRANSFER  2001   • ARTHROSCOPY, KNEE     • LAMINOTOMY     • OTHER ORTHOPEDIC SURGERY      spine c2 c3  "nerves clipped   • TONSILLECTOMY       Social History     Tobacco Use   • Smoking status: Current Every Day Smoker     Packs/day: 0.50     Years: 35.00     Pack years: 17.50     Types: Cigarettes     Last attempt to quit: 2011     Years since quittin.6   • Smokeless tobacco: Never Used   • Tobacco comment:  ffor 22yrs   Substance Use Topics   • Alcohol use: Yes     Comment: 1 daily     Chief Complaint   Patient presents with   • Post-Op Complications     Pt had testicular surgery with Dr. Schafer on Thursday. Pt states his testicles were swollen, and the incision have opened up.    • Post-Op Pain     Pain level 10/10.      Diagnosis: Surgical wound dehiscence, initial encounter [T81.31XA]    Unit where seen by Wound Team: T431/02     WOUND CONSULT/FOLLOW UP RELATED TO: f/u  Scrotal wound dehiscence.      WOUND HISTORY:  \"Abhishek Pacheco is a 50 y.o. male patient consulted to our service for wound dehiscence. 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 Riddle last week. He experienced severe scrotal edema following his procedure. Last night at dinner, his midline scrotal incision opened up and drained blood. Today upon exam, the incision continues to drain clear yellow serous fluid. No signs of infection, gangrene, crepitus, fluctuance. Denies fevers/ chills.\"  - from Urology [facility]ON Florez       Wound 21 Full Thickness Wound Scrotum complicated open surgical (Active)   Wound Image   21 1500   Site Assessment Pink;White    Periwound Assessment Intact    Margins Unattached edges    Closure Secondary intention    Drainage Amount None    Drainage Description Serosanguineous    Treatments Site care    Wound Cleansing Approved Wound Cleanser    Periwound Protectant Skin Protectant Wipes to Periwound    Dressing Cleansing/Solutions Not Applicable    Dressing Options Collagen Dressing;Hydrofiber Silver;Silicone Adhesive Foam    Dressing Changed Changed    Dressing Status Intact    "   Dressing Change/Treatment Frequency Every 48 hrs, and As Needed    NEXT Dressing Change/Treatment Date 05/10/21    NEXT Weekly Photo (Inpatient Only) 05/12/21    Non-staged Wound Description Full thickness    Wound Length (cm) 4 cm    Wound Width (cm) 1.5 cm    Wound Depth (cm) 0.6 cm    Wound Surface Area (cm^2) 6 cm^2    Wound Volume (cm^3) 3.6 cm^3    Tunneling (cm) 0 cm    Undermining (cm) 0 cm    Shape ellipse    Wound Odor None    Exposed Structures Other (Comments)                  THERESA:   No results found.    Lab Values:    Lab Results   Component Value Date/Time    WBC 11.1 (H) 05/07/2021 04:50 AM    RBC 5.07 05/07/2021 04:50 AM    HEMOGLOBIN 14.8 05/07/2021 04:50 AM    HEMATOCRIT 45.5 05/07/2021 04:50 AM    CREACTPROT 2.38 (H) 11/25/2019 05:00 PM        Culture Results show:  No results found for this or any previous visit (from the past 720 hour(s)).    Pain Level/Medicated:  Premedicated prior to wound care       INTERVENTIONS BY WOUND TEAM:  Chart and images reviewed. Discussed with bedside RN. All areas of concern (based on picture review, LDA review and discussion with bedside RN) have been thoroughly assessed. Documentation of areas based on significant findings. This RN in to assess patient. Performed standard wound care which includes appropriate positioning, dressing removal and non-selective debridement. Pictures and measurements obtained weekly if/when required.  Preparation for Dressing removal: soaked with cleanser  Cleansed with:  wound cleanser and gauze.  Sharp debridement: n/a  Deb wound: Cleansed with wound cleanser and gauze, Prepped with no sting skin prep  Primary Dressing: Activated kayce into space and over exposed teste. 2 layers of hydrofiber silver to cover.  Secondary (Outer) Dressing: Adhesive foam. Hypafix tape to border.    Interdisciplinary consultation: Patient, Bedside RN, Dr. Perez    EVALUATION / RATIONALE FOR TREATMENT:  Most Recent Date:  5/8: Pt planning on going on  road trip today and returning on Tuesday, prior to his  outpatient wound clinic appointment scheduled for WED at 0800. Discussed risk and benefits on maintaining wound vac in place while on trip vs antimicrobial collagen dressing. Due to risk of vac malfunction with increased risk of infection if vac foam stay inside the wound for 2+ hrs, patient opted to initiate antimicrobial collagen dressing for the duration of his trip. Pt states he will f/u with the OP clinic where he will resume NPWT. Home vac at bedside. Notified pt he will need to bring home vac with him to his appointment.    Wound is clean with decreasing edema and induration to edges. Applied activated kayce to wound bed and over exposed teste absorb destructive components of wound exudate and create an optimal environment for cellular growth. Covered with hydrofiber silver to absorb any extra drainage. Provided teaching to wife at bedside on proper, wound cleansing, changing and application of kayce and hydrofiber silver, as well as identifying  s/sx of infection. Extra supplies provided including chlorhexidene wipes which pt can use daily around thighs groin and sacrum to decrease risk of cross contamination during dsg changes. Dressing can be changed q48h or PRN if drainage or dislodgement.    05/06/21: Patient admitted through ED today r/t wound dehiscence of scrotal incision. Teste is visible. Veraflo NPWT with NS instillation initiated to protect exposed structure, manage exudate and promote granular tissue growth.      Goals: Steady decrease in wound area and depth weekly.    WOUND TEAM PLAN OF CARE ([X] for frequency of wound follow up,):   Nursing to follow orders written for wound care. Contact wound team if area fails to progress, deteriorates or with any questions/concerns  Dressing changes by wound team:                   Follow up 3 times weekly:                NPWT change 3 times weekly: X    Follow up 1-2 times weekly:      Follow up  Bi-Monthly:                   Follow up as needed:     Other (explain):     NURSING PLAN OF CARE ORDERS (X):  Dressing changes: See Dressing Care orders: X  Skin care: See Skin Care orders:   RN Prevention Protocol:   Rectal tube care: See Rectal Tube Care orders:   Other orders:      Anticipated discharge plans:   LTACH:        SNF/Rehab:                  Home Health Care:           Outpatient Wound Center:  X to resume vac therapy. Wednesday at 0800  Self/Family Care:        Other:

## 2021-05-08 NOTE — PROGRESS NOTES
[facility] Medicine Daily Progress Note    Date of Service  5/7/2021    Chief Complaint  50 y.o. male admitted 5/6/2021 with wound dishescence    Hospital Course  50 y.o. M active smoker presented to the Emergency Department for post-op complications onset since 5/5 PM. Per the patient, he had testicular surgery with Dr. Schafer 1 week ago consisting of a vasectomy and excision of a lesion of the spermatic cord, and since then, he has experienced worsening swelling. He came to the ED as his incision opened and he had clear and, slightly bloody drainage from the wound. No other exacerbating or alleviating factors were noted. He was discharged following his surgery with a prescription for ibuprofen and hydrocodone.     Pt was then admitted to medicine for possible wound revision; Urology on call was consulted on admission.     During 5/6 day shift, vitals wnl; afebrile. Exam was notable for scrotal swelling with about 3cm x 1cm oval shape opening in ventral scrotal area; scrotal contents visible. No signs of necrosis, gangrene, fluctuance or crepitus. Without secondary clinical signs and symptoms of infection, Abx was stopped. Urology evaluation appreciated - no surgical intervention; wound care team evaluated the patient - wound vac Veraflo placed.     Interval Problem Update  5/7  Vitals reviewed; afebrile.  The rest of the vitals within normal parameters.  Pain scale reported - 4-6 in scrotal area  Per report, voiding urine spontaneously and had BM    CDW Urology - clear to DC from urology standpoint. Per wound team, would prefer to have veraflo in placed for at least 48hrs which would be 5/8. After that, likely can be switched to regular wound vac or possible just dressing. However, dressing change will need to be 3x weekly. Home with home health or outpatient wound follow up (preferably on Tue 5/11 if available).     Met with Pt during rounds and later in PM with Wife Zainab at bedside - DC planning and logistic  issues discussed as Pt and wife are planning to have a short road trip over the weekend and likely will not be back till late Tue. There may be issues if vacuum leak or needing urgent dressing change while on the trip.     At this point, plan to have an eval by wound team tomorrow AM discussed.     Additionally, Pt mentioned of left thigh and calf numbness without changes in motor strength - plan to monitor discussed and encouraged ambulation.     Wound vac referral placed. CM team is following.     Labs reviewed    Consultants/Specialty  Urology    Code Status  Full Code    Disposition  Likely home (tentatively planned for 5/8)    Discussed with patient, patient's nurse and with multidisciplinary team during rounds including , pharmacist and charge nurse.        Review of Systems  Review of Systems   Constitutional: Negative for chills, fever and malaise/fatigue.   HENT: Negative for congestion and sore throat.    Eyes: Negative for double vision.   Respiratory: Negative for shortness of breath.    Cardiovascular: Negative for chest pain, palpitations and leg swelling.   Gastrointestinal: Negative for abdominal pain, heartburn, nausea and vomiting.   Genitourinary: Negative for dysuria, frequency and urgency.        Scrotal wound opening   Musculoskeletal: Negative for myalgias and neck pain.   Neurological: Positive for sensory change (LLE). Negative for dizziness, focal weakness and headaches.   Psychiatric/Behavioral: Negative for depression.        Physical Exam  Temp:  [36.3 °C (97.3 °F)-37.6 °C (99.6 °F)] 36.9 °C (98.4 °F)  Pulse:  [63-80] 64  Resp:  [18] 18  BP: (107-119)/(52-89) 112/52  SpO2:  [93 %-96 %] 94 %    Physical Exam  Vitals and nursing note reviewed.   Constitutional:       General: He is not in acute distress.     Appearance: Normal appearance. He is not ill-appearing.   HENT:      Head: Normocephalic.      Mouth/Throat:      Mouth: Mucous membranes are moist.   Eyes:      General:  No scleral icterus.     Conjunctiva/sclera: Conjunctivae normal.   Cardiovascular:      Rate and Rhythm: Normal rate and regular rhythm.      Pulses: Normal pulses.      Heart sounds: Normal heart sounds.   Pulmonary:      Effort: Pulmonary effort is normal. No respiratory distress.      Breath sounds: Normal breath sounds. No wheezing.   Abdominal:      General: Bowel sounds are normal. There is no distension.      Palpations: Abdomen is soft.      Tenderness: There is no abdominal tenderness.   Genitourinary:      Musculoskeletal:         General: No swelling or tenderness. Normal range of motion.   Skin:     General: Skin is warm and dry.      Capillary Refill: Capillary refill takes less than 2 seconds.   Neurological:      General: No focal deficit present.      Mental Status: He is alert and oriented to person, place, and time. Mental status is at baseline.   Psychiatric:         Mood and Affect: Mood normal.         Fluids    Intake/Output Summary (Last 24 hours) at 5/7/2021 1833  Last data filed at 5/7/2021 1800  Gross per 24 hour   Intake 930 ml   Output 500 ml   Net 430 ml       Laboratory  Recent Labs     05/06/21  0307 05/07/21  0450   WBC 11.1* 11.1*   RBC 5.08 5.07   HEMOGLOBIN 15.3 14.8   HEMATOCRIT 44.1 45.5   MCV 86.8 89.7   MCH 30.1 29.2   MCHC 34.7 32.5*   RDW 42.5 44.2   PLATELETCT 273 266   MPV 9.6 10.2     Recent Labs     05/06/21  0307   SODIUM 139   POTASSIUM 4.0   CHLORIDE 103   CO2 22   GLUCOSE 95   BUN 19   CREATININE 0.94   CALCIUM 8.7             Recent Labs     05/06/21  0307   TRIGLYCERIDE 397*   HDL 25*   LDL 89       Imaging  [id number]   Final Result      1.  No testicular abnormality.   2.  Left scrotal wall thickening, likely on an inflammatory basis or possibly due to known recent surgery.   3.  Small right hydrocele.           Assessment/Plan  * Wound dehiscence- (present on admission)  Assessment & Plan  Scrotal wound dehiscence  No evidence of sepsis.  No signs of  necrosis, gangrene, fluctuance or crepitus. Without secondary clinical signs and symptoms of infection, Abx was stopped  Urology consulted - no surgical intervention  Wound care team following - to keep Veraflo for at least 48hrs  May need outpatient wound clinic follow up and regular wound vac upon DC    Tobacco abuse- (present on admission)  Assessment & Plan  Patch  Half pack per day  Before DC, will provide counseling and possible NRT    Pain- (present on admission)  Assessment & Plan  Scrotal, severe  Multimodal paain management regimen in place       VTE prophylaxis: Pt is ambulatory; SCD

## 2021-05-08 NOTE — DISCHARGE INSTRUCTIONS
Discharge Instructions    Discharged to home by car with relative. Discharged via wheelchair, hospital escort: Yes.  Special equipment needed: Wound VAC    Be sure to schedule a follow-up appointment with your primary care doctor or any specialists as instructed.     Discharge Plan:        I understand that a diet low in cholesterol, fat, and sodium is recommended for good health. Unless I have been given specific instructions below for another diet, I accept this instruction as my diet prescription.   Other diet: cardiac    Special Instructions: to go to wound care    · Is patient discharged on Warfarin / Coumadin?   No     Depression / Suicide Risk    As you are discharged from this [facility] facility, it is important to learn how to keep safe from harming yourself.    Recognize the warning signs:  · Abrupt changes in personality, positive or negative- including increase in energy   · Giving away possessions  · Change in eating patterns- significant weight changes-  positive or negative  · Change in sleeping patterns- unable to sleep or sleeping all the time   · Unwillingness or inability to communicate  · Depression  · Unusual sadness, discouragement and loneliness  · Talk of wanting to die  · Neglect of personal appearance   · Rebelliousness- reckless behavior  · Withdrawal from people/activities they love  · Confusion- inability to concentrate     If you or a loved one observes any of these behaviors or has concerns about self-harm, here's what you can do:  · Talk about it- your feelings and reasons for harming yourself  · Remove any means that you might use to hurt yourself (examples: pills, rope, extension cords, firearm)  · Get professional help from the community (Mental Health, Substance Abuse, psychological counseling)  · Do not be alone:Call your Safe Contact- someone whom you trust who will be there for you.  · Call your local CRISIS HOTLINE 142-8236 or 465-773-4519  · Call your local Children's  Mobile Crisis Response Team [place] (473) 916-8160 or www."[employer]  · Call the toll free National Suicide Prevention Hotlines   · National Suicide Prevention Lifeline 462-720-KHOT (6351)  · National Hope Line Network 800-SUICIDE (544-7789)

## 2021-05-08 NOTE — PROGRESS NOTES
Discharge Lounge order placed and patient educated. Care plan and patient education completed. All belongings returned to patient. Patient transported via wheelchair to discharge lounge. Family instructed to  patient at the emergency room [facility] area.

## 2021-05-08 NOTE — CARE PLAN
Problem: Communication  Goal: The ability to communicate needs accurately and effectively will improve  Outcome: PROGRESSING AS EXPECTED  Note: All questions and concerns addressed with patient, patient verbalizes understanding of current plan of care     Problem: Pain Management  Goal: Pain level will decrease to patient's comfort goal  Outcome: PROGRESSING AS EXPECTED  Note: Patient's pain managed with prescribed medications and alternative pain interventions. Education provided to patient on pain medications, alternative pain interventions and pain rating scale, patient verbalizes understanding of education. Patient able to rest and sleep comfortably throughout the night

## 2021-05-08 NOTE — PROGRESS NOTES
Pt A&Ox4  Patient answers all questions appropriately and follows all instructions      Patient rates pain as 4/10, at this time, denies need for pain intervention at this time. Patient's pain managed with prescribed medications and alternative pain interventions. Patient resting comfortably at this time     Tolerating diet, denies n/v. + bowel sounds, + flatus, LBM 5/6  +void      Saturating >90% on room air  Patient denies SOB      Wound Vac in place to scrotum, device checked, device patent      Pt ambulates independently with a steady gait, patient verbalizes understanding to use call light when needing assistance      Updated on plan of care. Safety education provided. Bed locked in low. Call light within reach. Rounding in place.

## 2021-05-09 NOTE — DISCHARGE SUMMARY
Discharge Summary    CHIEF COMPLAINT ON ADMISSION  Chief Complaint   Patient presents with   • Post-Op Complications     Pt had testicular surgery with Dr. Schafer on Thursday. Pt states his testicles were swollen, and the incision have opened up.    • Post-Op Pain     Pain level 10/10.        Reason for Admission  Post Op complication     Admission Date  5/6/2021    CODE STATUS  FULL    HPI & HOSPITAL COURSE  50 y.o. M active smoker presented to the Emergency Department for post-op complications onset since 5/5 PM. Per the patient, he had testicular surgery with Dr. Schafer 1 week ago consisting of a vasectomy and excision of a lesion of the spermatic cord, and since then, he has experienced worsening swelling. He came to the ED as his incision opened and he had clear and, slightly bloody drainage from the wound. No other exacerbating or alleviating factors were noted. He was discharged following his surgery with a prescription for ibuprofen and hydrocodone.      Pt was then admitted to medicine for possible wound revision; Urology on call was consulted on admission.      During 5/6 day shift, vitals wnl; afebrile. Exam was notable for scrotal swelling with about 3cm x 1cm oval shape opening in ventral scrotal area; scrotal contents visible. No signs of necrosis, gangrene, fluctuance or crepitus. Without secondary clinical signs and symptoms of infection, Abx was stopped. Urology evaluation appreciated - no surgical intervention; wound care team evaluated the patient - wound vac Veraflo placed.      5/7: Vitals wnl; afebrile.   Vitals reviewed; afebrile. Pain reported - 4-6 in scrotal area  Per report, voiding urine spontaneously and had BM     [facility] Urology - clear to DC from urology standpoint. Per wound team, would prefer to have veraflo in placed for at least 48hrs which would be 5/8. After that, likely can be switched to regular wound vac or possible just dressing. However, dressing change will need to be 3x weekly.  Home with home health or outpatient wound follow up (preferably on Tue 5/11 if available).      Met with Pt during rounds and later in PM with Wife Zainab at bedside - DC planning and logistic issues discussed as Pt and wife are planning to have a short road trip over the weekend and likely will not be back till late Tue. There may be issues if vacuum leak or needing urgent dressing change while on the trip.      At this point, plan to have an eval by wound team tomorrow AM discussed.      Additionally, Pt mentioned of left thigh and calf numbness without changes in motor strength - plan to monitor discussed and encouraged ambulation.      Wound vac referral placed. CM team is following.     5/8: vitals wnl; afebrile. Pain is adequately controlled.   No acute complain from patient.   Pt was re-evaluated by wound care team - dina shetty. Due to risk of wound vac malfunction while on trip, Pt opted to initiate antimicrobial collagen dressing. Wound vac to resume on 5/12 Wed at clinic appointment.   Smoking cessation counseling provided - agreed to start chantix.      Therefore, he is discharged in fair and stable condition to home with close outpatient follow-up.    The patient met 2-midnight criteria for an inpatient stay at the time of discharge.    Discharge Date  5/8/2021    FOLLOW UP ITEMS POST DISCHARGE  N/A    DISCHARGE DIAGNOSES  Principal Problem:    Surgical wound dehiscence, initial encounter POA: Yes  Active Problems:    Tobacco abuse POA: Yes    Pain POA: Yes  Resolved Problems:    * No resolved hospital problems. *  Physical Exam  Vitals and nursing note reviewed.   Constitutional:       General: He is not in acute distress.     Appearance: Normal appearance. He is not ill-appearing.   HENT:      Head: Normocephalic.      Mouth/Throat:      Mouth: Mucous membranes are moist.   Eyes:      General: No scleral icterus.     Conjunctiva/sclera: Conjunctivae normal.   Cardiovascular:      Rate and Rhythm:  Normal rate and regular rhythm.      Pulses: Normal pulses.      Heart sounds: Normal heart sounds.   Pulmonary:      Effort: Pulmonary effort is normal. No respiratory distress.      Breath sounds: Normal breath sounds. No wheezing.   Abdominal:      General: Bowel sounds are normal. There is no distension.      Palpations: Abdomen is soft.      Tenderness: There is no abdominal tenderness.   Genitourinary:      Musculoskeletal:         General: No swelling or tenderness. Normal range of motion.   Skin:     General: Skin is warm and dry.      Capillary Refill: Capillary refill takes less than 2 seconds.   Neurological:      General: No focal deficit present.      Mental Status: He is alert and oriented to person, place, and time. Mental status is at baseline.   Psychiatric:         Mood and Affect: Mood normal.        FOLLOW UP  Future Appointments   Date Time Provider Department Center   5/12/2021  8:00 AM Gladys Johnson R.N. ND [address] [place] [address] ZAFAR Valle  [address] [place] [address] [id number]  [address]  [facility] [address]  [phone]    Call  Please caall your Primary Care Provider to schedule a follow up appointment. Thank You.      MEDICATIONS ON DISCHARGE     Medication List      START taking these medications      Instructions   Chantix 1 MG tablet  Generic drug: varenicline   Take 1 tablet by mouth 2 times a day.  Dose: 1 mg     HYDROcodone-acetaminophen 5-325 MG Tabs per tablet  Commonly known as: NORCO   Take 1 tablet by mouth every 6 hours as needed for up to 5 days.  Dose: 1 tablet        CONTINUE taking these medications      Instructions   ibuprofen 200 MG Tabs  Commonly known as: MOTRIN   Take 400 mg by mouth every 8 hours as needed for Inflammation.  Dose: 400 mg     nicotine 21 MG/24HR Pt24  Commonly known as: NICODERM   Place 1 Patch on the skin 1 time a day as needed.  Dose: 1 Patch     sumatriptan 100 MG tablet  Commonly known as: IMITREX   Take 100 mg by mouth one time as needed for Migraine.  Dose: 100  mg     temazepam 30 MG capsule  Commonly known as: RESTORIL   Take 30 mg by mouth at bedtime.  Dose: 30 mg     valACYclovir 500 MG Tabs  Commonly known as: VALTREX   Take 500 mg by mouth 3 times a day as needed.  Dose: 500 mg        STOP taking these medications    cyclobenzaprine 10 mg Tabs  Commonly known as: Flexeril     methylPREDNISolone 4 MG Tbpk  Commonly known as: MEDROL DOSEPAK     sulfamethoxazole-trimethoprim 800-160 MG tablet  Commonly known as: BACTRIM DS            Allergies  Allergies   Allergen Reactions   • Iodine Hives and Itching     After receiving iodine contrast for CT pt developed hives and itching    Pt developed breakthrough reaction of hives after being premedicated for contrast injection on 12/10/19   • Percocet [Oxycodone-Acetaminophen] Nausea     And dizziness       DIET  Regular    ACTIVITY  As tolerated.  Weight bearing as tolerated    CONSULTATIONS  Urology    PROCEDURES  N/A    LABORATORY  Lab Results   Component Value Date    SODIUM 139 05/06/2021    POTASSIUM 4.0 05/06/2021    CHLORIDE 103 05/06/2021    CO2 22 05/06/2021    GLUCOSE 95 05/06/2021    BUN 19 05/06/2021    CREATININE 0.94 05/06/2021        Lab Results   Component Value Date    WBC 11.1 (H) 05/07/2021    HEMOGLOBIN 14.8 05/07/2021    HEMATOCRIT 45.5 05/07/2021    PLATELETCT 266 05/07/2021        Total time of the discharge process exceeds 36 minutes.

## 2021-05-11 LAB
BACTERIA BLD CULT: NORMAL
BACTERIA BLD CULT: NORMAL
SIGNIFICANT IND 70042: NORMAL
SIGNIFICANT IND 70042: NORMAL
SITE SITE: NORMAL
SITE SITE: NORMAL
SOURCE SOURCE: NORMAL
SOURCE SOURCE: NORMAL

## 2021-05-12 ENCOUNTER — NON-PROVIDER VISIT (OUTPATIENT)
Dept: WOUND CARE | Facility: MEDICAL CENTER | Age: 51
End: 2021-05-12
Attending: STUDENT IN AN ORGANIZED HEALTH CARE EDUCATION/TRAINING PROGRAM
Payer: COMMERCIAL

## 2021-05-12 PROCEDURE — 99211 OFF/OP EST MAY X REQ PHY/QHP: CPT

## 2021-05-12 PROCEDURE — 97605 NEG PRS WND THER DME<=50SQCM: CPT

## 2021-05-12 NOTE — PATIENT INSTRUCTIONS
-Keep your wound dressing clean, dry, and intact.    -Wound vac may not have any drainage in tube or cannister & it will still be working.   Change cannister if it does become full by pressing tab on side of machine to remove canister and snap on new one. Full canister can be thrown in the trash. If cannister fills with bright red blood - go to ER. Dressing will be changed every MWF at the wound clini.  If you are having issues with your wound VAC, please consider patching leaks, changing the canister, or calling [phone] for troubleshooting. If the wound VAC has been off or un-operational for over 2 hours, call wound care center to inform them and remove all dressings including black foam and replace with normal saline damp gauze.     -Should you experience any significant changes in your wound(s), such as infection (redness, swelling, localized heat, increased pain, fever > 101 F, chills) or have any questions regarding your home care instructions, please contact the wound center at [phone]. If after hours, contact your primary care physician or go to the hospital emergency room.

## 2021-05-12 NOTE — CERTIFICATION
Non Provider Encounter- Full Thickness wound    HISTORY OF PRESENT ILLNESS  Wound History:    START OF CARE IN CLINIC: 05/12/2021    REFERRING PROVIDER: Rafael Perez MD   WOUND- Full Thickness Wound   LOCATION: Scrotum   HISTORY: Patient is a 50 y.o. male patient who underwent excision of left spermatic cord lipoma and vasectomy by Dr. Riddle 4/29/21. He experienced severe scrotal edema following his procedure. On 5/5/21, his midline scrotal incision opened up and drained blood. Inpatient wound team was consulted and a veraflo vac was placed. Patient only had one vac change before being discharged because he was spending the weekend in [place]. Silver hydrofiber and collagen was placed in wound bed and changed while he was gone by his significant other. Upon assessment today, his scrotum is much more edematous per his report and he is still experiencing a significant amount of pain. The wound is twice the size per measurements and has increased slough. Due to the size difference since discharge, the vac was placed today even though there is a large amount of slough. Patient will be seeing provider on 5/17/21 for debridement.      Pertinent Labs and Diagnostics:    Labs:     A1c: No results found for: HBA1C     IMAGING: US scrotum 5/6/21    VASCULAR STUDIES: N/A    LAST  WOUND CULTURE:  DATE : None in [facility]           FALL RISK ASSESSMENT: Not a fall risk.    65 years or older     Fall within the last 2 years   Uses ambulatory devices  Loss of protective sensation in feet   Use of prostethic/orthotic    Presence of lower extremity/foot/toe amputation   Taking medication that increases risk (per facility policy)    Patient allergies and medications reviewed via Epic.     Wound Assessment:  Negative Pressure Wound Therapy 05/12/21 Scrotum (Active)   NPWT Pump Mode / Pressure Setting 125 mmHg;Continuous 05/12/21 0800   Dressing Type Black Foam (Regular) 05/12/21 0800   Number of Foam Pieces Used 2 05/12/21 0800   Canister  Changed Yes 05/12/21 0800     Wound 05/12/21 Scrotum (Active)   Wound Image   05/12/21 0800   Site Assessment Yellow;Red;Painful;Slough 05/12/21 0800   Periwound Assessment Edema;Painful 05/12/21 0800   Margins Unattached edges 05/12/21 0800   Closure Secondary intention 05/12/21 0800   Drainage Amount Large 05/12/21 0800   Drainage Description Serosanguineous 05/12/21 0800   Treatments Cleansed;Topical Lidocaine;Site care 05/12/21 0800   Wound Cleansing Puracyn Spray 05/12/21 0800   Periwound Protectant Skin Protectant Wipes to Periwound;Paste Ring;Drape 05/12/21 0800   Dressing Cleansing/Solutions Other (Comments) 05/12/21 0800   Dressing Options Collagen Dressing;Wound Vac 05/12/21 0800   Dressing Changed New 05/12/21 0800   Dressing Status Clean;Dry;Intact 05/12/21 0800   Dressing Change/Treatment Frequency Monday, Wednesday, Friday, and As Needed 05/12/21 0800   NEXT Dressing Change/Treatment Date 05/14/21 05/12/21 0800   Non-staged Wound Description Full thickness 05/12/21 0800   Wound Length (cm) 5.2 cm 05/12/21 0800   Wound Width (cm) 2.3 cm 05/12/21 0800   Wound Depth (cm) 2.1 cm 05/12/21 0800   Wound Surface Area (cm^2) 11.96 cm^2 05/12/21 0800   Wound Volume (cm^3) 25.12 cm^3 05/12/21 0800   Post-Procedure Length (cm) 5.2 cm 05/12/21 0800   Post-Procedure Width (cm) 2.3 cm 05/12/21 0800   Post-Procedure Depth (cm) 2.1 cm 05/12/21 0800   Post-Procedure Surface Area (cm^2) 11.96 cm^2 05/12/21 0800   Post-Procedure Volume (cm^3) 25.12 cm^3 05/12/21 0800   Tunneling (cm) 1.8 cm 05/12/21 0800   Tunneling Clock Position of Wound 11 05/12/21 0800   Undermining (cm) 1.4 cm 05/12/21 0800   Undermining of Wound, 1st Location From 2 o'clock;To 4 o'clock 05/12/21 0800   Wound Odor None 05/12/21 0800   Pulses N/A 05/12/21 0800     Procedures:    -2% viscous lidocaine applied topically to wound bed for approximately 5 minutes prior to debridement  -Puracyn spray, gauze and cotton tipped applicator to remove non viable  tissue from wound bed. Patient with significant pain despite lidocaine.   -Refer to flowsheet for wound care details.     Post-debridement Photo      PATIENT EDUCATION  -Advised to go to ER for any increased redness, swelling, drainage or odor, or if patient develops fever, chills, nausea or vomiting.  -Importance of adequate nutrition for wound healing  -Increase protein intake (unless contraindicated by renal status)  -All instructions for the wound vac provided in AVS. Questions and concerns addressed and answered.

## 2021-05-14 ENCOUNTER — OFFICE VISIT (OUTPATIENT)
Dept: WOUND CARE | Facility: MEDICAL CENTER | Age: 51
End: 2021-05-14
Attending: STUDENT IN AN ORGANIZED HEALTH CARE EDUCATION/TRAINING PROGRAM
Payer: COMMERCIAL

## 2021-05-14 VITALS
TEMPERATURE: 97.7 F | DIASTOLIC BLOOD PRESSURE: 78 MMHG | OXYGEN SATURATION: 96 % | RESPIRATION RATE: 18 BRPM | SYSTOLIC BLOOD PRESSURE: 126 MMHG | HEART RATE: 71 BPM

## 2021-05-14 DIAGNOSIS — Z86.018 S/P EXCISION OF LIPOMA: ICD-10-CM

## 2021-05-14 DIAGNOSIS — T81.31XA SURGICAL WOUND DEHISCENCE, INITIAL ENCOUNTER: ICD-10-CM

## 2021-05-14 DIAGNOSIS — N50.89 SCROTAL EDEMA: ICD-10-CM

## 2021-05-14 DIAGNOSIS — T14.8XXA PAIN ASSOCIATED WITH WOUND: ICD-10-CM

## 2021-05-14 DIAGNOSIS — R52 PAIN ASSOCIATED WITH WOUND: ICD-10-CM

## 2021-05-14 DIAGNOSIS — Z98.890 S/P EXCISION OF LIPOMA: ICD-10-CM

## 2021-05-14 DIAGNOSIS — Z72.0 TOBACCO ABUSE: ICD-10-CM

## 2021-05-14 DIAGNOSIS — Z98.52 S/P VASECTOMY: ICD-10-CM

## 2021-05-14 PROCEDURE — 99214 OFFICE O/P EST MOD 30 MIN: CPT

## 2021-05-14 PROCEDURE — 11042 DBRDMT SUBQ TIS 1ST 20SQCM/<: CPT

## 2021-05-14 PROCEDURE — 11042 DBRDMT SUBQ TIS 1ST 20SQCM/<: CPT | Performed by: NURSE PRACTITIONER

## 2021-05-14 PROCEDURE — 99214 OFFICE O/P EST MOD 30 MIN: CPT | Mod: 25 | Performed by: NURSE PRACTITIONER

## 2021-05-14 PROCEDURE — 97605 NEG PRS WND THER DME<=50SQCM: CPT

## 2021-05-14 ASSESSMENT — ENCOUNTER SYMPTOMS
ROS SKIN COMMENTS: SCROTAL WOUND
COUGH: 0
CHILLS: 0
EYES NEGATIVE: 1
PALPITATIONS: 0
DEPRESSION: 0
VOMITING: 0
DIAPHORESIS: 0
NERVOUS/ANXIOUS: 0
NAUSEA: 0
BACK PAIN: 0
WHEEZING: 0
FEVER: 0
WEAKNESS: 0
SHORTNESS OF BREATH: 0
FALLS: 0
CLAUDICATION: 0

## 2021-05-14 ASSESSMENT — PAIN SCALES - GENERAL: PAINLEVEL: 8=MODERATE-SEVERE PAIN

## 2021-05-14 NOTE — PATIENT INSTRUCTIONS
Wound VAC at 125mmHg continuous with 3 foams. Dressing will be changed every MWF at the wound clinic or with your home health nurse.  If you are having issues with your wound VAC, please consider patching leaks, changing the canister, or calling [phone] for troubleshooting. If the wound VAC has been off or un-operational for over 2 hours, call wound care center to inform them and remove all dressings including black foam and replace with normal saline damp gauze.     Should you experience any significant changes in your wound(s), such as infection (redness, swelling, localized heat, increased pain, fever > 101 F, chills) or have any questions regarding your home care instructions, please contact the wound center at [phone]. If after hours, contact your primary care physician or go to the hospital emergency room.   Keep dressing clean, dry and cover when bathing. Only change dressing if it's over saturated, soiled or falls off.

## 2021-05-14 NOTE — PROGRESS NOTES
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 Riddle 4/29/2021.  Incision dehisced 5/5/2021 due to edema.  Patient noted bloody drainage, denied any erythema or odor.  Patient presented to ED and was admitted from 5/5/2021-5/8/2021.  He was seen by wound team, dina wound VAC was applied.  VAC was discontinued upon discharge as patient went out of town for a few days.  Upon return he had significant increase in scrotal edema and pain.  He also presented with slough to the wound.  At his first wound care appointment, VAC was reapplied.    Pertinent Medical History: Tobacco use, sleep apnea, obesity    TOBACCO USE: Half pack to 1 pack/day.  Started Chantix 5/13/2021.    Patient's problem list, allergies, and current medications reviewed and updated in Epic    Interval History:  5/14/2021: Initial provider visit. Clinic visit with FRANKO Coppola CWON.  Pt denies fevers, chills, nausea, vomiting.  Accompanied by wife Zainab.  Patient reports edema has improved since last wound care visit appointment.  Wife has noted that slough has decreased as well from the wound bed since reapplication of the VAC.  Patient has follow-up with surgeon on 6/2/2021.  He has completed a 3-day course of Bactrim that was given postop.  Currently using Norco for pain relief experiencing negative side effects from narcotics.  Requesting to return to work.  Works as a  at car [employer].        REVIEW OF SYSTEMS:   Review of Systems   Constitutional: Negative for chills, diaphoresis and fever.   HENT: Negative.    Eyes: Negative.    Respiratory: Negative for cough, shortness of breath and wheezing.    Cardiovascular: Negative for chest pain, palpitations and  claudication.   Gastrointestinal: Negative for nausea and vomiting.   Genitourinary: Negative for dysuria, frequency and hematuria.   Musculoskeletal: Negative for back pain, falls and joint pain.   Skin: Negative for itching and rash.        Scrotal wound   Neurological: Negative for weakness.   Psychiatric/Behavioral: Negative for depression. The patient is not nervous/anxious.        PHYSICAL EXAMINATION:   /78   Pulse 71   Temp 36.5 °C (97.7 °F)   Resp 18   SpO2 96%     Physical Exam  Vitals and nursing note reviewed.   HENT:      Head: Normocephalic.      Right Ear: External ear normal.      Left Ear: External ear normal.   Eyes:      Pupils: Pupils are equal, round, and reactive to light.   Cardiovascular:      Rate and Rhythm: Normal rate.   Pulmonary:      Effort: Pulmonary effort is normal.   Abdominal:      Palpations: Abdomen is soft.   Genitourinary:     Penis: Normal.       Comments: Severe scrotal edema primarily to left side  Musculoskeletal:      Cervical back: Normal range of motion.   Skin:     General: Skin is warm and dry.      Comments: See wound flowsheet for further detail  Anterior scrotal open surgical wound   Neurological:      Mental Status: He is alert and oriented to person, place, and time.   Psychiatric:         Mood and Affect: Mood and affect normal.         Cognition and Memory: Memory normal.         Judgment: Judgment normal.         WOUND ASSESSMENT              Negative Pressure Wound Therapy 05/12/21 Scrotum (Active)   NPWT Pump Mode / Pressure Setting 125 mmHg;Continuous 05/14/21 1100   Dressing Type Black Foam (Regular) 05/14/21 1100   Number of Foam Pieces Used 3 05/14/21 1100   Canister Changed No 05/14/21 1100           Wound 05/12/21 Scrotum (Active)   Wound Image    05/14/21 1100   Site Assessment Yellow;Red;Painful;Slough 05/14/21 1100   Periwound Assessment Edema;Painful;Blanchable erythema 05/14/21 1100   Margins Unattached edges 05/14/21 1100   Closure  Secondary intention 05/14/21 1100   Drainage Amount Moderate 05/14/21 1100   Drainage Description Serosanguineous 05/14/21 1100   Treatments Cleansed;Topical Lidocaine;Provider debridement;Site care 05/14/21 1100   Wound Cleansing Normal Saline Irrigation 05/14/21 1100   Periwound Protectant Skin Protectant Wipes to Periwound;Paste Ring 05/14/21 1100   Dressing Cleansing/Solutions Other (Comments) 05/12/21 0800   Dressing Options Puracyn Gel;Wound Vac 05/14/21 1100   Dressing Changed New 05/12/21 0800   Dressing Status Clean;Dry;Intact 05/12/21 0800   Dressing Change/Treatment Frequency Monday, Wednesday, Friday, and As Needed 05/12/21 0800   NEXT Dressing Change/Treatment Date 05/14/21 05/12/21 0800   Non-staged Wound Description Full thickness 05/14/21 1100   Wound Length (cm) 4.4 cm 05/14/21 1100   Wound Width (cm) 2 cm 05/14/21 1100   Wound Depth (cm) 3.1 cm 05/14/21 1100   Wound Surface Area (cm^2) 8.8 cm^2 05/14/21 1100   Wound Volume (cm^3) 27.28 cm^3 05/14/21 1100   Post-Procedure Length (cm) 4.5 cm 05/14/21 1100   Post-Procedure Width (cm) 2.2 cm 05/14/21 1100   Post-Procedure Depth (cm) 3.2 cm 05/14/21 1100   Post-Procedure Surface Area (cm^2) 9.9 cm^2 05/14/21 1100   Post-Procedure Volume (cm^3) 31.68 cm^3 05/14/21 1100   Wound Healing % -9 05/14/21 1100   Wound Bed Granulation (%) 10 % 05/14/21 1100   Wound Bed Slough (%) 90 % 05/14/21 1100   Tunneling (cm) 3 cm 05/14/21 1100   Tunneling Clock Position of Wound 11 05/14/21 1100   Undermining (cm) 2 cm 05/14/21 1100   Undermining of Wound, 1st Location From 2 o'clock;To 4 o'clock 05/14/21 1100   Wound Odor None 05/14/21 1100   Pulses N/A 05/12/21 0800   Exposed Structures Other (Comments) 05/14/21 1100            PROCEDURE:   -2% viscous lidocaine applied topically to wound bed for approximately 5 minutes prior to debridement  -Curette, scissors, forceps used to debride wound bed.  Exposed frayed Vicryl suture trimmed.  Excisional debridement was  performed to remove devitalized tissue until healthy, bleeding tissue was visualized.   Entire surface of wound, 9.9cm2 debrided.  Tissue debrided into the subcutaneous layer.    -Bleeding controlled with manual pressure.    -Wound care completed by wound RN, refer to flowsheet  -Patient tolerated the procedure well, without c/o pain or discomfort.       Pertinent Labs and Diagnostics:    Labs:   Pathology:4/29/21  A. Left spermatic cord mass:          Mature adipose tissue, consistent with lipoma.     A1c: No results found for: HBA1C       IMAGING: None on record    VASCULAR STUDIES: N/A    LAST  WOUND CULTURE:  DATE : None on record  Blood culture  negative 5/6/2021           ASSESSMENT AND PLAN:     1. Surgical wound dehiscence, initial encounter    5/14/2021: Initial provider visit. Slough decreased from photo on 5/12/21. Loose vicryl sutures trimmed.  -Excisional debridement of wound in clinic today, medically necessary to promote wound healing.  -Patient to return to clinic 3x weekly for VAC change, assessment and possible debridement  -ok to return to work from wound care standpoint if company will allow VAC therapy at work  - Importance of adequate nutrition for wound healing    Wound care: puracyn gel to reduce slough, NPWT at 125mmhg continuous      2. S/P excision of lipoma, S/P vasectomy  Surgery on 4/29/21 with Dr. Riddle for Excision of left spermatic cord mass and bilateral vasectomy.    5/14/21: See above. Has follow up appt 6/2/21        3. Scrotal edema    5/14/21: decreased per pt and wife from 5/12/21  -Edema severe primarily to left side  -discussed use of sling. Pt to follow up with urology office or purchase sling.  -recommend elevating scrotum while sitting. Techniques discussed.   -encouraged ambulation        4. Tobacco abuse  5/14/2021.  Patient working on quitting.  Started Chantix at 1321.  No side effects at this time.    5. Pain associated with wound:     5/14/21: taking norco 3x/day.  Reports negative side effects  -Recommend transitioning to tylenol/ibuprofen   -may take ibuprofen 600mg Q6hr PRN pain and alternate with tylenol 500mg Q4-6hrs not to exceed 3000mg in 24hr day  -tolerated debridement with topical lidocaine           PATIENT EDUCATION  -Advised to go to ER for any increased redness, swelling, drainage, or odor, or if patient develops fever, chills, nausea or vomiting.       30 min spent  counseling, coordinating care, reviewing records, discussing POC, educating patient regarding wound healing and progression.  This time was spent in excess to procedure time.       Please note that this note may have been created using voice recognition software. I have worked with technical experts from [employer] to optimize the interface.  I have made every reasonable attempt to correct obvious errors, but there may be errors of grammar and possibly content that I did not discover before finalizing the note.    N

## 2021-05-17 ENCOUNTER — NON-PROVIDER VISIT (OUTPATIENT)
Dept: WOUND CARE | Facility: MEDICAL CENTER | Age: 51
End: 2021-05-17
Attending: STUDENT IN AN ORGANIZED HEALTH CARE EDUCATION/TRAINING PROGRAM
Payer: COMMERCIAL

## 2021-05-17 PROCEDURE — 97605 NEG PRS WND THER DME<=50SQCM: CPT

## 2021-05-17 NOTE — PATIENT INSTRUCTIONS
Wound VAC at 125mmHg continuous 3 black foams. Dressing will be changed every MWF at the wound clinic or with your home health nurse.  If you are having issues with your wound VAC, please consider patching leaks, changing the canister, or calling [phone] for troubleshooting. If the wound VAC has been off or un-operational for over 2 hours, call wound care center to inform them and remove all dressings including black foam and replace with normal saline damp gauze.     Should you experience any significant changes in your wound(s), such as infection (redness, swelling, localized heat, increased pain, fever > 101 F, chills) or have any questions regarding your home care instructions, please contact the wound center at [phone]. If after hours, contact your primary care physician or go to the hospital emergency room.   Keep dressing clean, dry and cover when bathing. Only change dressing if it's over saturated, soiled or falls off.

## 2021-05-19 ENCOUNTER — NON-PROVIDER VISIT (OUTPATIENT)
Dept: WOUND CARE | Facility: MEDICAL CENTER | Age: 51
End: 2021-05-19
Attending: STUDENT IN AN ORGANIZED HEALTH CARE EDUCATION/TRAINING PROGRAM
Payer: COMMERCIAL

## 2021-05-19 PROCEDURE — 97605 NEG PRS WND THER DME<=50SQCM: CPT

## 2021-05-19 NOTE — PROCEDURES
2% viscous lidocaine for ~5 minute dwell time. Cleansed wound with puracyn spray and gauze to remove non viable tissue from wound bed. 1 piece black foam to tract at 12, 1 small one into undermining, 1 to cover the wound bed and one to accommodate the trac pad. NPWT resumed at 125 mmHg with no leaks noted. Patient tolerated well.

## 2021-05-19 NOTE — PATIENT INSTRUCTIONS
-Keep your wound dressing clean, dry, and intact.    -Wound vac may not have any drainage in tube or cannister & it will still be working.   Change cannister if it does become full by pressing tab on side of machine to remove canister and snap on new one. Full canister can be thrown in the trash. If cannister fills with bright red blood - go to ER. Dressing will be changed every MWF at the wound clini.  If you are having issues with your wound VAC, please consider patching leaks, changing the canister, or calling [phone] for troubleshooting. If the wound VAC has been off or un-operational for over 2 hours, call wound care center to inform them and remove all dressings including black foam and replace with normal saline damp gauze.     -Should you experience any significant changes in your wound(s), such as infection (redness, swelling, localized heat, increased pain, fever > 101 F, chills) or have any questions regarding your home care instructions, please contact the wound center at [phone]. If after hours, contact your primary care physician or go to the hospital emergency room.

## 2021-05-21 ENCOUNTER — OFFICE VISIT (OUTPATIENT)
Dept: WOUND CARE | Facility: MEDICAL CENTER | Age: 51
End: 2021-05-21
Attending: STUDENT IN AN ORGANIZED HEALTH CARE EDUCATION/TRAINING PROGRAM
Payer: COMMERCIAL

## 2021-05-21 VITALS
OXYGEN SATURATION: 97 % | HEART RATE: 81 BPM | DIASTOLIC BLOOD PRESSURE: 89 MMHG | RESPIRATION RATE: 18 BRPM | SYSTOLIC BLOOD PRESSURE: 129 MMHG | TEMPERATURE: 97.1 F

## 2021-05-21 DIAGNOSIS — Z86.018 S/P EXCISION OF LIPOMA: ICD-10-CM

## 2021-05-21 DIAGNOSIS — T81.31XA SURGICAL WOUND DEHISCENCE, INITIAL ENCOUNTER: ICD-10-CM

## 2021-05-21 DIAGNOSIS — R52 PAIN ASSOCIATED WITH WOUND: ICD-10-CM

## 2021-05-21 DIAGNOSIS — Z72.0 TOBACCO ABUSE: ICD-10-CM

## 2021-05-21 DIAGNOSIS — Z98.52 S/P VASECTOMY: ICD-10-CM

## 2021-05-21 DIAGNOSIS — T14.8XXA PAIN ASSOCIATED WITH WOUND: ICD-10-CM

## 2021-05-21 DIAGNOSIS — E66.01 CLASS 2 SEVERE OBESITY DUE TO EXCESS CALORIES WITH SERIOUS COMORBIDITY IN ADULT, UNSPECIFIED BMI (HCC): ICD-10-CM

## 2021-05-21 DIAGNOSIS — Z98.890 S/P EXCISION OF LIPOMA: ICD-10-CM

## 2021-05-21 DIAGNOSIS — N50.89 SCROTAL EDEMA: ICD-10-CM

## 2021-05-21 PROCEDURE — 99213 OFFICE O/P EST LOW 20 MIN: CPT

## 2021-05-21 PROCEDURE — 11042 DBRDMT SUBQ TIS 1ST 20SQCM/<: CPT

## 2021-05-21 PROCEDURE — 97605 NEG PRS WND THER DME<=50SQCM: CPT

## 2021-05-21 ASSESSMENT — ENCOUNTER SYMPTOMS
BACK PAIN: 0
NAUSEA: 0
FEVER: 0
SHORTNESS OF BREATH: 0
COUGH: 0
EYES NEGATIVE: 1
CLAUDICATION: 0
NERVOUS/ANXIOUS: 0
DIAPHORESIS: 0
ROS SKIN COMMENTS: SCROTAL WOUND
PALPITATIONS: 0
VOMITING: 0
WEAKNESS: 0
WHEEZING: 0
DEPRESSION: 0
FALLS: 0
CHILLS: 0

## 2021-05-21 NOTE — PROGRESS NOTES
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 Riddle 4/29/2021.  Incision dehisced 5/5/2021 due to edema.  Patient noted bloody drainage, denied any erythema or odor.  Patient presented to ED and was admitted from 5/5/2021-5/8/2021.  He was seen by wound team, dina wound VAC was applied.  VAC was discontinued upon discharge as patient went out of town for a few days.  Upon return he had significant increase in scrotal edema and pain.  He also presented with slough to the wound.  At his first wound care appointment, VAC was reapplied.    Pertinent Medical History: Tobacco use, sleep apnea, obesity    TOBACCO USE: Half pack to 1 pack/day.  Started Chantix 5/13/2021.    Patient's problem list, allergies, and current medications reviewed and updated in Epic    Interval History:  5/14/2021: Initial provider visit. Clinic visit with FRANKO Coppola CWON.  Pt denies fevers, chills, nausea, vomiting.  Accompanied by wife Zainab.  Patient reports edema has improved since last wound care visit appointment.  Wife has noted that slough has decreased as well from the wound bed since reapplication of the VAC.  Patient has follow-up with surgeon on 6/2/2021.  He has completed a 3-day course of Bactrim that was given postop.  Currently using Norco for pain relief experiencing negative side effects from narcotics. Requesting to return to work.  Works as a  at car [employer].    5/21/2021 : Clinic visit with Dr. Mcclain.  He returns today for a follow-up visit companied by his wife.  He denies chills fever headache cough chest pain abdominal pain or other acute medical issues.  He is requesting a note for his employer to limit his work schedule to 6  hours a day as long as he is on the wound VAC, It was provided for him.        REVIEW OF SYSTEMS:   Review of Systems   Constitutional: Negative for chills, diaphoresis and fever.   HENT: Negative.    Eyes: Negative.    Respiratory: Negative for cough, shortness of breath and wheezing.    Cardiovascular: Negative for chest pain, palpitations and claudication.   Gastrointestinal: Negative for nausea and vomiting.   Genitourinary: Negative for dysuria, frequency and hematuria.   Musculoskeletal: Negative for back pain, falls and joint pain.   Skin: Negative for itching and rash.        Scrotal wound   Neurological: Negative for weakness.   Psychiatric/Behavioral: Negative for depression. The patient is not nervous/anxious.        PHYSICAL EXAMINATION:   There were no vitals taken for this visit.    Physical Exam  Vitals and nursing note reviewed.   HENT:      Head: Normocephalic.      Right Ear: External ear normal.      Left Ear: External ear normal.   Eyes:      Pupils: Pupils are equal, round, and reactive to light.   Cardiovascular:      Rate and Rhythm: Normal rate.   Pulmonary:      Effort: Pulmonary effort is normal.   Abdominal:      Palpations: Abdomen is soft.   Genitourinary:     Penis: Normal.       Comments: Severe scrotal edema primarily to left side  Musculoskeletal:      Cervical back: Normal range of motion.   Skin:     General: Skin is warm and dry.      Comments: See wound flowsheet for further detail  Anterior scrotal open surgical wound   Neurological:      Mental Status: He is alert and oriented to person, place, and time.   Psychiatric:         Mood and Affect: Mood and affect normal.         Cognition and Memory: Memory normal.         Judgment: Judgment normal.         WOUND ASSESSMENT        Negative Pressure Wound Therapy 05/12/21 Scrotum (Active)   NPWT Pump Mode / Pressure Setting 125 mmHg;Continuous 05/19/21 0830   Dressing Type Black Foam (Regular) 05/19/21 0830   Number of Foam Pieces  Used 4 05/19/21 0830   Canister Changed No 05/19/21 0830           Wound 05/12/21 Scrotum (Active)   Wound Image    05/14/21 1100   Site Assessment Yellow;Red;Painful 05/21/21 0900   Periwound Assessment Edema;Painful;Blanchable erythema 05/21/21 0900   Margins Unattached edges 05/21/21 0900   Closure Secondary intention 05/19/21 0830   Drainage Amount Small 05/21/21 0900   Drainage Description Serosanguineous 05/21/21 0900   Treatments Cleansed;Topical Lidocaine;Provider debridement;Site care 05/21/21 0900   Wound Cleansing Puracyn [place] 05/21/21 0900   Periwound Protectant Skin Protectant Wipes to Periwound;Benzoin;Paste Ring;Drape 05/21/21 0900   Dressing Cleansing/Solutions Not Applicable 05/21/21 0900   Dressing Options Puracyn Gel;Wound Vac 05/21/21 0900   Dressing Changed Changed 05/21/21 0900   Dressing Status Clean;Dry;Intact 05/21/21 0900   Dressing Change/Treatment Frequency Monday, Wednesday, Friday, and As Needed 05/19/21 0830   NEXT Dressing Change/Treatment Date 05/21/21 05/19/21 0830   Non-staged Wound Description Full thickness 05/21/21 0900   Wound Length (cm) 4.4 cm 05/21/21 0900   Wound Width (cm) 2.1 cm 05/21/21 0900   Wound Depth (cm) 1.4 cm 05/21/21 0900   Wound Surface Area (cm^2) 9.24 cm^2 05/21/21 0900   Wound Volume (cm^3) 12.94 cm^3 05/21/21 0900   Post-Procedure Length (cm) 4.5 cm 05/14/21 1100   Post-Procedure Width (cm) 2.2 cm 05/14/21 1100   Post-Procedure Depth (cm) 3.2 cm 05/14/21 1100   Post-Procedure Surface Area (cm^2) 9.9 cm^2 05/14/21 1100   Post-Procedure Volume (cm^3) 31.68 cm^3 05/14/21 1100   Wound Healing % 48 05/21/21 0900   Wound Bed Granulation (%) 10 % 05/14/21 1100   Wound Bed Slough (%) 90 % 05/14/21 1100   Tunneling (cm) 3 cm 05/14/21 1100   Tunneling Clock Position of Wound 11 05/14/21 1100   Undermining (cm) 2 cm 05/14/21 1100   Undermining of Wound, 1st Location From 2 o'clock;To 4 o'clock 05/14/21 1100   Wound Odor None 05/19/21 0830   Pulses N/A 05/12/21 0800    Exposed Structures Adipose 05/19/21 0830        PROCEDURE:   -2% viscous lidocaine applied topically to wound bed for approximately 5 minutes prior to debridement  -Curette, scissors, forceps used to debride wound bed.  Exposed frayed Vicryl suture trimmed.  Excisional debridement was performed to remove devitalized tissue until healthy, bleeding tissue was visualized.   Entire surface of wound, 9.24cm2 debrided.  Tissue debrided into the subcutaneous layer.    -Bleeding controlled with manual pressure.    -Wound care completed by wound RN, refer to flowsheet  -Patient tolerated the procedure well, without c/o pain or discomfort.       Pertinent Labs and Diagnostics:    Labs:   Pathology:4/29/21  A. Left spermatic cord mass:          Mature adipose tissue, consistent with lipoma.     A1c: No results found for: HBA1C       IMAGING: None on record    VASCULAR STUDIES: N/A    LAST  WOUND CULTURE:  DATE : None on record  Blood culture  negative 5/6/2021           ASSESSMENT AND PLAN:     1. Surgical wound dehiscence, initial encounter    5/21/2021: Subsequent provider visit.   -Excisional debridement of wound in clinic today, medically necessary to promote wound healing.  -Patient to return to clinic 3x weekly for VAC change, assessment and possible debridement  -ok to return to work from wound care standpoint if company will allow VAC therapy at work  - Importance of adequate nutrition for wound healing    Wound care: puracyn gel to reduce slough, NPWT at 125mmhg continuous      2. S/P excision of lipoma, S/P vasectomy  Surgery on 4/29/21 with Dr. Riddle for Excision of left spermatic cord mass and bilateral vasectomy.    5/14/21: See above. Has follow up appt 6/2/21        3. Scrotal edema    5/21/2021: decreased per pt and wife from 5/12/21  -Edema severe primarily to left side  -discussed use of sling. Pt to follow up with urology office or purchase sling.  -recommend elevating scrotum while sitting. Techniques  discussed.   -encouraged ambulation        4. Tobacco abuse  5/14/2021.  Patient working on quitting.  Started Chantix at 1321.  No side effects at this time.    5. Pain associated with wound:     5/21/2021: taking norco 3x/day. Reports negative side effects  -Recommend transitioning to tylenol/ibuprofen   -may take ibuprofen 600mg Q6hr PRN pain and alternate with tylenol 500mg Q4-6hrs not to exceed 3000mg in 24hr day  -tolerated debridement with topical lidocaine           PATIENT EDUCATION  -Advised to go to ER for any increased redness, swelling, drainage, or odor, or if patient develops fever, chills, nausea or vomiting.       20 min spent  counseling, coordinating care, reviewing records, discussing POC, educating patient regarding wound healing and progression.  This time was spent in excess to procedure time.       Please note that this note may have been created using voice recognition software. I have worked with technical experts from [facility] to optimize the interface.  I have made every reasonable attempt to correct obvious errors, but there may be errors of grammar and possibly content that I did not discover before finalizing the note.    N

## 2021-05-21 NOTE — PROGRESS NOTES
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 Riddle 4/29/2021.  Incision dehisced 5/5/2021 due to edema.  Patient noted bloody drainage, denied any erythema or odor.  Patient presented to ED and was admitted from 5/5/2021-5/8/2021.  He was seen by wound team, dina wound VAC was applied.  VAC was discontinued upon discharge as patient went out of town for a few days.  Upon return he had significant increase in scrotal edema and pain.  He also presented with slough to the wound.  At his first wound care appointment, VAC was reapplied.    Pertinent Medical History: Tobacco use, sleep apnea, obesity    TOBACCO USE: Half pack to 1 pack/day.  Started Chantix 5/13/2021.    Patient's problem list, allergies, and current medications reviewed and updated in Epic    Interval History:  5/14/2021: Initial provider visit. Clinic visit with FRANKO Coppola CWON.  Pt denies fevers, chills, nausea, vomiting.  Accompanied by wife Zainab.  Patient reports edema has improved since last wound care visit appointment.  Wife has noted that slough has decreased as well from the wound bed since reapplication of the VAC.  Patient has follow-up with surgeon on 6/2/2021.  He has completed a 3-day course of Bactrim that was given postop.  Currently using Norco for pain relief experiencing negative side effects from narcotics. Requesting to return to work.  Works as a  at car dealership.    5/21/2021 : Clinic visit with Dr. Mcclain.  He returns today for a follow-up visit companied by his wife.  He denies chills fever headache cough chest pain abdominal pain or other acute medical issues.  He is requesting a note for his employer to limit his work schedule to 6  hours a day as long as he is on the wound VAC, It was provided for him.        REVIEW OF SYSTEMS:   Review of Systems   Constitutional: Negative for chills, diaphoresis and fever.   HENT: Negative.    Eyes: Negative.    Respiratory: Negative for cough, shortness of breath and wheezing.    Cardiovascular: Negative for chest pain, palpitations and claudication.   Gastrointestinal: Negative for nausea and vomiting.   Genitourinary: Negative for dysuria, frequency and hematuria.   Musculoskeletal: Negative for back pain, falls and joint pain.   Skin: Negative for itching and rash.        Scrotal wound   Neurological: Negative for weakness.   Psychiatric/Behavioral: Negative for depression. The patient is not nervous/anxious.        PHYSICAL EXAMINATION:   There were no vitals taken for this visit.    Physical Exam  Vitals and nursing note reviewed.   HENT:      Head: Normocephalic.      Right Ear: External ear normal.      Left Ear: External ear normal.   Eyes:      Pupils: Pupils are equal, round, and reactive to light.   Cardiovascular:      Rate and Rhythm: Normal rate.   Pulmonary:      Effort: Pulmonary effort is normal.   Abdominal:      Palpations: Abdomen is soft.   Genitourinary:     Penis: Normal.       Comments: Severe scrotal edema primarily to left side  Musculoskeletal:      Cervical back: Normal range of motion.   Skin:     General: Skin is warm and dry.      Comments: See wound flowsheet for further detail  Anterior scrotal open surgical wound   Neurological:      Mental Status: He is alert and oriented to person, place, and time.   Psychiatric:         Mood and Affect: Mood and affect normal.         Cognition and Memory: Memory normal.         Judgment: Judgment normal.         WOUND ASSESSMENT        Negative Pressure Wound Therapy 05/12/21 Scrotum (Active)   NPWT Pump Mode / Pressure Setting 125 mmHg;Continuous 05/19/21 0830   Dressing Type Black Foam (Regular) 05/19/21 0830   Number of Foam Pieces  Used 4 05/19/21 0830   Canister Changed No 05/19/21 0830           Wound 05/12/21 Scrotum (Active)   Wound Image    05/14/21 1100   Site Assessment Yellow;Red;Painful 05/21/21 0900   Periwound Assessment Edema;Painful;Blanchable erythema 05/21/21 0900   Margins Unattached edges 05/21/21 0900   Closure Secondary intention 05/19/21 0830   Drainage Amount Small 05/21/21 0900   Drainage Description Serosanguineous 05/21/21 0900   Treatments Cleansed;Topical Lidocaine;Provider debridement;Site care 05/21/21 0900   Wound Cleansing Puracyn [place] 05/21/21 0900   Periwound Protectant Skin Protectant Wipes to Periwound;Benzoin;Paste Ring;Drape 05/21/21 0900   Dressing Cleansing/Solutions Not Applicable 05/21/21 0900   Dressing Options Puracyn Gel;Wound Vac 05/21/21 0900   Dressing Changed Changed 05/21/21 0900   Dressing Status Clean;Dry;Intact 05/21/21 0900   Dressing Change/Treatment Frequency Monday, Wednesday, Friday, and As Needed 05/19/21 0830   NEXT Dressing Change/Treatment Date 05/21/21 05/19/21 0830   Non-staged Wound Description Full thickness 05/21/21 0900   Wound Length (cm) 4.4 cm 05/21/21 0900   Wound Width (cm) 2.1 cm 05/21/21 0900   Wound Depth (cm) 1.4 cm 05/21/21 0900   Wound Surface Area (cm^2) 9.24 cm^2 05/21/21 0900   Wound Volume (cm^3) 12.94 cm^3 05/21/21 0900   Post-Procedure Length (cm) 4.5 cm 05/14/21 1100   Post-Procedure Width (cm) 2.2 cm 05/14/21 1100   Post-Procedure Depth (cm) 3.2 cm 05/14/21 1100   Post-Procedure Surface Area (cm^2) 9.9 cm^2 05/14/21 1100   Post-Procedure Volume (cm^3) 31.68 cm^3 05/14/21 1100   Wound Healing % 48 05/21/21 0900   Wound Bed Granulation (%) 10 % 05/14/21 1100   Wound Bed Slough (%) 90 % 05/14/21 1100   Tunneling (cm) 3 cm 05/14/21 1100   Tunneling Clock Position of Wound 11 05/14/21 1100   Undermining (cm) 2 cm 05/14/21 1100   Undermining of Wound, 1st Location From 2 o'clock;To 4 o'clock 05/14/21 1100   Wound Odor None 05/19/21 0830   Pulses N/A 05/12/21 0800    Exposed Structures Adipose 05/19/21 0830        PROCEDURE:   -2% viscous lidocaine applied topically to wound bed for approximately 5 minutes prior to debridement  -Curette, scissors, forceps used to debride wound bed.  Exposed frayed Vicryl suture trimmed.  Excisional debridement was performed to remove devitalized tissue until healthy, bleeding tissue was visualized.   Entire surface of wound, 9.24cm2 debrided.  Tissue debrided into the subcutaneous layer.    -Bleeding controlled with manual pressure.    -Wound care completed by wound RN, refer to flowsheet  -Patient tolerated the procedure well, without c/o pain or discomfort.       Pertinent Labs and Diagnostics:    Labs:   Pathology:4/29/21  A. Left spermatic cord mass:          Mature adipose tissue, consistent with lipoma.     A1c: No results found for: HBA1C       IMAGING: None on record    VASCULAR STUDIES: N/A    LAST  WOUND CULTURE:  DATE : None on record  Blood culture  negative 5/6/2021           ASSESSMENT AND PLAN:     1. Surgical wound dehiscence, initial encounter    5/21/2021: Subsequent provider visit.   -Excisional debridement of wound in clinic today, medically necessary to promote wound healing.  -Patient to return to clinic 3x weekly for VAC change, assessment and possible debridement  -ok to return to work from wound care standpoint if company will allow VAC therapy at work  - Importance of adequate nutrition for wound healing    Wound care: puracyn gel to reduce slough, NPWT at 125mmhg continuous      2. S/P excision of lipoma, S/P vasectomy  Surgery on 4/29/21 with Dr. Riddle for Excision of left spermatic cord mass and bilateral vasectomy.    5/14/21: See above. Has follow up appt 6/2/21        3. Scrotal edema    5/21/2021: decreased per pt and wife from 5/12/21  -Edema severe primarily to left side  -discussed use of sling. Pt to follow up with urology office or purchase sling.  -recommend elevating scrotum while sitting. Techniques  discussed.   -encouraged ambulation        4. Tobacco abuse  5/14/2021.  Patient working on quitting.  Started Chantix at 1321.  No side effects at this time.    5. Pain associated with wound:     5/21/2021: taking norco 3x/day. Reports negative side effects  -Recommend transitioning to tylenol/ibuprofen   -may take ibuprofen 600mg Q6hr PRN pain and alternate with tylenol 500mg Q4-6hrs not to exceed 3000mg in 24hr day  -tolerated debridement with topical lidocaine           PATIENT EDUCATION  -Advised to go to ER for any increased redness, swelling, drainage, or odor, or if patient develops fever, chills, nausea or vomiting.       20 min spent  counseling, coordinating care, reviewing records, discussing POC, educating patient regarding wound healing and progression.  This time was spent in excess to procedure time.       Please note that this note may have been created using voice recognition software. I have worked with technical experts from formerly [facility] to optimize the interface.  I have made every reasonable attempt to correct obvious errors, but there may be errors of grammar and possibly content that I did not discover before finalizing the note.    N

## 2021-05-21 NOTE — PATIENT INSTRUCTIONS
-Keep your wound dressing clean, dry, and intact.    -Wound vac may not have any drainage in tube or cannister & it will still be working.   Change cannister if it does become full by pressing tab on side of machine to remove canister and snap on new one. Full canister can be thrown in the trash. If cannister fills with bright red blood - go to ER. Dressing will be changed every MWF at the wound clini.  If you are having issues with your wound VAC, please consider patching leaks, changing the canister, or calling [phone] for troubleshooting. If the wound VAC has been off or un-operational for over 2 hours, call wound care center to inform them and remove all dressings including black foam and replace with normal saline damp gauze.     -Should you experience any significant changes in your wound(s), such as infection (redness, swelling, localized heat, increased pain, fever > 101 F, chills) or have any questions regarding your home care instructions, please contact the wound center at [phone]. If after hours, contact your primary care physician or go to the hospital emergency room.

## 2021-05-24 ENCOUNTER — NON-PROVIDER VISIT (OUTPATIENT)
Dept: WOUND CARE | Facility: MEDICAL CENTER | Age: 51
End: 2021-05-24
Attending: STUDENT IN AN ORGANIZED HEALTH CARE EDUCATION/TRAINING PROGRAM
Payer: COMMERCIAL

## 2021-05-24 PROCEDURE — 97605 NEG PRS WND THER DME<=50SQCM: CPT

## 2021-05-24 NOTE — PATIENT INSTRUCTIONS
Wound VAC at 125mmHg continuous with 1 foam. Dressing will be changed every MWF at the wound clinic or with your home health nurse.  If you are having issues with your wound VAC, please consider patching leaks, changing the canister, or calling [phone] for troubleshooting. If the wound VAC has been off or un-operational for over 2 hours, call wound care center to inform them and remove all dressings including black foam and replace with normal saline damp gauze.     Should you experience any significant changes in your wound(s), such as infection (redness, swelling, localized heat, increased pain, fever > 101 F, chills) or have any questions regarding your home care instructions, please contact the wound center at [phone]. If after hours, contact your primary care physician or go to the hospital emergency room.   Keep dressing clean, dry and cover when bathing. Only change dressing if it's over saturated, soiled or falls off.

## 2021-05-26 ENCOUNTER — NON-PROVIDER VISIT (OUTPATIENT)
Dept: WOUND CARE | Facility: MEDICAL CENTER | Age: 51
End: 2021-05-26
Attending: STUDENT IN AN ORGANIZED HEALTH CARE EDUCATION/TRAINING PROGRAM
Payer: COMMERCIAL

## 2021-05-26 DIAGNOSIS — T81.31XA SURGICAL WOUND DEHISCENCE, INITIAL ENCOUNTER: ICD-10-CM

## 2021-05-26 PROCEDURE — 97605 NEG PRS WND THER DME<=50SQCM: CPT

## 2021-05-26 PROCEDURE — 97602 WOUND(S) CARE NON-SELECTIVE: CPT

## 2021-05-26 NOTE — PROCEDURES
Non-selective debridement to wound and periwound using normal saline and gauze to remove nonviable tissue and biofilm.    NPWT changed this visit using 1 piece of black foam. Pump set to neg 125mmhg continuous. No leaks noted.

## 2021-05-26 NOTE — PATIENT INSTRUCTIONS
-Keep your wound dressing clean, dry, and intact.    -Change your dressing if it becomes soiled, soaked, or falls off.    -Wound vac may not have any drainage in tube or cannister & it will still be working.   Change cannister if it does become full by pressing tab on side of machine to remove canister and snap on new one. Full canister can be thrown in the trash. If cannister fills with bright red blood - go to ER. Dressing will be changed every MWF at the wound clinic.  If you are having issues with your wound VAC, please consider patching leaks, changing the canister, or calling [phone] for troubleshooting. If the wound VAC has been off or un-operational for over 2 hours, call wound care center to inform them and remove all dressings including black foam and replace with normal saline damp gauze.     -Should you experience any significant changes in your wound(s), such as infection (redness, swelling, localized heat, increased pain, fever > 101 F, chills) or have any questions regarding your home care instructions, please contact the wound center at [phone]. If after hours, contact your primary care physician or go to the hospital emergency room.

## 2021-05-28 ENCOUNTER — NON-PROVIDER VISIT (OUTPATIENT)
Dept: WOUND CARE | Facility: MEDICAL CENTER | Age: 51
End: 2021-05-28
Attending: STUDENT IN AN ORGANIZED HEALTH CARE EDUCATION/TRAINING PROGRAM
Payer: COMMERCIAL

## 2021-05-28 PROCEDURE — 97605 NEG PRS WND THER DME<=50SQCM: CPT

## 2021-05-28 NOTE — PATIENT INSTRUCTIONS
-Keep your wound dressing clean, dry, and intact.    -Change your dressing if it becomes soiled, soaked, or falls off.    -Wound vac may not have any drainage in tube or cannister & it will still be working.   Change cannister if it does become full by pressing tab on side of machine to remove canister and snap on new one. Full canister can be thrown in the trash. If cannister fills with bright red blood - go to ER. Dressing will be changed every MWF at the wound clini.  If you are having issues with your wound VAC, please consider patching leaks, changing the canister, or calling [phone] for troubleshooting. If the wound VAC has been off or un-operational for over 2 hours, call wound care center to inform them and remove all dressings including black foam and replace with normal saline damp gauze.       -Should you experience any significant changes in your wound(s), such as infection (redness, swelling, localized heat, increased pain, fever > 101 F, chills) or have any questions regarding your home care instructions, please contact the wound center at [phone]. If after hours, contact your primary care physician or go to the hospital emergency room.

## 2021-05-31 ENCOUNTER — NON-PROVIDER VISIT (OUTPATIENT)
Dept: WOUND CARE | Facility: MEDICAL CENTER | Age: 51
End: 2021-05-31
Attending: STUDENT IN AN ORGANIZED HEALTH CARE EDUCATION/TRAINING PROGRAM
Payer: COMMERCIAL

## 2021-05-31 PROCEDURE — 97605 NEG PRS WND THER DME<=50SQCM: CPT

## 2021-05-31 NOTE — PROCEDURES
Wound cleansed with saline and gauze. NPWT resumed at 125 mmHg with no leaks noted. Patient tolerated well.

## 2021-05-31 NOTE — PATIENT INSTRUCTIONS
-Keep your wound dressing clean, dry, and intact.    -Wound vac may not have any drainage in tube or cannister & it will still be working.   Change cannister if it does become full by pressing tab on side of machine to remove canister and snap on new one. Full canister can be thrown in the trash. If cannister fills with bright red blood - go to ER. Dressing will be changed every MWF at the wound clini.  If you are having issues with your wound VAC, please consider patching leaks, changing the canister, or calling [phone] for troubleshooting. If the wound VAC has been off or un-operational for over 2 hours, call wound care center to inform them and remove all dressings including black foam and replace with normal saline damp gauze.     -Should you experience any significant changes in your wound(s), such as infection (redness, swelling, localized heat, increased pain, fever > 101 F, chills) or have any questions regarding your home care instructions, please contact the wound center at [phone]. If after hours, contact your primary care physician or go to the hospital emergency room.

## 2021-06-02 ENCOUNTER — NON-PROVIDER VISIT (OUTPATIENT)
Dept: WOUND CARE | Facility: MEDICAL CENTER | Age: 51
End: 2021-06-02
Attending: STUDENT IN AN ORGANIZED HEALTH CARE EDUCATION/TRAINING PROGRAM
Payer: COMMERCIAL

## 2021-06-02 PROCEDURE — 97602 WOUND(S) CARE NON-SELECTIVE: CPT

## 2021-06-02 NOTE — PATIENT INSTRUCTIONS
-Keep your wound dressing clean, dry, and intact.    -Change your dressing every 3 to 4 days or if it becomes soiled, soaked, or falls off.    -Should you experience any significant changes in your wound(s), such as infection (redness, swelling, localized heat, increased pain, fever > 101 F, chills) or have any questions regarding your home care instructions, please contact the wound center at [phone]. If after hours, contact your primary care physician or go to the hospital emergency room.

## 2021-06-02 NOTE — PROCEDURES
Non-selective debridement to wound and periwound using normal saline and gauze to remove nonviable tissue and biofilm.    Vac hold initiated today due to the fact that the patient has been having difficulty keeping the vac in place as well as patient has a follow up with the surgeon this morning. [employer] notified.

## 2021-06-04 ENCOUNTER — OFFICE VISIT (OUTPATIENT)
Dept: WOUND CARE | Facility: MEDICAL CENTER | Age: 51
End: 2021-06-04
Attending: STUDENT IN AN ORGANIZED HEALTH CARE EDUCATION/TRAINING PROGRAM
Payer: COMMERCIAL

## 2021-06-04 VITALS
RESPIRATION RATE: 18 BRPM | DIASTOLIC BLOOD PRESSURE: 81 MMHG | HEART RATE: 80 BPM | SYSTOLIC BLOOD PRESSURE: 123 MMHG | TEMPERATURE: 97.5 F | OXYGEN SATURATION: 96 %

## 2021-06-04 DIAGNOSIS — E66.01 CLASS 2 SEVERE OBESITY DUE TO EXCESS CALORIES WITH SERIOUS COMORBIDITY IN ADULT, UNSPECIFIED BMI (HCC): ICD-10-CM

## 2021-06-04 DIAGNOSIS — T81.31XA SURGICAL WOUND DEHISCENCE, INITIAL ENCOUNTER: ICD-10-CM

## 2021-06-04 DIAGNOSIS — Z72.0 TOBACCO ABUSE: ICD-10-CM

## 2021-06-04 DIAGNOSIS — Z86.018 S/P EXCISION OF LIPOMA: ICD-10-CM

## 2021-06-04 DIAGNOSIS — Z98.52 S/P VASECTOMY: ICD-10-CM

## 2021-06-04 DIAGNOSIS — Z98.890 S/P EXCISION OF LIPOMA: ICD-10-CM

## 2021-06-04 DIAGNOSIS — R52 PAIN ASSOCIATED WITH WOUND: ICD-10-CM

## 2021-06-04 DIAGNOSIS — N50.89 SCROTAL EDEMA: ICD-10-CM

## 2021-06-04 DIAGNOSIS — T14.8XXA PAIN ASSOCIATED WITH WOUND: ICD-10-CM

## 2021-06-04 PROCEDURE — 11042 DBRDMT SUBQ TIS 1ST 20SQCM/<: CPT

## 2021-06-04 ASSESSMENT — ENCOUNTER SYMPTOMS
EYES NEGATIVE: 1
NAUSEA: 0
FALLS: 0
SHORTNESS OF BREATH: 0
DEPRESSION: 0
CLAUDICATION: 0
WEAKNESS: 0
FEVER: 0
PALPITATIONS: 0
COUGH: 0
BACK PAIN: 0
VOMITING: 0
NERVOUS/ANXIOUS: 0
ROS SKIN COMMENTS: SCROTAL WOUND
DIAPHORESIS: 0
WHEEZING: 0
CHILLS: 0

## 2021-06-04 ASSESSMENT — PAIN SCALES - GENERAL: PAINLEVEL: 3=SLIGHT PAIN

## 2021-06-04 NOTE — PATIENT INSTRUCTIONS
Should you experience any significant changes in your wound(s) such as infection (redness, swelling, localized heat, increased pain, fever >101 F, chills) or have any questions regarding your home care instructions, please contact the wound center [phone]. If after hours, contact your primary care physician or go the hospital emergency room.  Keep dressing clean and dry and cover while bathing. Only change dressing if over saturated, soiled or its falling off.

## 2021-06-04 NOTE — PROGRESS NOTES
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 Riddle 4/29/2021.  Incision dehisced 5/5/2021 due to edema.  Patient noted bloody drainage, denied any erythema or odor.  Patient presented to ED and was admitted from 5/5/2021-5/8/2021.  He was seen by wound team, dina wound VAC was applied.  VAC was discontinued upon discharge as patient went out of town for a few days.  Upon return he had significant increase in scrotal edema and pain.  He also presented with slough to the wound.  At his first wound care appointment, VAC was reapplied.    Pertinent Medical History: Tobacco use, sleep apnea, obesity    TOBACCO USE: Half pack to 1 pack/day.  Started Chantix 5/13/2021.    Patient's problem list, allergies, and current medications reviewed and updated in Epic    Interval History:  5/14/2021: Initial provider visit. Clinic visit with FRANKO Coppola CWON.  Pt denies fevers, chills, nausea, vomiting.  Accompanied by wife Zainab.  Patient reports edema has improved since last wound care visit appointment.  Wife has noted that slough has decreased as well from the wound bed since reapplication of the VAC.  Patient has follow-up with surgeon on 6/2/2021.  He has completed a 3-day course of Bactrim that was given postop.  Currently using Norco for pain relief experiencing negative side effects from narcotics. Requesting to return to work.  Works as a  at car [employer].    5/21/2021 : Clinic visit with Dr. Mcclain.  He returns today for a follow-up visit companied by his wife.  He denies chills fever headache cough chest pain abdominal pain or other acute medical issues.  He is requesting a note for his employer to limit his work schedule to 6  hours a day as long as he is on the wound VAC, It was provided for him.    6/4/2021 : Clinic visit with Dr. Mcclain. . Patient returns with his wife today for a follow-up visit the wound VAC has been off for a couple of days because of maceration on the tissue edges.  He nor his wife have any questions concerns or problems and denies any acute medical issues.  His only question today was would there be a scar the size of.        REVIEW OF SYSTEMS:   Review of Systems   Constitutional: Negative for chills, diaphoresis and fever.   HENT: Negative.    Eyes: Negative.    Respiratory: Negative for cough, shortness of breath and wheezing.    Cardiovascular: Negative for chest pain, palpitations and claudication.   Gastrointestinal: Negative for nausea and vomiting.   Genitourinary: Negative for dysuria, frequency and hematuria.   Musculoskeletal: Negative for back pain, falls and joint pain.   Skin: Negative for itching and rash.        Scrotal wound   Neurological: Negative for weakness.   Psychiatric/Behavioral: Negative for depression. The patient is not nervous/anxious.        PHYSICAL EXAMINATION:   /81   Pulse 80   Temp 36.4 °C (97.5 °F)   Resp 18   SpO2 96%     Physical Exam  Vitals and nursing note reviewed.   HENT:      Head: Normocephalic.      Right Ear: External ear normal.      Left Ear: External ear normal.   Eyes:      Pupils: Pupils are equal, round, and reactive to light.   Cardiovascular:      Rate and Rhythm: Normal rate.   Pulmonary:      Effort: Pulmonary effort is normal.   Abdominal:      Palpations: Abdomen is soft.   Genitourinary:     Penis: Normal.       Comments: Severe scrotal edema primarily to left side  Musculoskeletal:      Cervical back: Normal range of motion.   Skin:     General: Skin is warm and dry.      Comments: See wound flowsheet for further detail  Anterior scrotal open surgical wound   Neurological:      Mental Status: He is alert and oriented to person, place, and  time.   Psychiatric:         Mood and Affect: Mood and affect normal.         Cognition and Memory: Memory normal.         Judgment: Judgment normal.         WOUND ASSESSMENT        Negative Pressure Wound Therapy 05/12/21 Scrotum (Active)   NPWT Pump Mode / Pressure Setting Other (Comment) 06/04/21 1330   Dressing Type Black Foam (Regular) 05/31/21 1300   Number of Foam Pieces Used 2 05/31/21 1300   Canister Changed No 05/31/21 1300           Wound 05/12/21 Scrotum (Active)   Wound Image    06/04/21 1330   Site Assessment Red;Yellow 06/04/21 1330   Periwound Assessment Intact;Edema 06/04/21 1330   Margins Unattached edges 06/04/21 1330   Closure Secondary intention 05/26/21 1300   Drainage Amount CHRIS 06/04/21 1330   Drainage Description Serosanguineous 06/02/21 0730   Treatments Cleansed;Topical Lidocaine;Provider debridement;Site care;Other (Comment) 06/04/21 1330   Wound Cleansing Puracyn [place] 06/04/21 1330   Periwound Protectant Skin Protectant Wipes to Periwound 06/04/21 1330   Dressing Cleansing/Solutions Normal Saline 06/02/21 0730   Dressing Options Collagen Dressing;Hydrofiber Silver;Nonadhesive Foam;Hypafix Tape 06/04/21 1330   Dressing Changed New 06/02/21 0730   Dressing Status Clean;Dry;Intact 05/31/21 1300   Dressing Change/Treatment Frequency Every 72 hrs, and As Needed 06/02/21 0730   NEXT Dressing Change/Treatment Date 06/02/21 05/31/21 1300   Non-staged Wound Description Full thickness 06/04/21 1330   Wound Length (cm) 3 cm 06/04/21 1330   Wound Width (cm) 1.2 cm 06/04/21 1330   Wound Depth (cm) 0.6 cm 06/04/21 1330   Wound Surface Area (cm^2) 3.6 cm^2 06/04/21 1330   Wound Volume (cm^3) 2.16 cm^3 06/04/21 1330   Post-Procedure Length (cm) 3 cm 06/04/21 1330   Post-Procedure Width (cm) 1.4 cm 06/04/21 1330   Post-Procedure Depth (cm) 0.6 cm 06/04/21 1330   Post-Procedure Surface Area (cm^2) 4.2 cm^2 06/04/21 1330   Post-Procedure Volume (cm^3) 2.52 cm^3 06/04/21 1330   Wound Healing % 91 06/04/21  1330   Wound Bed Granulation (%) 75 % 06/04/21 1330   Wound Bed Slough (%) 25 % 06/04/21 1330   Tunneling (cm) 0 cm 06/04/21 1330   Tunneling Clock Position of Wound 10 05/21/21 0900   Tunneling - 2nd Location (cm) 0 cm 05/31/21 1300   Tunneling Clock Position of Wound - 2nd Location 3 05/21/21 0900   Undermining (cm) 0 cm 06/04/21 1330   Undermining of Wound, 1st Location From 2 o'clock;To 4 o'clock 05/14/21 1100   Wound Odor None 06/04/21 1330   Pulses N/A 05/12/21 0800   Exposed Structures None 06/04/21 1330        PROCEDURE:   -2% viscous lidocaine applied topically to wound bed for approximately 5 minutes prior to debridement  -Curette, scissors, forceps used to debride wound bed.  Exposed frayed Vicryl suture trimmed.  Excisional debridement was performed to remove devitalized tissue until healthy, bleeding tissue was visualized.   Entire surface of wound, 4.2cm2 debrided.  Tissue debrided into the subcutaneous layer.    -Bleeding controlled with manual pressure.    -Wound care completed by wound RN, refer to flowsheet  -Patient tolerated the procedure well, without c/o pain or discomfort.       Pertinent Labs and Diagnostics:    Labs:   Pathology:4/29/21  A. Left spermatic cord mass:          Mature adipose tissue, consistent with lipoma.     A1c: No results found for: HBA1C       IMAGING: None on record    VASCULAR STUDIES: N/A    LAST  WOUND CULTURE:  DATE : None on record  Blood culture  negative 5/6/2021           ASSESSMENT AND PLAN:     1. Surgical wound dehiscence, initial encounter    5 421: Subsequent provider visit.   -Excisional debridement of wound in clinic today, medically necessary to promote wound healing.  -Continue to hold wound VAC until he returns next week and reevaluate at that time  - Importance of adequate nutrition for wound healing    Wound care: puracyn gel to reduce slough, NPWT at 125mmhg continuous      2. S/P excision of lipoma, S/P vasectomy  Surgery on 4/29/21 with Dr. Riddle  for Excision of left spermatic cord mass and bilateral vasectomy.    5/14/21: See above. Has follow up appt 6/2/21        3. Scrotal edema    5/21/2021: decreased per pt and wife from 5/12/21  -Edema severe primarily to left side  -discussed use of sling. Pt to follow up with urology office or purchase sling.  -recommend elevating scrotum while sitting. Techniques discussed.   -encouraged ambulation        4. Tobacco abuse  5/14/2021.  Patient working on quitting.  Started Chantix at 1321.  No side effects at this time.    5. Pain associated with wound:     5/21/2021: taking norco 3x/day. Reports negative side effects  -Recommend transitioning to tylenol/ibuprofen   -may take ibuprofen 600mg Q6hr PRN pain and alternate with tylenol 500mg Q4-6hrs not to exceed 3000mg in 24hr day  -tolerated debridement with topical lidocaine           PATIENT EDUCATION  -Advised to go to ER for any increased redness, swelling, drainage, or odor, or if patient develops fever, chills, nausea or vomiting.       20 min spent  counseling, coordinating care, reviewing records, discussing POC, educating patient regarding wound healing and progression.  This time was spent in excess to procedure time.       Please note that this note may have been created using voice recognition software. I have worked with technical experts from [employer] to optimize the interface.  I have made every reasonable attempt to correct obvious errors, but there may be errors of grammar and possibly content that I did not discover before finalizing the note.    N

## 2021-06-07 ENCOUNTER — OFFICE VISIT (OUTPATIENT)
Dept: WOUND CARE | Facility: MEDICAL CENTER | Age: 51
End: 2021-06-07
Attending: STUDENT IN AN ORGANIZED HEALTH CARE EDUCATION/TRAINING PROGRAM
Payer: COMMERCIAL

## 2021-06-07 DIAGNOSIS — T81.31XA SURGICAL WOUND DEHISCENCE, INITIAL ENCOUNTER: Primary | ICD-10-CM

## 2021-06-07 PROCEDURE — 97602 WOUND(S) CARE NON-SELECTIVE: CPT

## 2021-06-07 PROCEDURE — 99999 PR NO CHARGE: CPT | Performed by: NURSE PRACTITIONER

## 2021-06-07 NOTE — PATIENT INSTRUCTIONS
Should you experience any significant changes in your wound(s) such as infection (redness, swelling, localized heat, increased pain, fever >101 F, chills) or have any questions regarding your home care instructions, please contact the wound center [phone]. If after hours, contact your primary care physician or go the hospital emergency room.  Keep dressing clean and dry and cover while bathing. Only change dressing if over saturated, soiled or its falling off.

## 2021-06-07 NOTE — PROGRESS NOTES
Patient was debrided by Dr. Mcclain on 6/4/2021 patient does not need debridement in clinic today.  Patient was seen by wound care RN only.

## 2021-06-07 NOTE — PROCEDURES
Nonselective debridement with NS and gauze to remove nonviable tissue from wound bed.    Discussed cancelling Wed. appt and coming back on Friday as wound is making good progress. Pt agreeable.

## 2021-06-11 ENCOUNTER — OFFICE VISIT (OUTPATIENT)
Dept: WOUND CARE | Facility: MEDICAL CENTER | Age: 51
End: 2021-06-11
Attending: STUDENT IN AN ORGANIZED HEALTH CARE EDUCATION/TRAINING PROGRAM
Payer: COMMERCIAL

## 2021-06-11 VITALS
OXYGEN SATURATION: 96 % | TEMPERATURE: 97.7 F | DIASTOLIC BLOOD PRESSURE: 90 MMHG | SYSTOLIC BLOOD PRESSURE: 137 MMHG | HEART RATE: 75 BPM | RESPIRATION RATE: 18 BRPM

## 2021-06-11 DIAGNOSIS — R52 PAIN ASSOCIATED WITH WOUND: ICD-10-CM

## 2021-06-11 DIAGNOSIS — N50.89 SCROTAL EDEMA: ICD-10-CM

## 2021-06-11 DIAGNOSIS — Z72.0 TOBACCO ABUSE: ICD-10-CM

## 2021-06-11 DIAGNOSIS — Z86.018 S/P EXCISION OF LIPOMA: ICD-10-CM

## 2021-06-11 DIAGNOSIS — Z98.890 S/P EXCISION OF LIPOMA: ICD-10-CM

## 2021-06-11 DIAGNOSIS — T81.31XD SURGICAL WOUND DEHISCENCE, SUBSEQUENT ENCOUNTER: ICD-10-CM

## 2021-06-11 DIAGNOSIS — T14.8XXA PAIN ASSOCIATED WITH WOUND: ICD-10-CM

## 2021-06-11 PROCEDURE — 99214 OFFICE O/P EST MOD 30 MIN: CPT

## 2021-06-11 PROCEDURE — 11042 DBRDMT SUBQ TIS 1ST 20SQCM/<: CPT

## 2021-06-11 PROCEDURE — 99212 OFFICE O/P EST SF 10 MIN: CPT | Mod: 25 | Performed by: NURSE PRACTITIONER

## 2021-06-11 PROCEDURE — 11042 DBRDMT SUBQ TIS 1ST 20SQCM/<: CPT | Performed by: NURSE PRACTITIONER

## 2021-06-11 RX ORDER — VARENICLINE TARTRATE 1 MG/1
1 TABLET, FILM COATED ORAL 2 TIMES DAILY
Qty: 60 TABLET | Refills: 3 | Status: SHIPPED | OUTPATIENT
Start: 2021-06-11 | End: 2021-09-15

## 2021-06-11 ASSESSMENT — ENCOUNTER SYMPTOMS
WHEEZING: 0
ROS SKIN COMMENTS: SCROTAL WOUND
NERVOUS/ANXIOUS: 0
COUGH: 0
WEAKNESS: 0
DIAPHORESIS: 0
SHORTNESS OF BREATH: 0
EYES NEGATIVE: 1
NAUSEA: 0
FEVER: 0
CHILLS: 0
BACK PAIN: 0
DEPRESSION: 0
VOMITING: 0
PALPITATIONS: 0
FALLS: 0
CLAUDICATION: 0

## 2021-06-11 ASSESSMENT — PAIN SCALES - GENERAL: PAINLEVEL: NO PAIN

## 2021-06-11 NOTE — PATIENT INSTRUCTIONS
-Keep your wound dressing clean, dry, and intact.    -Change your dressing every 3 to 4 days or if it becomes soiled, soaked, or falls off.    -Should you experience any significant changes in your wound(s), such as infection (redness, swelling, localized heat, increased pain, fever > 101 F, chills) or have any questions regarding your home care instructions, please contact the wound center at [phone]. If after hours, contact your primary care physician or go to the hospital emergency room.

## 2021-06-11 NOTE — PROGRESS NOTES
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 Riddle 4/29/2021.  Incision dehisced 5/5/2021 due to edema.  Patient noted bloody drainage, denied any erythema or odor.  Patient presented to ED and was admitted from 5/5/2021-5/8/2021.  He was seen by wound team, dina wound VAC was applied.  VAC was discontinued upon discharge as patient went out of town for a few days.  Upon return he had significant increase in scrotal edema and pain.  He also presented with slough to the wound.  At his first wound care appointment, VAC was reapplied.    Pertinent Medical History: Tobacco use, sleep apnea, obesity    TOBACCO USE: Half pack to 1 pack/day.  Started Chantix 5/13/2021.    Patient's problem list, allergies, and current medications reviewed and updated in Epic    Interval History:  5/14/2021: Initial provider visit. Clinic visit with FRANKO Coppola CWON.  Pt denies fevers, chills, nausea, vomiting.  Accompanied by wife Zainab.  Patient reports edema has improved since last wound care visit appointment.  Wife has noted that slough has decreased as well from the wound bed since reapplication of the VAC.  Patient has follow-up with surgeon on 6/2/2021.  He has completed a 3-day course of Bactrim that was given postop.  Currently using Norco for pain relief experiencing negative side effects from narcotics. Requesting to return to work.  Works as a  at car [employer].    5/21/2021 : Clinic visit with Dr. Mcclain.  He returns today for a follow-up visit companied by his wife.  He denies chills fever headache cough chest pain abdominal pain or other acute medical issues.  He is requesting a note for his employer to limit his work schedule to 6  hours a day as long as he is on the wound VAC, It was provided for him.    6/4/2021 : Clinic visit with Dr. Mcclain. . Patient returns with his wife today for a follow-up visit the wound VAC has been off for a couple of days because of maceration on the tissue edges.  He nor his wife have any questions concerns or problems and denies any acute medical issues.  His only question today was would there be a scar the size of.    6/11/2021 : Clinic visit with Rosa Elena Rhodes, DIDIER, FNP-BC, CWOCN, CFCN.  Abhishek states he is feeling well, denies fevers, chills, nausea, vomiting, cough or shortness of breath.  He is much happier without the VAC.  His wound seems to be progressing well without it, and therefore the VAC will be DC'd.        REVIEW OF SYSTEMS:   Review of Systems   Constitutional: Negative for chills, diaphoresis and fever.   HENT: Negative.    Eyes: Negative.    Respiratory: Negative for cough, shortness of breath and wheezing.    Cardiovascular: Negative for chest pain, palpitations and claudication.   Gastrointestinal: Negative for nausea and vomiting.   Genitourinary: Negative for dysuria, frequency and hematuria.   Musculoskeletal: Negative for back pain, falls and joint pain.   Skin: Negative for itching and rash.        Scrotal wound   Neurological: Negative for weakness.   Psychiatric/Behavioral: Negative for depression. The patient is not nervous/anxious.        PHYSICAL EXAMINATION:   /90   Pulse 75   Temp 36.5 °C (97.7 °F)   Resp 18   SpO2 96%     Physical Exam  Vitals and nursing note reviewed.   HENT:      Head: Normocephalic.      Right Ear: External ear normal.      Left Ear: External ear normal.   Eyes:      Pupils: Pupils are equal, round, and reactive to light.   Cardiovascular:      Rate and Rhythm: Normal rate.   Pulmonary:      Effort: Pulmonary effort is normal.   Abdominal:      Palpations: Abdomen is soft.   Genitourinary:     Penis: Normal.       Comments: Severe scrotal edema  primarily to left side  Musculoskeletal:      Cervical back: Normal range of motion.   Skin:     General: Skin is warm and dry.      Comments: See wound flowsheet for further detail  Anterior scrotal open surgical wound   Neurological:      Mental Status: He is alert and oriented to person, place, and time.   Psychiatric:         Mood and Affect: Mood and affect normal.         Cognition and Memory: Memory normal.         Judgment: Judgment normal.         WOUND ASSESSMENT  Wound 05/12/21 Scrotum (Active)   Wound Image    06/11/21 1015   Site Assessment Red;Yellow;[place] 06/11/21 1015   Periwound Assessment Edema 06/11/21 1015   Margins Unattached edges 06/11/21 1015   Closure Secondary intention 05/26/21 1300   Drainage Amount Moderate 06/11/21 1015   Drainage Description Serosanguineous 06/11/21 1015   Treatments Cleansed;Topical Lidocaine;Provider debridement 06/11/21 1015   Wound Cleansing Normal Saline Irrigation 06/11/21 1015   Periwound Protectant Skin Protectant Wipes to Periwound;Barrier Paste 06/11/21 1015   Dressing Cleansing/Solutions Normal Saline 06/11/21 1015   Dressing Options Collagen Dressing;Hydrofiber Silver;Silicone Adhesive Foam 06/11/21 1015   Dressing Changed Changed 06/11/21 1015   Dressing Status Clean;Dry;Intact 05/31/21 1300   Dressing Change/Treatment Frequency Every 72 hrs, and As Needed 06/11/21 1015   NEXT Dressing Change/Treatment Date 06/02/21 05/31/21 1300   Non-staged Wound Description Full thickness 06/11/21 1015   Wound Length (cm) 2.7 cm 06/11/21 1015   Wound Width (cm) 0.9 cm 06/11/21 1015   Wound Depth (cm) 0.2 cm 06/11/21 1015   Wound Surface Area (cm^2) 2.43 cm^2 06/11/21 1015   Wound Volume (cm^3) 0.49 cm^3 06/11/21 1015   Post-Procedure Length (cm) 2 cm 06/11/21 1015   Post-Procedure Width (cm) 0.7 cm 06/11/21 1015   Post-Procedure Depth (cm) 0.3 cm 06/11/21 1015   Post-Procedure Surface Area (cm^2) 1.4 cm^2 06/11/21 1015   Post-Procedure Volume (cm^3) 0.42 cm^3 06/11/21  1015   Wound Healing % 98 06/11/21 1015   Wound Bed Granulation (%) 95 % 06/07/21 1430   Wound Bed Slough (%) 5 % 06/07/21 1430   Tunneling (cm) 0 cm 06/11/21 1015   Tunneling Clock Position of Wound 10 05/21/21 0900   Tunneling - 2nd Location (cm) 0 cm 05/31/21 1300   Tunneling Clock Position of Wound - 2nd Location 3 05/21/21 0900   Undermining (cm) 0 cm 06/11/21 1015   Undermining of Wound, 1st Location From 2 o'clock;To 4 o'clock 05/14/21 1100   Wound Odor None 06/11/21 1015   Pulses N/A 05/12/21 0800   Exposed Structures None 06/11/21 1015              PROCEDURE:   -2% viscous lidocaine applied topically to wound bed for approximately 5 minutes prior to debridement  -Curette, scissors, forceps used to debride wound bed.  Exposed frayed Vicryl suture trimmed.  Excisional debridement was performed to remove devitalized tissue until healthy, bleeding tissue was visualized.   Entire surface of wound, 1.4cm2 debrided.  Tissue debrided into the subcutaneous layer.    -Bleeding controlled with manual pressure.    -Wound care completed by wound RN, refer to flowsheet  -Patient tolerated the procedure well, without c/o pain or discomfort.       Pertinent Labs and Diagnostics:    Labs:   Pathology:4/29/21  A. Left spermatic cord mass:          Mature adipose tissue, consistent with lipoma.     A1c: No results found for: HBA1C       IMAGING: None on record    VASCULAR STUDIES: N/A    LAST  WOUND CULTURE:  DATE : None on record  Blood culture  negative 5/6/2021           ASSESSMENT AND PLAN:     1. Surgical wound dehiscence, subsequent encounter    6/11/2021: Wound area steadily decreasing.  Progressing well without VAC.  -Excisional debridement of wound in clinic today, medically necessary to promote wound healing.  -Discontinue VAC  -Patient to change dressing 1-2 times per week in between clinic visits    Wound care: Collagen to accelerate granulation, Hydrofiber silver to manage exudate and bioburden, foam cover  dressing, Hypafix tape      2. S/P excision of lipoma, S/P vasectomy  Surgery on 4/29/21 with Dr. Riddle for Excision of left spermatic cord mass and bilateral vasectomy.            3. Scrotal edema    6/11/2021: Steadily decreasing per patient          4. Tobacco abuse  6/11/2021.  Patient working on quitting.  Will be completing Chantix prescription tomorrow.  He requested a refill, states his PCP has not gotten back to him.  -Rx for Chantix 1 mg twice daily x 60 days sent to patient's pharmacy    5. Pain associated with wound:     6/11/2021:  -2% viscous lidocaine applied topically to wound bed for approximately 5 minutes prior to debridement  -Patient tolerated procedure today with no complaints of discomfort.          PATIENT EDUCATION  -Advised to go to ER for any increased redness, swelling, drainage, or odor, or if patient develops fever, chills, nausea or vomiting.       15 min spent  counseling, coordinating care, reviewing records, discussing POC, educating patient regarding wound healing and progression.  This time was spent in excess to procedure time.       Please note that this note may have been created using voice recognition software. I have worked with technical experts from [employer] to optimize the interface.  I have made every reasonable attempt to correct obvious errors, but there may be errors of grammar and possibly content that I did not discover before finalizing the note.    N

## 2021-06-18 ENCOUNTER — OFFICE VISIT (OUTPATIENT)
Dept: WOUND CARE | Facility: MEDICAL CENTER | Age: 51
End: 2021-06-18
Attending: STUDENT IN AN ORGANIZED HEALTH CARE EDUCATION/TRAINING PROGRAM
Payer: COMMERCIAL

## 2021-06-18 VITALS
RESPIRATION RATE: 18 BRPM | TEMPERATURE: 97.4 F | DIASTOLIC BLOOD PRESSURE: 93 MMHG | OXYGEN SATURATION: 96 % | SYSTOLIC BLOOD PRESSURE: 133 MMHG | HEART RATE: 85 BPM

## 2021-06-18 DIAGNOSIS — N50.89 SCROTAL EDEMA: ICD-10-CM

## 2021-06-18 DIAGNOSIS — T14.8XXA PAIN ASSOCIATED WITH WOUND: ICD-10-CM

## 2021-06-18 DIAGNOSIS — R52 PAIN ASSOCIATED WITH WOUND: ICD-10-CM

## 2021-06-18 DIAGNOSIS — Z86.018 S/P EXCISION OF LIPOMA: ICD-10-CM

## 2021-06-18 DIAGNOSIS — Z98.890 S/P EXCISION OF LIPOMA: ICD-10-CM

## 2021-06-18 DIAGNOSIS — Z72.0 TOBACCO ABUSE: ICD-10-CM

## 2021-06-18 DIAGNOSIS — T81.31XD SURGICAL WOUND DEHISCENCE, SUBSEQUENT ENCOUNTER: ICD-10-CM

## 2021-06-18 PROCEDURE — 99212 OFFICE O/P EST SF 10 MIN: CPT | Mod: 25 | Performed by: NURSE PRACTITIONER

## 2021-06-18 PROCEDURE — 11042 DBRDMT SUBQ TIS 1ST 20SQCM/<: CPT

## 2021-06-18 PROCEDURE — 99212 OFFICE O/P EST SF 10 MIN: CPT

## 2021-06-18 PROCEDURE — 11042 DBRDMT SUBQ TIS 1ST 20SQCM/<: CPT | Performed by: NURSE PRACTITIONER

## 2021-06-18 ASSESSMENT — ENCOUNTER SYMPTOMS
CHILLS: 0
ROS SKIN COMMENTS: SCROTAL WOUND
COUGH: 0
WEAKNESS: 0
EYES NEGATIVE: 1
NAUSEA: 0
PALPITATIONS: 0
VOMITING: 0
DEPRESSION: 0
BACK PAIN: 0
NERVOUS/ANXIOUS: 0
DIAPHORESIS: 0
WHEEZING: 0
CLAUDICATION: 0
FALLS: 0
SHORTNESS OF BREATH: 0
FEVER: 0

## 2021-06-18 NOTE — PROGRESS NOTES
Provider Encounter- Full Thickness wound    HISTORY OF PRESENT ILLNESS  Wound History:    START OF CARE IN CLINIC: 5/12/2021    REFERRING PROVIDER: Rafael Perze MD     WOUND- Full Thickness Wound   LOCATION: Scrotum   HISTORY: S/p excision left spermatic cord lipoma and bilateral vasectomy by Dr. Riddle 4/29/2021.  Incision dehisced 5/5/2021 due to edema.  Patient noted bloody drainage, denied any erythema or odor.  Patient presented to ED and was admitted from 5/5/2021-5/8/2021.  He was seen by wound team, dina wound VAC was applied.  VAC was discontinued upon discharge as patient went out of town for a few days.  Upon return he had significant increase in scrotal edema and pain.  He also presented with slough to the wound.  At his first wound care appointment, VAC was reapplied.    Pertinent Medical History: Tobacco use, sleep apnea, obesity    TOBACCO USE: Half pack to 1 pack/day.  Started Chantix 5/13/2021.    Patient's problem list, allergies, and current medications reviewed and updated in Epic    Interval History:  5/14/2021: Initial provider visit. Clinic visit with FRANKO Coppola CWON.  Pt denies fevers, chills, nausea, vomiting.  Accompanied by wife Zainab.  Patient reports edema has improved since last wound care visit appointment.  Wife has noted that slough has decreased as well from the wound bed since reapplication of the VAC.  Patient has follow-up with surgeon on 6/2/2021.  He has completed a 3-day course of Bactrim that was given postop.  Currently using Norco for pain relief experiencing negative side effects from narcotics. Requesting to return to work.  Works as a  at car [employer].    5/21/2021 : Clinic visit with Dr. Mcclain.  He returns today for a follow-up visit companied by his wife.  He denies chills fever headache cough chest pain abdominal pain or other acute medical issues.  He is requesting a note for his employer to limit his work schedule to 6  hours a day as long as he is on the wound VAC, It was provided for him.    6/4/2021 : Clinic visit with Dr. Mcclain. . Patient returns with his wife today for a follow-up visit the wound VAC has been off for a couple of days because of maceration on the tissue edges.  He nor his wife have any questions concerns or problems and denies any acute medical issues.  His only question today was would there be a scar the size of.    6/11/2021 : Clinic visit with DIDIER Lockett, FRANKO, PHILL, DUARTE.  Abhishek states he is feeling well, denies fevers, chills, nausea, vomiting, cough or shortness of breath.  He is much happier without the VAC.  His wound seems to be progressing well without it, and therefore the VAC will be DC'd.    6/18/2021 : Clinic visit with DIDIER Lockett, FRANKO, PHILL, DUARTE.  Abhishek states he continues to feel well overall.  Currently working 6 hours/day, normally he works 12 to 15 hours/day.  He asked if he could again working 8 hours a day as of Monday.  Given the progress of his wound, I felt this would be okay, though I do not feel he is yet ready to go to his full 12 to 15 hours/day.  Note written for his employer        REVIEW OF SYSTEMS:   Review of Systems   Constitutional: Negative for chills, diaphoresis and fever.   HENT: Negative.    Eyes: Negative.    Respiratory: Negative for cough, shortness of breath and wheezing.    Cardiovascular: Negative for chest pain, palpitations and claudication.   Gastrointestinal: Negative for nausea and vomiting.   Genitourinary: Negative for dysuria, frequency and hematuria.   Musculoskeletal: Negative for back pain, falls and joint pain.   Skin: Negative for itching and rash.        Scrotal wound   Neurological: Negative for weakness.   Psychiatric/Behavioral: Negative for depression. The patient is not nervous/anxious.        PHYSICAL EXAMINATION:   /93   Pulse 85   Temp 36.3 °C (97.4 °F)   Resp 18   SpO2 96%     Physical Exam  Vitals and  nursing note reviewed.   HENT:      Head: Normocephalic.      Right Ear: External ear normal.      Left Ear: External ear normal.   Eyes:      Pupils: Pupils are equal, round, and reactive to light.   Cardiovascular:      Rate and Rhythm: Normal rate.   Pulmonary:      Effort: Pulmonary effort is normal.   Abdominal:      Palpations: Abdomen is soft.   Genitourinary:     Penis: Normal.       Comments: Severe scrotal edema primarily to left side  Musculoskeletal:      Cervical back: Normal range of motion.   Skin:     General: Skin is warm and dry.      Comments: See wound flowsheet for further detail  Anterior scrotal open surgical wound   Neurological:      Mental Status: He is alert and oriented to person, place, and time.   Psychiatric:         Mood and Affect: Mood and affect normal.         Cognition and Memory: Memory normal.         Judgment: Judgment normal.         WOUND ASSESSMENT  Wound 05/12/21 Scrotum (Active)   Wound Image    06/18/21 0900   Site Assessment Red;Yellow;Pink 06/18/21 0900   Periwound Assessment Edema 06/18/21 0900   Margins Attached edges 06/18/21 0900   Closure Secondary intention 05/26/21 1300   Drainage Amount Small 06/18/21 0900   Drainage Description Serosanguineous 06/18/21 0900   Treatments Cleansed;Topical Lidocaine;Provider debridement;Site care 06/18/21 0900   Wound Cleansing [place] 06/18/21 0900   Periwound Protectant Skin Protectant Wipes to Periwound;Barrier Paste 06/18/21 0900   Dressing Cleansing/Solutions Not Applicable 06/18/21 0900   Dressing Options Hydrofiber Silver;Silicone Adhesive Foam 06/18/21 0900   Dressing Changed Changed 06/18/21 0900   Dressing Status Clean;Dry;Intact 06/18/21 0900   Dressing Change/Treatment Frequency Every 72 hrs, and As Needed 06/11/21 1015   NEXT Dressing Change/Treatment Date 06/02/21 05/31/21 1300   Non-staged Wound Description Full thickness 06/18/21 0900   Wound Length (cm) 1 cm 06/18/21 0900   Wound Width (cm) 0.4 cm 06/18/21  0900   Wound Depth (cm) 0.2 cm 06/11/21 1015   Wound Surface Area (cm^2) 0.4 cm^2 06/18/21 0900   Wound Volume (cm^3) 0.49 cm^3 06/11/21 1015   Post-Procedure Length (cm) 1 cm 06/18/21 0900   Post-Procedure Width (cm) 0.5 cm 06/18/21 0900   Post-Procedure Depth (cm) 0.1 cm 06/18/21 0900   Post-Procedure Surface Area (cm^2) 0.5 cm^2 06/18/21 0900   Post-Procedure Volume (cm^3) 0.05 cm^3 06/18/21 0900   Wound Healing % 98 06/11/21 1015   Wound Bed Granulation (%) 95 % 06/07/21 1430   Wound Bed Slough (%) 5 % 06/07/21 1430   Tunneling (cm) 0 cm 06/18/21 0900   Tunneling Clock Position of Wound 10 05/21/21 0900   Tunneling - 2nd Location (cm) 0 cm 05/31/21 1300   Tunneling Clock Position of Wound - 2nd Location 3 05/21/21 0900   Undermining (cm) 0 cm 06/18/21 0900   Undermining of Wound, 1st Location From 2 o'clock;To 4 o'clock 05/14/21 1100   Wound Odor None 06/18/21 0900   Pulses N/A 05/12/21 0800   Exposed Structures None 06/18/21 0900                 PROCEDURE:   -2% viscous lidocaine applied topically to wound bed for approximately 5 minutes prior to debridement  -Curette, scissors, forceps used to debride wound bed.  Exposed frayed Vicryl suture trimmed.  Excisional debridement was performed to remove devitalized tissue until healthy, bleeding tissue was visualized.   Entire surface of wound, 0.5 cm2 debrided.  Tissue debrided into the subcutaneous layer.    -Bleeding controlled with manual pressure.    -Wound care completed by wound RN, refer to flowsheet  -Patient tolerated the procedure well, without c/o pain or discomfort.       Pertinent Labs and Diagnostics:    Labs:   Pathology:4/29/21  A. Left spermatic cord mass:          Mature adipose tissue, consistent with lipoma.     A1c: No results found for: HBA1C       IMAGING: None on record    VASCULAR STUDIES: N/A    LAST  WOUND CULTURE:  DATE : None on record  Blood culture  negative 5/6/2021           ASSESSMENT AND PLAN:     1. Surgical wound dehiscence,  subsequent encounter    6/18/2021: Wound continues to decrease steadily  -Excisional debridement of wound in clinic today, medically necessary to promote wound healing.  -Patient to change dressing 1-2 times per week in between clinic visits    -Patient may begin working 8-hour days as of 6/21.  Note written for his employer    Wound care: Collagen to accelerate granulation, Hydrofiber silver to manage exudate and bioburden, foam cover dressing, Hypafix tape      2. S/P excision of lipoma, S/P vasectomy  Surgery on 4/29/21 with Dr. Riddle for Excision of left spermatic cord mass and bilateral vasectomy.      3. Scrotal edema    6/18/2021: Noticeable edema today.  -Recommended patient begin wearing jockstrap for better support        4. Tobacco abuse  6/18/2021.  Patient working on quitting.    -Refill Rx of Chantix prescription sent last week.      5. Pain associated with wound:     6/18/2021:  -2% viscous lidocaine applied topically to wound bed for approximately 5 minutes prior to debridement  -Patient tolerated procedure today with no complaints of discomfort.          PATIENT EDUCATION  -Advised to go to ER for any increased redness, swelling, drainage, or odor, or if patient develops fever, chills, nausea or vomiting.       15 min spent  counseling, coordinating care, reviewing records, discussing POC, educating patient regarding wound healing and progression.  This time was spent in excess to procedure time.       Please note that this note may have been created using voice recognition software. I have worked with technical experts from [employer] to optimize the interface.  I have made every reasonable attempt to correct obvious errors, but there may be errors of grammar and possibly content that I did not discover before finalizing the note.    N

## 2021-06-18 NOTE — PATIENT INSTRUCTIONS
-Keep your wound dressing clean, dry, and intact.    -Change your dressing if it becomes soiled, soaked, or falls off.    -Should you experience any significant changes in your wound(s), such as infection (redness, swelling, localized heat, increased pain, fever > 101 F, chills) or have any questions regarding your home care instructions, please contact the wound center at [phone]. If after hours, contact your primary care physician or go to the hospital emergency room.

## 2021-07-02 ENCOUNTER — NON-PROVIDER VISIT (OUTPATIENT)
Dept: WOUND CARE | Facility: MEDICAL CENTER | Age: 51
End: 2021-07-02
Attending: STUDENT IN AN ORGANIZED HEALTH CARE EDUCATION/TRAINING PROGRAM
Payer: COMMERCIAL

## 2021-07-02 PROCEDURE — 99211 OFF/OP EST MAY X REQ PHY/QHP: CPT

## 2021-07-02 NOTE — CERTIFICATION
[facility]   [facility]   [address]   [address]   [place] [address]   [phone] Fax: [phone]    Discharge Note      Referring Physician: Rafael Perez  Wound Etiology: Wound dehiscence  Wound location: scrotum  ICD-10: T81.30XA  Date of Discharge: 07/02/2021    Assessment:  Discharge patient at this time secondary to wound resolution.    Thank you for the referral and the opportunity to treat your patient.

## 2021-07-02 NOTE — PATIENT INSTRUCTIONS
- Resolved wound be fragile for a few days, bathe and dry area gently, only ever regains a maximum of 80% of the tensile strength of the surrounding skin, remodeling of scar can continue for 6mo - a year. Contact PCP for a referral back her if any problems with area opening and draining again.    -Should you experience any significant changes in your wound(s), such as infection (redness, swelling, localized heat, increased pain, fever > 101 F, chills) or have any questions regarding your home care instructions, please contact the wound center at [phone]. If after hours, contact your primary care physician or go to the hospital emergency room.

## 2021-09-09 ENCOUNTER — HOSPITAL ENCOUNTER (OUTPATIENT)
Dept: RADIOLOGY | Facility: MEDICAL CENTER | Age: 51
End: 2021-09-09
Attending: SURGERY
Payer: COMMERCIAL

## 2021-09-09 DIAGNOSIS — K40.90 LEFT INGUINAL HERNIA: ICD-10-CM

## 2021-09-09 PROCEDURE — 74176 CT ABD & PELVIS W/O CONTRAST: CPT

## 2021-09-15 ENCOUNTER — PRE-ADMISSION TESTING (OUTPATIENT)
Dept: ADMISSIONS | Facility: MEDICAL CENTER | Age: 51
End: 2021-09-15
Attending: SURGERY
Payer: COMMERCIAL

## 2021-09-15 DIAGNOSIS — Z01.812 PRE-OPERATIVE LABORATORY EXAMINATION: ICD-10-CM

## 2021-09-15 PROCEDURE — U0003 INFECTIOUS AGENT DETECTION BY NUCLEIC ACID (DNA OR RNA); SEVERE ACUTE RESPIRATORY SYNDROME CORONAVIRUS 2 (SARS-COV-2) (CORONAVIRUS DISEASE [COVID-19]), AMPLIFIED PROBE TECHNIQUE, MAKING USE OF HIGH THROUGHPUT TECHNOLOGIES AS DESCRIBED BY CMS-2020-01-R: HCPCS

## 2021-09-15 PROCEDURE — C9803 HOPD COVID-19 SPEC COLLECT: HCPCS

## 2021-09-15 PROCEDURE — U0005 INFEC AGEN DETEC AMPLI PROBE: HCPCS

## 2021-09-15 RX ORDER — HYDROCODONE BITARTRATE AND ACETAMINOPHEN 10; 325 MG/1; MG/1
TABLET ORAL
COMMUNITY
End: 2021-09-15

## 2021-09-15 RX ORDER — SULFAMETHOXAZOLE AND TRIMETHOPRIM 800; 160 MG/1; MG/1
TABLET ORAL
COMMUNITY
End: 2021-09-15

## 2021-09-15 RX ORDER — VARENICLINE TARTRATE 1 MG/1
TABLET, FILM COATED ORAL
COMMUNITY
End: 2021-09-15

## 2021-09-15 RX ORDER — ZOLPIDEM TARTRATE 10 MG/1
10 TABLET ORAL
Status: ON HOLD | COMMUNITY
Start: 2021-08-20 | End: 2023-08-29

## 2021-09-15 RX ORDER — SILDENAFIL CITRATE 20 MG/1
TABLET ORAL
COMMUNITY
End: 2022-02-28

## 2021-09-15 RX ORDER — HYDROCODONE BITARTRATE AND ACETAMINOPHEN 5; 325 MG/1; MG/1
TABLET ORAL
COMMUNITY
End: 2021-09-15

## 2021-09-15 RX ORDER — BUPROPION HYDROCHLORIDE 150 MG/1
150 TABLET, EXTENDED RELEASE ORAL
COMMUNITY
Start: 2021-08-24 | End: 2022-02-28

## 2021-09-15 ASSESSMENT — FIBROSIS 4 INDEX: FIB4 SCORE: 0.61

## 2021-09-15 NOTE — OR NURSING
"Preadmit appointment: \" Preparing for your Procedure information\" sheet given to patient with verbal and written instructions. Patient instructed to continue prescribed medications through the day before surgery, instructed to take the following medications the day of surgery per anesthesia protocol: WELLBUTRIN, NICODERM PATCH, IMITREX, VALTREX           Verbal and written, and pre-admit video website instructions provided on covid symptoms to watch for given to patient, pt advised to notify MD if any symptoms develop. Verbal instructions given to follow the [place] mask mandate and encouraged to avoid crowds. Also verbally instructed to wear a mask when with person known not to have had the Covid vaccine.    Pt states he watched the pre-admit instruction video, provided \"Preparing for You Surgery' written instructions and discussed verbally.    STOP/BANG protocol initiated. Pt states her will bring CPAP machine DOS.  "

## 2021-09-16 LAB
SARS-COV-2 RNA RESP QL NAA+PROBE: NOTDETECTED
SPECIMEN SOURCE: NORMAL

## 2021-09-17 ENCOUNTER — HOSPITAL ENCOUNTER (OUTPATIENT)
Facility: MEDICAL CENTER | Age: 51
End: 2021-09-17
Attending: SURGERY | Admitting: SURGERY
Payer: COMMERCIAL

## 2021-09-17 ENCOUNTER — ANESTHESIA (OUTPATIENT)
Dept: SURGERY | Facility: MEDICAL CENTER | Age: 51
End: 2021-09-17
Payer: COMMERCIAL

## 2021-09-17 ENCOUNTER — ANESTHESIA EVENT (OUTPATIENT)
Dept: SURGERY | Facility: MEDICAL CENTER | Age: 51
End: 2021-09-17
Payer: COMMERCIAL

## 2021-09-17 VITALS
TEMPERATURE: 97.2 F | HEART RATE: 78 BPM | WEIGHT: 216.05 LBS | DIASTOLIC BLOOD PRESSURE: 92 MMHG | BODY MASS INDEX: 32 KG/M2 | RESPIRATION RATE: 16 BRPM | OXYGEN SATURATION: 93 % | HEIGHT: 69 IN | SYSTOLIC BLOOD PRESSURE: 138 MMHG

## 2021-09-17 DIAGNOSIS — G89.18 POST-OPERATIVE PAIN: ICD-10-CM

## 2021-09-17 PROCEDURE — 160025 RECOVERY II MINUTES (STATS): Performed by: SURGERY

## 2021-09-17 PROCEDURE — 160029 HCHG SURGERY MINUTES - 1ST 30 MINS LEVEL 4: Performed by: SURGERY

## 2021-09-17 PROCEDURE — A9270 NON-COVERED ITEM OR SERVICE: HCPCS | Performed by: ANESTHESIOLOGY

## 2021-09-17 PROCEDURE — 501838 HCHG SUTURE GENERAL: Performed by: SURGERY

## 2021-09-17 PROCEDURE — 160048 HCHG OR STATISTICAL LEVEL 1-5: Performed by: SURGERY

## 2021-09-17 PROCEDURE — 500868 HCHG NEEDLE, SURGI(VARES): Performed by: SURGERY

## 2021-09-17 PROCEDURE — 160035 HCHG PACU - 1ST 60 MINS PHASE I: Performed by: SURGERY

## 2021-09-17 PROCEDURE — 160009 HCHG ANES TIME/MIN: Performed by: SURGERY

## 2021-09-17 PROCEDURE — 502714 HCHG ROBOTIC SURGERY SERVICES: Performed by: SURGERY

## 2021-09-17 PROCEDURE — 700105 HCHG RX REV CODE 258: Performed by: SURGERY

## 2021-09-17 PROCEDURE — 700101 HCHG RX REV CODE 250: Performed by: ANESTHESIOLOGY

## 2021-09-17 PROCEDURE — 160041 HCHG SURGERY MINUTES - EA ADDL 1 MIN LEVEL 4: Performed by: SURGERY

## 2021-09-17 PROCEDURE — 160036 HCHG PACU - EA ADDL 30 MINS PHASE I: Performed by: SURGERY

## 2021-09-17 PROCEDURE — C1781 MESH (IMPLANTABLE): HCPCS | Performed by: SURGERY

## 2021-09-17 PROCEDURE — 160046 HCHG PACU - 1ST 60 MINS PHASE II: Performed by: SURGERY

## 2021-09-17 PROCEDURE — 700111 HCHG RX REV CODE 636 W/ 250 OVERRIDE (IP): Performed by: ANESTHESIOLOGY

## 2021-09-17 PROCEDURE — 700101 HCHG RX REV CODE 250: Performed by: SURGERY

## 2021-09-17 PROCEDURE — 160002 HCHG RECOVERY MINUTES (STAT): Performed by: SURGERY

## 2021-09-17 PROCEDURE — 500002 HCHG ADHESIVE, DERMABOND: Performed by: SURGERY

## 2021-09-17 PROCEDURE — 700102 HCHG RX REV CODE 250 W/ 637 OVERRIDE(OP): Performed by: ANESTHESIOLOGY

## 2021-09-17 DEVICE — MESH PROGRIP LAPROSCOPIC SELF FIXATING (1/CA): Type: IMPLANTABLE DEVICE | Site: GROIN | Status: FUNCTIONAL

## 2021-09-17 RX ORDER — DIPHENHYDRAMINE HYDROCHLORIDE 50 MG/ML
12.5 INJECTION INTRAMUSCULAR; INTRAVENOUS
Status: DISCONTINUED | OUTPATIENT
Start: 2021-09-17 | End: 2021-09-17 | Stop reason: HOSPADM

## 2021-09-17 RX ORDER — HYDROMORPHONE HYDROCHLORIDE 1 MG/ML
0.4 INJECTION, SOLUTION INTRAMUSCULAR; INTRAVENOUS; SUBCUTANEOUS
Status: DISCONTINUED | OUTPATIENT
Start: 2021-09-17 | End: 2021-09-17 | Stop reason: HOSPADM

## 2021-09-17 RX ORDER — HYDROMORPHONE HYDROCHLORIDE 1 MG/ML
0.1 INJECTION, SOLUTION INTRAMUSCULAR; INTRAVENOUS; SUBCUTANEOUS
Status: DISCONTINUED | OUTPATIENT
Start: 2021-09-17 | End: 2021-09-17 | Stop reason: HOSPADM

## 2021-09-17 RX ORDER — ONDANSETRON 4 MG/1
4 TABLET, ORALLY DISINTEGRATING ORAL EVERY 6 HOURS PRN
Qty: 15 TABLET | Refills: 0 | Status: SHIPPED | OUTPATIENT
Start: 2021-09-17 | End: 2021-09-24

## 2021-09-17 RX ORDER — ONDANSETRON 2 MG/ML
INJECTION INTRAMUSCULAR; INTRAVENOUS PRN
Status: DISCONTINUED | OUTPATIENT
Start: 2021-09-17 | End: 2021-09-17 | Stop reason: SURG

## 2021-09-17 RX ORDER — OXYCODONE HCL 5 MG/5 ML
5 SOLUTION, ORAL ORAL
Status: COMPLETED | OUTPATIENT
Start: 2021-09-17 | End: 2021-09-17

## 2021-09-17 RX ORDER — ACETAMINOPHEN 500 MG
1000 TABLET ORAL ONCE
Status: COMPLETED | OUTPATIENT
Start: 2021-09-17 | End: 2021-09-17

## 2021-09-17 RX ORDER — OXYCODONE HCL 5 MG/5 ML
10 SOLUTION, ORAL ORAL
Status: COMPLETED | OUTPATIENT
Start: 2021-09-17 | End: 2021-09-17

## 2021-09-17 RX ORDER — DEXAMETHASONE SODIUM PHOSPHATE 4 MG/ML
INJECTION, SOLUTION INTRA-ARTICULAR; INTRALESIONAL; INTRAMUSCULAR; INTRAVENOUS; SOFT TISSUE PRN
Status: DISCONTINUED | OUTPATIENT
Start: 2021-09-17 | End: 2021-09-17 | Stop reason: SURG

## 2021-09-17 RX ORDER — HYDRALAZINE HYDROCHLORIDE 20 MG/ML
5 INJECTION INTRAMUSCULAR; INTRAVENOUS
Status: DISCONTINUED | OUTPATIENT
Start: 2021-09-17 | End: 2021-09-17 | Stop reason: HOSPADM

## 2021-09-17 RX ORDER — HYDROMORPHONE HYDROCHLORIDE 1 MG/ML
0.2 INJECTION, SOLUTION INTRAMUSCULAR; INTRAVENOUS; SUBCUTANEOUS
Status: DISCONTINUED | OUTPATIENT
Start: 2021-09-17 | End: 2021-09-17 | Stop reason: HOSPADM

## 2021-09-17 RX ORDER — HYDROCODONE BITARTRATE AND ACETAMINOPHEN 5; 325 MG/1; MG/1
1 TABLET ORAL EVERY 4 HOURS PRN
Qty: 30 TABLET | Refills: 0 | Status: SHIPPED | OUTPATIENT
Start: 2021-09-17 | End: 2021-09-24

## 2021-09-17 RX ORDER — BUPIVACAINE HYDROCHLORIDE AND EPINEPHRINE 5; 5 MG/ML; UG/ML
INJECTION, SOLUTION EPIDURAL; INTRACAUDAL; PERINEURAL
Status: DISCONTINUED | OUTPATIENT
Start: 2021-09-17 | End: 2021-09-17 | Stop reason: HOSPADM

## 2021-09-17 RX ORDER — LIDOCAINE HYDROCHLORIDE 20 MG/ML
INJECTION, SOLUTION EPIDURAL; INFILTRATION; INTRACAUDAL; PERINEURAL PRN
Status: DISCONTINUED | OUTPATIENT
Start: 2021-09-17 | End: 2021-09-17 | Stop reason: SURG

## 2021-09-17 RX ORDER — SODIUM CHLORIDE, SODIUM LACTATE, POTASSIUM CHLORIDE, CALCIUM CHLORIDE 600; 310; 30; 20 MG/100ML; MG/100ML; MG/100ML; MG/100ML
INJECTION, SOLUTION INTRAVENOUS CONTINUOUS
Status: ACTIVE | OUTPATIENT
Start: 2021-09-17 | End: 2021-09-17

## 2021-09-17 RX ORDER — KETOROLAC TROMETHAMINE 30 MG/ML
INJECTION, SOLUTION INTRAMUSCULAR; INTRAVENOUS PRN
Status: DISCONTINUED | OUTPATIENT
Start: 2021-09-17 | End: 2021-09-17 | Stop reason: SURG

## 2021-09-17 RX ORDER — SODIUM CHLORIDE, SODIUM LACTATE, POTASSIUM CHLORIDE, CALCIUM CHLORIDE 600; 310; 30; 20 MG/100ML; MG/100ML; MG/100ML; MG/100ML
INJECTION, SOLUTION INTRAVENOUS CONTINUOUS
Status: DISCONTINUED | OUTPATIENT
Start: 2021-09-17 | End: 2021-09-17 | Stop reason: HOSPADM

## 2021-09-17 RX ORDER — LABETALOL HYDROCHLORIDE 5 MG/ML
5 INJECTION, SOLUTION INTRAVENOUS
Status: DISCONTINUED | OUTPATIENT
Start: 2021-09-17 | End: 2021-09-17 | Stop reason: HOSPADM

## 2021-09-17 RX ORDER — MEPERIDINE HYDROCHLORIDE 25 MG/ML
6.25 INJECTION INTRAMUSCULAR; INTRAVENOUS; SUBCUTANEOUS
Status: DISCONTINUED | OUTPATIENT
Start: 2021-09-17 | End: 2021-09-17 | Stop reason: HOSPADM

## 2021-09-17 RX ORDER — ONDANSETRON 2 MG/ML
4 INJECTION INTRAMUSCULAR; INTRAVENOUS
Status: DISCONTINUED | OUTPATIENT
Start: 2021-09-17 | End: 2021-09-17 | Stop reason: HOSPADM

## 2021-09-17 RX ORDER — ROCURONIUM BROMIDE 10 MG/ML
INJECTION, SOLUTION INTRAVENOUS PRN
Status: DISCONTINUED | OUTPATIENT
Start: 2021-09-17 | End: 2021-09-17 | Stop reason: SURG

## 2021-09-17 RX ORDER — HALOPERIDOL 5 MG/ML
1 INJECTION INTRAMUSCULAR
Status: DISCONTINUED | OUTPATIENT
Start: 2021-09-17 | End: 2021-09-17 | Stop reason: HOSPADM

## 2021-09-17 RX ORDER — CEFAZOLIN SODIUM 1 G/3ML
INJECTION, POWDER, FOR SOLUTION INTRAMUSCULAR; INTRAVENOUS PRN
Status: DISCONTINUED | OUTPATIENT
Start: 2021-09-17 | End: 2021-09-17 | Stop reason: SURG

## 2021-09-17 RX ADMIN — ROCURONIUM BROMIDE 20 MG: 10 INJECTION, SOLUTION INTRAVENOUS at 13:39

## 2021-09-17 RX ADMIN — FENTANYL CITRATE 50 MCG: 50 INJECTION, SOLUTION INTRAMUSCULAR; INTRAVENOUS at 15:09

## 2021-09-17 RX ADMIN — FENTANYL CITRATE 50 MCG: 50 INJECTION, SOLUTION INTRAMUSCULAR; INTRAVENOUS at 12:51

## 2021-09-17 RX ADMIN — FENTANYL CITRATE 50 MCG: 50 INJECTION, SOLUTION INTRAMUSCULAR; INTRAVENOUS at 13:39

## 2021-09-17 RX ADMIN — LIDOCAINE HYDROCHLORIDE 100 MG: 20 INJECTION, SOLUTION EPIDURAL; INFILTRATION; INTRACAUDAL; PERINEURAL at 12:51

## 2021-09-17 RX ADMIN — FENTANYL CITRATE 50 MCG: 50 INJECTION, SOLUTION INTRAMUSCULAR; INTRAVENOUS at 14:26

## 2021-09-17 RX ADMIN — PROPOFOL 240 MG: 10 INJECTION, EMULSION INTRAVENOUS at 12:51

## 2021-09-17 RX ADMIN — ACETAMINOPHEN 1000 MG: 500 TABLET, FILM COATED ORAL at 11:04

## 2021-09-17 RX ADMIN — CEFAZOLIN 2 G: 330 INJECTION, POWDER, FOR SOLUTION INTRAMUSCULAR; INTRAVENOUS at 12:51

## 2021-09-17 RX ADMIN — SUGAMMADEX 200 MG: 100 INJECTION, SOLUTION INTRAVENOUS at 14:21

## 2021-09-17 RX ADMIN — DEXAMETHASONE SODIUM PHOSPHATE 8 MG: 4 INJECTION, SOLUTION INTRAMUSCULAR; INTRAVENOUS at 12:58

## 2021-09-17 RX ADMIN — KETOROLAC TROMETHAMINE 30 MG: 30 INJECTION, SOLUTION INTRAMUSCULAR at 14:21

## 2021-09-17 RX ADMIN — SODIUM CHLORIDE, POTASSIUM CHLORIDE, SODIUM LACTATE AND CALCIUM CHLORIDE: 600; 310; 30; 20 INJECTION, SOLUTION INTRAVENOUS at 11:04

## 2021-09-17 RX ADMIN — ONDANSETRON 4 MG: 2 INJECTION INTRAMUSCULAR; INTRAVENOUS at 14:21

## 2021-09-17 RX ADMIN — LIDOCAINE HYDROCHLORIDE 0.5 ML: 10 INJECTION, SOLUTION INFILTRATION; PERINEURAL at 11:04

## 2021-09-17 RX ADMIN — ROCURONIUM BROMIDE 50 MG: 10 INJECTION, SOLUTION INTRAVENOUS at 12:51

## 2021-09-17 ASSESSMENT — PAIN SCALES - GENERAL: PAIN_LEVEL: 5

## 2021-09-17 NOTE — ANESTHESIA POSTPROCEDURE EVALUATION
Patient: Abhishek Pacheco    Procedure Summary     Date: 09/17/21 Room / Location:  OR  / SURGERY [facility]    Anesthesia Start: 1244 Anesthesia Stop: 1431    Procedure: REPAIR, HERNIA, INGUINAL, ROBOT-ASSISTED, USING DA ELOISE XI - WITH MESH PLACEMENT (Bilateral Groin) Diagnosis:       Left inguinal hernia      (LEFT INGUINAL HERNIA)    Surgeons: Estuardo Castellon M.D. Responsible Provider: Juancarlos Naqvi M.D.    Anesthesia Type: general ASA Status: 2          Final Anesthesia Type: general  Last vitals  BP   Blood Pressure: 138/92    Temp   36.2 °C (97.2 °F)    Pulse   78   Resp   16    SpO2   93 %      Anesthesia Post Evaluation    Patient location during evaluation: PACU  Patient participation: complete - patient participated  Level of consciousness: awake and alert  Pain score: 5    Airway patency: patent  Anesthetic complications: no  Cardiovascular status: hemodynamically stable  Respiratory status: acceptable  Hydration status: euvolemic    PONV: none          There were no known complications for this encounter.     Nurse Pain Score: 5 (NPRS)

## 2021-09-17 NOTE — OR NURSING
1455 assumed care, pt resting w/eyes closed, reponds to verbal stim    1510 medicated for c/o pain 8/10    1525  States pain better now, tolerable @ 4/10, called wife with update  Given IS and instructed on use with goal of 3000, pt currently inhaling volumes of approx 2000.     1538 awake, alert, vss, tolerating po fluids, called report to derrick vega, transferred to phase 2 in stable condition via natanael arauz/cna

## 2021-09-17 NOTE — OR NURSING
1539 Pt arrived from PACU post   REPAIR, HERNIA, INGUINAL, ROBOT-ASSISTED, USING DA ELOISE XI - WITH MESH PLACEMENT Bilateral   , report received. Pt states pain is tollerable and denies nausea at this time. Pt dressing @ BS with assistance.    1608 Discharge instructions and medications gone over with Pt and ride. All questions answered. Pt meets DC criteria. PIV DC'd. Pt transported to POV via WC in stable condition.

## 2021-09-17 NOTE — DISCHARGE INSTRUCTIONS
ACTIVITY: Rest and take it easy for the first 24 hours.  A responsible adult is recommended to remain with you during that time.  It is normal to feel sleepy.  We encourage you to not do anything that requires balance, judgment or coordination.    MILD FLU-LIKE SYMPTOMS ARE NORMAL. YOU MAY EXPERIENCE GENERALIZED MUSCLE ACHES, THROAT IRRITATION, HEADACHE AND/OR SOME NAUSEA.    FOR 24 HOURS DO NOT:  Drive, operate machinery or run household appliances.  Drink beer or alcoholic beverages.   Make important decisions or sign legal documents.    SPECIAL INSTRUCTIONS:     No heavy lifting, pushing or pulling (less than 15lbs) for 6 weeks  Ok to shower tomorrow, do not bath or swim for 2 weeks    DIET: To avoid nausea, slowly advance diet as tolerated, avoiding spicy or greasy foods for the first day.  Add more substantial food to your diet according to your physician's instructions.  Babies can be fed formula or breast milk as soon as they are hungry.  INCREASE FLUIDS AND FIBER TO AVOID CONSTIPATION.    SURGICAL DRESSING/BATHING:                                                                                                   Tissue Adhesive Wound Care  Some cuts and wounds can be closed with skin glue (tissue adhesive). Skin glue holds the skin together and helps your wound heal faster. Skin glue goes away on its own as your wound gets better.  Follow these instructions at home:    Wound care  · Showers are allowed 24 hours after treatment. Do not soak the wound in water. Do not take baths, swim, or use hot tubs. Do not use soaps or creams on your wound.  · If a bandage (dressing) was put on the wound:  ? Wash your hands with soap and water before you change your bandage.  ? Change the bandage as often as told by your doctor.  ? Leave skin glue in place. It will fall off on its own after 7-10 days.  ? Keep the bandage dry.  · Do not scratch, rub, or pick at the skin glue.  · Do not put tape over the skin glue. The skin  glue could come off when you take the tape off.  · Protect the wound from another injury.  · Protect the wound from sun and tanning beds.  General instructions  · Take over-the-counter and prescription medicines only as told by your doctor.  · Keep all follow-up visits as told by your doctor. This is important.  Get help right away if:  · Your wound is red, puffy (swollen), hot, or tender.  · You get a rash after the glue is put on.  · You have more pain in the wound.  · You have a red streak going away from the wound.  · You have yellowish-white fluid (pus) coming from the wound.  · You have more bleeding.  · You have a fever.  · You have chills and you start to shake.  · You notice a bad smell coming from the wound.  · Your wound or skin glue breaks open.  This information is not intended to replace advice given to you by your health care provider. Make sure you discuss any questions you have with your health care provider.  Document Released: 09/26/2009 Document Revised: 11/30/2018 Document Reviewed: 11/10/2017  [employer] Patient Education © 2020 [employer]      FOLLOW-UP APPOINTMENT:  A follow-up appointment should be arranged with your doctor in 1-2 weeks; call to schedule.    You should CALL YOUR PHYSICIAN if you develop:  Fever greater than 101 degrees F.  Pain not relieved by medication, or persistent nausea or vomiting.  Excessive bleeding (blood soaking through dressing) or unexpected drainage from the wound.  Extreme redness or swelling around the incision site, drainage of pus or foul smelling drainage.  Inability to urinate or empty your bladder within 8 hours.  Problems with breathing or chest pain.    You should call 911 if you develop problems with breathing or chest pain.  If you are unable to contact your doctor or surgical center, you should go to the nearest emergency room or urgent care center.  Physician's telephone #: [phone]    If any questions arise, call your doctor.  If your doctor is  not available, please feel free to call the Surgical Center at [phone]. The Contact Center is open Monday through Friday 7AM to 5PM and may speak to a nurse at [phone], or toll free at [phone].     A registered nurse may call you a few days after your surgery to see how you are doing after your procedure.    MEDICATIONS: Resume taking daily medication.  Take prescribed pain medication with food.  If no medication is prescribed, you may take non-aspirin pain medication if needed.  PAIN MEDICATION CAN BE VERY CONSTIPATING.  Take a stool softener or laxative such as senokot, pericolace, or milk of magnesia if needed.    Prescription given for norco and zofran.  Last pain medication given at ____________.    If your physician has prescribed pain medication that includes Acetaminophen (Tylenol), do not take additional Acetaminophen (Tylenol) while taking the prescribed medication.    Depression / Suicide Risk    As you are discharged from this [facility] facility, it is important to learn how to keep safe from harming yourself.    Recognize the warning signs:  · Abrupt changes in personality, positive or negative- including increase in energy   · Giving away possessions  · Change in eating patterns- significant weight changes-  positive or negative  · Change in sleeping patterns- unable to sleep or sleeping all the time   · Unwillingness or inability to communicate  · Depression  · Unusual sadness, discouragement and loneliness  · Talk of wanting to die  · Neglect of personal appearance   · Rebelliousness- reckless behavior  · Withdrawal from people/activities they love  · Confusion- inability to concentrate     If you or a loved one observes any of these behaviors or has concerns about self-harm, here's what you can do:  · Talk about it- your feelings and reasons for harming yourself  · Remove any means that you might use to hurt yourself (examples: pills, rope, extension cords, firearm)  · Get  professional help from the community (Mental Health, Substance Abuse, psychological counseling)  · Do not be alone:Call your Safe Contact- someone whom you trust who will be there for you.  · Call your local CRISIS HOTLINE 058-1412 or 772-074-8640  · Call your local Children's Mobile Crisis Response Team [place] (472) 740-5862 or www.[employer]  · Call the toll free National Suicide Prevention Hotlines   · National Suicide Prevention Lifeline 359-048-NGIG (0347)  · National Hope Line Network 800-SUICIDE (291-4748)

## 2021-09-17 NOTE — ANESTHESIA TIME REPORT
Anesthesia Start and Stop Event Times     Date Time Event    9/17/2021 1228 Ready for Procedure     1244 Anesthesia Start     1431 Anesthesia Stop        Responsible Staff  09/17/21    Name Role Begin End    Juancarlos Naqvi M.D. Anesth 1244 1431        Preop Diagnosis (Free Text):  Pre-op Diagnosis     LEFT INGUINAL HERNIA        Preop Diagnosis (Codes):  Diagnosis Information     Diagnosis Code(s): Left inguinal hernia [K40.90]        Post op Diagnosis  Left inguinal hernia      Premium Reason  Non-Premium    Comments:

## 2021-09-17 NOTE — OP REPORT
OP Note    PreOp Diagnosis: bilateral inguinal hernias      PostOp Diagnosis:   1. Right sided indirect incarcerated inguinal hernia  3. Left sided indirect incarcerated inguinal hernia        Procedure(s):  1.  Robotic assisted laparoscopic right sided indirect incarcerated inguinal hernia repair  2.  Implantation of mesh for right inguinal hernia reconstruction  3.  Robotic assisted laparoscopic left sided indirect incarcerated inguinal hernia repair  4.  Implantation of mesh for left inguinal hernia reconstruction     - Wound Class: Clean    Surgeon(s):  Estuardo Castellon M.D.    Assistant(s):  Calos Skinner M.D.    Anesthesiologist/Type of Anesthesia:  Anesthesiologist: Juancarlos Naqvi M.D./General    Surgical Staff:  Circulator: Heather C Cogan, R.N.  Scrub Person: Breezy Mariscal    Specimens removed if any:  * No specimens in log *    Estimated Blood Loss: 20cc    Findings: bilateral inguinal hernia, significant scar tissue and adiposity in left inguinal canal with extensive dissection required    Complications: none observed    The patient was met in the preoperative holding area where all of the potential risks and benefits (including recurrence, pain, need for additional surgeries, stroke, death, damage to testicle) of the procedure were discussed in detail with the patient. All of his questions were answered to his satisfaction and informed consent was signed. The patient was taken back to the operating room and placed supine on the operating room table. General endotracheal anesthesia was induced and all of the patients pressure points were padded appropriately. The patients abdomen was prepped and draped in the usual sterile fashion.  A timeout was performed which correctly identified the patient and the procedure being performed and preoperative antibiotics were given according to SCIP guidelines.     The procedure was started with infiltration of 0.5% marcaine into the lateral abdominal wall  skin. A 8mm incision was made and carried down to fascia. The verress needle was inserted into the abdominal cavity and insufflation carried out to 15mmHg.  Two additional robotic trochars were then placed under direct vision. The camera was inserted into the abdominal cavity and a brief survey of the abdominal cavity demonstrated no obvious injuries or pathologies other than bilateral inguinal hernias.  I then took my place at the console.    I began by creating a preperitoneal space on the left side of the anterior abdominal wall just superior to the arcuate line.  I gently dissected out the retrofascial plane until I reached Coopers ligament medially and adequate space was opened laterally.  I then freed up the space immediately lateral to the cord to make adequate space for the mesh.  I then skeletonized the cord and reduced the hernia back to well within the abdomen.  There was a very large amount of adherent adipose tissue to the spermtic cord and a great deal of time was spent trying to free this from the cord without injury.  Once the critical view of the myopectineal orifice was obtained, I placed a 10 x 15 cm progrip mesh with rounded corners.  This lay flat against the abdominal wall with excellent coverage of the defect with additional mesh laid medially and inferiorly.  I used a 15 cm Stratisfix suture to reapproximate the peritoneum.  This completely hid the mesh from the abdominal cavity.  Hemostasis was ensured.    I then turned my attention to the right side. A preperitoneal space on the right side of the anterior abdominal wall just superior to the arcuate line.  I gently dissected out the retrofascial plane until I reached Coopers ligament medially and adequate space was opened laterally.  I then freed up the space immediately lateral to the cord to make adequate space for the mesh.  I then skeletonized the cord and reduced the hernia back to well within the abdomen.  Once the critical view of the  myopectineal orifice was obtained, I placed a 10 x 15 cm progrip mesh with rounded corners.  This lay flat against the abdominal wall with excellent coverage of the defect with additional mesh laid medially and inferiorly.  I used a 15 cm Stratisfix suture to reapproximate the peritoneum.  This completely hid the mesh from the abdominal cavity.  A vicryl suture was used to close one small rent in the left peritoneal flap. Hemostasis was again ensured.      The robot was then undocked after the needles were withdrawn under direct visualization.  Monocryl was used for skin edges and Dermabond was applied as a dressing.      The patient tolerated the procedure well and was extubated at the conclusion of the case without incident. All of the sponge, needle and instrument counts were correct at the conclusion of the case. After he was awoken from anesthesia he was taken to the recovery room in stable condition.    9/17/2021 2:17 PM Estuardo Castellon M.D.

## 2021-09-17 NOTE — ANESTHESIA PREPROCEDURE EVALUATION
Inguinal hernia, KACY, smoker.    Relevant Problems   No relevant active problems       Physical Exam    Airway   Mallampati: II  TM distance: >3 FB  Neck ROM: full       Cardiovascular - normal exam  Rhythm: regular  Rate: normal  (-) murmur     Dental - normal exam           Pulmonary - normal exam  Breath sounds clear to auscultation     Abdominal    Neurological - normal exam                 Anesthesia Plan    ASA 2       Plan - general       Airway plan will be ETT          Induction: intravenous    Postoperative Plan: Postoperative administration of opioids is intended.    Pertinent diagnostic labs and testing reviewed    Informed Consent:    Anesthetic plan and risks discussed with patient.    Use of blood products discussed with: patient whom consented to blood products.

## 2021-09-17 NOTE — ANESTHESIA PROCEDURE NOTES
Airway    Date/Time: 9/17/2021 12:52 PM  Performed by: Juancarlos Naqvi M.D.  Authorized by: Juancarlos Naqvi M.D.     Location:  OR  Urgency:  Elective  Indications for Airway Management:  Anesthesia      Spontaneous Ventilation: absent    Sedation Level:  Deep  Preoxygenated: Yes    Patient Position:  Sniffing  Mask Difficulty Assessment:  0 - not attempted  Final Airway Type:  Endotracheal airway  Final Endotracheal Airway:  ETT  Cuffed: Yes    Technique Used for Successful ETT Placement:  Direct laryngoscopy    Insertion Site:  Oral  Blade Type:  Christiano  Laryngoscope Blade/Videolaryngoscope Blade Size:  3  ETT Size (mm):  7.5  Measured from:  Lips  ETT to Lips (cm):  23  Placement Verified by: auscultation and capnometry    Cormack-Lehane Classification:  Grade I - full view of glottis  Number of Attempts at Approach:  1  Number of Other Approaches Attempted:  0

## 2022-01-14 ENCOUNTER — APPOINTMENT (OUTPATIENT)
Dept: URGENT CARE | Facility: CLINIC | Age: 52
End: 2022-01-14
Payer: COMMERCIAL

## 2022-02-28 ENCOUNTER — OFFICE VISIT (OUTPATIENT)
Dept: URGENT CARE | Facility: CLINIC | Age: 52
End: 2022-02-28
Payer: COMMERCIAL

## 2022-02-28 ENCOUNTER — HOSPITAL ENCOUNTER (OUTPATIENT)
Facility: MEDICAL CENTER | Age: 52
End: 2022-02-28
Attending: PHYSICIAN ASSISTANT
Payer: COMMERCIAL

## 2022-02-28 VITALS
HEIGHT: 70 IN | RESPIRATION RATE: 18 BRPM | SYSTOLIC BLOOD PRESSURE: 150 MMHG | OXYGEN SATURATION: 98 % | DIASTOLIC BLOOD PRESSURE: 102 MMHG | TEMPERATURE: 96.9 F | BODY MASS INDEX: 30.49 KG/M2 | HEART RATE: 91 BPM | WEIGHT: 213 LBS

## 2022-02-28 DIAGNOSIS — B34.9 NONSPECIFIC SYNDROME SUGGESTIVE OF VIRAL ILLNESS: ICD-10-CM

## 2022-02-28 PROBLEM — N43.3 ACQUIRED HYDROCELE: Status: ACTIVE | Noted: 2021-02-16

## 2022-02-28 PROBLEM — G47.30 SLEEP APNEA: Status: ACTIVE | Noted: 2021-02-16

## 2022-02-28 PROCEDURE — 0240U HCHG SARS-COV-2 COVID-19 NFCT DS RESP RNA 3 TRGT MIC: CPT

## 2022-02-28 PROCEDURE — 99213 OFFICE O/P EST LOW 20 MIN: CPT | Performed by: PHYSICIAN ASSISTANT

## 2022-02-28 RX ORDER — ALBUTEROL SULFATE 90 UG/1
2 AEROSOL, METERED RESPIRATORY (INHALATION) EVERY 6 HOURS PRN
Qty: 8.5 G | Refills: 0 | Status: SHIPPED | OUTPATIENT
Start: 2022-02-28 | End: 2022-07-01

## 2022-02-28 RX ORDER — DEXTROMETHORPHAN HYDROBROMIDE AND PROMETHAZINE HYDROCHLORIDE 15; 6.25 MG/5ML; MG/5ML
5 SYRUP ORAL EVERY 6 HOURS PRN
Qty: 118 ML | Refills: 0 | Status: SHIPPED | OUTPATIENT
Start: 2022-02-28 | End: 2022-03-07

## 2022-02-28 ASSESSMENT — ENCOUNTER SYMPTOMS
ABDOMINAL PAIN: 0
NAUSEA: 0
COUGH: 1
CONSTIPATION: 0
SPUTUM PRODUCTION: 1
SORE THROAT: 1
EYE PAIN: 0
SHORTNESS OF BREATH: 0
FEVER: 1
CHILLS: 1
VOMITING: 0
HEADACHES: 1
MYALGIAS: 0
DIARRHEA: 0

## 2022-02-28 NOTE — PROGRESS NOTES
"Subjective:   Abhishek Pacheco is a 51 y.o. male who presents for Fever (Congested, sweats, SOB, cough, sore ribs from coughing, sore throat, no taste/smell  x 4 days )      51 years old male presents to urgent care for 4 day history of cough, congestion, sore throat, subjective fever/chills and altered taste and smell.  Symptoms developed after travel to [place].  Patient current everyday smoker, no history of asthma or copd.  Reports feeling mildly better today.  Significant other in clinic with similar symptoms.  He is vaccinated against covid with booster 11/2021.      Review of Systems   Constitutional: Positive for chills, fever and malaise/fatigue.   HENT: Positive for congestion and sore throat. Negative for ear pain.    Eyes: Negative for pain.   Respiratory: Positive for cough and sputum production. Negative for shortness of breath.    Cardiovascular: Negative for chest pain.   Gastrointestinal: Negative for abdominal pain, constipation, diarrhea, nausea and vomiting.   Genitourinary: Negative for dysuria.   Musculoskeletal: Negative for myalgias.   Skin: Negative for rash.   Neurological: Positive for headaches.       Medications, Allergies, and current problem list reviewed today in Epic.     Objective:     /102   Pulse 91   Temp 36.1 °C (96.9 °F)   Resp 18   Ht 1.778 m (5' 10\")   Wt 96.6 kg (213 lb)   SpO2 98%     Physical Exam  Vitals reviewed.   Constitutional:       Appearance: Normal appearance. He is not ill-appearing or toxic-appearing.   HENT:      Head: Normocephalic and atraumatic.      Right Ear: External ear normal.      Left Ear: External ear normal.      Nose: Nose normal. No congestion or rhinorrhea.      Mouth/Throat:      Mouth: Mucous membranes are moist.      Comments: S/p UVJ  Eyes:      Conjunctiva/sclera: Conjunctivae normal.      Pupils: Pupils are equal, round, and reactive to light.   Cardiovascular:      Rate and Rhythm: Normal rate and regular rhythm. " "  Pulmonary:      Effort: Pulmonary effort is normal.      Breath sounds: Normal breath sounds.   Lymphadenopathy:      Cervical: No cervical adenopathy.   Skin:     General: Skin is warm and dry.      Capillary Refill: Capillary refill takes less than 2 seconds.   Neurological:      Mental Status: He is alert and oriented to person, place, and time.         Assessment/Plan:     Diagnosis and associated orders:     1. Nonspecific syndrome suggestive of viral illness  CoV-2 and Flu A/B by PCR (24 hour In-House): Collect NP swab in VTM    promethazine-dextromethorphan (PROMETHAZINE-DM) 6.25-15 MG/5ML syrup    albuterol 108 (90 Base) MCG/ACT Aero Soln inhalation aerosol      Comments/MDM:     • Trial albuterol  • Night time cough syrup sent - warned about sedation  Patient's vital signs are reassuring and they appear hemodynamically stable and do not require higher level care at this time  I discussed self isolation and provided printed instructions (if applicable)  I discussed ER precautions and provided printed instructions (if applicable)  I educated the patient on possibility of a false-negative test and indications for repeat testing  I instructed the patient to try to follow up with their PCP (if applicable) for follow up care  I discussed the possibly of \"breakthrough infections\" in fully vaccinated people  The patient will receive results via MyChart (\"detected\" is a positive result, \"not detected\" indicates a negative result) or in clinic with rapid test.  If requested, I provided the patient with a work note to provide to their employer or school regarding returning to work and discontinuation of self isolation.  All questions were answered and patient demonstrated verbal understanding of above.  I followed all reasonable PPE precautions during this encounter including but not limited to use of an N95 mask, gloves, and gown if indicated.             Differential diagnosis, natural history, supportive care, " and indications for immediate follow-up discussed.    Advised the patient to follow-up with the primary care physician for recheck, reevaluation, and consideration of further management.    Please note that this dictation was created using voice recognition software. I have made a reasonable attempt to correct obvious errors, but I expect that there are errors of grammar and possibly content that I did not discover before finalizing the note.    This note was electronically signed by Rocael Chang PA-C

## 2022-02-28 NOTE — LETTER
February 28, 2022    To Whom It May Concern:         This is confirmation that Abhishek Pacheco attended his scheduled appointment with Rocael Chang P.A.-C. on 2/28/22.  Your employee was seen in our clinic today.  A concern for COVID-19 has been identified and testing is in progress. We are asking you to excuse absences while following self-isolation protocol per Center for Disease Control (CDC) guidelines.  Your employee will be able to access test results through our electronic delivery system called [employer].     If the results of testing are positive, your employee should follow the CDC guidelines.  These are isolation for a minimum of 5 days and at least 24 hours have passed since your last fever without the use of fever-reducing medications and all other symptoms have improved.  After completing the isolation the patient must continue to use a well fitting mask for an additional 5 days.  The health department may contact you and provide further directions regarding self-isolation and return to work.     If the results of testing are negative, and once there has been no fever (tem >100.4) for at least 48 hours without treatment, and no vomiting or diarrhea for at least 48 hours, then return to work is approved.    This is the only note that will be provided from [facility] for this visit.  Your employee will require an appointment with a primary care provider if FMLA or disability forms are required.  If you have any questions please do not hesitate to call me at the phone number listed below.    Sincerely,    Rocael Chang P.A.-C.  [phone]  (signed electronically)

## 2022-03-01 LAB
FLUAV RNA SPEC QL NAA+PROBE: NEGATIVE
FLUBV RNA SPEC QL NAA+PROBE: NEGATIVE
SARS-COV-2 RNA RESP QL NAA+PROBE: NOTDETECTED
SPECIMEN SOURCE: NORMAL

## 2022-04-11 ENCOUNTER — HOSPITAL ENCOUNTER (OUTPATIENT)
Dept: LAB | Facility: MEDICAL CENTER | Age: 52
End: 2022-04-11
Attending: FAMILY MEDICINE
Payer: COMMERCIAL

## 2022-04-11 LAB
ALBUMIN SERPL BCP-MCNC: 4.6 G/DL (ref 3.2–4.9)
ALBUMIN/GLOB SERPL: 1.8 G/DL
ALP SERPL-CCNC: 45 U/L (ref 30–99)
ALT SERPL-CCNC: 38 U/L (ref 2–50)
ANION GAP SERPL CALC-SCNC: 12 MMOL/L (ref 7–16)
AST SERPL-CCNC: 19 U/L (ref 12–45)
BASOPHILS # BLD AUTO: 0.8 % (ref 0–1.8)
BASOPHILS # BLD: 0.08 K/UL (ref 0–0.12)
BILIRUB SERPL-MCNC: 0.7 MG/DL (ref 0.1–1.5)
BUN SERPL-MCNC: 14 MG/DL (ref 8–22)
CALCIUM SERPL-MCNC: 9.4 MG/DL (ref 8.5–10.5)
CHLORIDE SERPL-SCNC: 106 MMOL/L (ref 96–112)
CHOLEST SERPL-MCNC: 212 MG/DL (ref 100–199)
CO2 SERPL-SCNC: 25 MMOL/L (ref 20–33)
CREAT SERPL-MCNC: 1.05 MG/DL (ref 0.5–1.4)
EOSINOPHIL # BLD AUTO: 0.09 K/UL (ref 0–0.51)
EOSINOPHIL NFR BLD: 0.9 % (ref 0–6.9)
ERYTHROCYTE [DISTWIDTH] IN BLOOD BY AUTOMATED COUNT: 44.6 FL (ref 35.9–50)
EST. AVERAGE GLUCOSE BLD GHB EST-MCNC: 120 MG/DL
FASTING STATUS PATIENT QL REPORTED: NORMAL
GFR SERPLBLD CREATININE-BSD FMLA CKD-EPI: 86 ML/MIN/1.73 M 2
GLOBULIN SER CALC-MCNC: 2.6 G/DL (ref 1.9–3.5)
GLUCOSE SERPL-MCNC: 88 MG/DL (ref 65–99)
HBA1C MFR BLD: 5.8 % (ref 4–5.6)
HCT VFR BLD AUTO: 49.3 % (ref 42–52)
HDLC SERPL-MCNC: 36 MG/DL
HGB BLD-MCNC: 16.4 G/DL (ref 14–18)
IMM GRANULOCYTES # BLD AUTO: 0.1 K/UL (ref 0–0.11)
IMM GRANULOCYTES NFR BLD AUTO: 1 % (ref 0–0.9)
LDLC SERPL CALC-MCNC: 127 MG/DL
LYMPHOCYTES # BLD AUTO: 3.26 K/UL (ref 1–4.8)
LYMPHOCYTES NFR BLD: 31.1 % (ref 22–41)
MCH RBC QN AUTO: 28.8 PG (ref 27–33)
MCHC RBC AUTO-ENTMCNC: 33.3 G/DL (ref 33.7–35.3)
MCV RBC AUTO: 86.5 FL (ref 81.4–97.8)
MONOCYTES # BLD AUTO: 0.66 K/UL (ref 0–0.85)
MONOCYTES NFR BLD AUTO: 6.3 % (ref 0–13.4)
NEUTROPHILS # BLD AUTO: 6.3 K/UL (ref 1.82–7.42)
NEUTROPHILS NFR BLD: 59.9 % (ref 44–72)
NRBC # BLD AUTO: 0 K/UL
NRBC BLD-RTO: 0 /100 WBC
PLATELET # BLD AUTO: 307 K/UL (ref 164–446)
PMV BLD AUTO: 10.3 FL (ref 9–12.9)
POTASSIUM SERPL-SCNC: 5.2 MMOL/L (ref 3.6–5.5)
PROT SERPL-MCNC: 7.2 G/DL (ref 6–8.2)
PSA SERPL-MCNC: 2.29 NG/ML (ref 0–4)
RBC # BLD AUTO: 5.7 M/UL (ref 4.7–6.1)
SODIUM SERPL-SCNC: 143 MMOL/L (ref 135–145)
TRIGL SERPL-MCNC: 245 MG/DL (ref 0–149)
TSH SERPL DL<=0.005 MIU/L-ACNC: 1.78 UIU/ML (ref 0.38–5.33)
WBC # BLD AUTO: 10.5 K/UL (ref 4.8–10.8)

## 2022-04-11 PROCEDURE — 84402 ASSAY OF FREE TESTOSTERONE: CPT

## 2022-04-11 PROCEDURE — 83036 HEMOGLOBIN GLYCOSYLATED A1C: CPT

## 2022-04-11 PROCEDURE — 84443 ASSAY THYROID STIM HORMONE: CPT

## 2022-04-11 PROCEDURE — 84270 ASSAY OF SEX HORMONE GLOBUL: CPT

## 2022-04-11 PROCEDURE — 85025 COMPLETE CBC W/AUTO DIFF WBC: CPT

## 2022-04-11 PROCEDURE — 84153 ASSAY OF PSA TOTAL: CPT

## 2022-04-11 PROCEDURE — 80053 COMPREHEN METABOLIC PANEL: CPT

## 2022-04-11 PROCEDURE — 84403 ASSAY OF TOTAL TESTOSTERONE: CPT

## 2022-04-11 PROCEDURE — 80061 LIPID PANEL: CPT

## 2022-04-11 PROCEDURE — 36415 COLL VENOUS BLD VENIPUNCTURE: CPT

## 2022-04-13 LAB
SHBG SERPL-SCNC: 14 NMOL/L (ref 19–76)
TESTOST FREE MFR SERPL: 2.5 % (ref 1.6–2.9)
TESTOST FREE SERPL-MCNC: 62 PG/ML (ref 47–244)
TESTOST SERPL-MCNC: 245 NG/DL (ref 300–890)

## 2022-05-02 ENCOUNTER — APPOINTMENT (OUTPATIENT)
Dept: RADIOLOGY | Facility: IMAGING CENTER | Age: 52
End: 2022-05-02
Attending: FAMILY MEDICINE
Payer: COMMERCIAL

## 2022-05-02 ENCOUNTER — OFFICE VISIT (OUTPATIENT)
Dept: URGENT CARE | Facility: PHYSICIAN GROUP | Age: 52
End: 2022-05-02
Payer: COMMERCIAL

## 2022-05-02 VITALS
HEART RATE: 85 BPM | DIASTOLIC BLOOD PRESSURE: 96 MMHG | SYSTOLIC BLOOD PRESSURE: 148 MMHG | TEMPERATURE: 98.3 F | WEIGHT: 210 LBS | OXYGEN SATURATION: 97 % | HEIGHT: 70 IN | BODY MASS INDEX: 30.06 KG/M2 | RESPIRATION RATE: 20 BRPM

## 2022-05-02 DIAGNOSIS — S69.92XA INJURY OF LEFT WRIST, INITIAL ENCOUNTER: ICD-10-CM

## 2022-05-02 DIAGNOSIS — S63.502A SPRAIN OF LEFT WRIST, INITIAL ENCOUNTER: ICD-10-CM

## 2022-05-02 PROCEDURE — 73110 X-RAY EXAM OF WRIST: CPT | Mod: TC,LT | Performed by: RADIOLOGY

## 2022-05-02 PROCEDURE — 99213 OFFICE O/P EST LOW 20 MIN: CPT | Performed by: FAMILY MEDICINE

## 2022-05-02 RX ORDER — IBUPROFEN 200 MG
600 TABLET ORAL ONCE
Status: COMPLETED | OUTPATIENT
Start: 2022-05-02 | End: 2022-05-02

## 2022-05-02 RX ORDER — VALACYCLOVIR HYDROCHLORIDE 500 MG/1
TABLET, FILM COATED ORAL PRN
COMMUNITY
Start: 2022-04-05 | End: 2023-12-05

## 2022-05-02 RX ADMIN — Medication 600 MG: at 09:44

## 2022-05-02 ASSESSMENT — ENCOUNTER SYMPTOMS
SENSORY CHANGE: 0
BACK PAIN: 0
FOCAL WEAKNESS: 0
NECK PAIN: 0

## 2022-05-02 ASSESSMENT — FIBROSIS 4 INDEX: FIB4 SCORE: 0.5

## 2022-05-02 NOTE — PROGRESS NOTES
"Subjective     Abhishek Pacheco is a 51 y.o. male who presents with Wrist Injury (Left; Pt states that he had a fall this morning. )            Onset today left wrist pain.  FOOSH injury.  Left hand dominant.  No prior injury or surgery.  Pain is moderate to severe.  Worse with movement.  No function or sensory loss.  No abrasion or laceration.  No elbow or shoulder pain.  No other injuries.  No other aggravating or alleviating factors.      Review of Systems   Musculoskeletal: Negative for back pain and neck pain.   Neurological: Negative for sensory change and focal weakness.              Objective     /96 (BP Location: Right arm, Patient Position: Sitting, BP Cuff Size: Adult)   Pulse 85   Temp 36.8 °C (98.3 °F) (Temporal)   Resp 20   Ht 1.778 m (5' 10\")   Wt 95.3 kg (210 lb)   SpO2 97%   BMI 30.13 kg/m²      Physical Exam  Musculoskeletal:      Comments: Left wrist: Diffusely tender.  +soft tissue swelling.  No deformity.  Pain reproduced with movement in all planes.  Distal neurovascular intact.   Skin:     General: Skin is warm and dry.                             Assessment & Plan       Xray: no fracture or dislocation per radiology    1. Injury of left wrist, initial encounter  DX-WRIST-COMPLETE 3+ LEFT    ibuprofen (MOTRIN) tablet 600 mg   2. Sprain of left wrist, initial encounter       Differential diagnosis, natural history, supportive care, and indications for immediate follow-up discussed at length.     Relative rest, ice, nsaid prn. Elevation and compression prn swelling. Resume activity as tolerated.    Velcro wrist splint placed in clinic.              "

## 2022-05-02 NOTE — LETTER
May 2, 2022         Patient: Abhishek Pacheco   YOB: 1970   Date of Visit: 5/2/2022           To Whom it May Concern:    Abhishek Pacheco was seen in my clinic on 5/2/2022. Please excuse from work today. Please allow left wrist brace as needed this month.       Sincerely,           Lloyd Muniz M.D.  Electronically Signed

## 2022-06-07 ENCOUNTER — HOSPITAL ENCOUNTER (OUTPATIENT)
Dept: RADIOLOGY | Facility: MEDICAL CENTER | Age: 52
End: 2022-06-07
Attending: FAMILY MEDICINE
Payer: COMMERCIAL

## 2022-06-07 DIAGNOSIS — R05.1 ACUTE COUGH: ICD-10-CM

## 2022-06-07 PROCEDURE — 71046 X-RAY EXAM CHEST 2 VIEWS: CPT

## 2022-07-01 ENCOUNTER — OFFICE VISIT (OUTPATIENT)
Dept: URGENT CARE | Facility: CLINIC | Age: 52
End: 2022-07-01
Payer: COMMERCIAL

## 2022-07-01 VITALS
SYSTOLIC BLOOD PRESSURE: 122 MMHG | RESPIRATION RATE: 16 BRPM | HEART RATE: 96 BPM | BODY MASS INDEX: 32.58 KG/M2 | WEIGHT: 220 LBS | OXYGEN SATURATION: 96 % | HEIGHT: 69 IN | DIASTOLIC BLOOD PRESSURE: 78 MMHG | TEMPERATURE: 97.9 F

## 2022-07-01 DIAGNOSIS — J40 BRONCHITIS: ICD-10-CM

## 2022-07-01 LAB
EXTERNAL QUALITY CONTROL: NORMAL
INT CON NEG: NORMAL
INT CON POS: NORMAL
S PYO AG THROAT QL: NEGATIVE
SARS-COV+SARS-COV-2 AG RESP QL IA.RAPID: NEGATIVE

## 2022-07-01 PROCEDURE — 99213 OFFICE O/P EST LOW 20 MIN: CPT | Performed by: STUDENT IN AN ORGANIZED HEALTH CARE EDUCATION/TRAINING PROGRAM

## 2022-07-01 PROCEDURE — 87880 STREP A ASSAY W/OPTIC: CPT | Performed by: STUDENT IN AN ORGANIZED HEALTH CARE EDUCATION/TRAINING PROGRAM

## 2022-07-01 PROCEDURE — 87426 SARSCOV CORONAVIRUS AG IA: CPT | Performed by: STUDENT IN AN ORGANIZED HEALTH CARE EDUCATION/TRAINING PROGRAM

## 2022-07-01 RX ORDER — ALBUTEROL SULFATE 90 UG/1
2 AEROSOL, METERED RESPIRATORY (INHALATION) EVERY 6 HOURS PRN
Qty: 8.5 G | Refills: 1 | Status: SHIPPED | OUTPATIENT
Start: 2022-07-01

## 2022-07-01 RX ORDER — INHALER, ASSIST DEVICES
1 SPACER (EA) MISCELLANEOUS ONCE
Qty: 1 EACH | Refills: 0 | Status: SHIPPED | OUTPATIENT
Start: 2022-07-01 | End: 2022-07-01

## 2022-07-01 ASSESSMENT — FIBROSIS 4 INDEX: FIB4 SCORE: 0.51

## 2022-07-01 NOTE — PROGRESS NOTES
Subjective:   CHIEF COMPLAINT  Chief Complaint   Patient presents with   • Cough     X 2 day, cough, congestion, SOB, sore throat       HPI  Abhishek Pacheco is a 52 y.o. male who presents with a chief complaint of sinus congestion, runny nose, sore throat and slight cough for 2 days.  He was seen in urgent care approximately 1 month ago with similar symptoms, found to have COVID and sent home on albuterol.  Says he has tried using his albuterol which has provided nominal relief of symptoms.  Says he is out of the medication.  He has not tried any additional medications.  Positive ROS for wheezing and shortness of breath.  Denies any history of asthma.  He is vaccine against COVID x3.    REVIEW OF SYSTEMS  General: no fever or chills  GI: no nausea or vomiting  See HPI for further details.    PAST MEDICAL HISTORY  Patient Active Problem List    Diagnosis Date Noted   • Surgical wound dehiscence, initial encounter 05/06/2021   • Pain 05/06/2021   • Tobacco abuse 04/29/2021   • Sleep apnea 02/16/2021   • Acquired hydrocele 02/16/2021   • Obesity 10/28/2018       SURGICAL HISTORY   has a past surgical history that includes appendectomy (2009); nerve ulnar transfer (2001); debridement (11/2/2010); cyst excision (11/2/2010); other orthopedic surgery (2007); other orthopedic surgery; knee arthroscopy (11/2/2010); tonsillectomy and adenoidectomy (10/12/2011); septoturbinoplasty (10/12/2011); uvulopharyngopalatoplasty (10/12/2011); reconstruction, knee, acl (Right, 2003); shoulder arthroscopy w/ rotator cuff repair (Left, 2/7/2018); shoulder arthroscopy w/ bicipital tenodesis repair (2/7/2018); laminotomy; arthroscopy, knee; tonsillectomy; removal of sperm cord lesion (Left, 4/29/2021); removal of sperm duct(s) (Bilateral, 4/29/2021); and inguinal hernia repair robotic xi (Bilateral, 9/17/2021).    ALLERGIES  Allergies   Allergen Reactions   • Iodine Hives and Itching     After receiving iodine contrast for CT pt  "developed hives and itching    Pt developed breakthrough reaction of hives after being premedicated for contrast injection on 12/10/19   • Percocet [Oxycodone-Acetaminophen] Nausea     And dizziness       CURRENT MEDICATIONS  Home Medications     Reviewed by Chidi Mancini D.O. (Physician) on 07/01/22 at 1437  Med List Status: <None>   Medication Last Dose Status   albuterol 108 (90 Base) MCG/ACT Aero Soln inhalation aerosol  Active   APO-VARENICLINE 0.5 MG tablet Not Taking Active   Spacer/Aero-Holding Chambers (AEROCHAMBER PLUS-FLOW SIGNAL) Misc  Active   temazepam (RESTORIL) 30 MG capsule Taking Active   valACYclovir (VALTREX) 500 MG Tab Taking Active   zolpidem (AMBIEN) 10 MG Tab Taking Active                SOCIAL HISTORY  Social History     Tobacco Use   • Smoking status: Current Every Day Smoker     Packs/day: 0.50     Years: 35.00     Pack years: 17.50     Types: Cigarettes     Last attempt to quit: 9/1/2011     Years since quitting: 10.8   • Smokeless tobacco: Never Used   • Tobacco comment: 1/2 ffor 22yrs   Vaping Use   • Vaping Use: Never used   Substance and Sexual Activity   • Alcohol use: Yes     Alcohol/week: 1.8 oz     Types: 2 Cans of beer, 1 Shots of liquor per week     Comment: 2-3 daily   • Drug use: No   • Sexual activity: Yes     Partners: Female     Birth control/protection: Condom       FAMILY HISTORY  No family history on file.       Objective:   PHYSICAL EXAM  VITAL SIGNS: /78 (BP Location: Left arm, Patient Position: Sitting, BP Cuff Size: Adult)   Pulse 96   Temp 36.6 °C (97.9 °F) (Temporal)   Resp 16   Ht 1.753 m (5' 9\")   Wt 99.8 kg (220 lb)   SpO2 96%   BMI 32.49 kg/m²     Gen: no acute distress, normal voice  Skin: dry, intact, moist mucosal membranes  ENT: Diffuse pharyngeal erythema without exudates.  Absence of uvula and tonsils.  No lymphadenopathy.  Lungs: Scattered wheezing bilateral upper lobes w/ symmetric expansion.  No rhonchi or crackles.  CV: RRR w/o " murmurs or clicks  Psych: normal affect, normal judgement, alert, awake    POC strep: Negative   POC COVID: Negative    Assessment/Plan:     1. Bronchitis  Spacer/Aero-Holding Chambers (AEROCHAMBER PLUS-FLOW SIGNAL) Misc    albuterol 108 (90 Base) MCG/ACT Aero Soln inhalation aerosol    POCT Rapid Strep A    POCT SARS-COV Antigen JOHANNE Manual Result   Signs and symptoms consistent with a viral respiratory infection.  Offered shot of Toradol to help with pharyngitis but patient respectfully declined.  He is vaccinated against COVID x3.  - Refilled albuterol and ordered spacer  - Recommended ibuprofen 800 mg 3 times daily as needed  - Provided note for work as requested per patient  - Return to urgent care any new/worsening symptoms or further questions or concerns.  Patient understood everything discussed.  All questions were answered.      Differential diagnosis, natural history, supportive care, and indications for immediate follow-up discussed. All questions answered. Patient agrees with the plan of care.    Follow-up as needed if symptoms worsen or fail to improve to PCP, Urgent care or Emergency Room.    Please note that this dictation was created using voice recognition software. I have made a reasonable attempt to correct obvious errors, but I expect that there are errors of grammar and possibly content that I did not discover before finalizing the note.

## 2022-07-01 NOTE — LETTER
July 1, 2022       Patient: Abhishek Pacheco   YOB: 1970   Date of Visit: 7/1/2022         To Whom It May Concern:    Abhishek Pacheco was seen in urgent care on 7/1/2022 for his doctor's appointment.    If you have any questions or concerns, please don't hesitate to call [phone]          Sincerely,          Chidi Mancini D.O.  Electronically Signed

## 2022-09-19 ENCOUNTER — HOSPITAL ENCOUNTER (OUTPATIENT)
Dept: RADIOLOGY | Facility: MEDICAL CENTER | Age: 52
End: 2022-09-19
Attending: ORTHOPAEDIC SURGERY
Payer: COMMERCIAL

## 2022-09-19 DIAGNOSIS — M25.512 ACUTE PAIN OF LEFT SHOULDER: ICD-10-CM

## 2022-09-19 PROCEDURE — 73030 X-RAY EXAM OF SHOULDER: CPT | Mod: LT

## 2022-10-03 ENCOUNTER — TELEPHONE (OUTPATIENT)
Dept: HEMATOLOGY ONCOLOGY | Facility: MEDICAL CENTER | Age: 52
End: 2022-10-03
Payer: COMMERCIAL

## 2022-10-03 NOTE — TELEPHONE ENCOUNTER
Received referral to lung cancer screening program.  Chart review to assess for lung cancer screening program eligibility.   1. Age 50-80 yrs of age? Yes 52 y.o.  2. 20 pack year hx of smoking, or greater? Yes 1 [id number]  3. Current smoker or if quit, has pt quit within last 15 yrs?Yes  Current smoker  4. Any signs or symptoms of lung cancer? None noted  5. Previous history of lung cancer? None noted  6. Chest CT within past 12 mos.? None noted  Patient does meet eligibility criteria. [facility] scheduling notified to schedule the shared decision making visit.

## 2022-10-05 ENCOUNTER — HOSPITAL ENCOUNTER (OUTPATIENT)
Dept: HEMATOLOGY ONCOLOGY | Facility: MEDICAL CENTER | Age: 52
End: 2022-10-05
Attending: NURSE PRACTITIONER
Payer: COMMERCIAL

## 2022-10-05 VITALS
RESPIRATION RATE: 14 BRPM | WEIGHT: 224.6 LBS | OXYGEN SATURATION: 98 % | DIASTOLIC BLOOD PRESSURE: 70 MMHG | HEART RATE: 83 BPM | SYSTOLIC BLOOD PRESSURE: 118 MMHG | TEMPERATURE: 98.8 F | HEIGHT: 69 IN | BODY MASS INDEX: 33.27 KG/M2

## 2022-10-05 DIAGNOSIS — F17.210 CIGARETTE SMOKER: ICD-10-CM

## 2022-10-05 PROCEDURE — G0296 VISIT TO DETERM LDCT ELIG: HCPCS | Performed by: NURSE PRACTITIONER

## 2022-10-05 PROCEDURE — 99212 OFFICE O/P EST SF 10 MIN: CPT | Performed by: NURSE PRACTITIONER

## 2022-10-05 ASSESSMENT — ENCOUNTER SYMPTOMS
SHORTNESS OF BREATH: 1
WEIGHT LOSS: 0
SPUTUM PRODUCTION: 0
HEMOPTYSIS: 0
WHEEZING: 0
COUGH: 0

## 2022-10-05 ASSESSMENT — FIBROSIS 4 INDEX: FIB4 SCORE: 0.51

## 2022-10-05 NOTE — PROGRESS NOTES
"Subjective     Abhishek Pacheco is a 52 y.o. male who presents with Lung Cancer Screening Program Prescreen (LCSP/HTH/Nicotine Dependence/Will Cavazos)            HPI  Patient seen today for initial lung cancer screening visit. Patient referred by PCP, Dr. Will White, for lung cancer screening shared decision making visit.    The patient meets eligibility criteria including age, smoking history (30+ pack years), if former smoker, quit in the last 15 years, and absence of signs or symptoms of lung cancer.    - Age - 52  - Smoking history - Patient has smoked for 36 years at an average of 1.5 ppd = 54 pack year smoking history.  - Current smoking status - current smoker  - No symptoms of lung cancer and no previous history of lung cancer       Review of Systems   Constitutional:  Positive for malaise/fatigue (\"always tired\"). Negative for weight loss.   Respiratory:  Positive for shortness of breath (with activity; has panic attacks nwiht recent trouble breathing). Negative for cough, hemoptysis, sputum production and wheezing.         H/o uvulectomy (sleep apnea surgery) ~2010; CPAP - 10 years on machine that is linked to increased cancer dx ([employer]) - currently without, new machine pending; Pulm consult in Jan     Allergies   Allergen Reactions    Iodine Hives and Itching     After receiving iodine contrast for CT pt developed hives and itching    Pt developed breakthrough reaction of hives after being premedicated for contrast injection on 12/10/19    Percocet [Oxycodone-Acetaminophen] Nausea     And dizziness         Current Outpatient Medications on File Prior to Encounter   Medication Sig Dispense Refill    albuterol 108 (90 Base) MCG/ACT Aero Soln inhalation aerosol Inhale 2 Puffs every 6 hours as needed for Shortness of Breath (cough). 8.5 g 1    valACYclovir (VALTREX) 500 MG Tab       APO-VARENICLINE 0.5 MG tablet TAKE 1 TABLET BY MOUTH TWICE DAILY FOR 7 DAYS      zolpidem " "(AMBIEN) 10 MG Tab Take 10 mg by mouth.      temazepam (RESTORIL) 30 MG capsule Take 30 mg by mouth at bedtime.       No current facility-administered medications on file prior to encounter.              Objective     /70 (BP Location: Left arm, Patient Position: Sitting, BP Cuff Size: Adult)   Pulse 83   Temp 37.1 °C (98.8 °F) (Temporal)   Resp 14   Ht 1.753 m (5' 9\")   Wt 102 kg (224 lb 9.6 oz)   SpO2 98%   BMI 33.17 kg/m²      Physical Exam  Vitals reviewed.   Constitutional:       General: He is not in acute distress.     Appearance: He is well-developed. He is not diaphoretic.   Cardiovascular:      Rate and Rhythm: Normal rate and regular rhythm.      Heart sounds: Normal heart sounds. No murmur heard.    No friction rub. No gallop.   Pulmonary:      Effort: Pulmonary effort is normal. No respiratory distress.      Breath sounds: Examination of the right-lower field reveals decreased breath sounds. Examination of the left-lower field reveals decreased breath sounds. Decreased breath sounds present. No wheezing.   Musculoskeletal:         General: Normal range of motion.   Skin:     General: Skin is warm and dry.   Neurological:      Mental Status: He is alert and oriented to person, place, and time.            Assessment & Plan   1. Cigarette smoker  CT-LUNG CANCER-SCREENING          We conducted a shared decision-making process using a decision aid. We reviewed benefits and harms of screening, including false positives and potential need for additional diagnostic testing, the possibility of over diagnosis, and total radiation exposure.    We discussed the importance of adhering to annual LDCT screening. We also discussed the impact of comorbities on the patient's the ability or willingness to undergo diagnostic procedure(s) and treatment.    Counseling on the importance of maintaining cigarette smoking abstinence if former smoker; or the importance of smoking cessation if current smoker and, if " appropriate, furnishing of information about tobacco cessation interventions. I provided patient with smoking cessation materials and resources within [facility] and the community. Patient appreciative of the resources.    Based on our discussion, we have decided to begin annual lung cancer screening starting now.

## 2022-10-05 NOTE — LETTER
"     [facility]   [address] [facility] [address],   [address]  [place] [address]  Phone: [phone]  Fax: [phone]              Abhishek Pacheco  1970    Encounter Date: 10/5/2022    EDILIA Figueroa          PROGRESS NOTE:  Subjective    Abhishek Pacheco is a 52 y.o. male who presents with Lung Cancer Screening Program Prescreen (LCSP/HTH/Nicotine Dependence/Will Cavazos)            HPI  Patient seen today for initial lung cancer screening visit. Patient referred by PCP, Dr. Will White, for lung cancer screening shared decision making visit.    The patient meets eligibility criteria including age, smoking history (30+ pack years), if former smoker, quit in the last 15 years, and absence of signs or symptoms of lung cancer.    - Age - 52  - Smoking history - Patient has smoked for 36 years at an average of 1.5 ppd = 54 pack year smoking history.  - Current smoking status - current smoker  - No symptoms of lung cancer and no previous history of lung cancer       Review of Systems   Constitutional:  Positive for malaise/fatigue (\"always tired\"). Negative for weight loss.   Respiratory:  Positive for shortness of breath (with activity; has panic attacks nwiht recent trouble breathing). Negative for cough, hemoptysis, sputum production and wheezing.         H/o uvulectomy (sleep apnea surgery) ~2010; CPAP - 10 years on machine that is linked to increased cancer dx ([employer]) - currently without, new machine pending; Pulm consult in Jan     Allergies   Allergen Reactions   • Iodine Hives and Itching     After receiving iodine contrast for CT pt developed hives and itching    Pt developed breakthrough reaction of hives after being premedicated for contrast injection on 12/10/19   • Percocet [Oxycodone-Acetaminophen] Nausea     And dizziness         Current Outpatient Medications on File Prior to Encounter   Medication Sig Dispense Refill   • albuterol 108 (90 Base) MCG/ACT Aero " "Soln inhalation aerosol Inhale 2 Puffs every 6 hours as needed for Shortness of Breath (cough). 8.5 g 1   • valACYclovir (VALTREX) 500 MG Tab      • APO-VARENICLINE 0.5 MG tablet TAKE 1 TABLET BY MOUTH TWICE DAILY FOR 7 DAYS     • zolpidem (AMBIEN) 10 MG Tab Take 10 mg by mouth.     • temazepam (RESTORIL) 30 MG capsule Take 30 mg by mouth at bedtime.       No current facility-administered medications on file prior to encounter.              Objective    /70 (BP Location: Left arm, Patient Position: Sitting, BP Cuff Size: Adult)   Pulse 83   Temp 37.1 °C (98.8 °F) (Temporal)   Resp 14   Ht 1.753 m (5' 9\")   Wt 102 kg (224 lb 9.6 oz)   SpO2 98%   BMI 33.17 kg/m²      Physical Exam  Vitals reviewed.   Constitutional:       General: He is not in acute distress.     Appearance: He is well-developed. He is not diaphoretic.   Cardiovascular:      Rate and Rhythm: Normal rate and regular rhythm.      Heart sounds: Normal heart sounds. No murmur heard.    No friction rub. No gallop.   Pulmonary:      Effort: Pulmonary effort is normal. No respiratory distress.      Breath sounds: Examination of the right-lower field reveals decreased breath sounds. Examination of the left-lower field reveals decreased breath sounds. Decreased breath sounds present. No wheezing.   Musculoskeletal:         General: Normal range of motion.   Skin:     General: Skin is warm and dry.   Neurological:      Mental Status: He is alert and oriented to person, place, and time.            Assessment & Plan  1. Cigarette smoker  CT-LUNG CANCER-SCREENING          We conducted a shared decision-making process using a decision aid. We reviewed benefits and harms of screening, including false positives and potential need for additional diagnostic testing, the possibility of over diagnosis, and total radiation exposure.    We discussed the importance of adhering to annual LDCT screening. We also discussed the impact of comorbities on the " patient's the ability or willingness to undergo diagnostic procedure(s) and treatment.    Counseling on the importance of maintaining cigarette smoking abstinence if former smoker; or the importance of smoking cessation if current smoker and, if appropriate, furnishing of information about tobacco cessation interventions. I provided patient with smoking cessation materials and resources within [facility] and the community. Patient appreciative of the resources.    Based on our discussion, we have decided to begin annual lung cancer screening starting now.                     Calos Valle M.D.  [address] [place] [address] [address]  [address]  [place] [address]  Via Fax: [phone]

## 2022-10-07 RX ORDER — TESTOSTERONE CYPIONATE 200 MG/ML
INJECTION, SOLUTION INTRAMUSCULAR
Status: ON HOLD | COMMUNITY
Start: 2022-09-22 | End: 2023-08-29

## 2022-10-07 RX ORDER — VARENICLINE TARTRATE 1 MG/1
TABLET, FILM COATED ORAL
COMMUNITY
End: 2023-12-05

## 2022-10-07 RX ORDER — MONTELUKAST SODIUM 10 MG/1
TABLET ORAL
COMMUNITY
Start: 2022-10-01 | End: 2023-05-23

## 2022-10-07 RX ORDER — DOXYCYCLINE HYCLATE 100 MG/1
CAPSULE ORAL
COMMUNITY
Start: 2022-09-02 | End: 2022-12-15

## 2022-10-19 ENCOUNTER — HOSPITAL ENCOUNTER (OUTPATIENT)
Dept: RADIOLOGY | Facility: MEDICAL CENTER | Age: 52
End: 2022-10-19
Attending: NURSE PRACTITIONER
Payer: COMMERCIAL

## 2022-10-19 DIAGNOSIS — F17.210 CIGARETTE SMOKER: ICD-10-CM

## 2022-10-19 PROCEDURE — 71271 CT THORAX LUNG CANCER SCR C-: CPT

## 2022-10-21 ENCOUNTER — TELEPHONE (OUTPATIENT)
Dept: HEMATOLOGY ONCOLOGY | Facility: MEDICAL CENTER | Age: 52
End: 2022-10-21
Payer: COMMERCIAL

## 2022-10-21 NOTE — TELEPHONE ENCOUNTER
Phoned patient with results of LDCT exam performed on 10/19/2022    Notified him that the results showed a very small nodule at the right lung base that had a benign (or non cancer) appearance.    To make sure these nodule(s) are benign, and remain unchanged, we recommend a follow-up low-dose chest CT in 12 months, which will be ordered per PCP/referring provider.    Patient informed of incidental findings of atelectasis within both lungs, borderline enlarged right hilar lymph node and enlarged right axillary lymph node unchanged, ectatic ascending aorta, and fatty change in liver with recommendation to follow-up up with PCP/referring provider.    Patient agrees to all recommendations.    Referring provider Dr. Cavazos notified of results and incidental findings via fax.    [facility] updated and patient sent lung cancer screening result letter.        CT-LUNG CANCER-SCREENING    Result Date: 10/20/2022  10/19/2022 4:28 PM HISTORY/REASON FOR EXAM:  lung cancer screening; lung cancer screening. TECHNIQUE/EXAM DESCRIPTION AND NUMBER OF VIEWS: Lung cancer screening without contrast. Low dose noncontrast helical images were obtained of the chest from the lung apices through the costophrenic sulci utilizing thin collimation and intervals with reconstructed images sent to PACS in axial, coronal and sagittal planes. Low dose optimization technique was utilized for this CT exam including automated exposure control and adjustment of the mA and/or kV according to patient size. COMPARISON: 12/19/2013 FINDINGS: Subsegmental atelectasis within the right middle lobe and lingula. Dependent atelectasis within the lower lobes. Pleural-based 5.3 mm nodule lateral right lung base image 97. No pleural effusion. 9.5 mm short axis right hilar lymph node unchanged compared to previous. No enlarged mediastinal lymph nodes identified. 12.3 mm short axis right axillary lymph node and 7.2 mm short axis left axillary lymph node  unchanged. Heart is within normal limits in size. Minimal pericardial fluid. Ectatic ascending aorta measuring 3.9 x 3.8 cm. Calcifications within the aortic valve. Diffuse low-attenuation liver consistent with fatty change.     1.  5.3 mm nodule right lung base. 2.  Atelectasis within both lungs. No pleural effusions. 3.  Borderline enlarged right hilar lymph node and enlarged right axillary lymph node unchanged. 4.  Ectatic ascending aorta. 5.  Fatty change liver. Lung RADS: 2 - Benign Appearance or Behavior Nodules with a very low likelihood of becoming a clinically active cancer due to size or lack of growth Findings: solid nodule(s): less than 6 mm or new less than 4 mm part solid nodule(s): less than 6 mm total diameter on baseline screening non solid nodule(s) (GGN): less than 30 mm OR greater than or equal to 30 mm and unchanged or slowly growing category 3 or 4 nodules unchanged for greater than or equal to 3 months perifissural nodule(s) less than 10 mm Management: Continue annual screening with LDCT in 12 months

## 2022-10-21 NOTE — LETTER
. [address]  [place] [address]  P [phone]  F [phone]         Date: October 21, 2022    Abhishek Lam Pacheco  [address] [place] [address]  [place] [id number]    Re:  Low-dose chest CT performed on 10/19/2022     Medical Record Number: [id number]    Dear Abhishek,    We are writing to let you know that the results of your recent low-dose chest CT (LDCT) examination shows one or more lung nodule(s) which are likely benign (not cancer).  Lung nodules are very common and many people without cancer have these nodules.  To make sure these nodule(s) are benign, and remain unchanged, your radiologist recommends you have another low-dose chest CT on or around 10/19/2023. In the event that any additional “incidental” findings were identified from this exam, we have communicated back to your primary care provider for follow-up.    Here are some other important points you should know:  • Your low-dose Chest CT report has been sent to your referring or primary health care provider and is available to participants in [employer].  As a part of our Lung Cancer Screening program we will remind you and your referring health care provider when your next LDCT screening is due.  • Although low-dose chest CT is very effective at finding lung cancer early, it cannot find all lung cancers. If you develop any new symptoms such as shortness of breath, chest pain, or coughing up blood, please call your doctor.  • Please keep in mind that good health involves quitting smoking (for help, call [facility] Quit Tobacco program at [phone]), an annual physical exam, and continued screening with low-dose chest CT.    Thank you for participating in the Lung Cancer Screening program. If you have any questions about this letter or our program, please call our Nurse at [phone].    Sincerely,  James Polo MD [facility]   Medical Director  [facility] Lung Cancer Screening Program

## 2022-12-14 ASSESSMENT — ENCOUNTER SYMPTOMS
WHEEZING: 1
CHEST TIGHTNESS: 1
SHORTNESS OF BREATH: 1
DYSPNEA AT REST: 1
HEMOPTYSIS: 0
RESPIRATORY SYMPTOMS COMMENTS: YES

## 2022-12-15 ENCOUNTER — OFFICE VISIT (OUTPATIENT)
Dept: SLEEP MEDICINE | Facility: MEDICAL CENTER | Age: 52
End: 2022-12-15
Payer: COMMERCIAL

## 2022-12-15 VITALS
DIASTOLIC BLOOD PRESSURE: 90 MMHG | BODY MASS INDEX: 33.17 KG/M2 | HEART RATE: 89 BPM | RESPIRATION RATE: 18 BRPM | OXYGEN SATURATION: 96 % | HEIGHT: 69 IN | SYSTOLIC BLOOD PRESSURE: 126 MMHG

## 2022-12-15 DIAGNOSIS — K21.9 GASTROESOPHAGEAL REFLUX DISEASE, UNSPECIFIED WHETHER ESOPHAGITIS PRESENT: ICD-10-CM

## 2022-12-15 DIAGNOSIS — Z72.0 TOBACCO USE: ICD-10-CM

## 2022-12-15 DIAGNOSIS — R91.1 LUNG NODULE: ICD-10-CM

## 2022-12-15 DIAGNOSIS — R05.3 CHRONIC COUGH: ICD-10-CM

## 2022-12-15 DIAGNOSIS — R06.02 SOB (SHORTNESS OF BREATH): ICD-10-CM

## 2022-12-15 DIAGNOSIS — G47.33 OSA (OBSTRUCTIVE SLEEP APNEA): ICD-10-CM

## 2022-12-15 PROCEDURE — 99204 OFFICE O/P NEW MOD 45 MIN: CPT | Performed by: INTERNAL MEDICINE

## 2022-12-15 RX ORDER — FLUTICASONE FUROATE, UMECLIDINIUM BROMIDE AND VILANTEROL TRIFENATATE 200; 62.5; 25 UG/1; UG/1; UG/1
POWDER RESPIRATORY (INHALATION)
COMMUNITY
Start: 2022-12-12 | End: 2023-12-05

## 2022-12-15 RX ORDER — PANTOPRAZOLE SODIUM 40 MG/1
40 TABLET, DELAYED RELEASE ORAL DAILY
Qty: 90 TABLET | Refills: 3 | Status: SHIPPED | OUTPATIENT
Start: 2022-12-15 | End: 2023-05-23 | Stop reason: SDUPTHER

## 2022-12-15 ASSESSMENT — ENCOUNTER SYMPTOMS
HEADACHES: 0
SPUTUM PRODUCTION: 0
ORTHOPNEA: 0
BLURRED VISION: 0
ABDOMINAL PAIN: 0
SHORTNESS OF BREATH: 1
TREMORS: 0
CLAUDICATION: 0
NAUSEA: 0
SORE THROAT: 0
SPEECH CHANGE: 0
WEIGHT LOSS: 0
MYALGIAS: 0
CHILLS: 0
WHEEZING: 1
EYE DISCHARGE: 0
DEPRESSION: 0
FOCAL WEAKNESS: 0
BACK PAIN: 0
FEVER: 0
FALLS: 0
WEAKNESS: 0
COUGH: 0
DIAPHORESIS: 0
PHOTOPHOBIA: 0
EYE PAIN: 0
STRIDOR: 0
SINUS PAIN: 0
EYE REDNESS: 0
DIARRHEA: 0
PND: 0
PALPITATIONS: 0
DOUBLE VISION: 0
VOMITING: 0
CONSTIPATION: 0
DIZZINESS: 0
NECK PAIN: 0
HEARTBURN: 0
HEMOPTYSIS: 0

## 2022-12-15 NOTE — PROGRESS NOTES
Chief Complaint   Patient presents with    Establish Care     REFERRAL 10/26/22 Will White MD DX solitary pulmonary nodule     Results     CT Chest Lung cancer screening 10/9/22     Apnea     Last seen 10/29/18 Dr. Michaud          HPI: This patient is a 52 y.o. male presenting for evaluation of lung nodule found on lung Ca screening CT in October.  He also suffers from KACY seen by us in 2018 but not currently followed by sleep provider. Pt is an active tobacco user but recently started chantix as he has had success with this in the past. He has 35+ pk year hx. 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 No known occupational or environmental exposures. He has horses, donkeys, dogs and Cats at home, no birds. He denies allergies. He does have fairly severe GERD on OTC PPI with frequent breakthrough sxs. He drinks 1-2 etoh beverages/night. No family hx of atopic or autoimmune disease. Lung cancer screening CT on 10/9/22 showed 5 mm RLL sold nodule in the sulcus which appears to have possibly been present but smaller on CT abdomen form 9/2021 but not present on CT abdomen 12/2019. He was recently started on trelegy 200 and prn albuterol for cough and SOB present over the past several mos. This is new for the pt and occurs primarily when there is an abrupt change in temperature. No wheezing. No PFTs. NO cardiac w/u. Pt denies exertional CP. No allergy sxs as per above. Sxs are relatively unchanged on trelegy. No significant emphysema on CT.     Past Medical History:   Diagnosis Date    Apnea, sleep     Back pain     Chickenpox     Cough     Daytime sleepiness     Dizziness     Frequent headaches     Gasping for breath     Heart burn     Heartburn     Morning headache     Pain     right knee    Renal disorder 2013    Kidney stone    Shortness of breath     Sleep apnea     no CPAP had ENT surgery    Snoring     Sore muscles     Swelling of lower extremity     Wears glasses     Weight loss        Social History      Socioeconomic History    Marital status:      Spouse name: Not on file    Number of children: Not on file    Years of education: Not on file    Highest education level: Not on file   Occupational History    Not on file   Tobacco Use    Smoking status: Every Day     Packs/day: 1.50     Years: 36.00     Pack years: 54.00     Types: Cigarettes     Passive exposure: Past    Smokeless tobacco: Never    Tobacco comments:     1/2 for 10yrs   Vaping Use    Vaping Use: Never used   Substance and Sexual Activity    Alcohol use: Yes     Alcohol/week: 1.8 oz     Types: 2 Cans of beer, 1 Shots of liquor per week     Comment: 2-3 daily    Drug use: No    Sexual activity: Yes     Partners: Female     Birth control/protection: Condom   Other Topics Concern    Not on file   Social History Narrative    Not on file     Social Determinants of Health     Financial Resource Strain: Not on file   Food Insecurity: Not on file   Transportation Needs: Not on file   Physical Activity: Not on file   Stress: Not on file   Social Connections: Not on file   Intimate Partner Violence: Not on file   Housing Stability: Not on file       History reviewed. No pertinent family history.    Current Outpatient Medications on File Prior to Visit   Medication Sig Dispense Refill    TRELEGY ELLIPTA 200-62.5-25 MCG/ACT AEROSOL POWDER, BREATH ACTIVATED       testosterone cypionate (DEPO-TESTOSTERONE) 200 MG/ML Solution injection 3x a month      montelukast (SINGULAIR) 10 MG Tab       albuterol 108 (90 Base) MCG/ACT Aero Soln inhalation aerosol Inhale 2 Puffs every 6 hours as needed for Shortness of Breath (cough). 8.5 g 1    valACYclovir (VALTREX) 500 MG Tab       zolpidem (AMBIEN) 10 MG Tab Take 10 mg by mouth.      varenicline (CHANTIX CONTINUING MONTH DENISE) 1 MG tablet Chantix 1 mg tablet   TAKE 1 TABLET BY MOUTH TWICE DAILY      varenicline (CHANTIX DENISE) 0.5 MG X 11 & 1 MG X 42 tablet       temazepam (RESTORIL) 30 MG capsule Take 30 mg by mouth  "at bedtime. (Patient not taking: Reported on 12/15/2022)       No current facility-administered medications on file prior to visit.       Iodine and Percocet [oxycodone-acetaminophen]      ROS:   Review of Systems   Constitutional:  Negative for chills, diaphoresis, fever, malaise/fatigue and weight loss.   HENT:  Negative for congestion, ear discharge, ear pain, hearing loss, nosebleeds, sinus pain, sore throat and tinnitus.    Eyes:  Negative for blurred vision, double vision, photophobia, pain, discharge and redness.   Respiratory:  Positive for shortness of breath and wheezing. Negative for cough, hemoptysis, sputum production and stridor.    Cardiovascular:  Negative for chest pain, palpitations, orthopnea, claudication, leg swelling and PND.   Gastrointestinal:  Negative for abdominal pain, constipation, diarrhea, heartburn, nausea and vomiting.   Genitourinary:  Negative for dysuria and urgency.   Musculoskeletal:  Negative for back pain, falls, joint pain, myalgias and neck pain.   Skin:  Negative for itching and rash.   Neurological:  Negative for dizziness, tremors, speech change, focal weakness, weakness and headaches.   Endo/Heme/Allergies:  Negative for environmental allergies.   Psychiatric/Behavioral:  Negative for depression.      BP (!) 126/90   Pulse 89   Resp 18   Ht 1.753 m (5' 9\")   SpO2 96%   Physical Exam  Vitals reviewed.   Constitutional:       General: He is not in acute distress.     Appearance: Normal appearance. He is normal weight.   HENT:      Head: Normocephalic and atraumatic.      Right Ear: External ear normal.      Left Ear: External ear normal.      Nose: Nose normal. No congestion.      Mouth/Throat:      Mouth: Mucous membranes are moist.      Pharynx: Oropharynx is clear. No oropharyngeal exudate.   Eyes:      General: No scleral icterus.     Extraocular Movements: Extraocular movements intact.      Conjunctiva/sclera: Conjunctivae normal.      Pupils: Pupils are equal, " round, and reactive to light.   Cardiovascular:      Rate and Rhythm: Normal rate and regular rhythm.      Heart sounds: Normal heart sounds. No murmur heard.  Pulmonary:      Effort: Pulmonary effort is normal. No respiratory distress.      Breath sounds: Normal breath sounds. No wheezing or rales.   Abdominal:      General: There is no distension.      Palpations: Abdomen is soft.   Musculoskeletal:         General: Normal range of motion.      Cervical back: Normal range of motion and neck supple.      Right lower leg: No edema.      Left lower leg: No edema.   Skin:     General: Skin is warm and dry.      Findings: No rash.   Neurological:      Mental Status: He is alert and oriented to person, place, and time.      Cranial Nerves: No cranial nerve deficit.   Psychiatric:         Mood and Affect: Mood normal.       PFTs as reviewed by me personally: none    Imaging as reviewed by me personally:  as per hPI    Assessment:  1. Lung nodule  CT-CHEST (THORAX) W/O      2. KACY (obstructive sleep apnea)        3. SOB (shortness of breath)  PULMONARY FUNCTION TESTS -Test requested: Complete Pulmonary Function Test      4. Chronic cough  PULMONARY FUNCTION TESTS -Test requested: Complete Pulmonary Function Test      5. Gastroesophageal reflux disease, unspecified whether esophagitis present  PULMONARY FUNCTION TESTS -Test requested: Complete Pulmonary Function Test          Plan:  Sub-cm but pt is high risk and there may have been an increase in sz from 2021 to 2022 therefore I would like to repeat CT in 6 mos  Pt has apt to establish care with sleep in 1/12/23  This is fairly new and while sxs sound like RAD, I am also suspicious for GERD affecting upper airway. We will tx for this, obtain PFTs, counseled on tobacco cessation and continue trelegy and Loi for now  Suspect GERD related; we discussed lifestyle modifications including reducing ETOH at night, elevating HOB, trial of pantoprazole 40  See above  Return in  about 5 months (around 5/15/2023) for PFTs at any time, CT chest .

## 2023-01-10 ENCOUNTER — TELEPHONE (OUTPATIENT)
Dept: SLEEP MEDICINE | Facility: MEDICAL CENTER | Age: 53
End: 2023-01-10
Payer: COMMERCIAL

## 2023-01-10 NOTE — TELEPHONE ENCOUNTER
SLEEP - NEW PATIENT CHART PREP COMPLETED ON 01/10/2023    SCHEDULED FOLLOW UP 01/11/2023    Ref by Dr. White Dx: Obstructive sleep apnea     Referring OV Notes Reviewed : YES     Is Pt currently on PAP? NO .If yes, contact patient as a remind to bring device with them. UNKNOWN IF PATIENT IS STILL ON THERAPY, NOT MENTIONED IN REFERRAL NOTES , CPAP 9 per last ov with Dr. Michaud-  Not Wireless    Nocturnal Oxygen :NO . If yes, Liter Flow?  UNKNOWN , NOT MENTIONED IN REFERRAL NOTES    Any prior Sleep Studies on file? YES . If yes, when? Titration 07/16/2018 , HST 05/29/2018 , PMA SS on 01/11/2012 , 05/16/12, 08/21/11    Previously seen with [facility]? YES  If yes, when?  Consult 05/11/2018 with Dr. Boyer , Last Seen 10/29/2018 with Dr. Michaud    Previous PMA Patient? YES , PMA Records in Media

## 2023-01-13 ENCOUNTER — TELEPHONE (OUTPATIENT)
Dept: SLEEP MEDICINE | Facility: MEDICAL CENTER | Age: 53
End: 2023-01-13
Payer: COMMERCIAL

## 2023-01-13 NOTE — TELEPHONE ENCOUNTER
1-13-23  1st NO SHOW  Date of No Show: 1-12-23  Provider: Dr. Dalton  Reason For Visit: NP/ REF BY:KAREN DAN / DX:KACY / INS:HTH  Outcome of call:  no answer LVM.  Left call back for scheduling [phone].

## 2023-02-08 ENCOUNTER — HOSPITAL ENCOUNTER (OUTPATIENT)
Dept: LAB | Facility: MEDICAL CENTER | Age: 53
End: 2023-02-08
Attending: FAMILY MEDICINE
Payer: COMMERCIAL

## 2023-02-08 LAB
BASOPHILS # BLD AUTO: 0.7 % (ref 0–1.8)
BASOPHILS # BLD: 0.08 K/UL (ref 0–0.12)
CHOLEST SERPL-MCNC: 224 MG/DL (ref 100–199)
EOSINOPHIL # BLD AUTO: 0.07 K/UL (ref 0–0.51)
EOSINOPHIL NFR BLD: 0.6 % (ref 0–6.9)
ERYTHROCYTE [DISTWIDTH] IN BLOOD BY AUTOMATED COUNT: 43.6 FL (ref 35.9–50)
FASTING STATUS PATIENT QL REPORTED: NORMAL
HCT VFR BLD AUTO: 54.2 % (ref 42–52)
HDLC SERPL-MCNC: 34 MG/DL
HGB BLD-MCNC: 18.3 G/DL (ref 14–18)
IMM GRANULOCYTES # BLD AUTO: 0.13 K/UL (ref 0–0.11)
IMM GRANULOCYTES NFR BLD AUTO: 1.2 % (ref 0–0.9)
LDLC SERPL CALC-MCNC: 149 MG/DL
LYMPHOCYTES # BLD AUTO: 3.74 K/UL (ref 1–4.8)
LYMPHOCYTES NFR BLD: 33.5 % (ref 22–41)
MCH RBC QN AUTO: 29 PG (ref 27–33)
MCHC RBC AUTO-ENTMCNC: 33.8 G/DL (ref 33.7–35.3)
MCV RBC AUTO: 85.9 FL (ref 81.4–97.8)
MONOCYTES # BLD AUTO: 0.67 K/UL (ref 0–0.85)
MONOCYTES NFR BLD AUTO: 6 % (ref 0–13.4)
NEUTROPHILS # BLD AUTO: 6.47 K/UL (ref 1.82–7.42)
NEUTROPHILS NFR BLD: 58 % (ref 44–72)
NRBC # BLD AUTO: 0 K/UL
NRBC BLD-RTO: 0 /100 WBC
PLATELET # BLD AUTO: 288 K/UL (ref 164–446)
PMV BLD AUTO: 10 FL (ref 9–12.9)
RBC # BLD AUTO: 6.31 M/UL (ref 4.7–6.1)
TRIGL SERPL-MCNC: 205 MG/DL (ref 0–149)
WBC # BLD AUTO: 11.2 K/UL (ref 4.8–10.8)

## 2023-02-08 PROCEDURE — 84270 ASSAY OF SEX HORMONE GLOBUL: CPT

## 2023-02-08 PROCEDURE — 84403 ASSAY OF TOTAL TESTOSTERONE: CPT

## 2023-02-08 PROCEDURE — 80061 LIPID PANEL: CPT

## 2023-02-08 PROCEDURE — 36415 COLL VENOUS BLD VENIPUNCTURE: CPT

## 2023-02-08 PROCEDURE — 85025 COMPLETE CBC W/AUTO DIFF WBC: CPT

## 2023-02-08 PROCEDURE — 84402 ASSAY OF FREE TESTOSTERONE: CPT

## 2023-02-10 LAB
SHBG SERPL-SCNC: 13 NMOL/L (ref 19–76)
TESTOST FREE MFR SERPL: 2.8 % (ref 1.6–2.9)
TESTOST FREE SERPL-MCNC: 209 PG/ML (ref 47–244)
TESTOST SERPL-MCNC: 739 NG/DL (ref 300–890)

## 2023-02-15 ENCOUNTER — NON-PROVIDER VISIT (OUTPATIENT)
Dept: SLEEP MEDICINE | Facility: MEDICAL CENTER | Age: 53
End: 2023-02-15
Attending: INTERNAL MEDICINE
Payer: COMMERCIAL

## 2023-02-15 VITALS — BODY MASS INDEX: 31.92 KG/M2 | HEIGHT: 70 IN | WEIGHT: 223 LBS

## 2023-02-15 DIAGNOSIS — R05.3 CHRONIC COUGH: ICD-10-CM

## 2023-02-15 DIAGNOSIS — K21.9 GASTROESOPHAGEAL REFLUX DISEASE, UNSPECIFIED WHETHER ESOPHAGITIS PRESENT: ICD-10-CM

## 2023-02-15 DIAGNOSIS — R06.02 SOB (SHORTNESS OF BREATH): ICD-10-CM

## 2023-02-15 PROCEDURE — 94727 GAS DIL/WSHOT DETER LNG VOL: CPT | Mod: 59 | Performed by: INTERNAL MEDICINE

## 2023-02-15 PROCEDURE — 94060 EVALUATION OF WHEEZING: CPT | Performed by: INTERNAL MEDICINE

## 2023-02-15 PROCEDURE — 94726 PLETHYSMOGRAPHY LUNG VOLUMES: CPT | Mod: 59 | Performed by: INTERNAL MEDICINE

## 2023-02-15 ASSESSMENT — PULMONARY FUNCTION TESTS
FEV1_PERCENT_CHANGE: 1
FVC_LLN: 4.07
FEV1/FVC_PERCENT_PREDICTED: 107
FVC_PREDICTED: 4.87
FEV1/FVC_PERCENT_PREDICTED: 108
FEV1/FVC_PERCENT_PREDICTED: 105
FEV1/FVC_PERCENT_LLN: 66
FEV1/FVC_PERCENT_CHANGE: 0
FEV1_LLN: 3.21
FEV1/FVC_PERCENT_CHANGE: 200
FVC_PERCENT_PREDICTED: 88
FVC: 4.31
FEV1/FVC: 84.63
FEV1_PERCENT_CHANGE: 2
FEV1/FVC: 84
FEV1/FVC_PERCENT_PREDICTED: 79
FEV1_PREDICTED: 3.84
FEV1: 3.69
FEV1/FVC_PREDICTED: 79
FEV1_PERCENT_PREDICTED: 94
FEV1_PERCENT_PREDICTED: 96
FEV1: 3.61
FVC: 4.36
FEV1/FVC: 84
FEV1/FVC_PERCENT_PREDICTED: 106
FVC_PERCENT_PREDICTED: 89
FEV1/FVC: 84

## 2023-02-15 ASSESSMENT — FIBROSIS 4 INDEX: FIB4 SCORE: 0.55

## 2023-02-15 NOTE — PROCEDURES
Technician: MANJU Aguirre    Technician Comment:  Good patient effort & cooperation.  The results of this test meet the ATS/ERS standards for acceptability & reproducibility.  Test was performed on the [employer] Body Plethysmograph-Elite DX system.  Predicted values were GLI-2012 for spirometry, GLI-2017 for DLCO, ITS for Lung Volumes.  The DLCO was uncorrected for Hgb.  A bronchodilator of Ventolin HFA -2puffs via spacer administered.  FRC N2 washout performed to obtain lung volumes due to claustrophobia.    Interpretation:   Baseline spirometry shows normal airflows.  No significant bronchodilator response.  Lung volumes are within normal limits.  Diffusion capacity is elevated at 129% predicted.  Overall normal pulmonary function testing with normal flow volume loop.

## 2023-02-23 ENCOUNTER — TELEPHONE (OUTPATIENT)
Dept: SLEEP MEDICINE | Facility: MEDICAL CENTER | Age: 53
End: 2023-02-23
Payer: COMMERCIAL

## 2023-02-24 NOTE — TELEPHONE ENCOUNTER
VOICEMAIL  1. Caller Name: patient                       Call Back Number: [phone] (home) [phone] (work)    2. Message: patient is requesting PFT results. Patient states he was to have results by Friday.     3. Patient approves office to leave a detailed voicemail/Kandace message: N\A      Last seen 12/15/22 Dr. Brito    PFt 2/15/23    Next OV 5/23/23 Dr. Daryl Khoury message has been sent.

## 2023-05-10 ENCOUNTER — HOSPITAL ENCOUNTER (OUTPATIENT)
Dept: RADIOLOGY | Facility: MEDICAL CENTER | Age: 53
End: 2023-05-10
Attending: INTERNAL MEDICINE
Payer: COMMERCIAL

## 2023-05-10 DIAGNOSIS — R91.1 LUNG NODULE: ICD-10-CM

## 2023-05-10 PROCEDURE — 71250 CT THORAX DX C-: CPT

## 2023-05-23 ENCOUNTER — OFFICE VISIT (OUTPATIENT)
Dept: SLEEP MEDICINE | Facility: MEDICAL CENTER | Age: 53
End: 2023-05-23
Attending: INTERNAL MEDICINE
Payer: COMMERCIAL

## 2023-05-23 VITALS
HEIGHT: 70 IN | HEART RATE: 74 BPM | OXYGEN SATURATION: 95 % | WEIGHT: 222 LBS | BODY MASS INDEX: 31.78 KG/M2 | RESPIRATION RATE: 16 BRPM

## 2023-05-23 DIAGNOSIS — K21.9 GASTROESOPHAGEAL REFLUX DISEASE, UNSPECIFIED WHETHER ESOPHAGITIS PRESENT: ICD-10-CM

## 2023-05-23 DIAGNOSIS — G47.33 OSA (OBSTRUCTIVE SLEEP APNEA): ICD-10-CM

## 2023-05-23 DIAGNOSIS — Z87.891 FORMER SMOKER: ICD-10-CM

## 2023-05-23 DIAGNOSIS — R91.1 LUNG NODULE: ICD-10-CM

## 2023-05-23 PROCEDURE — 99214 OFFICE O/P EST MOD 30 MIN: CPT | Performed by: INTERNAL MEDICINE

## 2023-05-23 PROCEDURE — 99212 OFFICE O/P EST SF 10 MIN: CPT | Performed by: INTERNAL MEDICINE

## 2023-05-23 RX ORDER — PANTOPRAZOLE SODIUM 40 MG/1
40 TABLET, DELAYED RELEASE ORAL DAILY
Qty: 90 TABLET | Refills: 3 | Status: ON HOLD | OUTPATIENT
Start: 2023-05-23 | End: 2023-08-29

## 2023-05-23 ASSESSMENT — ENCOUNTER SYMPTOMS
PALPITATIONS: 0
PND: 0
PHOTOPHOBIA: 0
WEAKNESS: 0
MYALGIAS: 0
EYE REDNESS: 0
CHILLS: 0
EYE PAIN: 0
VOMITING: 0
HEMOPTYSIS: 0
HEADACHES: 0
DOUBLE VISION: 0
WEIGHT LOSS: 0
TREMORS: 0
NAUSEA: 0
ABDOMINAL PAIN: 0
COUGH: 0
DEPRESSION: 0
CLAUDICATION: 0
SHORTNESS OF BREATH: 0
SORE THROAT: 0
SPEECH CHANGE: 0
DIAPHORESIS: 0
STRIDOR: 0
FOCAL WEAKNESS: 0
DIZZINESS: 0
SINUS PAIN: 0
ORTHOPNEA: 0
BLURRED VISION: 0
DIARRHEA: 0
FALLS: 0
CONSTIPATION: 0
WHEEZING: 0
SPUTUM PRODUCTION: 0
HEARTBURN: 0
FEVER: 0
EYE DISCHARGE: 0
NECK PAIN: 0
BACK PAIN: 0

## 2023-05-23 ASSESSMENT — FIBROSIS 4 INDEX: FIB4 SCORE: 0.56

## 2023-05-23 NOTE — PROGRESS NOTES
Chief Complaint   Patient presents with    Follow-Up     Last seen 12/15/22     Results     Ct Chest Thorax 5/10/23, PFt 2/15/23          HPI: This patient is a 53 y.o. male whom is followed in our clinic for pulmonary nodule and chronic cough last seen by me on 12/15/22.  Pt was smoking at our last visit with 35+ pk year hx but is now tobacco free.  He also drank etoh on a nightly basis and has since quit this. At our last visit he reported fairly severe GERD for which we started Pantoprazole 40 mg and he was advised to cut back on ETOH and tobacco. His cough has since resolved. Lung cancer screening CT on 10/9/22 showed 5 mm RLL sold nodule in the sulcus which appeared to have possibly been present but smaller on CT abdomen form 2021 but not present on CT abdomen 2019. Repeat CT from 5/10/23 showed stable nodules, no new nodules.   He was also reporting some MCCABE for which he had been trialed on trelegy 200. He reports improvement in MCCABE with tx for GERD. No SOB. PFT from February was essentially normal. No new concerns today.     Past Medical History:   Diagnosis Date    Apnea, sleep     Back pain     Chickenpox     Cough     Daytime sleepiness     Dizziness     Frequent headaches     Gasping for breath     Heart burn     Heartburn     Morning headache     Pain     right knee    Renal disorder     Kidney stone    Shortness of breath     Sleep apnea     no CPAP had ENT surgery    Snoring     Sore muscles     Swelling of lower extremity     Wears glasses     Weight loss        Social History     Socioeconomic History    Marital status:      Spouse name: Not on file    Number of children: Not on file    Years of education: Not on file    Highest education level: Not on file   Occupational History    Not on file   Tobacco Use    Smoking status: Former     Packs/day: 1.50     Years: 36.00     Pack years: 54.00     Types: Cigarettes     Quit date: 2022     Years since quittin.4     Passive  exposure: Past    Smokeless tobacco: Never    Tobacco comments:     1/2 for 10yrs   Vaping Use    Vaping Use: Never used   Substance and Sexual Activity    Alcohol use: Not Currently    Drug use: No    Sexual activity: Yes     Partners: Female     Birth control/protection: Condom   Other Topics Concern    Not on file   Social History Narrative    Not on file     Social Determinants of Health     Financial Resource Strain: Not on file   Food Insecurity: Not on file   Transportation Needs: Not on file   Physical Activity: Not on file   Stress: Not on file   Social Connections: Not on file   Intimate Partner Violence: Not on file   Housing Stability: Not on file       No family history on file.    Current Outpatient Medications on File Prior to Visit   Medication Sig Dispense Refill    TRELEGY ELLIPTA 200-62.5-25 MCG/ACT AEROSOL POWDER, BREATH ACTIVATED       varenicline (CHANTIX CONTINUING MONTH DENISE) 1 MG tablet Chantix 1 mg tablet   TAKE 1 TABLET BY MOUTH TWICE DAILY      testosterone cypionate (DEPO-TESTOSTERONE) 200 MG/ML Solution injection 3x a month      varenicline (CHANTIX DENISE) 0.5 MG X 11 & 1 MG X 42 tablet       albuterol 108 (90 Base) MCG/ACT Aero Soln inhalation aerosol Inhale 2 Puffs every 6 hours as needed for Shortness of Breath (cough). 8.5 g 1    valACYclovir (VALTREX) 500 MG Tab       zolpidem (AMBIEN) 10 MG Tab Take 10 mg by mouth.       No current facility-administered medications on file prior to visit.       Iodine and Percocet [oxycodone-acetaminophen]      ROS:   Review of Systems   Constitutional:  Negative for chills, diaphoresis, fever, malaise/fatigue and weight loss.   HENT:  Negative for congestion, ear discharge, ear pain, hearing loss, nosebleeds, sinus pain, sore throat and tinnitus.    Eyes:  Negative for blurred vision, double vision, photophobia, pain, discharge and redness.   Respiratory:  Negative for cough, hemoptysis, sputum production, shortness of breath, wheezing and stridor.   "  Cardiovascular:  Negative for chest pain, palpitations, orthopnea, claudication, leg swelling and PND.   Gastrointestinal:  Negative for abdominal pain, constipation, diarrhea, heartburn, nausea and vomiting.   Genitourinary:  Negative for dysuria and urgency.   Musculoskeletal:  Negative for back pain, falls, joint pain, myalgias and neck pain.   Skin:  Negative for itching and rash.   Neurological:  Negative for dizziness, tremors, speech change, focal weakness, weakness and headaches.   Endo/Heme/Allergies:  Negative for environmental allergies.   Psychiatric/Behavioral:  Negative for depression.        Pulse 74   Resp 16   Ht 1.765 m (5' 9.5\")   Wt 101 kg (222 lb)   SpO2 95%   Physical Exam  Vitals reviewed.   Constitutional:       General: He is not in acute distress.     Appearance: Normal appearance. He is normal weight.   HENT:      Head: Normocephalic and atraumatic.      Right Ear: External ear normal.      Left Ear: External ear normal.      Nose: Nose normal. No congestion.      Mouth/Throat:      Mouth: Mucous membranes are moist.      Pharynx: Oropharynx is clear. No oropharyngeal exudate.   Eyes:      General: No scleral icterus.     Extraocular Movements: Extraocular movements intact.      Conjunctiva/sclera: Conjunctivae normal.      Pupils: Pupils are equal, round, and reactive to light.   Cardiovascular:      Rate and Rhythm: Normal rate and regular rhythm.      Heart sounds: Normal heart sounds. No murmur heard.     No gallop.   Pulmonary:      Effort: No respiratory distress.      Breath sounds: Normal breath sounds. No wheezing or rales.   Abdominal:      General: There is no distension.      Palpations: Abdomen is soft.   Musculoskeletal:         General: Normal range of motion.      Cervical back: Normal range of motion and neck supple.      Right lower leg: No edema.      Left lower leg: No edema.   Skin:     General: Skin is warm and dry.      Findings: No rash.   Neurological:      " Mental Status: He is alert and oriented to person, place, and time.      Cranial Nerves: No cranial nerve deficit.   Psychiatric:         Mood and Affect: Mood normal.         Behavior: Behavior normal.         PFTs as reviewed by me personally: as per hPI    Imaging as reviewed by me personally:  as per hpI    Assessment:  1. Lung nodule  CT-CHEST (THORAX) W/O      2. Gastroesophageal reflux disease, unspecified whether esophagitis present  pantoprazole (PROTONIX) 40 MG Tablet Delayed Response      3. KACY (obstructive sleep apnea)        4. Former smoker            Plan:  Stable. Resumed annual surveillance imaging  Chronic but markedly improved. Continue PPI; encouraged him to try every other day or prn the longer he abstains from ETOH and cigarettes  Pt reports compliance with CPAP but did not establish with sleep; he is hoping weight loss will allow him to come off therapy; encouraged to establish with a sleep provider  Tobacco free; encouraged ongoing abstinence.   Return in about 1 year (around 5/23/2024) for ct chest .

## 2023-08-10 ENCOUNTER — APPOINTMENT (OUTPATIENT)
Dept: RADIOLOGY | Facility: MEDICAL CENTER | Age: 53
DRG: 856 | End: 2023-08-10
Payer: COMMERCIAL

## 2023-08-10 ENCOUNTER — APPOINTMENT (OUTPATIENT)
Dept: RADIOLOGY | Facility: MEDICAL CENTER | Age: 53
DRG: 856 | End: 2023-08-10
Attending: EMERGENCY MEDICINE
Payer: COMMERCIAL

## 2023-08-10 ENCOUNTER — HOSPITAL ENCOUNTER (INPATIENT)
Facility: MEDICAL CENTER | Age: 53
LOS: 19 days | DRG: 856 | End: 2023-08-29
Attending: EMERGENCY MEDICINE | Admitting: STUDENT IN AN ORGANIZED HEALTH CARE EDUCATION/TRAINING PROGRAM
Payer: COMMERCIAL

## 2023-08-10 DIAGNOSIS — L03.317 CELLULITIS OF BUTTOCK: ICD-10-CM

## 2023-08-10 DIAGNOSIS — M62.82 NON-TRAUMATIC RHABDOMYOLYSIS: ICD-10-CM

## 2023-08-10 DIAGNOSIS — M72.6 NECROTIZING FASCIITIS (HCC): ICD-10-CM

## 2023-08-10 DIAGNOSIS — M79.A22 NONTRAUMATIC COMPARTMENT SYNDROME OF LEFT LOWER EXTREMITY: ICD-10-CM

## 2023-08-10 DIAGNOSIS — T81.31XA SURGICAL WOUND DEHISCENCE, INITIAL ENCOUNTER: ICD-10-CM

## 2023-08-10 PROBLEM — A41.9 SEPSIS DUE TO CELLULITIS (HCC): Status: ACTIVE | Noted: 2023-08-10

## 2023-08-10 PROBLEM — L03.90 SEPSIS DUE TO CELLULITIS (HCC): Status: ACTIVE | Noted: 2023-08-10

## 2023-08-10 LAB
ALBUMIN SERPL BCP-MCNC: 4 G/DL (ref 3.2–4.9)
ALBUMIN/GLOB SERPL: 1.3 G/DL
ALP SERPL-CCNC: 60 U/L (ref 30–99)
ALT SERPL-CCNC: 35 U/L (ref 2–50)
ANION GAP SERPL CALC-SCNC: 16 MMOL/L (ref 7–16)
AST SERPL-CCNC: 33 U/L (ref 12–45)
BASOPHILS # BLD AUTO: 0.9 % (ref 0–1.8)
BASOPHILS # BLD: 0.3 K/UL (ref 0–0.12)
BILIRUB SERPL-MCNC: 2 MG/DL (ref 0.1–1.5)
BUN SERPL-MCNC: 13 MG/DL (ref 8–22)
CALCIUM ALBUM COR SERPL-MCNC: 9.1 MG/DL (ref 8.5–10.5)
CALCIUM SERPL-MCNC: 9.1 MG/DL (ref 8.5–10.5)
CHLORIDE SERPL-SCNC: 96 MMOL/L (ref 96–112)
CO2 SERPL-SCNC: 22 MMOL/L (ref 20–33)
CREAT SERPL-MCNC: 1.04 MG/DL (ref 0.5–1.4)
EOSINOPHIL # BLD AUTO: 0 K/UL (ref 0–0.51)
EOSINOPHIL NFR BLD: 0 % (ref 0–6.9)
ERYTHROCYTE [DISTWIDTH] IN BLOOD BY AUTOMATED COUNT: 46.4 FL (ref 35.9–50)
GFR SERPLBLD CREATININE-BSD FMLA CKD-EPI: 86 ML/MIN/1.73 M 2
GLOBULIN SER CALC-MCNC: 3.2 G/DL (ref 1.9–3.5)
GLUCOSE SERPL-MCNC: 105 MG/DL (ref 65–99)
HCT VFR BLD AUTO: 52.4 % (ref 42–52)
HGB BLD-MCNC: 17.5 G/DL (ref 14–18)
LACTATE SERPL-SCNC: 1.6 MMOL/L (ref 0.5–2)
LYMPHOCYTES # BLD AUTO: 2.59 K/UL (ref 1–4.8)
LYMPHOCYTES NFR BLD: 7.7 % (ref 22–41)
MAGNESIUM SERPL-MCNC: 1.7 MG/DL (ref 1.5–2.5)
MANUAL DIFF BLD: NORMAL
MCH RBC QN AUTO: 29.5 PG (ref 27–33)
MCHC RBC AUTO-ENTMCNC: 33.4 G/DL (ref 32.3–36.5)
MCV RBC AUTO: 88.2 FL (ref 81.4–97.8)
MICROCYTES BLD QL SMEAR: ABNORMAL
MONOCYTES # BLD AUTO: 1.72 K/UL (ref 0–0.85)
MONOCYTES NFR BLD AUTO: 5.1 % (ref 0–13.4)
MORPHOLOGY BLD-IMP: NORMAL
NEUTROPHILS # BLD AUTO: 29.08 K/UL (ref 1.82–7.42)
NEUTROPHILS NFR BLD: 86.3 % (ref 44–72)
NRBC # BLD AUTO: 0 K/UL
NRBC BLD-RTO: 0 /100 WBC (ref 0–0.2)
PLATELET # BLD AUTO: 238 K/UL (ref 164–446)
PLATELET BLD QL SMEAR: NORMAL
PMV BLD AUTO: 10 FL (ref 9–12.9)
POLYCHROMASIA BLD QL SMEAR: NORMAL
POTASSIUM SERPL-SCNC: 4.3 MMOL/L (ref 3.6–5.5)
PROT SERPL-MCNC: 7.2 G/DL (ref 6–8.2)
RBC # BLD AUTO: 5.94 M/UL (ref 4.7–6.1)
RBC BLD AUTO: PRESENT
SODIUM SERPL-SCNC: 134 MMOL/L (ref 135–145)
WBC # BLD AUTO: 33.7 K/UL (ref 4.8–10.8)

## 2023-08-10 PROCEDURE — 99285 EMERGENCY DEPT VISIT HI MDM: CPT

## 2023-08-10 PROCEDURE — 99223 1ST HOSP IP/OBS HIGH 75: CPT | Mod: GC,25 | Performed by: STUDENT IN AN ORGANIZED HEALTH CARE EDUCATION/TRAINING PROGRAM

## 2023-08-10 PROCEDURE — 770020 HCHG ROOM/CARE - TELE (206)

## 2023-08-10 PROCEDURE — 96374 THER/PROPH/DIAG INJ IV PUSH: CPT

## 2023-08-10 PROCEDURE — 36415 COLL VENOUS BLD VENIPUNCTURE: CPT

## 2023-08-10 PROCEDURE — 96375 TX/PRO/DX INJ NEW DRUG ADDON: CPT

## 2023-08-10 PROCEDURE — 85007 BL SMEAR W/DIFF WBC COUNT: CPT

## 2023-08-10 PROCEDURE — 72193 CT PELVIS W/DYE: CPT

## 2023-08-10 PROCEDURE — 82977 ASSAY OF GGT: CPT

## 2023-08-10 PROCEDURE — 99406 BEHAV CHNG SMOKING 3-10 MIN: CPT | Mod: GC | Performed by: STUDENT IN AN ORGANIZED HEALTH CARE EDUCATION/TRAINING PROGRAM

## 2023-08-10 PROCEDURE — 96376 TX/PRO/DX INJ SAME DRUG ADON: CPT

## 2023-08-10 PROCEDURE — 71045 X-RAY EXAM CHEST 1 VIEW: CPT

## 2023-08-10 PROCEDURE — 83605 ASSAY OF LACTIC ACID: CPT

## 2023-08-10 PROCEDURE — 80053 COMPREHEN METABOLIC PANEL: CPT

## 2023-08-10 PROCEDURE — 87040 BLOOD CULTURE FOR BACTERIA: CPT | Mod: 91

## 2023-08-10 PROCEDURE — 83735 ASSAY OF MAGNESIUM: CPT

## 2023-08-10 PROCEDURE — 85025 COMPLETE CBC W/AUTO DIFF WBC: CPT

## 2023-08-10 PROCEDURE — 700117 HCHG RX CONTRAST REV CODE 255: Performed by: EMERGENCY MEDICINE

## 2023-08-10 PROCEDURE — 700111 HCHG RX REV CODE 636 W/ 250 OVERRIDE (IP): Mod: JZ | Performed by: EMERGENCY MEDICINE

## 2023-08-10 RX ORDER — KETOROLAC TROMETHAMINE 30 MG/ML
30 INJECTION, SOLUTION INTRAMUSCULAR; INTRAVENOUS ONCE
Status: COMPLETED | OUTPATIENT
Start: 2023-08-10 | End: 2023-08-10

## 2023-08-10 RX ORDER — OXYCODONE HYDROCHLORIDE 5 MG/1
2.5 TABLET ORAL
Status: DISCONTINUED | OUTPATIENT
Start: 2023-08-10 | End: 2023-08-12

## 2023-08-10 RX ORDER — NICOTINE 21 MG/24HR
14 PATCH, TRANSDERMAL 24 HOURS TRANSDERMAL
Status: DISCONTINUED | OUTPATIENT
Start: 2023-08-11 | End: 2023-08-29 | Stop reason: HOSPADM

## 2023-08-10 RX ORDER — ZOLPIDEM TARTRATE 5 MG/1
10 TABLET ORAL
Status: DISCONTINUED | OUTPATIENT
Start: 2023-08-10 | End: 2023-08-29 | Stop reason: HOSPADM

## 2023-08-10 RX ORDER — CEFAZOLIN 2 G/1
2 INJECTION, POWDER, FOR SOLUTION INTRAMUSCULAR; INTRAVENOUS ONCE
Status: COMPLETED | OUTPATIENT
Start: 2023-08-10 | End: 2023-08-10

## 2023-08-10 RX ORDER — IBUPROFEN 800 MG/1
400 TABLET ORAL 3 TIMES DAILY
Status: DISCONTINUED | OUTPATIENT
Start: 2023-08-11 | End: 2023-08-12

## 2023-08-10 RX ORDER — HYDROMORPHONE HYDROCHLORIDE 1 MG/ML
0.25 INJECTION, SOLUTION INTRAMUSCULAR; INTRAVENOUS; SUBCUTANEOUS
Status: DISCONTINUED | OUTPATIENT
Start: 2023-08-10 | End: 2023-08-12

## 2023-08-10 RX ORDER — OXYCODONE HYDROCHLORIDE 5 MG/1
5 TABLET ORAL
Status: DISCONTINUED | OUTPATIENT
Start: 2023-08-10 | End: 2023-08-12

## 2023-08-10 RX ORDER — FUROSEMIDE 10 MG/ML
20 INJECTION INTRAMUSCULAR; INTRAVENOUS ONCE
Status: COMPLETED | OUTPATIENT
Start: 2023-08-10 | End: 2023-08-11

## 2023-08-10 RX ORDER — ALBUTEROL SULFATE 90 UG/1
2 AEROSOL, METERED RESPIRATORY (INHALATION) EVERY 6 HOURS PRN
Status: DISCONTINUED | OUTPATIENT
Start: 2023-08-10 | End: 2023-08-29 | Stop reason: HOSPADM

## 2023-08-10 RX ORDER — DEXAMETHASONE SODIUM PHOSPHATE 4 MG/ML
8 INJECTION, SOLUTION INTRA-ARTICULAR; INTRALESIONAL; INTRAMUSCULAR; INTRAVENOUS; SOFT TISSUE ONCE
Status: COMPLETED | OUTPATIENT
Start: 2023-08-10 | End: 2023-08-10

## 2023-08-10 RX ORDER — DIPHENHYDRAMINE HYDROCHLORIDE 50 MG/ML
25 INJECTION INTRAMUSCULAR; INTRAVENOUS ONCE
Status: COMPLETED | OUTPATIENT
Start: 2023-08-10 | End: 2023-08-10

## 2023-08-10 RX ORDER — POLYETHYLENE GLYCOL 3350 17 G/17G
1 POWDER, FOR SOLUTION ORAL
Status: DISCONTINUED | OUTPATIENT
Start: 2023-08-10 | End: 2023-08-11

## 2023-08-10 RX ORDER — ACETAMINOPHEN 325 MG/1
650 TABLET ORAL EVERY 6 HOURS PRN
Status: DISCONTINUED | OUTPATIENT
Start: 2023-08-10 | End: 2023-08-10

## 2023-08-10 RX ORDER — BISACODYL 10 MG
10 SUPPOSITORY, RECTAL RECTAL
Status: DISCONTINUED | OUTPATIENT
Start: 2023-08-10 | End: 2023-08-11

## 2023-08-10 RX ORDER — HYDROMORPHONE HYDROCHLORIDE 1 MG/ML
1 INJECTION, SOLUTION INTRAMUSCULAR; INTRAVENOUS; SUBCUTANEOUS ONCE
Status: COMPLETED | OUTPATIENT
Start: 2023-08-10 | End: 2023-08-10

## 2023-08-10 RX ORDER — ACETAMINOPHEN 325 MG/1
650 TABLET ORAL EVERY 6 HOURS
Status: DISCONTINUED | OUTPATIENT
Start: 2023-08-11 | End: 2023-08-12

## 2023-08-10 RX ORDER — ONDANSETRON 2 MG/ML
4 INJECTION INTRAMUSCULAR; INTRAVENOUS ONCE
Status: COMPLETED | OUTPATIENT
Start: 2023-08-10 | End: 2023-08-10

## 2023-08-10 RX ORDER — ENOXAPARIN SODIUM 100 MG/ML
40 INJECTION SUBCUTANEOUS DAILY
Status: DISCONTINUED | OUTPATIENT
Start: 2023-08-11 | End: 2023-08-12

## 2023-08-10 RX ORDER — LABETALOL HYDROCHLORIDE 5 MG/ML
10 INJECTION, SOLUTION INTRAVENOUS EVERY 4 HOURS PRN
Status: DISCONTINUED | OUTPATIENT
Start: 2023-08-10 | End: 2023-08-29 | Stop reason: HOSPADM

## 2023-08-10 RX ORDER — OMEPRAZOLE 20 MG/1
20 CAPSULE, DELAYED RELEASE ORAL DAILY
Status: DISCONTINUED | OUTPATIENT
Start: 2023-08-11 | End: 2023-08-29 | Stop reason: HOSPADM

## 2023-08-10 RX ORDER — AMOXICILLIN 250 MG
2 CAPSULE ORAL 2 TIMES DAILY
Status: DISCONTINUED | OUTPATIENT
Start: 2023-08-11 | End: 2023-08-11

## 2023-08-10 RX ADMIN — KETOROLAC TROMETHAMINE 30 MG: 30 INJECTION, SOLUTION INTRAMUSCULAR; INTRAVENOUS at 19:15

## 2023-08-10 RX ADMIN — ONDANSETRON 4 MG: 2 INJECTION INTRAMUSCULAR; INTRAVENOUS at 19:14

## 2023-08-10 RX ADMIN — DIPHENHYDRAMINE HYDROCHLORIDE 25 MG: 50 INJECTION, SOLUTION INTRAMUSCULAR; INTRAVENOUS at 20:11

## 2023-08-10 RX ADMIN — CEFAZOLIN 2 G: 2 INJECTION, POWDER, FOR SOLUTION INTRAMUSCULAR; INTRAVENOUS at 22:00

## 2023-08-10 RX ADMIN — HYDROMORPHONE HYDROCHLORIDE 1 MG: 1 INJECTION, SOLUTION INTRAMUSCULAR; INTRAVENOUS; SUBCUTANEOUS at 20:08

## 2023-08-10 RX ADMIN — IOHEXOL 95 ML: 350 INJECTION, SOLUTION INTRAVENOUS at 21:15

## 2023-08-10 RX ADMIN — DEXAMETHASONE SODIUM PHOSPHATE 8 MG: 4 INJECTION INTRA-ARTICULAR; INTRALESIONAL; INTRAMUSCULAR; INTRAVENOUS; SOFT TISSUE at 20:12

## 2023-08-10 RX ADMIN — HYDROMORPHONE HYDROCHLORIDE 1 MG: 1 INJECTION, SOLUTION INTRAMUSCULAR; INTRAVENOUS; SUBCUTANEOUS at 19:14

## 2023-08-10 ASSESSMENT — FIBROSIS 4 INDEX: FIB4 SCORE: 0.56

## 2023-08-11 PROBLEM — E80.6 BILIRUBINEMIA: Status: ACTIVE | Noted: 2023-08-11

## 2023-08-11 PROBLEM — J96.01 ACUTE RESPIRATORY FAILURE WITH HYPOXEMIA (HCC): Status: ACTIVE | Noted: 2023-08-11

## 2023-08-11 PROBLEM — G47.00 INSOMNIA: Status: ACTIVE | Noted: 2023-08-11

## 2023-08-11 PROBLEM — L03.90: Status: ACTIVE | Noted: 2023-08-11

## 2023-08-11 PROBLEM — K21.9 GERD (GASTROESOPHAGEAL REFLUX DISEASE): Status: ACTIVE | Noted: 2023-08-11

## 2023-08-11 PROBLEM — F17.200 TOBACCO DEPENDENCE: Chronic | Status: ACTIVE | Noted: 2023-08-11

## 2023-08-11 PROBLEM — K59.09 OTHER CONSTIPATION: Status: ACTIVE | Noted: 2023-08-11

## 2023-08-11 LAB
ALBUMIN SERPL BCP-MCNC: 3.9 G/DL (ref 3.2–4.9)
ALBUMIN/GLOB SERPL: 1.2 G/DL
ALP SERPL-CCNC: 58 U/L (ref 30–99)
ALT SERPL-CCNC: 35 U/L (ref 2–50)
ANION GAP SERPL CALC-SCNC: 17 MMOL/L (ref 7–16)
AST SERPL-CCNC: 40 U/L (ref 12–45)
BASOPHILS # BLD AUTO: 0.3 % (ref 0–1.8)
BASOPHILS # BLD: 0.09 K/UL (ref 0–0.12)
BILIRUB CONJ SERPL-MCNC: 0.9 MG/DL (ref 0.1–0.5)
BILIRUB INDIRECT SERPL-MCNC: 1 MG/DL (ref 0–1)
BILIRUB SERPL-MCNC: 1.9 MG/DL (ref 0.1–1.5)
BUN SERPL-MCNC: 19 MG/DL (ref 8–22)
CALCIUM ALBUM COR SERPL-MCNC: 9.2 MG/DL (ref 8.5–10.5)
CALCIUM SERPL-MCNC: 9.1 MG/DL (ref 8.5–10.5)
CHLORIDE SERPL-SCNC: 93 MMOL/L (ref 96–112)
CO2 SERPL-SCNC: 21 MMOL/L (ref 20–33)
CREAT SERPL-MCNC: 1.23 MG/DL (ref 0.5–1.4)
EOSINOPHIL # BLD AUTO: 0 K/UL (ref 0–0.51)
EOSINOPHIL NFR BLD: 0 % (ref 0–6.9)
ERYTHROCYTE [DISTWIDTH] IN BLOOD BY AUTOMATED COUNT: 46.3 FL (ref 35.9–50)
GFR SERPLBLD CREATININE-BSD FMLA CKD-EPI: 71 ML/MIN/1.73 M 2
GGT SERPL-CCNC: 133 U/L (ref 7–51)
GLOBULIN SER CALC-MCNC: 3.3 G/DL (ref 1.9–3.5)
GLUCOSE SERPL-MCNC: 147 MG/DL (ref 65–99)
HCT VFR BLD AUTO: 50.2 % (ref 42–52)
HGB BLD-MCNC: 17.1 G/DL (ref 14–18)
IMM GRANULOCYTES # BLD AUTO: 0.94 K/UL (ref 0–0.11)
IMM GRANULOCYTES NFR BLD AUTO: 2.9 % (ref 0–0.9)
LYMPHOCYTES # BLD AUTO: 1.14 K/UL (ref 1–4.8)
LYMPHOCYTES NFR BLD: 3.5 % (ref 22–41)
MCH RBC QN AUTO: 29.7 PG (ref 27–33)
MCHC RBC AUTO-ENTMCNC: 34.1 G/DL (ref 32.3–36.5)
MCV RBC AUTO: 87.3 FL (ref 81.4–97.8)
MONOCYTES # BLD AUTO: 1.98 K/UL (ref 0–0.85)
MONOCYTES NFR BLD AUTO: 6.1 % (ref 0–13.4)
NEUTROPHILS # BLD AUTO: 28.19 K/UL (ref 1.82–7.42)
NEUTROPHILS NFR BLD: 87.2 % (ref 44–72)
NRBC # BLD AUTO: 0 K/UL
NRBC BLD-RTO: 0 /100 WBC (ref 0–0.2)
NT-PROBNP SERPL IA-MCNC: 278 PG/ML (ref 0–125)
PLATELET # BLD AUTO: 251 K/UL (ref 164–446)
PMV BLD AUTO: 10.2 FL (ref 9–12.9)
POTASSIUM SERPL-SCNC: 4.2 MMOL/L (ref 3.6–5.5)
PROT SERPL-MCNC: 7.2 G/DL (ref 6–8.2)
RBC # BLD AUTO: 5.75 M/UL (ref 4.7–6.1)
SCCMEC + MECA PNL NOSE NAA+PROBE: NEGATIVE
SCCMEC + MECA PNL NOSE NAA+PROBE: NEGATIVE
SODIUM SERPL-SCNC: 131 MMOL/L (ref 135–145)
WBC # BLD AUTO: 32.3 K/UL (ref 4.8–10.8)

## 2023-08-11 PROCEDURE — 770020 HCHG ROOM/CARE - TELE (206)

## 2023-08-11 PROCEDURE — 700105 HCHG RX REV CODE 258

## 2023-08-11 PROCEDURE — 83880 ASSAY OF NATRIURETIC PEPTIDE: CPT

## 2023-08-11 PROCEDURE — 85025 COMPLETE CBC W/AUTO DIFF WBC: CPT

## 2023-08-11 PROCEDURE — 99233 SBSQ HOSP IP/OBS HIGH 50: CPT | Mod: GC | Performed by: INTERNAL MEDICINE

## 2023-08-11 PROCEDURE — 700102 HCHG RX REV CODE 250 W/ 637 OVERRIDE(OP)

## 2023-08-11 PROCEDURE — 87641 MR-STAPH DNA AMP PROBE: CPT

## 2023-08-11 PROCEDURE — A9270 NON-COVERED ITEM OR SERVICE: HCPCS

## 2023-08-11 PROCEDURE — 700102 HCHG RX REV CODE 250 W/ 637 OVERRIDE(OP): Performed by: HOSPITALIST

## 2023-08-11 PROCEDURE — A9270 NON-COVERED ITEM OR SERVICE: HCPCS | Performed by: HOSPITALIST

## 2023-08-11 PROCEDURE — 80053 COMPREHEN METABOLIC PANEL: CPT

## 2023-08-11 PROCEDURE — 87640 STAPH A DNA AMP PROBE: CPT

## 2023-08-11 PROCEDURE — 82248 BILIRUBIN DIRECT: CPT

## 2023-08-11 PROCEDURE — 700111 HCHG RX REV CODE 636 W/ 250 OVERRIDE (IP): Mod: JZ

## 2023-08-11 PROCEDURE — 36415 COLL VENOUS BLD VENIPUNCTURE: CPT

## 2023-08-11 RX ORDER — BISACODYL 10 MG
10 SUPPOSITORY, RECTAL RECTAL
Status: DISCONTINUED | OUTPATIENT
Start: 2023-08-11 | End: 2023-08-12

## 2023-08-11 RX ORDER — POLYETHYLENE GLYCOL 3350 17 G/17G
1 POWDER, FOR SOLUTION ORAL DAILY
Status: DISCONTINUED | OUTPATIENT
Start: 2023-08-12 | End: 2023-08-12

## 2023-08-11 RX ORDER — POLYETHYLENE GLYCOL 3350 17 G/17G
1 POWDER, FOR SOLUTION ORAL ONCE
Status: COMPLETED | OUTPATIENT
Start: 2023-08-11 | End: 2023-08-11

## 2023-08-11 RX ORDER — AMOXICILLIN 250 MG
2 CAPSULE ORAL 2 TIMES DAILY
Status: DISCONTINUED | OUTPATIENT
Start: 2023-08-11 | End: 2023-08-12

## 2023-08-11 RX ADMIN — NICOTINE 14 MG: 14 PATCH TRANSDERMAL at 18:19

## 2023-08-11 RX ADMIN — IBUPROFEN 400 MG: 800 TABLET, FILM COATED ORAL at 17:02

## 2023-08-11 RX ADMIN — ACETAMINOPHEN 650 MG: 325 TABLET, FILM COATED ORAL at 05:51

## 2023-08-11 RX ADMIN — ACETAMINOPHEN 650 MG: 325 TABLET, FILM COATED ORAL at 23:12

## 2023-08-11 RX ADMIN — ENOXAPARIN SODIUM 40 MG: 100 INJECTION SUBCUTANEOUS at 17:17

## 2023-08-11 RX ADMIN — ACETAMINOPHEN 650 MG: 325 TABLET, FILM COATED ORAL at 00:52

## 2023-08-11 RX ADMIN — OXYCODONE HYDROCHLORIDE 5 MG: 5 TABLET ORAL at 13:13

## 2023-08-11 RX ADMIN — IBUPROFEN 400 MG: 800 TABLET, FILM COATED ORAL at 11:26

## 2023-08-11 RX ADMIN — OXYCODONE HYDROCHLORIDE 5 MG: 5 TABLET ORAL at 05:52

## 2023-08-11 RX ADMIN — CEFAZOLIN 2 G: 2 INJECTION, POWDER, FOR SOLUTION INTRAMUSCULAR; INTRAVENOUS at 13:18

## 2023-08-11 RX ADMIN — ZOLPIDEM TARTRATE 10 MG: 5 TABLET ORAL at 00:51

## 2023-08-11 RX ADMIN — SENNOSIDES AND DOCUSATE SODIUM 2 TABLET: 50; 8.6 TABLET ORAL at 05:51

## 2023-08-11 RX ADMIN — HYDROMORPHONE HYDROCHLORIDE 0.25 MG: 1 INJECTION, SOLUTION INTRAMUSCULAR; INTRAVENOUS; SUBCUTANEOUS at 02:41

## 2023-08-11 RX ADMIN — POLYETHYLENE GLYCOL 3350 1 PACKET: 17 POWDER, FOR SOLUTION ORAL at 12:05

## 2023-08-11 RX ADMIN — HYDROMORPHONE HYDROCHLORIDE 0.25 MG: 1 INJECTION, SOLUTION INTRAMUSCULAR; INTRAVENOUS; SUBCUTANEOUS at 20:54

## 2023-08-11 RX ADMIN — ACETAMINOPHEN 650 MG: 325 TABLET, FILM COATED ORAL at 11:26

## 2023-08-11 RX ADMIN — OXYCODONE HYDROCHLORIDE 5 MG: 5 TABLET ORAL at 23:11

## 2023-08-11 RX ADMIN — IBUPROFEN 400 MG: 800 TABLET, FILM COATED ORAL at 05:51

## 2023-08-11 RX ADMIN — HYDROMORPHONE HYDROCHLORIDE 0.25 MG: 1 INJECTION, SOLUTION INTRAMUSCULAR; INTRAVENOUS; SUBCUTANEOUS at 11:26

## 2023-08-11 RX ADMIN — ZOLPIDEM TARTRATE 10 MG: 5 TABLET ORAL at 21:43

## 2023-08-11 RX ADMIN — CEFAZOLIN 2 G: 2 INJECTION, POWDER, FOR SOLUTION INTRAMUSCULAR; INTRAVENOUS at 21:45

## 2023-08-11 RX ADMIN — ACETAMINOPHEN 650 MG: 325 TABLET, FILM COATED ORAL at 17:02

## 2023-08-11 RX ADMIN — OXYCODONE HYDROCHLORIDE 5 MG: 5 TABLET ORAL at 00:51

## 2023-08-11 RX ADMIN — CEFAZOLIN 2 G: 2 INJECTION, POWDER, FOR SOLUTION INTRAMUSCULAR; INTRAVENOUS at 05:56

## 2023-08-11 RX ADMIN — HYDROMORPHONE HYDROCHLORIDE 0.25 MG: 1 INJECTION, SOLUTION INTRAMUSCULAR; INTRAVENOUS; SUBCUTANEOUS at 18:26

## 2023-08-11 RX ADMIN — OXYCODONE HYDROCHLORIDE 5 MG: 5 TABLET ORAL at 19:58

## 2023-08-11 RX ADMIN — OMEPRAZOLE 20 MG: 20 CAPSULE, DELAYED RELEASE ORAL at 05:50

## 2023-08-11 RX ADMIN — SENNOSIDES AND DOCUSATE SODIUM 2 TABLET: 50; 8.6 TABLET ORAL at 17:04

## 2023-08-11 RX ADMIN — FUROSEMIDE 20 MG: 10 INJECTION, SOLUTION INTRAVENOUS at 00:52

## 2023-08-11 RX ADMIN — NICOTINE 14 MG: 14 PATCH TRANSDERMAL at 05:52

## 2023-08-11 RX ADMIN — OXYCODONE HYDROCHLORIDE 5 MG: 5 TABLET ORAL at 17:01

## 2023-08-11 RX ADMIN — OXYCODONE HYDROCHLORIDE 5 MG: 5 TABLET ORAL at 09:34

## 2023-08-11 RX ADMIN — HYDROMORPHONE HYDROCHLORIDE 0.25 MG: 1 INJECTION, SOLUTION INTRAMUSCULAR; INTRAVENOUS; SUBCUTANEOUS at 15:03

## 2023-08-11 ASSESSMENT — LIFESTYLE VARIABLES
HAVE YOU EVER FELT YOU SHOULD CUT DOWN ON YOUR DRINKING: NO
CONSUMPTION TOTAL: NEGATIVE
HOW MANY TIMES IN THE PAST YEAR HAVE YOU HAD 5 OR MORE DRINKS IN A DAY: 0
ALCOHOL_USE: NO
TOTAL SCORE: 0
TOTAL SCORE: 0
HAVE PEOPLE ANNOYED YOU BY CRITICIZING YOUR DRINKING: NO
ON A TYPICAL DAY WHEN YOU DRINK ALCOHOL HOW MANY DRINKS DO YOU HAVE: 0
EVER HAD A DRINK FIRST THING IN THE MORNING TO STEADY YOUR NERVES TO GET RID OF A HANGOVER: NO
EVER FELT BAD OR GUILTY ABOUT YOUR DRINKING: NO
TOTAL SCORE: 0
AVERAGE NUMBER OF DAYS PER WEEK YOU HAVE A DRINK CONTAINING ALCOHOL: 0

## 2023-08-11 ASSESSMENT — ENCOUNTER SYMPTOMS
NECK PAIN: 0
SORE THROAT: 0
ORTHOPNEA: 0
BACK PAIN: 0
TREMORS: 0
BRUISES/BLEEDS EASILY: 1
EYE PAIN: 0
DIAPHORESIS: 1
BRUISES/BLEEDS EASILY: 0
MYALGIAS: 1
NAUSEA: 0
DIARRHEA: 0
ABDOMINAL PAIN: 0
SINUS PAIN: 0
FEVER: 0
PALPITATIONS: 0
FEVER: 1
PSYCHIATRIC NEGATIVE: 1
CHILLS: 0
DOUBLE VISION: 0
SHORTNESS OF BREATH: 0
BLURRED VISION: 0
HEADACHES: 1
COUGH: 0
WEAKNESS: 0
CHILLS: 1
SENSORY CHANGE: 0
FOCAL WEAKNESS: 0
MYALGIAS: 0
SPUTUM PRODUCTION: 0
CONSTIPATION: 1
HEARTBURN: 0
VOMITING: 0

## 2023-08-11 ASSESSMENT — PAIN DESCRIPTION - PAIN TYPE
TYPE: ACUTE PAIN

## 2023-08-11 ASSESSMENT — PATIENT HEALTH QUESTIONNAIRE - PHQ9
2. FEELING DOWN, DEPRESSED, IRRITABLE, OR HOPELESS: NOT AT ALL
2. FEELING DOWN, DEPRESSED, IRRITABLE, OR HOPELESS: NOT AT ALL
SUM OF ALL RESPONSES TO PHQ9 QUESTIONS 1 AND 2: 0
SUM OF ALL RESPONSES TO PHQ9 QUESTIONS 1 AND 2: 0
1. LITTLE INTEREST OR PLEASURE IN DOING THINGS: NOT AT ALL
1. LITTLE INTEREST OR PLEASURE IN DOING THINGS: NOT AT ALL

## 2023-08-11 ASSESSMENT — FIBROSIS 4 INDEX
FIB4 SCORE: 1.22
FIB4 SCORE: 1.22

## 2023-08-11 ASSESSMENT — COGNITIVE AND FUNCTIONAL STATUS - GENERAL
MOBILITY SCORE: 23
SUGGESTED CMS G CODE MODIFIER DAILY ACTIVITY: CH
DAILY ACTIVITIY SCORE: 24
SUGGESTED CMS G CODE MODIFIER MOBILITY: CI
MOVING TO AND FROM BED TO CHAIR: A LITTLE

## 2023-08-11 ASSESSMENT — COPD QUESTIONNAIRES
DURING THE PAST 4 WEEKS HOW MUCH DID YOU FEEL SHORT OF BREATH: SOME OF THE TIME
COPD SCREENING SCORE: 4
DO YOU EVER COUGH UP ANY MUCUS OR PHLEGM?: NO/ONLY WITH OCCASIONAL COLDS OR INFECTIONS
HAVE YOU SMOKED AT LEAST 100 CIGARETTES IN YOUR ENTIRE LIFE: YES

## 2023-08-11 ASSESSMENT — PAIN SCALES - WONG BAKER: WONGBAKER_NUMERICALRESPONSE: DOESN'T HURT AT ALL

## 2023-08-11 NOTE — H&P
[facility] History & Physical Note    Date of Service  8/10/2023    [facility] Team: [facility]   Attending: Moris Espino M.d.  Senior Resident: Franklin Campos DO  Contact Number: [phone]    Primary Care Physician  Calos Valle M.D.    Code Status  DNAR/DNI    Chief Complaint  Chief Complaint   Patient presents with    Pain     Left buttock radiating down to leg x3days     Headache       History of Presenting Illness (HPI): Mr. Abhishek Pacheco is a 53yoM with chronic migraine (on Sumtatriptan), obesity (BMI 31.70), KACY (on nightly CPAP; s/p uvuloplasty, 10/2011) who presented 8/10/2023 with 3-day history of worsening left buttock pain and edema which started 2 days after injecting himself with testosterone in same region. The pain is constant, rated 8/10, worse with movement, and minimally improved with Tramadol and Prednisone (prescribed by PCP). He also reports chills, headache, and night sweats.     Patient reports taking testosterone shots thrice monthly for last 2 years. Exam is notable for generalized discomfort, clammy skin, tachycardia (to 112 bpm), hypoxemia (84-85% on RA), bilateral crackles in middle lung fields. No tenderness to abdominal palpation. His left buttock is somewhat tense, swollen, non-erythematous, moderately tender at gluteal cleft.     Labs show severe leukocytosis (33.7 K/uL, 86.3% PMNs), mildly elevated total bilirubin. CT pelvis (8/10/2023) shows subcutaneous inflammatory changes with enlargement of left gluteal musculature. CXR (8/10/2023) shows mildly thickened interlobular septa.     He receives Ancef 2g x1 in ED and is admitted for sepsis due to cellulitis of left buttock.     I discussed the plan of care with patient, bedside RN, and Dr. Moris Espino MD .    Review of Systems  Review of Systems   Constitutional:  Positive for chills. Negative for fever.        Night sweats for last 3 nights   HENT:  Negative for congestion, hearing loss, sinus pain and sore  throat.    Eyes:  Negative for blurred vision and double vision.   Respiratory:  Negative for cough, sputum production and shortness of breath.    Cardiovascular:  Negative for chest pain, palpitations, orthopnea and leg swelling.   Gastrointestinal:  Negative for heartburn, nausea and vomiting.   Genitourinary:  Negative for dysuria and urgency.   Musculoskeletal:  Negative for back pain, myalgias and neck pain.   Skin:  Negative for rash.   Neurological:  Positive for headaches (tension-like, not like usual migraines). Negative for tremors, sensory change, focal weakness and weakness.   Endo/Heme/Allergies:  Does not bruise/bleed easily.       Past Medical History   has a past medical history of Apnea, sleep, Back pain, Chickenpox, Cough, Daytime sleepiness, Dizziness, Frequent headaches, Gasping for breath, Heart burn, Heartburn, Morning headache, Pain, Renal disorder (2013), Shortness of breath, Sleep apnea, Snoring, Sore muscles, Swelling of lower extremity, Wears glasses, and Weight loss.    Surgical History   has a past surgical history that includes appendectomy (2009); nerve ulnar transfer (2001); debridement (11/2/2010); cyst excision (11/2/2010); other orthopedic surgery (2007); other orthopedic surgery; knee arthroscopy (11/2/2010); tonsillectomy and adenoidectomy (10/12/2011); septoturbinoplasty (10/12/2011); uvulopharyngopalatoplasty (10/12/2011); reconstruction, knee, acl (Right, 2003); shoulder arthroscopy w/ rotator cuff repair (Left, 2/7/2018); shoulder arthroscopy w/ bicipital tenodesis repair (2/7/2018); laminotomy; arthroscopy, knee; tonsillectomy; pr removal of sperm cord lesion (Left, 4/29/2021); pr removal of sperm duct(s) (Bilateral, 4/29/2021); and inguinal hernia repair robotic xi (Bilateral, 9/17/2021).     Family History  Non-contributory    Social History  Tobacco: Endorses 35 pack-years (1PPD x 35 years). Quit 10 months ago.   Alcohol: Drinks 1 alcoholic beverage per day (usually 1  shot of vodka), most recently 8/9/2023  Recreational drugs (illegal or prescription): Denied  Employment: Works as  for [employer]  Living Situation: Lives in [place].   Recent Travel: Denied  Primary Care Provider: Reviewed Calos Valle MD  Other (stressors, spirituality, exposures): N/A    Allergies  Allergies   Allergen Reactions    Iodine Hives and Itching     After receiving iodine contrast for CT pt developed hives and itching    Pt developed breakthrough reaction of hives after being premedicated for contrast injection on 12/10/19    Percocet [Oxycodone-Acetaminophen] Nausea     And dizziness       Medications  Prior to Admission Medications   Prescriptions Last Dose Informant Patient Reported? Taking?   TRELEGY ELLIPTA 200-62.5-25 MCG/ACT AEROSOL POWDER, BREATH ACTIVATED   Yes No   albuterol 108 (90 Base) MCG/ACT Aero Soln inhalation aerosol   No No   Sig: Inhale 2 Puffs every 6 hours as needed for Shortness of Breath (cough).   pantoprazole (PROTONIX) 40 MG Tablet Delayed Response   No No   Sig: Take 1 Tablet by mouth every day.   testosterone cypionate (DEPO-TESTOSTERONE) 200 MG/ML Solution injection   Yes No   Sig: 3x a month   valACYclovir (VALTREX) 500 MG Tab   Yes No   varenicline (CHANTIX CONTINUING MONTH DENISE) 1 MG tablet   Yes No   Sig: Chantix 1 mg tablet   TAKE 1 TABLET BY MOUTH TWICE DAILY   varenicline (CHANTIX DENISE) 0.5 MG X 11 & 1 MG X 42 tablet   Yes No   zolpidem (AMBIEN) 10 MG Tab   Yes No   Sig: Take 10 mg by mouth.      Facility-Administered Medications: None       Physical Exam  Temp:  [35.9 °C (96.7 °F)-36.6 °C (97.8 °F)] 35.9 °C (96.7 °F)  Pulse:  [] 100  Resp:  [16-18] 18  BP: (117-138)/() 134/96  SpO2:  [95 %-98 %] 98 %  Blood Pressure: 133/78   Temperature: 36.6 °C (97.8 °F)   Pulse: 89   Respiration: 16   Pulse Oximetry: 95 %       Physical Exam  Constitutional:       General: He is in acute distress.      Appearance: He is obese.   HENT:      Head:  Normocephalic and atraumatic.      Mouth/Throat:      Mouth: Mucous membranes are moist.      Pharynx: Oropharynx is clear.   Eyes:      General: No scleral icterus.     Extraocular Movements: Extraocular movements intact.      Pupils: Pupils are equal, round, and reactive to light.   Cardiovascular:      Rate and Rhythm: Regular rhythm. Tachycardia present.      Pulses: Normal pulses.      Heart sounds: Normal heart sounds.   Pulmonary:      Breath sounds: Rhonchi (diffusely heard) and rales (bilateral crackles in middle lung zones) present.   Abdominal:      General: Abdomen is flat. Bowel sounds are normal. There is no distension.      Palpations: Abdomen is soft.   Genitourinary:     Comments: somewhat tense, swollen, non-erythematous, moderately tender at gluteal cleft.  Musculoskeletal:      Right lower leg: No edema.      Left lower leg: No edema.   Skin:     Capillary Refill: Capillary refill takes less than 2 seconds.      Coloration: Skin is not jaundiced.      Findings: Rash (as above at left buttock near gluteal cleft) present.   Neurological:      General: No focal deficit present.      Mental Status: He is alert. Mental status is at baseline.      Motor: No weakness.   Psychiatric:         Mood and Affect: Mood normal.         Laboratory:  Recent Labs     08/10/23  1915 08/11/23  0130   WBC 33.7* 32.3*   RBC 5.94 5.75   HEMOGLOBIN 17.5 17.1   HEMATOCRIT 52.4* 50.2   MCV 88.2 87.3   MCH 29.5 29.7   MCHC 33.4 34.1   RDW 46.4 46.3   PLATELETCT 238 251   MPV 10.0 10.2     Recent Labs     08/10/23  1915 08/11/23  0130   SODIUM 134*  --    POTASSIUM 4.3  --    CHLORIDE 96  --    CO2 22  --    GLUCOSE 105*  --    BUN 13  --    CREATININE 1.04 1.23   CALCIUM 9.1  --      Recent Labs     08/10/23  1915   ALTSGPT 35   ASTSGOT 33   ALKPHOSPHAT 60   TBILIRUBIN 2.0*   GLUCOSE 105*         Recent Labs     08/11/23 0130   NTPROBNP 278*         No results for input(s): TROPONINT in the last 72  hours.    Imaging:  DX-CHEST-PORTABLE (1 VIEW)   Final Result      No acute cardiac or pulmonary abnormalities are identified.      CT-PELVIS WITH   Final Result      1.  Subcutaneous inflammatory changes about the left buttock suspicious for cellulitis.      2.  Enlargement of left gluteal musculature consistent with myositis and/or inflammatory change.        X-Ray:  I have personally reviewed the images and compared with prior images.  EKG:  I have personally reviewed the images and compared with prior images.    Scheduled Medications   Medication Dose Frequency    senna-docusate  2 Tablet BID    enoxaparin (LOVENOX) injection  40 mg DAILY AT 1800    nicotine  14 mg Daily-0600    omeprazole  20 mg DAILY    zolpidem  10 mg QHS    ceFAZolin  2 g Q8HRS    Pharmacy Consult Request  1 Each PHARMACY TO DOSE    acetaminophen  650 mg Q6HRS    ibuprofen  400 mg TID      Assessment/Plan:  Problem Representation:     I anticipate this patient will require at least two midnights for appropriate medical management, necessitating inpatient admission because of sepsis with cellulitis of left buttock     Patient will need a Telemetry bed on MEDICAL service. The need is secondary to sepsis.    * Sepsis due to cellulitis (HCC)- (present on admission)  Assessment & Plan  This is Sepsis Present on admission  SIRS criteria identified on my evaluation include: Tachycardia, with heart rate greater than 90 BPM, Tachypnea, with respirations greater than 20 per minute, Leukocytosis, with WBC greater than 12,000 and Bandemia, greater than 10% bands  Clinical indicators of end organ dysfunction include Acute Liver Failure, with bilirubin greater than 2  Source is cellulitis  Sepsis protocol initiated  Crystalloid Fluid Administration: Resuscitation volume of 0mL ordered. Reason that resuscitation volume of less than 30ml/kg was ordered concern for causing harm given pulmonary edema  IV antibiotics as appropriate for source of sepsis: IV  "Ancef     Reassessment: I have reassessed the patient's hemodynamic status    - Blood cultures x2, wound culture pending; lactic acid WNR.   - Continued on IV Ancef 18hrs      Acute respiratory failure with hypoxemia (HCC)- (present on admission)  Assessment & Plan  Mild diffuse rhonchi heard on exam. Denies recent cough. CXR shows peripheral Kerley B lines. NT-proBNP slightly elevated.   - IV Lasix 20mg x1 ordered; strict I/Os, daily standing weights ordered.   - RT/OT protocol; wean from oxygen as tolerated.   - Would consider TTE given likelihood of HFpEF in setting of KACY    Cellulitis at injection site- (present on admission)  Assessment & Plan  Reports 3-day history of left buttock pain after recent injection with testosterone at left buttock, which is tense, swollen, and moderately tender. CT pelvis (8/10/2023) shows subcutaneous inflammatory changes. enlargement of left gluteal musculature.   - Blood cultures x2; wound culture pending. Lactic acid WNR.   - IV Ancef q8hrs continued pending cultures/sensitivities.   - For pain: Tylenol 650mg q6hrs, Ibuprofen 400mg TID, Oxy or Dilaudid PRN      Bilirubinemia  Assessment & Plan  Mild. Likely due to sepsis. No evidence of anemia on labs.   - Direct bili ordered. Will monitor.     GERD (gastroesophageal reflux disease)  Assessment & Plan  History of. On Pantoprazole at home.  - Resumed.     Insomnia  Assessment & Plan  History of. On Zolpidem at home.  - Resumed.    Tobacco dependence- (present on admission)  Assessment & Plan  Smokes 4-5 \"puffs\" per day. Has 35 pack-years. Previously was on Chantix, but not for last few months.   - Smoking cessation encouraged.   - Nicotine patch ordered.     Sleep apnea- (present on admission)  Assessment & Plan  - RT/OT protocol  - Resume CPAP at home settings    Obesity- (present on admission)  Assessment & Plan  Noted.   - Counseling deferred to primary.      VTE prophylaxis: enoxaparin ppx    "

## 2023-08-11 NOTE — PROGRESS NOTES
[facility] Internal Medicine Daily Progress Note    Date of Service  8/11/2023    R Team: R IM White Team   Attending: Mary Mcclain M.d.  Senior Resident: Dr. Ayala  Contact Number: [phone]    Chief Complaint  Abhishek Pacheco is a 53 y.o. male admitted 8/10/2023 with cellulitis of left buttock, likely secondary to home testosterone injection.    Hospital Course  Mr. Abhishek Pacheco is a 53yoM with chronic migraine (on Sumtatriptan), obesity (BMI 31.70), KACY (on nightly CPAP; s/p uvuloplasty, 10/2011) who presented 8/10/2023 with 3-day history of worsening left buttock pain and edema which started 2 days after injecting himself with testosterone in same region. The pain is constant, rated 8/10, worse with movement, and minimally improved with Tramadol and Prednisone (prescribed by PCP). He also reports chills, headache, and night sweats.      Patient reports taking testosterone shots thrice monthly for last 2 years. Exam is notable for generalized discomfort, clammy skin, tachycardia (to 112 bpm), hypoxemia (84-85% on RA), bilateral crackles in middle lung fields. No tenderness to abdominal palpation. His left buttock is somewhat tense, swollen, non-erythematous, moderately tender at gluteal cleft.      Labs show severe leukocytosis (33.7 K/uL, 86.3% PMNs), mildly elevated total bilirubin. CT pelvis (8/10/2023) shows subcutaneous inflammatory changes with enlargement of left gluteal musculature. CXR (8/10/2023) shows mildly thickened interlobular septa.      He receives Ancef 2g x1 in ED and is admitted for sepsis due to cellulitis of left buttock.       Interval Problem Update  -Patient reports that he has been injecting the same spot on his buttock for the last 2 years, every 3 months or so.  - Patient's reporting extreme pain in the area, was unaware that he could request pain meds in addition to the scheduled medications as needed.  - MRSA nares negative, will continue current regimen.  If  no resolution or improvement after 2 days, will consider ultrasound imaging to assess for the presence of an abscess.    I have discussed this patient's plan of care and discharge plan at IDT rounds today with Case Management, Nursing, Nursing leadership, and other members of the IDT team.    Consultants/Specialty  N/A    Code Status  DNAR/DNI    Disposition  The patient is not medically cleared for discharge to home or a post-acute facility.      I have placed the appropriate orders for post-discharge needs.    Review of Systems  Review of Systems   Constitutional:  Negative for chills and fever.   Respiratory:  Negative for cough and shortness of breath.    Cardiovascular:  Negative for chest pain.   Gastrointestinal:  Positive for constipation.   Genitourinary:  Negative for dysuria.   Musculoskeletal:  Positive for myalgias (Left calf and left buttock).        Physical Exam  Temp:  [35.9 °C (96.6 °F)-36.7 °C (98.1 °F)] 36.7 °C (98.1 °F)  Pulse:  [] 70  Resp:  [16-19] 19  BP: (116-138)/() 121/80  SpO2:  [92 %-98 %] 95 %    Physical Exam  Constitutional:       General: He is in acute distress.      Appearance: He is obese.   HENT:      Head: Normocephalic and atraumatic.      Nose: No rhinorrhea.   Eyes:      General: No scleral icterus.     Extraocular Movements: Extraocular movements intact.      Pupils: Pupils are equal, round, and reactive to light.   Cardiovascular:      Rate and Rhythm: Regular rhythm. Tachycardia present.      Pulses: Normal pulses.      Heart sounds: Normal heart sounds.   Pulmonary:      Effort: Pulmonary effort is normal. No respiratory distress.      Breath sounds: Rhonchi: diffusely heard.      Comments: With appropriate oxygen saturations on room air.  Abdominal:      General: Abdomen is flat. Bowel sounds are normal. There is no distension.      Palpations: Abdomen is soft.   Genitourinary:     Comments: somewhat tense, swollen, non-erythematous, moderately tender at  gluteal cleft.  Musculoskeletal:         General: Tenderness (Tenderness of left calf and left buttock with palpation.) present.      Cervical back: Normal range of motion. No rigidity.      Right lower leg: No edema.      Left lower leg: No edema.   Skin:     Capillary Refill: Capillary refill takes less than 2 seconds.      Coloration: Skin is not jaundiced.      Findings: Erythema (Erythema of the left upper outer buttock) and rash (as above at left buttock near gluteal cleft) present.   Neurological:      General: No focal deficit present.      Mental Status: He is alert. Mental status is at baseline.      Motor: No weakness.   Psychiatric:         Mood and Affect: Mood normal.         Fluids    Intake/Output Summary (Last 24 hours) at 8/11/2023 1502  Last data filed at 8/11/2023 0100  Gross per 24 hour   Intake 240 ml   Output --   Net 240 ml       Laboratory  Recent Labs     08/10/23  1915 08/11/23  0130   WBC 33.7* 32.3*   RBC 5.94 5.75   HEMOGLOBIN 17.5 17.1   HEMATOCRIT 52.4* 50.2   MCV 88.2 87.3   MCH 29.5 29.7   MCHC 33.4 34.1   RDW 46.4 46.3   PLATELETCT 238 251   MPV 10.0 10.2     Recent Labs     08/10/23  1915 08/11/23  0130   SODIUM 134* 131*   POTASSIUM 4.3 4.2   CHLORIDE 96 93*   CO2 22 21   GLUCOSE 105* 147*   BUN 13 19   CREATININE 1.04 1.23   CALCIUM 9.1 9.1                   Imaging  DX-CHEST-PORTABLE (1 VIEW)   Final Result      No acute cardiac or pulmonary abnormalities are identified.      CT-PELVIS WITH   Final Result      1.  Subcutaneous inflammatory changes about the left buttock suspicious for cellulitis.      2.  Enlargement of left gluteal musculature consistent with myositis and/or inflammatory change.           Assessment/Plan  Problem Representation:    * Sepsis due to cellulitis (HCC)- (present on admission)  Assessment & Plan  This is Sepsis Present on admission  SIRS criteria identified on my evaluation include: Tachycardia, with heart rate greater than 90 BPM, Tachypnea, with  "respirations greater than 20 per minute, Leukocytosis, with WBC greater than 12,000 and Bandemia, greater than 10% bands  Clinical indicators of end organ dysfunction include Acute Liver Failure, with bilirubin greater than 2  Source is cellulitis  Sepsis protocol initiated  Crystalloid Fluid Administration: Resuscitation volume of 0mL ordered. Reason that resuscitation volume of less than 30ml/kg was ordered concern for causing harm given pulmonary edema  IV antibiotics as appropriate for source of sepsis: IV Ancef   Reassessment: I have reassessed the patient's hemodynamic status  MRSA nares negative; lactic acid WNR.   - Blood cultures x2, wound culture pending;   - Continued on IV Ancef 18hrs      Other constipation  Assessment & Plan  Upon presentation patient reports that he has not had a bowel movement in at least 3 to 4 days.  Scheduled bowel regimen/prep  Evaluate and assess for SBO if still without a bowel movement during hospitalization day 2; currently patient is able to pass gas    GERD (gastroesophageal reflux disease)  Assessment & Plan  History of. On Pantoprazole at home.  - Resumed.     Insomnia  Assessment & Plan  History of. On Zolpidem at home.  - Resumed.    Acute respiratory failure with hypoxemia (HCC)- (present on admission)  Assessment & Plan  Mild diffuse rhonchi heard on exam. Denies recent cough. CXR shows peripheral Kerley B lines. NT-proBNP slightly elevated.   - IV Lasix 20mg x1 ordered; strict I/Os, daily standing weights ordered.   - RT/OT protocol; wean from oxygen as tolerated.   - Would consider TTE given likelihood of HFpEF in setting of KACY    Bilirubinemia  Assessment & Plan  Mild. Likely due to sepsis. No evidence of anemia on labs.   - Direct bili ordered. Will monitor.     Tobacco dependence- (present on admission)  Assessment & Plan  Smokes 4-5 \"puffs\" per day. Has 35 pack-years. Previously was on Chantix, but not for last few months.   - Smoking cessation encouraged.   - " Nicotine patch ordered.     Cellulitis at injection site- (present on admission)  Assessment & Plan  Reports 3-day history of left buttock pain after recent injection with testosterone at left buttock, which is tense, swollen, and moderately tender. CT pelvis (8/10/2023) shows subcutaneous inflammatory changes. enlargement of left gluteal musculature.   - Blood cultures x2; wound culture pending. Lactic acid WNR.   - IV Ancef q8hrs continued pending cultures/sensitivities.   - For pain: Tylenol 650mg q6hrs, Ibuprofen 400mg TID, Oxy or Dilaudid PRN  - If no resolution or improvement after 2 days, will consider ultrasound imaging to assess for the presence of an abscess      Sleep apnea- (present on admission)  Assessment & Plan  - RT/OT protocol  - Resume CPAP at home settings    Obesity- (present on admission)  Assessment & Plan  Noted.   - Counseling deferred to primary.         VTE prophylaxis: enoxaparin ppx    I have performed a physical exam and reviewed and updated ROS and Plan today (8/11/2023). In review of yesterday's note (8/10/2023), there are no changes except as documented above.

## 2023-08-11 NOTE — PROGRESS NOTES
4 Eyes Skin Assessment Completed by LISHA Guadarrama and LISHA Christian.    Head WDL  Ears WDL  Nose WDL  Mouth WDL  Neck WDL  Breast/Chest WDL  Shoulder Blades WDL  Spine WDL  (R) Arm/Elbow/Hand Dry scaly skin  (L) Arm/Elbow/Hand Dry scaly skin  Abdomen WDL  Groin WDL  Scrotum/Coccyx/Buttocks Swelling tight tender right buttock (Cellulitis?)  (R) Leg Discoloration, bruise, Varicose veins  (L) Leg WDL  (R) Heel/Foot/Toe WDL  (L) Heel/Foot/Toe WDL          Devices In Places None      Interventions In Place Pillows    Possible Skin Injury No    Pictures Uploaded Into Epic Yes  Wound Consult Placed N/A  RN Wound Prevention Protocol Ordered No

## 2023-08-11 NOTE — DISCHARGE PLANNING
HTH/ [facility] TCN chart review completed. Per chart patient has low LACE 25 and high 6 click score 23.  Additionally, per Kardex patient has ambulated 2 x 25 feet with no AD or DME use, and is currently on RA  No TCN needs anticipated in patient discharge planning at this time.     Should post acute discharge needs arise warranting TCN involvement, please contact TCN team via Voalte. Thank you.

## 2023-08-11 NOTE — PROGRESS NOTES
Patient insisting that he should be DNR/DNI. Patient stated that he talked to the Doctor downstairs and that he stated that. Updated Dr. Espino, Dr. Campos called and confirmed patient's code status request. Confirmed with Dr. Campos that patient insisted that he should be DNR/DNI. Dr. Campos to make changes in code status.

## 2023-08-11 NOTE — ASSESSMENT & PLAN NOTE
Obesity likely a contributing factor.  Advised calorie restriction, regular exercise, dinner 4 hours before bedtime  Denies heartburn, chest pain, nausea  Continuing outpatient PPI.

## 2023-08-11 NOTE — ASSESSMENT & PLAN NOTE
Secondary to Intramuscular inj of Testosterone in buttock and thigh x3 months    -S/p I&D of large abscess and necrotic tissue with wound vac placement 8/13 by Dr Castro.  See notes regarding further assessments and future care.  -Switched to oral Augmentin twice daily per ID recs  -WBC in normal range  -Pain management with ibuprofen and oxycodone 10, 15 for moderate pain.  Monitor for adverse effect of narcotic pain medications.  -SIERRA drains to 2 & 4 removed, per Ortho okay to discharge patient with drains 1 and 3 with close follow-up  -Wound care ongoing.  -Discontinue heparin after 2200 dose 8/29/2023  -Start rivaroxaban 10 mg daily for DVT prophylaxis

## 2023-08-11 NOTE — ASSESSMENT & PLAN NOTE
Sepsis secondary to testosterone injection in muscles:    -Empiric therapy for necrotizing fascitis be continued  -Follow-up with CBC, CMP. Adequete hydration, monitor for signs of endorgan dysfunction  -Resolved

## 2023-08-11 NOTE — ED NOTES
Pt brought to PUR [address] via wheelchair. Pants removed however pt requested to keep underwear on.   Pt's left buttock noted to be hard and tender to touch. Pt states the injection site is about an inch or so above where the swelling and pain starts. States he's been giving himself injections at the site for years without incident. Last injection 5 days ago. Pain started 3 days ago.   Lights turned down per pt request.   Chart up for ERP review.

## 2023-08-11 NOTE — ASSESSMENT & PLAN NOTE
History of. On Zolpidem at home.  -Holding medication in context of significant pain management on board.

## 2023-08-11 NOTE — CARE PLAN
The patient is Stable - Low risk of patient condition declining or worsening    Shift Goals  Clinical Goals: Abx, Pain management, Fall precautions  Patient Goals: Pain control, Rest  Family Goals: CHRIS    Progress made toward(s) clinical / shift goals:     Problem: Fall Risk  Goal: Patient will remain free from falls    Description  Target End Date: Prior to discharge or change in level of care     Document interventions on the [place] [facility] Fall Risk Assessment   1. Assess for fall risk factors   2. Implement fall precautions    Outcome: Progressing  Note: Encouraged and instructed to call for assistance. Bed alarm on. Side rails up. Call button within reach. Provided walker for ambulation and transfers. Placed on standby assists.    Problem: Pain - Standard  Goal: Alleviation of pain or a reduction in pain to the patient’s comfort goal    Description  Target End Date: Prior to discharge or change in level of care     Document on Vitals flowsheet   1. Document pain using the appropriate pain scale per order or unit policy   2. Educate and implement non-pharmacologic comfort measures (i.e. relaxation, distraction, massage, cold/heat therapy, etc.)   3. Pain management medications as ordered   4. Reassess pain after pain med administration per policy   5. If opiods administered assess patient's response to pain medication is appropriate per POSS sedation scale 6. Follow pain management plan developed in collaboration with patient and interdisciplinary team (including palliative care or pain specialists if applicable)    Outcome: Progressing  Note: Patient reports pain at left gluteus PS 10/10. PRN Oxycodone 5 mg PO and Dilaudid 0.25 mg IV given. Monitored for side effects. Patient reports relief from pain after medication administration Continued pain level assessment and monitoring during shift.      Problem: Knowledge Deficit - Standard  Goal: Patient and family/care givers will demonstrate understanding of plan of  care, disease process/condition, diagnostic tests and medications    Description  Target End Date: 1-3 days or as soon as patient condition allows     Document in Patient Education   1. Patient and family/caregiver oriented to unit, equipment, visitation policy and means for communicating concern   2. Complete/review Learning Assessment   3. Assess knowledge level of disease process/condition, treatment plan, diagnostic tests and medications   4. Explain disease process/condition, treatment plan, diagnostic tests and medications    Outcome: Progressing  Note: Patient updated on plan of care, and patient care for shift. Patient able to process instructions and explanations. Patient responsive to commands.

## 2023-08-11 NOTE — PROGRESS NOTES
Telemetry Report:      ST-SR   105-85  PVC  0.12/0.09/0.34    Per Telestrip from Monitor Room

## 2023-08-11 NOTE — ED TRIAGE NOTES
Chief Complaint   Patient presents with    Pain     Left buttock radiating down to leg x3days     Headache     Pt wheeled to triage c/o left buttock pain, assessed pt's buttock noted left side swollen and hard compared to right side. Pt said that he gives himself testosterone injections 5days ago . His doctor prescribed prednisone and tramadol but no relief.   Pt also said unable to have bowel movement due to pain , last bm 3days ago.

## 2023-08-11 NOTE — CARE PLAN
The patient is Watcher - Medium risk of patient condition declining or worsening    Shift Goals  Clinical Goals: pain management , abx   Patient Goals: pain control  Family Goals: CHRIS    Progress made toward(s) clinical / shift goals:  prn's and ice packs given for L buttock pain.education provided to patient at bedside.     Patient is not progressing towards the following goals:

## 2023-08-11 NOTE — ASSESSMENT & PLAN NOTE
BMI 33.44    - Counseling deferred to primary.  -Patient was counseled on decrease calorie intake  -Follow-up with lipid panel, A1c

## 2023-08-11 NOTE — WOUND TEAM
[facility]  Inpatient Services  Wound and Skin Care Brief Evaluation    Admission Date: 8/10/2023     Last order of IP CONSULT TO WOUND CARE was found on 8/10/2023 from Hospital Encounter on 8/10/2023     HPI, PMH, SH: Reviewed    Chief Complaint   Patient presents with    Pain     Left buttock radiating down to leg x3days     Headache     Diagnosis: Sepsis due to cellulitis (HCC) [L03.90, A41.9]    Unit where seen by Wound Team: S173/01     Wound consult placed regarding left buttock. Chart and images reviewed. This discussed with bedside RN Selin. This RN in to assess patient. Pt pleasant and agreeable. Patient with edematous, indurated, erythmic and painful left buttock, no open wound or drainage.     No pressure injuries or advanced wound care needs identified. Wound consult completed. No further follow up unless indicated and consulted.        8/10/2023 8:36 PM     HISTORY/REASON FOR EXAM:  please extend to mid thigh. history of testoserone injections. left buttock swollen but also prox thigh, hematoma? vs abscess with cellulitis vs ?.        TECHNIQUE/EXAM DESCRIPTION AND NUMBER OF VIEWS:  CT scan of the pelvis with contrast.     Contrast-enhanced helical scanning of the pelvis was obtained from the iliac crests through the pubic symphysis following the bolus administration of 95 mL of Omnipaque 350 nonionic contrast without complication.     Low dose optimization technique was utilized for this CT exam including automated exposure control and adjustment of the mA and/or kV according to patient size.     COMPARISON:  CT abdomen/pelvis 9/9/2021     FINDINGS:  There are curvilinear soft tissue stranding in the subcutaneous tissues of the left buttocks. There is enlargement of the left gluteal musculature     IMPRESSION:     1.  Subcutaneous inflammatory changes about the left buttock suspicious for cellulitis.     2.  Enlargement of left gluteal musculature consistent with myositis and/or  inflammatory change.           Exam Ended: 08/10/23 20:58 Last Resulted: 08/10/23 22:56             PREVENTATIVE INTERVENTIONS:    Q shift [place] - performed per nursing policy  Q shift pressure point assessments - performed per nursing policy    .

## 2023-08-11 NOTE — PROGRESS NOTES
[facility] Internal Medicine Daily Progress Note    Date of Service  8/11/2023    [facility] Team: R IM White Team   Attending: Mary Mcclain M.d.  Senior Resident: Dr. Nilsa Ayala  Intern:  JUAN PABLO  Contact Number: [phone]    Chief Complaint  Abhishek Pacheco is a 53 y.o. male admitted 8/10/2023 with sepsis secondary to cellulitis.     Hospital Course  Mr. Abhishek Pacheco is a 53yoM with chronic migraine (on Sumtatriptan), obesity (BMI 31.70), KACY (on nightly CPAP; s/p uvuloplasty, 10/2011) who presented 8/10/2023 with 3-day history of worsening left buttock pain and edema which started 2 days after injecting himself with testosterone in same region. The pain is constant, rated 8/10, worse with movement, and minimally improved with Tramadol and Prednisone (prescribed by PCP). He also reports chills, headache, and night sweats.      Patient reports taking testosterone shots thrice monthly for last 2 years. Exam is notable for generalized discomfort, clammy skin, tachycardia (to 112 bpm), hypoxemia (84-85% on RA), bilateral crackles in middle lung fields. No tenderness to abdominal palpation. His left buttock is somewhat tense, swollen, non-erythematous, moderately tender at gluteal cleft.      Labs show severe leukocytosis (33.7 K/uL, 86.3% PMNs), mildly elevated total bilirubin. CT pelvis (8/10/2023) shows subcutaneous inflammatory changes with enlargement of left gluteal musculature. CXR (8/10/2023) shows mildly thickened interlobular septa.      He receives Ancef 2g x1 in ED and is admitted for sepsis due to cellulitis of left buttock    Interval Problem Update  - Left buttock pain increased since admission, now rated 10/10.   - WBC in the AM minimally decreased since initiation of IV cefazolin 2 grams Q8H.   - Shortness of breath and hypoxia resolved after treatment with x1 dose Lasix 20 mg IV and oxygen 2 LPM via NC.   - Vital signs stable at this time, tachycardia resolved.     Subjective  Patient  reports that his left buttock and upper quadriceps pain and swelling have worsened since admission and is now 10/10 in severity. He is unable to lie on his left side without discomfort and found it difficult to sleep through the night. He reports that he has not had a bowel movement in the past 3-4 days and this is unusual for him. Complicated by difficulty with sitting on toilet secondary to pain. He denies any issues with urination and reports his shortness of breath has since resolved. Endorses associated subjective fevers, night sweats, headaches, and chills. Denies any nausea, vomiting, or abdominal pain.     Consultants/Specialty      Code Status  DNAR/DNI    Disposition  The patient is not medically cleared for discharge to home or a post-acute facility.        Review of Systems  Review of Systems   Constitutional:  Positive for chills, diaphoresis, fever and malaise/fatigue.   HENT: Negative.     Eyes:  Negative for blurred vision, double vision and pain.   Respiratory:  Negative for cough and shortness of breath.    Cardiovascular:  Negative for chest pain, palpitations and leg swelling.   Gastrointestinal:  Positive for constipation. Negative for abdominal pain, diarrhea, nausea and vomiting.   Genitourinary:  Negative for dysuria and hematuria.   Musculoskeletal:  Positive for myalgias (left buttock and posterior thigh).   Skin:  Positive for rash (Left buttock rash).   Neurological:  Positive for headaches.   Endo/Heme/Allergies:  Bruises/bleeds easily.   Psychiatric/Behavioral: Negative.     All other systems reviewed and are negative.       Physical Exam  Temp:  [35.9 °C (96.6 °F)-36.7 °C (98.1 °F)] 36.1 °C (96.9 °F)  Pulse:  [] 85  Resp:  [16-19] 16  BP: (104-138)/() 104/65  SpO2:  [92 %-98 %] 96 %    Physical Exam  Vitals and nursing note reviewed.   Constitutional:       General: He is in acute distress.      Appearance: Normal appearance. He is normal weight. He is diaphoretic. He is  not ill-appearing.   HENT:      Nose: Nose normal.      Mouth/Throat:      Mouth: Mucous membranes are moist.   Eyes:      Extraocular Movements: Extraocular movements intact.      Pupils: Pupils are equal, round, and reactive to light.   Cardiovascular:      Rate and Rhythm: Normal rate and regular rhythm.      Pulses: Normal pulses.      Heart sounds: Normal heart sounds. No murmur heard.     No friction rub. No gallop.   Pulmonary:      Effort: Pulmonary effort is normal.      Breath sounds: Normal breath sounds. No rales.   Abdominal:      General: Abdomen is flat. Bowel sounds are normal. There is no distension.      Palpations: Abdomen is soft.      Tenderness: There is abdominal tenderness (LLQ).   Musculoskeletal:         General: Tenderness (Left calf) present.      Cervical back: Normal range of motion.      Right lower leg: No edema.      Left lower leg: No edema.   Skin:     General: Skin is warm.      Findings: Bruising (Ecchymosis present over bilateral lower extremities, particularly in the ankles.), erythema and rash (Diffuse erythematous and edematous rash persent over left buttock region extending into the upper posterior thigh. No lesions, abscesses, or drainage observed. Rash has associated tenderness to palpation.) present. No lesion.   Neurological:      General: No focal deficit present.      Mental Status: He is alert and oriented to person, place, and time. Mental status is at baseline.   Psychiatric:         Mood and Affect: Mood normal.         Behavior: Behavior normal.         Thought Content: Thought content normal.         Judgment: Judgment normal.         Fluids    Intake/Output Summary (Last 24 hours) at 8/11/2023 1550  Last data filed at 8/11/2023 0100  Gross per 24 hour   Intake 240 ml   Output --   Net 240 ml       Laboratory  Recent Labs     08/10/23  1915 08/11/23  0130   WBC 33.7* 32.3*   RBC 5.94 5.75   HEMOGLOBIN 17.5 17.1   HEMATOCRIT 52.4* 50.2   MCV 88.2 87.3   MCH 29.5  29.7   MCHC 33.4 34.1   RDW 46.4 46.3   PLATELETCT 238 251   MPV 10.0 10.2     Recent Labs     08/10/23  1915 08/11/23  0130   SODIUM 134* 131*   POTASSIUM 4.3 4.2   CHLORIDE 96 93*   CO2 22 21   GLUCOSE 105* 147*   BUN 13 19   CREATININE 1.04 1.23   CALCIUM 9.1 9.1                   Imaging  DX-CHEST-PORTABLE (1 VIEW)   Final Result      No acute cardiac or pulmonary abnormalities are identified.      CT-PELVIS WITH   Final Result      1.  Subcutaneous inflammatory changes about the left buttock suspicious for cellulitis.      2.  Enlargement of left gluteal musculature consistent with myositis and/or inflammatory change.           Assessment/Plan  Problem Representation:  Abhishek Pacheco is a 53 y.o. male with a PMH of chronic migraines, KACY, GERD, and tobacco abuse who was admitted 8/10 for sepsis 2/2 left buttock cellulitis onset three days prior. Patient reports he has been self-injecting testosterone thrice monthly for the past 2 years and is likely source of infection. Labs and imaging in the ED were suggestive of cellulitis and patient was initiated on IV cefazolin 2 grams Q8H. Additionally, CXR also demonstrated evidence of Kerley B lines and patient was treated for his dyspnea with oxygen 2 LPM via NC and x1 Lasix 20 mg IV dose. Dyspnea has since resolved, BNP slightly elevated at 278. Awaiting wound and blood cultures results at this time. Patient's pain has increased in severity. Will continue to monitor and treat with antibiotics and pain management.       * Sepsis due to cellulitis (HCC)- (present on admission)  Assessment & Plan  Patient was initiated on sepsis protocol without fluid resuscitation in the setting of pulmonary edema after presenting with tachycardia (112), tachypnea, and severe leukocytosis, fulfilling SIRS criteria with source of cellulitis. Total bilirubin was elevated to 2.0, indicative of end organ dysfunction. Patient was initiated on IV cefazolin and encouraged to maintain  hydration with PO oral intake. MRSA nares negative. Lactic acid WNL. Today tachycardia and tachypnea have since resolved, but leukocytosis remains severely elevated at 32.3. SOFA score ? 2, indicating patient still has sepsis per Sepsis 3 guidelines.   - Await results of wound and blood cultures.   - Continue IV cefazolin 2 grams Q8H.   - Continue PO fluid intake.   - Sepsis protocol; continue holding fluid resuscitation.     Cellulitis at injection site- (present on admission)  Assessment & Plan  History of self-injection testosterone treatments thrice monthly for the past two years in left buttock. Reports having developed left buttock pain and swelling 2 days after his most recent injection. CT pelvis 8/10 demonstrated subcutaneous inflammatory changes and enlargement of the left gluteal musculature consistent with cellulitis. WBC elevated 33 on presentation, decreased to 32 today. Lactic acid WNL. Initiated on IV cefazolin 2 grams Q8H in the ED. Reports worsening pain since admission, described as 10/10 in severity. On exam, left buttock is diffusely erythematous and edematous; no fluctuance observed.   - Blood cultures x2; wound culture pending.    - Continue IV cefazolin 2 grams Q8H and switch treatment as needed once cultures have resulted.   - Will treat his pain with Tylenol 650 mg Q6H, ibuprofen 400 mg TID, oxycodone 3 mg Q3H, and Diladid 0.5 mg PRN.   - If patient's cellulitis does not improve within 48 hours of starting treatment, will consider further evaluation with an ultrasound to evaluate for possible abscess formation.     Acute respiratory failure with hypoxemia (HCC)- (present on admission)  Assessment & Plan  Patient reports he has been experiencing intermittent shortness of breath but denies any cough or sputum production. CXR in the ED demonstrated peripheral Kerley B lines, suggestive of pulmonary edema. NT-proBNP slightly elevated at 278. Patient was treated with x1 dose of Lasix 20 mg IV as  "well as initiated on oxygen 2 LPM via NC. Today he reports his shortness of breath has resolved. He is saturating well on room air. He has never had a previous echocardiogram.   - No signs of fluid overload on exam at this time. Will hold further treatment with Lasix.   - Strict intake/output. Daily standing weights ordered.   - RT/OT protocol. Continuous pulse oximeter with target SpO2 goal 94-98%.   - Consider further evaluation with TTE if respiratory symptoms recur, especially with his history of KACY increasing the risk for HFpEF.     Other constipation  Assessment & Plan  Patient reports that he has not had a bowel movement in the past 3 to 4 days. He reports this is unusual for him. Exam demonstrate LLQ tenderness. He denies any recent history of narcotic use or other agents that would promote gut dysmotility.   - Will begin treatment with bowel regimen.     GERD (gastroesophageal reflux disease)  Assessment & Plan  Treated with omeprazole 20 mg daily.   - Continue home medication.     Bilirubinemia  Assessment & Plan  Mildly elevated total bilirubin of 2.0 on presentation. Decreased to 1.9 today. Likely secondary to sepsis.   - Will continue to monitor with repeat chemistries.     Tobacco dependence- (present on admission)  Assessment & Plan  Reports 35 PYH and is currently smoking 4-5 \"puffs\" daily.   - Smoking cessation encouraged.   - Will treat with nicotine patch 14 mg daily.     Sleep apnea- (present on admission)  Assessment & Plan  Controlled with CPAP therapy at home.   - RT/OT protocol  - Resume CPAP at home settings.    Insomnia  Assessment & Plan  Treated with zolpidem 10 mg nightly.  - Continue home medications.    VTE prophylaxis: enoxaparin ppx    I have performed a physical exam and reviewed and updated ROS and Plan today (8/11/2023). In review of yesterday's note (8/10/2023), there are no changes except as documented above.  "

## 2023-08-11 NOTE — ASSESSMENT & PLAN NOTE
Likely secondary to sepsis and comorbid KACY     Currently he is off from oxygen and maintaining oxygen saturation.  Requiring while asleep for his KACY  O2 sat maintained on room air

## 2023-08-11 NOTE — HOSPITAL COURSE
Mr. Abhishek Pacheco is a 53yoM with chronic migraine (on Sumtatriptan), obesity (BMI 31.70), KACY (on nightly CPAP; s/p uvuloplasty, 10/2011) who presented 8/10/2023 with 3-day history of worsening left buttock pain and edema which started 2 days after injecting himself with testosterone in same region. The pain is constant, rated 8/10, worse with movement, and minimally improved with Tramadol and Prednisone (prescribed by PCP). He also reports chills, headache, and night sweats.      Patient reports taking testosterone shots thrice monthly for last 2 years. Exam is notable for generalized discomfort, clammy skin, tachycardia (to 112 bpm), hypoxemia (84-85% on RA), bilateral crackles in middle lung fields. No tenderness to abdominal palpation. His left buttock is somewhat tense, swollen, non-erythematous, moderately tender at gluteal cleft.      Labs show severe leukocytosis (33.7 K/uL, 86.3% PMNs), mildly elevated total bilirubin. CT pelvis (8/10/2023) shows subcutaneous inflammatory changes with enlargement of left gluteal musculature. CXR (8/10/2023) shows mildly thickened interlobular septa.      He receives Ancef 2g x1 in ED and is admitted for sepsis due to cellulitis of left buttock.   Status post fasciotomy excisional debridement and wound VAC placement of left gluteus and thigh secondary to necrotizing fasciitis and myositis.  Discontinued  tomorrow for bulb drains.  Zosyn transition to Augmentin in preparation for discharge.

## 2023-08-12 PROBLEM — L03.317 CELLULITIS OF LEFT BUTTOCK: Status: ACTIVE | Noted: 2023-08-11

## 2023-08-12 PROBLEM — N17.9 AKI (ACUTE KIDNEY INJURY) (HCC): Status: ACTIVE | Noted: 2023-08-12

## 2023-08-12 PROBLEM — M62.82 RHABDOMYOLYSIS: Status: ACTIVE | Noted: 2023-08-12

## 2023-08-12 LAB
ALBUMIN SERPL BCP-MCNC: 3.2 G/DL (ref 3.2–4.9)
ALBUMIN/GLOB SERPL: 1 G/DL
ALP SERPL-CCNC: 64 U/L (ref 30–99)
ALT SERPL-CCNC: 64 U/L (ref 2–50)
ANION GAP SERPL CALC-SCNC: 15 MMOL/L (ref 7–16)
APPEARANCE UR: ABNORMAL
AST SERPL-CCNC: 95 U/L (ref 12–45)
BACTERIA #/AREA URNS HPF: NEGATIVE /HPF
BASOPHILS # BLD AUTO: 0 % (ref 0–1.8)
BASOPHILS # BLD: 0 K/UL (ref 0–0.12)
BILIRUB SERPL-MCNC: 1.8 MG/DL (ref 0.1–1.5)
BILIRUB UR QL STRIP.AUTO: ABNORMAL
BUN SERPL-MCNC: 35 MG/DL (ref 8–22)
BURR CELLS BLD QL SMEAR: NORMAL
CALCIUM ALBUM COR SERPL-MCNC: 9.2 MG/DL (ref 8.5–10.5)
CALCIUM SERPL-MCNC: 8.6 MG/DL (ref 8.5–10.5)
CHLORIDE SERPL-SCNC: 94 MMOL/L (ref 96–112)
CK SERPL-CCNC: 1675 U/L (ref 0–154)
CO2 SERPL-SCNC: 24 MMOL/L (ref 20–33)
COLOR UR: ABNORMAL
CREAT SERPL-MCNC: 1.58 MG/DL (ref 0.5–1.4)
EOSINOPHIL # BLD AUTO: 0 K/UL (ref 0–0.51)
EOSINOPHIL NFR BLD: 0 % (ref 0–6.9)
EPI CELLS #/AREA URNS HPF: ABNORMAL /HPF
ERYTHROCYTE [DISTWIDTH] IN BLOOD BY AUTOMATED COUNT: 46.2 FL (ref 35.9–50)
GFR SERPLBLD CREATININE-BSD FMLA CKD-EPI: 52 ML/MIN/1.73 M 2
GLOBULIN SER CALC-MCNC: 3.3 G/DL (ref 1.9–3.5)
GLUCOSE SERPL-MCNC: 73 MG/DL (ref 65–99)
GLUCOSE UR STRIP.AUTO-MCNC: NEGATIVE MG/DL
HCT VFR BLD AUTO: 51.3 % (ref 42–52)
HGB BLD-MCNC: 16.9 G/DL (ref 14–18)
HYALINE CASTS #/AREA URNS LPF: ABNORMAL /LPF
KETONES UR STRIP.AUTO-MCNC: ABNORMAL MG/DL
LACTATE SERPL-SCNC: 1.6 MMOL/L (ref 0.5–2)
LEUKOCYTE ESTERASE UR QL STRIP.AUTO: ABNORMAL
LYMPHOCYTES # BLD AUTO: 0.68 K/UL (ref 1–4.8)
LYMPHOCYTES NFR BLD: 3.4 % (ref 22–41)
MAGNESIUM SERPL-MCNC: 1.8 MG/DL (ref 1.5–2.5)
MANUAL DIFF BLD: NORMAL
MCH RBC QN AUTO: 29.1 PG (ref 27–33)
MCHC RBC AUTO-ENTMCNC: 32.9 G/DL (ref 32.3–36.5)
MCV RBC AUTO: 88.3 FL (ref 81.4–97.8)
METAMYELOCYTES NFR BLD MANUAL: 2.6 %
MICRO URNS: ABNORMAL
MONOCYTES # BLD AUTO: 0.86 K/UL (ref 0–0.85)
MONOCYTES NFR BLD AUTO: 4.3 % (ref 0–13.4)
MORPHOLOGY BLD-IMP: NORMAL
MYELOCYTES NFR BLD MANUAL: 1.7 %
NEUTROPHILS # BLD AUTO: 17.6 K/UL (ref 1.82–7.42)
NEUTROPHILS NFR BLD: 78.6 % (ref 44–72)
NEUTS BAND NFR BLD MANUAL: 9.4 % (ref 0–10)
NITRITE UR QL STRIP.AUTO: POSITIVE
NRBC # BLD AUTO: 0 K/UL
NRBC BLD-RTO: 0 /100 WBC (ref 0–0.2)
PH UR STRIP.AUTO: 5 [PH] (ref 5–8)
PHOSPHATE SERPL-MCNC: 3.1 MG/DL (ref 2.5–4.5)
PLATELET # BLD AUTO: 225 K/UL (ref 164–446)
PLATELET BLD QL SMEAR: NORMAL
PMV BLD AUTO: 10.4 FL (ref 9–12.9)
POIKILOCYTOSIS BLD QL SMEAR: NORMAL
POTASSIUM SERPL-SCNC: 3.3 MMOL/L (ref 3.6–5.5)
PROT SERPL-MCNC: 6.5 G/DL (ref 6–8.2)
PROT UR QL STRIP: 30 MG/DL
RBC # BLD AUTO: 5.81 M/UL (ref 4.7–6.1)
RBC # URNS HPF: ABNORMAL /HPF
RBC BLD AUTO: PRESENT
RBC UR QL AUTO: NEGATIVE
RENAL EPI CELLS #/AREA URNS HPF: NEGATIVE /HPF
SODIUM SERPL-SCNC: 133 MMOL/L (ref 135–145)
SP GR UR STRIP.AUTO: 1.04
UROBILINOGEN UR STRIP.AUTO-MCNC: 1 MG/DL
WBC # BLD AUTO: 20 K/UL (ref 4.8–10.8)
WBC #/AREA URNS HPF: ABNORMAL /HPF

## 2023-08-12 PROCEDURE — A9270 NON-COVERED ITEM OR SERVICE: HCPCS

## 2023-08-12 PROCEDURE — 700105 HCHG RX REV CODE 258: Mod: JZ

## 2023-08-12 PROCEDURE — 99233 SBSQ HOSP IP/OBS HIGH 50: CPT | Mod: GC | Performed by: INTERNAL MEDICINE

## 2023-08-12 PROCEDURE — 80053 COMPREHEN METABOLIC PANEL: CPT

## 2023-08-12 PROCEDURE — A9270 NON-COVERED ITEM OR SERVICE: HCPCS | Performed by: HOSPITALIST

## 2023-08-12 PROCEDURE — 51798 US URINE CAPACITY MEASURE: CPT

## 2023-08-12 PROCEDURE — 85025 COMPLETE CBC W/AUTO DIFF WBC: CPT

## 2023-08-12 PROCEDURE — 82550 ASSAY OF CK (CPK): CPT

## 2023-08-12 PROCEDURE — 700105 HCHG RX REV CODE 258

## 2023-08-12 PROCEDURE — 83735 ASSAY OF MAGNESIUM: CPT

## 2023-08-12 PROCEDURE — 700102 HCHG RX REV CODE 250 W/ 637 OVERRIDE(OP): Mod: JZ | Performed by: INTERNAL MEDICINE

## 2023-08-12 PROCEDURE — 83605 ASSAY OF LACTIC ACID: CPT

## 2023-08-12 PROCEDURE — 770020 HCHG ROOM/CARE - TELE (206)

## 2023-08-12 PROCEDURE — 700111 HCHG RX REV CODE 636 W/ 250 OVERRIDE (IP)

## 2023-08-12 PROCEDURE — 81001 URINALYSIS AUTO W/SCOPE: CPT

## 2023-08-12 PROCEDURE — 700102 HCHG RX REV CODE 250 W/ 637 OVERRIDE(OP)

## 2023-08-12 PROCEDURE — 36415 COLL VENOUS BLD VENIPUNCTURE: CPT

## 2023-08-12 PROCEDURE — 85007 BL SMEAR W/DIFF WBC COUNT: CPT

## 2023-08-12 PROCEDURE — 700102 HCHG RX REV CODE 250 W/ 637 OVERRIDE(OP): Performed by: HOSPITALIST

## 2023-08-12 PROCEDURE — A9270 NON-COVERED ITEM OR SERVICE: HCPCS | Mod: JZ | Performed by: INTERNAL MEDICINE

## 2023-08-12 PROCEDURE — 87040 BLOOD CULTURE FOR BACTERIA: CPT | Mod: 91

## 2023-08-12 PROCEDURE — 84100 ASSAY OF PHOSPHORUS: CPT

## 2023-08-12 RX ORDER — OXYCODONE HYDROCHLORIDE 10 MG/1
10 TABLET ORAL
Status: DISCONTINUED | OUTPATIENT
Start: 2023-08-12 | End: 2023-08-16

## 2023-08-12 RX ORDER — ONDANSETRON 2 MG/ML
4 INJECTION INTRAMUSCULAR; INTRAVENOUS EVERY 6 HOURS PRN
Status: DISCONTINUED | OUTPATIENT
Start: 2023-08-13 | End: 2023-08-29 | Stop reason: HOSPADM

## 2023-08-12 RX ORDER — POLYETHYLENE GLYCOL 3350 17 G/17G
1 POWDER, FOR SOLUTION ORAL DAILY
Status: DISCONTINUED | OUTPATIENT
Start: 2023-08-13 | End: 2023-08-12

## 2023-08-12 RX ORDER — CALCIUM CARBONATE 500 MG/1
1000 TABLET, CHEWABLE ORAL 3 TIMES DAILY PRN
Status: DISCONTINUED | OUTPATIENT
Start: 2023-08-12 | End: 2023-08-29 | Stop reason: HOSPADM

## 2023-08-12 RX ORDER — BISACODYL 10 MG
10 SUPPOSITORY, RECTAL RECTAL
Status: DISCONTINUED | OUTPATIENT
Start: 2023-08-12 | End: 2023-08-12

## 2023-08-12 RX ORDER — CLINDAMYCIN HYDROCHLORIDE 150 MG/1
150 CAPSULE ORAL EVERY 6 HOURS
Status: DISCONTINUED | OUTPATIENT
Start: 2023-08-12 | End: 2023-08-13

## 2023-08-12 RX ORDER — PROCHLORPERAZINE MALEATE 5 MG/1
5 TABLET ORAL EVERY 6 HOURS PRN
Status: DISCONTINUED | OUTPATIENT
Start: 2023-08-12 | End: 2023-08-12

## 2023-08-12 RX ORDER — SODIUM CHLORIDE 9 MG/ML
INJECTION, SOLUTION INTRAVENOUS CONTINUOUS
Status: DISCONTINUED | OUTPATIENT
Start: 2023-08-12 | End: 2023-08-12

## 2023-08-12 RX ORDER — AMOXICILLIN 250 MG
2 CAPSULE ORAL 2 TIMES DAILY
Status: DISCONTINUED | OUTPATIENT
Start: 2023-08-12 | End: 2023-08-12

## 2023-08-12 RX ORDER — BACLOFEN 10 MG/1
5 TABLET ORAL 3 TIMES DAILY
Status: DISCONTINUED | OUTPATIENT
Start: 2023-08-12 | End: 2023-08-18

## 2023-08-12 RX ORDER — PROMETHAZINE HYDROCHLORIDE 25 MG/1
25 TABLET ORAL EVERY 6 HOURS PRN
Status: DISCONTINUED | OUTPATIENT
Start: 2023-08-12 | End: 2023-08-29 | Stop reason: HOSPADM

## 2023-08-12 RX ORDER — SODIUM CHLORIDE, SODIUM LACTATE, POTASSIUM CHLORIDE, CALCIUM CHLORIDE 600; 310; 30; 20 MG/100ML; MG/100ML; MG/100ML; MG/100ML
INJECTION, SOLUTION INTRAVENOUS CONTINUOUS
Status: DISCONTINUED | OUTPATIENT
Start: 2023-08-12 | End: 2023-08-13

## 2023-08-12 RX ORDER — METAXALONE 800 MG/1
400 TABLET ORAL 3 TIMES DAILY
Status: DISCONTINUED | OUTPATIENT
Start: 2023-08-12 | End: 2023-08-29 | Stop reason: HOSPADM

## 2023-08-12 RX ORDER — POLYETHYLENE GLYCOL 3350 17 G/17G
1 POWDER, FOR SOLUTION ORAL 2 TIMES DAILY
Status: DISCONTINUED | OUTPATIENT
Start: 2023-08-12 | End: 2023-08-29 | Stop reason: HOSPADM

## 2023-08-12 RX ORDER — ACETAMINOPHEN 500 MG
1000 TABLET ORAL 3 TIMES DAILY
Status: DISCONTINUED | OUTPATIENT
Start: 2023-08-12 | End: 2023-08-29 | Stop reason: HOSPADM

## 2023-08-12 RX ORDER — BISACODYL 10 MG
10 SUPPOSITORY, RECTAL RECTAL
Status: DISCONTINUED | OUTPATIENT
Start: 2023-08-12 | End: 2023-08-29 | Stop reason: HOSPADM

## 2023-08-12 RX ORDER — HEPARIN SODIUM 5000 [USP'U]/ML
5000 INJECTION, SOLUTION INTRAVENOUS; SUBCUTANEOUS EVERY 8 HOURS
Status: DISPENSED | OUTPATIENT
Start: 2023-08-12 | End: 2023-08-28

## 2023-08-12 RX ORDER — POTASSIUM CHLORIDE 20 MEQ/1
40 TABLET, EXTENDED RELEASE ORAL ONCE
Status: COMPLETED | OUTPATIENT
Start: 2023-08-12 | End: 2023-08-12

## 2023-08-12 RX ORDER — ONDANSETRON 4 MG/1
4 TABLET, ORALLY DISINTEGRATING ORAL EVERY 4 HOURS PRN
Status: DISCONTINUED | OUTPATIENT
Start: 2023-08-12 | End: 2023-08-12

## 2023-08-12 RX ORDER — HYDROMORPHONE HYDROCHLORIDE 1 MG/ML
0.5 INJECTION, SOLUTION INTRAMUSCULAR; INTRAVENOUS; SUBCUTANEOUS
Status: DISCONTINUED | OUTPATIENT
Start: 2023-08-12 | End: 2023-08-16

## 2023-08-12 RX ORDER — IBUPROFEN 600 MG/1
600 TABLET ORAL EVERY 8 HOURS
Status: DISCONTINUED | OUTPATIENT
Start: 2023-08-12 | End: 2023-08-12

## 2023-08-12 RX ORDER — OXYCODONE HYDROCHLORIDE 5 MG/1
5 TABLET ORAL
Status: DISCONTINUED | OUTPATIENT
Start: 2023-08-12 | End: 2023-08-16

## 2023-08-12 RX ORDER — IBUPROFEN 600 MG/1
600 TABLET ORAL 3 TIMES DAILY
Status: DISCONTINUED | OUTPATIENT
Start: 2023-08-12 | End: 2023-08-12

## 2023-08-12 RX ORDER — AMOXICILLIN 250 MG
2 CAPSULE ORAL 2 TIMES DAILY
Status: DISCONTINUED | OUTPATIENT
Start: 2023-08-12 | End: 2023-08-29 | Stop reason: HOSPADM

## 2023-08-12 RX ADMIN — HYDROMORPHONE HYDROCHLORIDE 0.5 MG: 1 INJECTION, SOLUTION INTRAMUSCULAR; INTRAVENOUS; SUBCUTANEOUS at 13:00

## 2023-08-12 RX ADMIN — OXYCODONE HYDROCHLORIDE 10 MG: 10 TABLET ORAL at 22:40

## 2023-08-12 RX ADMIN — OXYCODONE HYDROCHLORIDE 10 MG: 10 TABLET ORAL at 05:16

## 2023-08-12 RX ADMIN — METAXALONE 400 MG: 800 TABLET ORAL at 09:32

## 2023-08-12 RX ADMIN — MAGNESIUM HYDROXIDE 30 ML: 400 SUSPENSION ORAL at 08:20

## 2023-08-12 RX ADMIN — HYDROMORPHONE HYDROCHLORIDE 0.5 MG: 1 INJECTION, SOLUTION INTRAMUSCULAR; INTRAVENOUS; SUBCUTANEOUS at 03:23

## 2023-08-12 RX ADMIN — ACETAMINOPHEN 650 MG: 325 TABLET, FILM COATED ORAL at 05:16

## 2023-08-12 RX ADMIN — ONDANSETRON 4 MG: 4 TABLET, ORALLY DISINTEGRATING ORAL at 06:34

## 2023-08-12 RX ADMIN — HYDROMORPHONE HYDROCHLORIDE 0.5 MG: 1 INJECTION, SOLUTION INTRAMUSCULAR; INTRAVENOUS; SUBCUTANEOUS at 20:33

## 2023-08-12 RX ADMIN — OMEPRAZOLE 20 MG: 20 CAPSULE, DELAYED RELEASE ORAL at 05:16

## 2023-08-12 RX ADMIN — HYDROMORPHONE HYDROCHLORIDE 0.5 MG: 1 INJECTION, SOLUTION INTRAMUSCULAR; INTRAVENOUS; SUBCUTANEOUS at 16:07

## 2023-08-12 RX ADMIN — POTASSIUM CHLORIDE 40 MEQ: 1500 TABLET, EXTENDED RELEASE ORAL at 06:50

## 2023-08-12 RX ADMIN — CEFAZOLIN 2 G: 2 INJECTION, POWDER, FOR SOLUTION INTRAMUSCULAR; INTRAVENOUS at 05:26

## 2023-08-12 RX ADMIN — ONDANSETRON 4 MG: 4 TABLET, ORALLY DISINTEGRATING ORAL at 20:33

## 2023-08-12 RX ADMIN — ACETAMINOPHEN 1000 MG: 500 TABLET, FILM COATED ORAL at 11:36

## 2023-08-12 RX ADMIN — HEPARIN SODIUM 5000 UNITS: 5000 INJECTION, SOLUTION INTRAVENOUS; SUBCUTANEOUS at 22:02

## 2023-08-12 RX ADMIN — HYDROMORPHONE HYDROCHLORIDE 0.25 MG: 1 INJECTION, SOLUTION INTRAMUSCULAR; INTRAVENOUS; SUBCUTANEOUS at 00:26

## 2023-08-12 RX ADMIN — OXYCODONE HYDROCHLORIDE 10 MG: 10 TABLET ORAL at 19:24

## 2023-08-12 RX ADMIN — IBUPROFEN 600 MG: 600 TABLET, FILM COATED ORAL at 05:16

## 2023-08-12 RX ADMIN — SODIUM CHLORIDE, POTASSIUM CHLORIDE, SODIUM LACTATE AND CALCIUM CHLORIDE: 600; 310; 30; 20 INJECTION, SOLUTION INTRAVENOUS at 17:55

## 2023-08-12 RX ADMIN — SODIUM CHLORIDE, POTASSIUM CHLORIDE, SODIUM LACTATE AND CALCIUM CHLORIDE: 600; 310; 30; 20 INJECTION, SOLUTION INTRAVENOUS at 06:50

## 2023-08-12 RX ADMIN — ONDANSETRON 4 MG: 4 TABLET, ORALLY DISINTEGRATING ORAL at 17:48

## 2023-08-12 RX ADMIN — SENNOSIDES AND DOCUSATE SODIUM 2 TABLET: 50; 8.6 TABLET ORAL at 05:17

## 2023-08-12 RX ADMIN — OXYCODONE HYDROCHLORIDE 10 MG: 10 TABLET ORAL at 14:39

## 2023-08-12 RX ADMIN — METAXALONE 400 MG: 800 TABLET ORAL at 17:40

## 2023-08-12 RX ADMIN — CEFAZOLIN 2 G: 2 INJECTION, POWDER, FOR SOLUTION INTRAMUSCULAR; INTRAVENOUS at 13:08

## 2023-08-12 RX ADMIN — ACETAMINOPHEN 1000 MG: 500 TABLET, FILM COATED ORAL at 17:40

## 2023-08-12 RX ADMIN — BACLOFEN 5 MG: 10 TABLET ORAL at 20:33

## 2023-08-12 RX ADMIN — CEFAZOLIN 2 G: 2 INJECTION, POWDER, FOR SOLUTION INTRAMUSCULAR; INTRAVENOUS at 22:13

## 2023-08-12 RX ADMIN — METAXALONE 400 MG: 800 TABLET ORAL at 11:37

## 2023-08-12 RX ADMIN — POLYETHYLENE GLYCOL 3350 1 PACKET: 17 POWDER, FOR SOLUTION ORAL at 05:18

## 2023-08-12 RX ADMIN — OXYCODONE HYDROCHLORIDE 10 MG: 10 TABLET ORAL at 11:36

## 2023-08-12 RX ADMIN — ZOLPIDEM TARTRATE 10 MG: 5 TABLET ORAL at 22:02

## 2023-08-12 RX ADMIN — NICOTINE 14 MG: 14 PATCH TRANSDERMAL at 05:17

## 2023-08-12 RX ADMIN — OXYCODONE HYDROCHLORIDE 5 MG: 5 TABLET ORAL at 02:21

## 2023-08-12 RX ADMIN — CLINDAMYCIN HYDROCHLORIDE 150 MG: 150 CAPSULE ORAL at 21:25

## 2023-08-12 RX ADMIN — HYDROMORPHONE HYDROCHLORIDE 0.5 MG: 1 INJECTION, SOLUTION INTRAMUSCULAR; INTRAVENOUS; SUBCUTANEOUS at 06:34

## 2023-08-12 ASSESSMENT — FIBROSIS 4 INDEX
FIB4 SCORE: 2.74
FIB4 SCORE: 1.4

## 2023-08-12 ASSESSMENT — PAIN DESCRIPTION - PAIN TYPE
TYPE: ACUTE PAIN

## 2023-08-12 ASSESSMENT — PATIENT HEALTH QUESTIONNAIRE - PHQ9
1. LITTLE INTEREST OR PLEASURE IN DOING THINGS: NOT AT ALL
SUM OF ALL RESPONSES TO PHQ9 QUESTIONS 1 AND 2: 0
1. LITTLE INTEREST OR PLEASURE IN DOING THINGS: NOT AT ALL
SUM OF ALL RESPONSES TO PHQ9 QUESTIONS 1 AND 2: 0
2. FEELING DOWN, DEPRESSED, IRRITABLE, OR HOPELESS: NOT AT ALL
2. FEELING DOWN, DEPRESSED, IRRITABLE, OR HOPELESS: NOT AT ALL

## 2023-08-12 ASSESSMENT — ENCOUNTER SYMPTOMS
CHILLS: 0
FEVER: 0
CONSTIPATION: 1
COUGH: 0
MYALGIAS: 1
SHORTNESS OF BREATH: 0

## 2023-08-12 NOTE — PROGRESS NOTES
Critical labs in; CPK 1675, K from 4.2 down to 3.3. Updated Dr. Campos; Blood CS x 2, Lactic level stat ordered.

## 2023-08-12 NOTE — PROGRESS NOTES
Telemetry Report:      ST  103-106  PAC, PVC  0.12/0.09/0.31    Per [employer] from Monitor Room

## 2023-08-12 NOTE — PROGRESS NOTES
[facility] Internal Medicine Daily Progress Note    Date of Service  8/12/2023    [facility] Team: R IM White Team   Attending: Mary Mcclain M.d.  Senior Resident: Dr. Ayala  Contact Number: [phone]    Chief Complaint  Abhishek Pacheco is a 53 y.o. male admitted 8/10/2023 with cellulitis of left buttock, likely secondary to home testosterone injection.    Hospital Course  Mr. Abhishek Pacheco is a 53yoM with chronic migraine (on Sumtatriptan), obesity (BMI 31.70), KACY (on nightly CPAP; s/p uvuloplasty, 10/2011) who presented 8/10/2023 with 3-day history of worsening left buttock pain and edema which started 2 days after injecting himself with testosterone in same region. The pain is constant, rated 8/10, worse with movement, and minimally improved with Tramadol and Prednisone (prescribed by PCP). He also reports chills, headache, and night sweats.      Patient reports taking testosterone shots thrice monthly for last 2 years. Exam is notable for generalized discomfort, clammy skin, tachycardia (to 112 bpm), hypoxemia (84-85% on RA), bilateral crackles in middle lung fields. No tenderness to abdominal palpation. His left buttock is somewhat tense, swollen, non-erythematous, moderately tender at gluteal cleft.      Labs show severe leukocytosis (33.7 K/uL, 86.3% PMNs), mildly elevated total bilirubin. CT pelvis (8/10/2023) shows subcutaneous inflammatory changes with enlargement of left gluteal musculature. CXR (8/10/2023) shows mildly thickened interlobular septa.      He receives Ancef 2g x1 in ED and is admitted for sepsis due to cellulitis of left buttock.     8/11: persistent pain, prn pain meds added      Interval Problem Update  8/12: Overnight, patient reported persistent pain uncontrolled by the prn oxycodone 2.5/5 mg and therefore required multiple doses of IV Dilaudid for breakthrough pain. PRN pain medications increased to try and better control pain.  Erythema now covering entire left  buttock, but no evidence to suggest new abscess formation.  WBC downtrending.  Continue IV Ancef for now.  - Patient also noted to have CPK 1400 and worsening creatinine, supportive of mild rhabdomyolysis.  IV fluids added and medications renally adjusted.    I have discussed this patient's plan of care and discharge plan at IDT rounds today with Case Management, Nursing, Nursing leadership, and other members of the IDT team.    Consultants/Specialty  N/A    Code Status  DNAR/DNI    Disposition  The patient is not medically cleared for discharge to home or a post-acute facility.      I have placed the appropriate orders for post-discharge needs.    Review of Systems  Review of Systems   Constitutional:  Negative for chills and fever.   Respiratory:  Negative for cough and shortness of breath.    Cardiovascular:  Negative for chest pain.   Gastrointestinal:  Positive for constipation.   Genitourinary:  Negative for dysuria.   Musculoskeletal:  Positive for myalgias (Left calf and left buttock).   Skin:  Positive for rash (Erythema of left buttock).        Physical Exam  Temp:  [36 °C (96.8 °F)-36.8 °C (98.2 °F)] 36.2 °C (97.2 °F)  Pulse:  [] 94  Resp:  [16-20] 20  BP: ()/(60-83) 113/73  SpO2:  [92 %-97 %] 97 %    Physical Exam  Vitals and nursing note reviewed.   Constitutional:       General: He is in acute distress.      Appearance: He is obese.   HENT:      Head: Normocephalic and atraumatic.   Eyes:      General: No scleral icterus.     Extraocular Movements: Extraocular movements intact.      Conjunctiva/sclera: Conjunctivae normal.   Cardiovascular:      Rate and Rhythm: Normal rate and regular rhythm.      Pulses: Normal pulses.      Heart sounds: Normal heart sounds.   Pulmonary:      Effort: Pulmonary effort is normal. No respiratory distress.      Breath sounds: Normal breath sounds. Rhonchi: diffusely heard.   Abdominal:      General: Abdomen is flat. There is no distension.      Palpations:  Abdomen is soft.   Musculoskeletal:         General: Tenderness (Tenderness of left calf and left buttock with palpation.) present.      Cervical back: Normal range of motion.      Right lower leg: No edema.      Left lower leg: No edema.   Skin:     Capillary Refill: Capillary refill takes less than 2 seconds.      Coloration: Skin is not jaundiced.      Findings: Erythema (Erythema of left buttock) and rash (left buttock) present.   Neurological:      General: No focal deficit present.      Mental Status: He is alert. Mental status is at baseline.      Motor: No weakness.   Psychiatric:         Mood and Affect: Mood normal.         Behavior: Behavior normal.         Fluids    Intake/Output Summary (Last 24 hours) at 8/12/2023 1042  Last data filed at 8/12/2023 0526  Gross per 24 hour   Intake 1600 ml   Output --   Net 1600 ml       Laboratory  Recent Labs     08/10/23  1915 08/11/23  0130 08/12/23  0216   WBC 33.7* 32.3* 20.0*   RBC 5.94 5.75 5.81   HEMOGLOBIN 17.5 17.1 16.9   HEMATOCRIT 52.4* 50.2 51.3   MCV 88.2 87.3 88.3   MCH 29.5 29.7 29.1   MCHC 33.4 34.1 32.9   RDW 46.4 46.3 46.2   PLATELETCT 238 251 225   MPV 10.0 10.2 10.4     Recent Labs     08/10/23  1915 08/11/23  0130 08/12/23  0216   SODIUM 134* 131* 133*   POTASSIUM 4.3 4.2 3.3*   CHLORIDE 96 93* 94*   CO2 22 21 24   GLUCOSE 105* 147* 73   BUN 13 19 35*   CREATININE 1.04 1.23 1.58*   CALCIUM 9.1 9.1 8.6                   Imaging  DX-CHEST-PORTABLE (1 VIEW)   Final Result      No acute cardiac or pulmonary abnormalities are identified.      CT-PELVIS WITH   Final Result      1.  Subcutaneous inflammatory changes about the left buttock suspicious for cellulitis.      2.  Enlargement of left gluteal musculature consistent with myositis and/or inflammatory change.           Assessment/Plan  Problem Representation:    * Cellulitis of left buttock- (present on admission)  Assessment & Plan  Reports 3-day history of left buttock pain after recent injection  with testosterone at left buttock, which is tense, swollen, and moderately tender. CT pelvis (8/10/2023) shows subcutaneous inflammatory changes. enlargement of left gluteal musculature.   - Blood cultures x2 (NGTD). Lactic acid WNR.   - IV Ancef q8hrs continued pending cultures/sensitivities.   - Pain control  - If no resolution or improvement after 2 days of abx, will consider repeat imaging to assess for the presence of an abscess    Rhabdomyolysis  Assessment & Plan  CPK 1400  Possibly secondary to mm tension in the setting of severe pain    - IVF  - Treat associated JUDY  - Pain control    JUDY (acute kidney injury) (HCC)  Assessment & Plan  8/11: Creatinine 1.0 admission, now 1.58.  It is likely secondary to systemic infection    - IV fluids  - Renally dose meds  - Avoid nephrotoxins    Insomnia  Assessment & Plan  History of. On Zolpidem at home.  - Resumed.    Acute respiratory failure with hypoxemia (HCC)- (present on admission)  Assessment & Plan  Mild diffuse rhonchi heard on exam. Denies recent cough. CXR shows peripheral Kerley B lines. NT-proBNP slightly elevated.   - IV Lasix 20mg x1 ordered; strict I/Os, daily standing weights ordered.   - RT/OT protocol; wean from oxygen as tolerated.   - Would consider TTE given likelihood of HFpEF in setting of KACY    8/12: resolved, off of o2    Bilirubinemia  Assessment & Plan  Mild. Likely due to sepsis. No evidence of anemia on labs.     8/12: improving    Sepsis due to cellulitis (HCC)- (present on admission)  Assessment & Plan  This is Sepsis Present on admission  SIRS criteria identified on my evaluation include: Tachycardia, with heart rate greater than 90 BPM, Tachypnea, with respirations greater than 20 per minute, Leukocytosis, with WBC greater than 12,000 and Bandemia, greater than 10% bands  Clinical indicators of end organ dysfunction include Acute Liver Failure, with bilirubin greater than 2  Source is cellulitis  Sepsis protocol  "initiated  Crystalloid Fluid Administration: Resuscitation volume of 0mL ordered. Reason that resuscitation volume of less than 30ml/kg was ordered concern for causing harm given pulmonary edema  IV antibiotics as appropriate for source of sepsis: IV Ancef   Reassessment: I have reassessed the patient's hemodynamic status    MRSA nares negative; lactic acid WNR.   - Blood cultures x2 (NGTD)  - Continued on IV Ancef 18hrs      Other constipation  Assessment & Plan  Upon presentation patient reports that he has not had a bowel movement in at least 3 to 4 days.  Scheduled bowel regimen/prep  Evaluate and assess for SBO if still without a bowel movement during hospitalization day 2; currently patient is able to pass gas    8/12: no BM yet, increased bowel regemin    GERD (gastroesophageal reflux disease)  Assessment & Plan  History of. On Pantoprazole at home.  - Resumed.     Tobacco dependence- (present on admission)  Assessment & Plan  Smokes 4-5 \"puffs\" per day. Has 35 pack-years. Previously was on Chantix, but not for last few months.   - Smoking cessation encouraged.   - Nicotine patch ordered.     Sleep apnea- (present on admission)  Assessment & Plan  - RT/OT protocol  - Resume CPAP at home settings    Obesity- (present on admission)  Assessment & Plan  Noted.   - Counseling deferred to primary.         VTE prophylaxis: enoxaparin ppx    I have performed a physical exam and reviewed and updated ROS and Plan today (8/12/2023). In review of yesterday's note (8/11/2023), there are no changes except as documented above.          "

## 2023-08-12 NOTE — ASSESSMENT & PLAN NOTE
Likely secondary to significant muscle injury in context of muscle abscess in context of other problems.  Possibly contributing to acute kidney injury.  CPK downtrending from approximately 2300.  Most recent CPK equals 171.  Creatinine, potassium normal, resolved at this time and no need for continuing to trend.

## 2023-08-12 NOTE — CARE PLAN
The patient is Stable - Low risk of patient condition declining or worsening    Shift Goals  Clinical Goals: pain management, ABX therapy, safety  Patient Goals: pain management, rest  Family Goals: same as pt    Progress made toward(s) clinical / shift goals:  Pt was able to remain free of any falls and demonstrate that they are steady while ambulating to the bathroom.     Patient is not progressing towards the following goals:The pt reports a pain level of 8-10 on the pain scale. Non-pharm methods have also been implemented with no relief.     Problem: Pain - Standard  Goal: Alleviation of pain or a reduction in pain to the patient’s comfort goal  Outcome: Not Met  Flowsheets (Taken 8/12/2023 1615)  Pain Rating Scale (NPRS): 10  Note: Pt reports a pain of 10 on the 0-10 pain scale. Throughout the shift the pt has ranged from 8-10 pain. Pt has been educated on non-pharm pain methods and the pt has been provided ice packs for the left buttock. Reassessment of pain have been completed per protocol. MD notified of pt's pain level and wants to continue PO oxy and IV dilaudid for breakthrough pain.     Problem: Fall Risk  Goal: Patient will remain free from falls  Outcome: Progressing  Note: Pt was able to remain fall free. Pt was steady while ambulating to the bathroom. Fall precautions have been implemented such as the bed alarm is activated, bed locked, and lowered.

## 2023-08-12 NOTE — PROGRESS NOTES
Report received from night shift RN. Pt is restless in bed. All needs are met at this time, will assess. Call light within reach.

## 2023-08-12 NOTE — PROGRESS NOTES
Updated Dr. Reid; patient requesting pain medication be increased. Pain mostly at 9-10 even with IV dilaudid for breakthrough pain.

## 2023-08-12 NOTE — CARE PLAN
The patient is Stable - Low risk of patient condition declining or worsening    Shift Goals  Clinical Goals: Abx, Pain management  Patient Goals: Pain control, Rest  Family Goals: CHRIS    Progress made toward(s) clinical / shift goals:     Problem: Fall Risk  Goal: Patient will remain free from falls     Description  Target End Date: Prior to discharge or change in level of care      Document interventions on the [facility] Fall Risk Assessment   1. Assess for fall risk factors   2. Implement fall precautions     Outcome: Progressing  Note: Encouraged and instructed to call for assistance. Side rails up. Call button within reach. Placed on standby assists.     Problem: Pain - Standard  Goal: Alleviation of pain or a reduction in pain to the patient’s comfort goal     Description  Target End Date: Prior to discharge or change in level of care      Document on Vitals flowsheet   1. Document pain using the appropriate pain scale per order or unit policy   2. Educate and implement non-pharmacologic comfort measures (i.e. relaxation, distraction, massage, cold/heat therapy, etc.)   3. Pain management medications as ordered   4. Reassess pain after pain med administration per policy   5. If opiods administered assess patient's response to pain medication is appropriate per POSS sedation scale   6. Follow pain management plan developed in collaboration with patient and interdisciplinary team (including palliative care or pain specialists if applicable)     Outcome: Progressing  Note: Patient reports pain at left gluteus PS 10/10. PRN Oxycodone 5 mg PO and Dilaudid 0.25 mg IV given. Monitored sedation level and side effects. Patient reports relief from pain after medication administration Continued pain level assessment and monitoring during shift.        Problem: Knowledge Deficit - Standard  Goal: Patient and family/care givers will demonstrate understanding of plan of care, disease process/condition, diagnostic tests  and medications     Description  Target End Date: 1-3 days or as soon as patient condition allows      Document in Patient Education   1. Patient and family/caregiver oriented to unit, equipment, visitation policy and means for communicating concern   2. Complete/review Learning Assessment   3. Assess knowledge level of disease process/condition, treatment plan, diagnostic tests and medications   4. Explain disease process/condition, treatment plan, diagnostic tests and medications     Outcome: Progressing  Note: Patient updated on plan of care, and patient care for shift. Patient able to process instructions and explanations. Patient responsive to commands.    Problem: Respiratory  Goal: Patient will achieve/maintain optimum respiratory ventilation and gas exchange    Description  Target End Date: Prior to discharge or change in level of care     Document on Assessment flowsheet   1. Assess and monitor rate, rhythm, depth and effort of respiration   2. Breath sounds assessed qshift and/or as needed   3. Assess O2 saturation, administer/titrate oxygen as ordered   4. Position patient for maximum ventilatory efficiency   5. Turn, cough, and deep breath with splinting to improve effectiveness   6. Collaborate with RT to administer medication/treatments per order   7. Encourage use of incentive spirometer and encourage patient to cough after use and utilize splinting techniques if applicable   8. Airway suctioning   9. Monitor sputum production for changes in color, consistency and frequency   10. Perform frequent oral hygiene   11. Alternate physical activity with rest periods    Outcome: Progressing  Note: Patient on Room Air. Encouraged patient to turn side to side to promote pulmonary toileting. Encouraged and instructed coughing, deep breathing exercises Patient reports no SOB or Dyspnea.  CPAP/BiPap ordered at night for KACY; patient refused. Oral care kit at bedside.

## 2023-08-12 NOTE — ASSESSMENT & PLAN NOTE
08/15/23    Likely prerenal JUDY due to hypovolemia secondary to sepsis(distributive shock)  Renal function has significantly improved creatinine trending down back to baseline  Medication dose adjustment as per renal functions  Avoid nephrotoxins.  IV fluids  Continue to monitor closely.  8/17/2023 resolved     No pertinent past medical history

## 2023-08-13 ENCOUNTER — ANESTHESIA (OUTPATIENT)
Dept: SURGERY | Facility: MEDICAL CENTER | Age: 53
DRG: 856 | End: 2023-08-13
Payer: COMMERCIAL

## 2023-08-13 ENCOUNTER — ANESTHESIA EVENT (OUTPATIENT)
Dept: SURGERY | Facility: MEDICAL CENTER | Age: 53
DRG: 856 | End: 2023-08-13
Payer: COMMERCIAL

## 2023-08-13 ENCOUNTER — APPOINTMENT (OUTPATIENT)
Dept: RADIOLOGY | Facility: MEDICAL CENTER | Age: 53
DRG: 856 | End: 2023-08-13
Attending: HOSPITALIST
Payer: COMMERCIAL

## 2023-08-13 PROBLEM — N39.0 UTI (URINARY TRACT INFECTION): Status: ACTIVE | Noted: 2023-08-13

## 2023-08-13 PROBLEM — R74.01 TRANSAMINITIS: Status: ACTIVE | Noted: 2023-08-13

## 2023-08-13 PROBLEM — M79.A22 NONTRAUMATIC COMPARTMENT SYNDROME OF LEFT LOWER EXTREMITY: Status: ACTIVE | Noted: 2023-08-13

## 2023-08-13 LAB
ALBUMIN SERPL BCP-MCNC: 2.9 G/DL (ref 3.2–4.9)
ALBUMIN/GLOB SERPL: 0.9 G/DL
ALP SERPL-CCNC: 60 U/L (ref 30–99)
ALT SERPL-CCNC: 71 U/L (ref 2–50)
ANION GAP SERPL CALC-SCNC: 15 MMOL/L (ref 7–16)
AST SERPL-CCNC: 166 U/L (ref 12–45)
BASOPHILS # BLD AUTO: 0 % (ref 0–1.8)
BASOPHILS # BLD: 0 K/UL (ref 0–0.12)
BILIRUB SERPL-MCNC: 1.8 MG/DL (ref 0.1–1.5)
BUN SERPL-MCNC: 34 MG/DL (ref 8–22)
CALCIUM ALBUM COR SERPL-MCNC: 9.5 MG/DL (ref 8.5–10.5)
CALCIUM SERPL-MCNC: 8.6 MG/DL (ref 8.5–10.5)
CHLORIDE SERPL-SCNC: 92 MMOL/L (ref 96–112)
CK SERPL-CCNC: 2391 U/L (ref 0–154)
CO2 SERPL-SCNC: 24 MMOL/L (ref 20–33)
CREAT SERPL-MCNC: 1.26 MG/DL (ref 0.5–1.4)
EOSINOPHIL # BLD AUTO: 0 K/UL (ref 0–0.51)
EOSINOPHIL NFR BLD: 0 % (ref 0–6.9)
ERYTHROCYTE [DISTWIDTH] IN BLOOD BY AUTOMATED COUNT: 46.3 FL (ref 35.9–50)
FUNGUS SPEC FUNGUS STN: NORMAL
GFR SERPLBLD CREATININE-BSD FMLA CKD-EPI: 69 ML/MIN/1.73 M 2
GLOBULIN SER CALC-MCNC: 3.4 G/DL (ref 1.9–3.5)
GLUCOSE SERPL-MCNC: 92 MG/DL (ref 65–99)
GRAM STN SPEC: ABNORMAL
GRAM STN SPEC: NORMAL
GRAM STN SPEC: NORMAL
HCT VFR BLD AUTO: 49 % (ref 42–52)
HGB BLD-MCNC: 16.6 G/DL (ref 14–18)
LYMPHOCYTES # BLD AUTO: 1.31 K/UL (ref 1–4.8)
LYMPHOCYTES NFR BLD: 6.5 % (ref 22–41)
MAGNESIUM SERPL-MCNC: 2.2 MG/DL (ref 1.5–2.5)
MANUAL DIFF BLD: NORMAL
MCH RBC QN AUTO: 29.2 PG (ref 27–33)
MCHC RBC AUTO-ENTMCNC: 33.9 G/DL (ref 32.3–36.5)
MCV RBC AUTO: 86.3 FL (ref 81.4–97.8)
METAMYELOCYTES NFR BLD MANUAL: 1.6 %
MONOCYTES # BLD AUTO: 1.64 K/UL (ref 0–0.85)
MONOCYTES NFR BLD AUTO: 8.1 % (ref 0–13.4)
MORPHOLOGY BLD-IMP: NORMAL
MYELOCYTES NFR BLD MANUAL: 2.5 %
NEUTROPHILS # BLD AUTO: 16.42 K/UL (ref 1.82–7.42)
NEUTROPHILS NFR BLD: 81.3 % (ref 44–72)
NRBC # BLD AUTO: 0 K/UL
NRBC BLD-RTO: 0 /100 WBC (ref 0–0.2)
PHOSPHATE SERPL-MCNC: 3.1 MG/DL (ref 2.5–4.5)
PLATELET # BLD AUTO: 199 K/UL (ref 164–446)
PLATELET BLD QL SMEAR: NORMAL
PMV BLD AUTO: 10.6 FL (ref 9–12.9)
POTASSIUM SERPL-SCNC: 4.3 MMOL/L (ref 3.6–5.5)
PROT SERPL-MCNC: 6.3 G/DL (ref 6–8.2)
RBC # BLD AUTO: 5.68 M/UL (ref 4.7–6.1)
RBC BLD AUTO: NORMAL
SIGNIFICANT IND 70042: ABNORMAL
SIGNIFICANT IND 70042: NORMAL
SITE SITE: ABNORMAL
SITE SITE: NORMAL
SODIUM SERPL-SCNC: 131 MMOL/L (ref 135–145)
SOURCE SOURCE: ABNORMAL
SOURCE SOURCE: NORMAL
WBC # BLD AUTO: 20.2 K/UL (ref 4.8–10.8)

## 2023-08-13 PROCEDURE — 700105 HCHG RX REV CODE 258: Mod: JZ

## 2023-08-13 PROCEDURE — 73701 CT LOWER EXTREMITY W/DYE: CPT | Mod: LT

## 2023-08-13 PROCEDURE — 87186 SC STD MICRODIL/AGAR DIL: CPT

## 2023-08-13 PROCEDURE — 83735 ASSAY OF MAGNESIUM: CPT

## 2023-08-13 PROCEDURE — 700105 HCHG RX REV CODE 258

## 2023-08-13 PROCEDURE — 160009 HCHG ANES TIME/MIN: Performed by: ORTHOPAEDIC SURGERY

## 2023-08-13 PROCEDURE — 85025 COMPLETE CBC W/AUTO DIFF WBC: CPT

## 2023-08-13 PROCEDURE — 160038 HCHG SURGERY MINUTES - EA ADDL 1 MIN LEVEL 2: Performed by: ORTHOPAEDIC SURGERY

## 2023-08-13 PROCEDURE — 0KBP0ZZ EXCISION OF LEFT HIP MUSCLE, OPEN APPROACH: ICD-10-PCS | Performed by: ORTHOPAEDIC SURGERY

## 2023-08-13 PROCEDURE — 160002 HCHG RECOVERY MINUTES (STAT): Performed by: ORTHOPAEDIC SURGERY

## 2023-08-13 PROCEDURE — 700111 HCHG RX REV CODE 636 W/ 250 OVERRIDE (IP): Mod: JZ

## 2023-08-13 PROCEDURE — 700117 HCHG RX CONTRAST REV CODE 255: Performed by: HOSPITALIST

## 2023-08-13 PROCEDURE — 80053 COMPREHEN METABOLIC PANEL: CPT

## 2023-08-13 PROCEDURE — 88304 TISSUE EXAM BY PATHOLOGIST: CPT

## 2023-08-13 PROCEDURE — 87205 SMEAR GRAM STAIN: CPT

## 2023-08-13 PROCEDURE — 99233 SBSQ HOSP IP/OBS HIGH 50: CPT | Mod: GC | Performed by: INTERNAL MEDICINE

## 2023-08-13 PROCEDURE — 700111 HCHG RX REV CODE 636 W/ 250 OVERRIDE (IP): Mod: JZ | Performed by: ANESTHESIOLOGY

## 2023-08-13 PROCEDURE — 700102 HCHG RX REV CODE 250 W/ 637 OVERRIDE(OP)

## 2023-08-13 PROCEDURE — A9270 NON-COVERED ITEM OR SERVICE: HCPCS

## 2023-08-13 PROCEDURE — 160048 HCHG OR STATISTICAL LEVEL 1-5: Performed by: ORTHOPAEDIC SURGERY

## 2023-08-13 PROCEDURE — 87075 CULTR BACTERIA EXCEPT BLOOD: CPT

## 2023-08-13 PROCEDURE — 160036 HCHG PACU - EA ADDL 30 MINS PHASE I: Performed by: ORTHOPAEDIC SURGERY

## 2023-08-13 PROCEDURE — 87102 FUNGUS ISOLATION CULTURE: CPT

## 2023-08-13 PROCEDURE — 700105 HCHG RX REV CODE 258: Mod: JZ | Performed by: ANESTHESIOLOGY

## 2023-08-13 PROCEDURE — 87077 CULTURE AEROBIC IDENTIFY: CPT

## 2023-08-13 PROCEDURE — 700111 HCHG RX REV CODE 636 W/ 250 OVERRIDE (IP)

## 2023-08-13 PROCEDURE — 110371 HCHG SHELL REV 272: Performed by: ORTHOPAEDIC SURGERY

## 2023-08-13 PROCEDURE — 160035 HCHG PACU - 1ST 60 MINS PHASE I: Performed by: ORTHOPAEDIC SURGERY

## 2023-08-13 PROCEDURE — 770000 HCHG ROOM/CARE - INTERMEDIATE ICU *

## 2023-08-13 PROCEDURE — 87070 CULTURE OTHR SPECIMN AEROBIC: CPT | Mod: 91

## 2023-08-13 PROCEDURE — 87015 SPECIMEN INFECT AGNT CONCNTJ: CPT | Mod: 91

## 2023-08-13 PROCEDURE — 87116 MYCOBACTERIA CULTURE: CPT

## 2023-08-13 PROCEDURE — 160027 HCHG SURGERY MINUTES - 1ST 30 MINS LEVEL 2: Performed by: ORTHOPAEDIC SURGERY

## 2023-08-13 PROCEDURE — 700102 HCHG RX REV CODE 250 W/ 637 OVERRIDE(OP): Performed by: ANESTHESIOLOGY

## 2023-08-13 PROCEDURE — 82550 ASSAY OF CK (CPK): CPT

## 2023-08-13 PROCEDURE — 87206 SMEAR FLUORESCENT/ACID STAI: CPT

## 2023-08-13 PROCEDURE — 700111 HCHG RX REV CODE 636 W/ 250 OVERRIDE (IP): Performed by: ANESTHESIOLOGY

## 2023-08-13 PROCEDURE — 700101 HCHG RX REV CODE 250: Performed by: ANESTHESIOLOGY

## 2023-08-13 PROCEDURE — 72193 CT PELVIS W/DYE: CPT

## 2023-08-13 PROCEDURE — 85007 BL SMEAR W/DIFF WBC COUNT: CPT

## 2023-08-13 PROCEDURE — A9270 NON-COVERED ITEM OR SERVICE: HCPCS | Performed by: ANESTHESIOLOGY

## 2023-08-13 PROCEDURE — 700105 HCHG RX REV CODE 258: Mod: JZ | Performed by: HOSPITALIST

## 2023-08-13 PROCEDURE — 84100 ASSAY OF PHOSPHORUS: CPT

## 2023-08-13 PROCEDURE — 700111 HCHG RX REV CODE 636 W/ 250 OVERRIDE (IP): Mod: JZ | Performed by: HOSPITALIST

## 2023-08-13 RX ORDER — LINEZOLID 2 MG/ML
600 INJECTION, SOLUTION INTRAVENOUS EVERY 12 HOURS
Status: DISCONTINUED | OUTPATIENT
Start: 2023-08-13 | End: 2023-08-15

## 2023-08-13 RX ORDER — SODIUM CHLORIDE, SODIUM LACTATE, POTASSIUM CHLORIDE, CALCIUM CHLORIDE 600; 310; 30; 20 MG/100ML; MG/100ML; MG/100ML; MG/100ML
INJECTION, SOLUTION INTRAVENOUS CONTINUOUS
Status: DISCONTINUED | OUTPATIENT
Start: 2023-08-13 | End: 2023-08-14

## 2023-08-13 RX ORDER — ONDANSETRON 2 MG/ML
INJECTION INTRAMUSCULAR; INTRAVENOUS PRN
Status: DISCONTINUED | OUTPATIENT
Start: 2023-08-13 | End: 2023-08-13 | Stop reason: SURG

## 2023-08-13 RX ORDER — KETAMINE HYDROCHLORIDE 50 MG/ML
INJECTION, SOLUTION, CONCENTRATE INTRAMUSCULAR; INTRAVENOUS PRN
Status: DISCONTINUED | OUTPATIENT
Start: 2023-08-13 | End: 2023-08-13 | Stop reason: SURG

## 2023-08-13 RX ORDER — SODIUM CHLORIDE, SODIUM LACTATE, POTASSIUM CHLORIDE, CALCIUM CHLORIDE 600; 310; 30; 20 MG/100ML; MG/100ML; MG/100ML; MG/100ML
INJECTION, SOLUTION INTRAVENOUS CONTINUOUS
Status: DISCONTINUED | OUTPATIENT
Start: 2023-08-13 | End: 2023-08-13 | Stop reason: HOSPADM

## 2023-08-13 RX ORDER — HYDROMORPHONE HYDROCHLORIDE 1 MG/ML
0.4 INJECTION, SOLUTION INTRAMUSCULAR; INTRAVENOUS; SUBCUTANEOUS
Status: DISCONTINUED | OUTPATIENT
Start: 2023-08-13 | End: 2023-08-13 | Stop reason: HOSPADM

## 2023-08-13 RX ORDER — LABETALOL HYDROCHLORIDE 5 MG/ML
5 INJECTION, SOLUTION INTRAVENOUS
Status: DISCONTINUED | OUTPATIENT
Start: 2023-08-13 | End: 2023-08-13 | Stop reason: HOSPADM

## 2023-08-13 RX ORDER — HYDROMORPHONE HYDROCHLORIDE 1 MG/ML
0.2 INJECTION, SOLUTION INTRAMUSCULAR; INTRAVENOUS; SUBCUTANEOUS
Status: DISCONTINUED | OUTPATIENT
Start: 2023-08-13 | End: 2023-08-13 | Stop reason: HOSPADM

## 2023-08-13 RX ORDER — DEXAMETHASONE SODIUM PHOSPHATE 4 MG/ML
8 INJECTION, SOLUTION INTRA-ARTICULAR; INTRALESIONAL; INTRAMUSCULAR; INTRAVENOUS; SOFT TISSUE
Status: DISPENSED | OUTPATIENT
Start: 2023-08-13 | End: 2023-08-13

## 2023-08-13 RX ORDER — LINEZOLID 600 MG/1
600 TABLET, FILM COATED ORAL EVERY 12 HOURS
Status: DISCONTINUED | OUTPATIENT
Start: 2023-08-13 | End: 2023-08-13

## 2023-08-13 RX ORDER — HYDROMORPHONE HYDROCHLORIDE 1 MG/ML
0.1 INJECTION, SOLUTION INTRAMUSCULAR; INTRAVENOUS; SUBCUTANEOUS
Status: DISCONTINUED | OUTPATIENT
Start: 2023-08-13 | End: 2023-08-13 | Stop reason: HOSPADM

## 2023-08-13 RX ORDER — HYDROMORPHONE HYDROCHLORIDE 2 MG/ML
INJECTION, SOLUTION INTRAMUSCULAR; INTRAVENOUS; SUBCUTANEOUS PRN
Status: DISCONTINUED | OUTPATIENT
Start: 2023-08-13 | End: 2023-08-13 | Stop reason: SURG

## 2023-08-13 RX ORDER — HYDRALAZINE HYDROCHLORIDE 20 MG/ML
5 INJECTION INTRAMUSCULAR; INTRAVENOUS
Status: DISCONTINUED | OUTPATIENT
Start: 2023-08-13 | End: 2023-08-13 | Stop reason: HOSPADM

## 2023-08-13 RX ORDER — METOCLOPRAMIDE HYDROCHLORIDE 5 MG/ML
INJECTION INTRAMUSCULAR; INTRAVENOUS PRN
Status: DISCONTINUED | OUTPATIENT
Start: 2023-08-13 | End: 2023-08-13 | Stop reason: SURG

## 2023-08-13 RX ORDER — DEXAMETHASONE SODIUM PHOSPHATE 4 MG/ML
INJECTION, SOLUTION INTRA-ARTICULAR; INTRALESIONAL; INTRAMUSCULAR; INTRAVENOUS; SOFT TISSUE PRN
Status: DISCONTINUED | OUTPATIENT
Start: 2023-08-13 | End: 2023-08-13 | Stop reason: SURG

## 2023-08-13 RX ORDER — ONDANSETRON 2 MG/ML
4 INJECTION INTRAMUSCULAR; INTRAVENOUS
Status: COMPLETED | OUTPATIENT
Start: 2023-08-13 | End: 2023-08-13

## 2023-08-13 RX ORDER — LIDOCAINE HYDROCHLORIDE 20 MG/ML
INJECTION, SOLUTION EPIDURAL; INFILTRATION; INTRACAUDAL; PERINEURAL PRN
Status: DISCONTINUED | OUTPATIENT
Start: 2023-08-13 | End: 2023-08-13 | Stop reason: SURG

## 2023-08-13 RX ORDER — MEPERIDINE HYDROCHLORIDE 25 MG/ML
12.5 INJECTION INTRAMUSCULAR; INTRAVENOUS; SUBCUTANEOUS
Status: DISCONTINUED | OUTPATIENT
Start: 2023-08-13 | End: 2023-08-13 | Stop reason: HOSPADM

## 2023-08-13 RX ORDER — ONDANSETRON 2 MG/ML
INJECTION INTRAMUSCULAR; INTRAVENOUS
Status: COMPLETED
Start: 2023-08-13 | End: 2023-08-13

## 2023-08-13 RX ORDER — HALOPERIDOL 5 MG/ML
1 INJECTION INTRAMUSCULAR
Status: DISCONTINUED | OUTPATIENT
Start: 2023-08-13 | End: 2023-08-13 | Stop reason: HOSPADM

## 2023-08-13 RX ORDER — DIPHENHYDRAMINE HYDROCHLORIDE 50 MG/ML
12.5 INJECTION INTRAMUSCULAR; INTRAVENOUS
Status: DISCONTINUED | OUTPATIENT
Start: 2023-08-13 | End: 2023-08-13 | Stop reason: HOSPADM

## 2023-08-13 RX ORDER — DIPHENHYDRAMINE HYDROCHLORIDE 50 MG/ML
25 INJECTION INTRAMUSCULAR; INTRAVENOUS
Status: COMPLETED | OUTPATIENT
Start: 2023-08-13 | End: 2023-08-13

## 2023-08-13 RX ORDER — FUROSEMIDE 10 MG/ML
20 INJECTION INTRAMUSCULAR; INTRAVENOUS ONCE
Status: COMPLETED | OUTPATIENT
Start: 2023-08-13 | End: 2023-08-13

## 2023-08-13 RX ORDER — SODIUM CHLORIDE, SODIUM LACTATE, POTASSIUM CHLORIDE, CALCIUM CHLORIDE 600; 310; 30; 20 MG/100ML; MG/100ML; MG/100ML; MG/100ML
INJECTION, SOLUTION INTRAVENOUS
Status: DISCONTINUED | OUTPATIENT
Start: 2023-08-13 | End: 2023-08-13 | Stop reason: SURG

## 2023-08-13 RX ADMIN — PROPOFOL 200 MG: 10 INJECTION, EMULSION INTRAVENOUS at 14:14

## 2023-08-13 RX ADMIN — HYDROMORPHONE HYDROCHLORIDE 0.4 MG: 2 INJECTION INTRAMUSCULAR; INTRAVENOUS; SUBCUTANEOUS at 15:52

## 2023-08-13 RX ADMIN — METAXALONE 400 MG: 800 TABLET ORAL at 21:21

## 2023-08-13 RX ADMIN — DIPHENHYDRAMINE HYDROCHLORIDE 25 MG: 50 INJECTION, SOLUTION INTRAMUSCULAR; INTRAVENOUS at 08:56

## 2023-08-13 RX ADMIN — FENTANYL CITRATE 50 MCG: 50 INJECTION, SOLUTION INTRAMUSCULAR; INTRAVENOUS at 14:42

## 2023-08-13 RX ADMIN — HYDROMORPHONE HYDROCHLORIDE 0.4 MG: 2 INJECTION INTRAMUSCULAR; INTRAVENOUS; SUBCUTANEOUS at 15:23

## 2023-08-13 RX ADMIN — HEPARIN SODIUM 5000 UNITS: 5000 INJECTION, SOLUTION INTRAVENOUS; SUBCUTANEOUS at 06:14

## 2023-08-13 RX ADMIN — METAXALONE 400 MG: 800 TABLET ORAL at 06:12

## 2023-08-13 RX ADMIN — DEXAMETHASONE SODIUM PHOSPHATE 4 MG: 4 INJECTION INTRA-ARTICULAR; INTRALESIONAL; INTRAMUSCULAR; INTRAVENOUS; SOFT TISSUE at 14:14

## 2023-08-13 RX ADMIN — METAXALONE 400 MG: 800 TABLET ORAL at 11:23

## 2023-08-13 RX ADMIN — CEFEPIME 2 G: 2 INJECTION, POWDER, FOR SOLUTION INTRAVENOUS at 13:26

## 2023-08-13 RX ADMIN — PIPERACILLIN AND TAZOBACTAM 4.5 G: 4; .5 INJECTION, POWDER, FOR SOLUTION INTRAVENOUS at 21:26

## 2023-08-13 RX ADMIN — BACLOFEN 5 MG: 10 TABLET ORAL at 11:23

## 2023-08-13 RX ADMIN — BACLOFEN 5 MG: 10 TABLET ORAL at 06:14

## 2023-08-13 RX ADMIN — NICOTINE 14 MG: 14 PATCH TRANSDERMAL at 06:13

## 2023-08-13 RX ADMIN — FENTANYL CITRATE 50 MCG: 50 INJECTION, SOLUTION INTRAMUSCULAR; INTRAVENOUS at 17:01

## 2023-08-13 RX ADMIN — ACETAMINOPHEN 1000 MG: 500 TABLET, FILM COATED ORAL at 06:13

## 2023-08-13 RX ADMIN — OXYCODONE HYDROCHLORIDE 10 MG: 10 TABLET ORAL at 10:36

## 2023-08-13 RX ADMIN — ROCURONIUM BROMIDE 50 MG: 10 INJECTION, SOLUTION INTRAVENOUS at 14:14

## 2023-08-13 RX ADMIN — ACETAMINOPHEN 1000 MG: 500 TABLET, FILM COATED ORAL at 11:23

## 2023-08-13 RX ADMIN — ACETAMINOPHEN 1000 MG: 500 TABLET, FILM COATED ORAL at 18:07

## 2023-08-13 RX ADMIN — FENTANYL CITRATE 50 MCG: 50 INJECTION, SOLUTION INTRAMUSCULAR; INTRAVENOUS at 16:51

## 2023-08-13 RX ADMIN — HYDROCODONE BITARTRATE AND ACETAMINOPHEN 15 MG: 7.5; 325 SOLUTION ORAL at 16:57

## 2023-08-13 RX ADMIN — ONDANSETRON 4 MG: 2 INJECTION INTRAMUSCULAR; INTRAVENOUS at 00:20

## 2023-08-13 RX ADMIN — HYDROMORPHONE HYDROCHLORIDE 0.4 MG: 2 INJECTION INTRAMUSCULAR; INTRAVENOUS; SUBCUTANEOUS at 15:38

## 2023-08-13 RX ADMIN — ZOLPIDEM TARTRATE 10 MG: 5 TABLET ORAL at 21:21

## 2023-08-13 RX ADMIN — SENNOSIDES AND DOCUSATE SODIUM 2 TABLET: 50; 8.6 TABLET ORAL at 06:13

## 2023-08-13 RX ADMIN — OXYCODONE HYDROCHLORIDE 10 MG: 10 TABLET ORAL at 03:27

## 2023-08-13 RX ADMIN — CEFAZOLIN 2 G: 2 INJECTION, POWDER, FOR SOLUTION INTRAMUSCULAR; INTRAVENOUS at 06:18

## 2023-08-13 RX ADMIN — BACLOFEN 5 MG: 10 TABLET ORAL at 20:31

## 2023-08-13 RX ADMIN — Medication 50 MG: at 16:00

## 2023-08-13 RX ADMIN — FENTANYL CITRATE 50 MCG: 50 INJECTION, SOLUTION INTRAMUSCULAR; INTRAVENOUS at 14:14

## 2023-08-13 RX ADMIN — LINEZOLID 600 MG: 600 INJECTION, SOLUTION INTRAVENOUS at 20:29

## 2023-08-13 RX ADMIN — HYDROMORPHONE HYDROCHLORIDE 0.4 MG: 1 INJECTION, SOLUTION INTRAMUSCULAR; INTRAVENOUS; SUBCUTANEOUS at 17:19

## 2023-08-13 RX ADMIN — POLYETHYLENE GLYCOL 3350 1 PACKET: 17 POWDER, FOR SOLUTION ORAL at 06:13

## 2023-08-13 RX ADMIN — LINEZOLID 600 MG: 600 INJECTION, SOLUTION INTRAVENOUS at 10:44

## 2023-08-13 RX ADMIN — DEXAMETHASONE SODIUM PHOSPHATE 8 MG: 4 INJECTION INTRA-ARTICULAR; INTRALESIONAL; INTRAMUSCULAR; INTRAVENOUS; SOFT TISSUE at 08:52

## 2023-08-13 RX ADMIN — SODIUM CHLORIDE, POTASSIUM CHLORIDE, SODIUM LACTATE AND CALCIUM CHLORIDE: 600; 310; 30; 20 INJECTION, SOLUTION INTRAVENOUS at 07:44

## 2023-08-13 RX ADMIN — LIDOCAINE HYDROCHLORIDE 100 MG: 20 INJECTION, SOLUTION EPIDURAL; INFILTRATION; INTRACAUDAL at 14:14

## 2023-08-13 RX ADMIN — FUROSEMIDE 20 MG: 10 INJECTION, SOLUTION INTRAVENOUS at 08:41

## 2023-08-13 RX ADMIN — FENTANYL CITRATE 50 MCG: 50 INJECTION, SOLUTION INTRAMUSCULAR; INTRAVENOUS at 15:21

## 2023-08-13 RX ADMIN — ONDANSETRON 4 MG: 2 INJECTION INTRAMUSCULAR; INTRAVENOUS at 16:49

## 2023-08-13 RX ADMIN — IOHEXOL 100 ML: 350 INJECTION, SOLUTION INTRAVENOUS at 10:27

## 2023-08-13 RX ADMIN — FENTANYL CITRATE 50 MCG: 50 INJECTION, SOLUTION INTRAMUSCULAR; INTRAVENOUS at 14:20

## 2023-08-13 RX ADMIN — FENTANYL CITRATE 50 MCG: 50 INJECTION, SOLUTION INTRAMUSCULAR; INTRAVENOUS at 15:14

## 2023-08-13 RX ADMIN — ONDANSETRON 4 MG: 2 INJECTION INTRAMUSCULAR; INTRAVENOUS at 16:13

## 2023-08-13 RX ADMIN — SODIUM CHLORIDE, POTASSIUM CHLORIDE, SODIUM LACTATE AND CALCIUM CHLORIDE: 600; 310; 30; 20 INJECTION, SOLUTION INTRAVENOUS at 14:07

## 2023-08-13 RX ADMIN — HYDROMORPHONE HYDROCHLORIDE 0.5 MG: 1 INJECTION, SOLUTION INTRAMUSCULAR; INTRAVENOUS; SUBCUTANEOUS at 00:20

## 2023-08-13 RX ADMIN — Medication 100 MG: at 16:23

## 2023-08-13 RX ADMIN — CLINDAMYCIN HYDROCHLORIDE 150 MG: 150 CAPSULE ORAL at 00:20

## 2023-08-13 RX ADMIN — HYDROMORPHONE HYDROCHLORIDE 0.4 MG: 2 INJECTION INTRAMUSCULAR; INTRAVENOUS; SUBCUTANEOUS at 16:18

## 2023-08-13 RX ADMIN — HYDROMORPHONE HYDROCHLORIDE 0.4 MG: 2 INJECTION INTRAMUSCULAR; INTRAVENOUS; SUBCUTANEOUS at 16:08

## 2023-08-13 RX ADMIN — SODIUM CHLORIDE, POTASSIUM CHLORIDE, SODIUM LACTATE AND CALCIUM CHLORIDE: 600; 310; 30; 20 INJECTION, SOLUTION INTRAVENOUS at 00:17

## 2023-08-13 RX ADMIN — SODIUM CHLORIDE, POTASSIUM CHLORIDE, SODIUM LACTATE AND CALCIUM CHLORIDE: 600; 310; 30; 20 INJECTION, SOLUTION INTRAVENOUS at 08:36

## 2023-08-13 RX ADMIN — CLINDAMYCIN HYDROCHLORIDE 150 MG: 150 CAPSULE ORAL at 06:13

## 2023-08-13 RX ADMIN — SENNOSIDES AND DOCUSATE SODIUM 2 TABLET: 50; 8.6 TABLET ORAL at 20:31

## 2023-08-13 RX ADMIN — OMEPRAZOLE 20 MG: 20 CAPSULE, DELAYED RELEASE ORAL at 06:12

## 2023-08-13 RX ADMIN — METOCLOPRAMIDE 10 MG: 5 INJECTION, SOLUTION INTRAMUSCULAR; INTRAVENOUS at 16:13

## 2023-08-13 RX ADMIN — MAGNESIUM HYDROXIDE 30 ML: 400 SUSPENSION ORAL at 06:13

## 2023-08-13 RX ADMIN — PIPERACILLIN AND TAZOBACTAM 4.5 G: 4; .5 INJECTION, POWDER, FOR SOLUTION INTRAVENOUS at 22:37

## 2023-08-13 RX ADMIN — MIDAZOLAM 2 MG: 1 INJECTION, SOLUTION INTRAMUSCULAR; INTRAVENOUS at 14:07

## 2023-08-13 ASSESSMENT — PAIN DESCRIPTION - PAIN TYPE
TYPE: SURGICAL PAIN
TYPE: ACUTE PAIN
TYPE: SURGICAL PAIN
TYPE: ACUTE PAIN
TYPE: SURGICAL PAIN
TYPE: ACUTE PAIN
TYPE: ACUTE PAIN
TYPE: SURGICAL PAIN
TYPE: ACUTE PAIN
TYPE: ACUTE PAIN
TYPE: SURGICAL PAIN
TYPE: ACUTE PAIN
TYPE: SURGICAL PAIN
TYPE: ACUTE PAIN

## 2023-08-13 ASSESSMENT — ENCOUNTER SYMPTOMS
FEVER: 0
SHORTNESS OF BREATH: 0
TINGLING: 1
SENSORY CHANGE: 1
MYALGIAS: 1
DIARRHEA: 0
ABDOMINAL PAIN: 0
COUGH: 0
CHILLS: 0

## 2023-08-13 ASSESSMENT — FIBROSIS 4 INDEX: FIB4 SCORE: 5.15

## 2023-08-13 ASSESSMENT — PATIENT HEALTH QUESTIONNAIRE - PHQ9
2. FEELING DOWN, DEPRESSED, IRRITABLE, OR HOPELESS: NOT AT ALL
SUM OF ALL RESPONSES TO PHQ9 QUESTIONS 1 AND 2: 0
1. LITTLE INTEREST OR PLEASURE IN DOING THINGS: NOT AT ALL

## 2023-08-13 NOTE — OR SURGEON
Immediate Post OP Note    PreOp Diagnosis: Left gluteal and thigh necrotizing fasciitis/myositis      PostOp Diagnosis: same      Procedure(s):  FASCIOTOMY - GLUTEAL, THIGH - Wound Class: Dirty or Infected    Surgeon(s):  Akhil Castro M.D.    Anesthesiologist/Type of Anesthesia:  Anesthesiologist: Tien Bains M.D./General    Surgical Staff:  Circulator: Kristi Figueroa R.N.  Scrub Person: Jorge Polo    Specimens removed if any:  ID Type Source Tests Collected by Time Destination   1 : gluteal abscess #1 Body Fluid Abscess AFB CULTURE, FUNGAL CULTURE, AEROBIC/ANAEROBIC CULTURE (SURGERY) Akhil Castro M.D. 8/13/2023  2:47 PM    2 : GLUTEAL ABSCESS#2 Body Fluid Abscess AFB CULTURE, FUNGAL CULTURE, AEROBIC/ANAEROBIC CULTURE (SURGERY) Akhil Castro M.D. 8/13/2023  2:47 PM    3 : GLUTEAL MUSCLE Tissue Buttock AFB CULTURE, FUNGAL CULTURE, AEROBIC/ANAEROBIC CULTURE (SURGERY) Akhil Castro M.D. 8/13/2023  2:47 PM    4 : LEFT THIGH FASCIA Tissue Leg MISCELLANEOUS TEST Akhil Castro M.D. 8/13/2023  3:33 PM        Estimated Blood Loss: 500cc    Findings: see dictation    Complications: none known    PLAN:  --readmit postop  --fu intraop cultures  --continue empiric abx, ID consultation  --continue VAC  --will need to return to the OR in 2-3 days for repeat debridement and VAC change        8/13/2023 4:30 PM Akhil Castro M.D.

## 2023-08-13 NOTE — PROGRESS NOTES
Jeff from Lab called with critical CPK of [phone], Dr. Ramos made aware via volt phone message immediately.

## 2023-08-13 NOTE — ANESTHESIA PROCEDURE NOTES
Airway    Date/Time: 8/13/2023 2:15 PM    Performed by: Tien Bains M.D.  Authorized by: Tien Bains M.D.    Location:  OR  Urgency:  Elective  Difficult Airway: No    Indications for Airway Management:  Anesthesia      Spontaneous Ventilation: absent    Sedation Level:  Deep  Preoxygenated: Yes    Patient Position:  Sniffing  Final Airway Type:  Endotracheal airway  Final Endotracheal Airway:  ETT  Cuffed: Yes    Technique Used for Successful ETT Placement:  Direct laryngoscopy  Devices/Methods Used in Placement:  Intubating stylet and cricoid pressure    Insertion Site:  Oral  Blade Type:  Christiano  Laryngoscope Blade/Videolaryngoscope Blade Size:  4  ETT Size (mm):  7.5  Measured from:  Teeth  ETT to Teeth (cm):  23  Placement Verified by: auscultation and capnometry    Cormack-Lehane Classification:  Grade IIa - partial view of glottis  Number of Attempts at Approach:  1

## 2023-08-13 NOTE — CARE PLAN
The patient is Stable - Low risk of patient condition declining or worsening    Shift Goals  Clinical Goals: Abx, Pain management, Trend WBC  Patient Goals: Pain control, Comfort, Sleep  Family Goals: CHRIS    Progress made toward(s) clinical / shift goals:     Problem: Fall Risk  Goal: Patient will remain free from falls     Description  Target End Date: Prior to discharge or change in level of care      Document interventions on the [facility] Fall Risk Assessment   1. Assess for fall risk factors   2. Implement fall precautions     Outcome: Progressing  Note: Encouraged and instructed to call for assistance. Side rails up. Call button within reach. Placed on standby assists.     Problem: Pain - Standard  Goal: Alleviation of pain or a reduction in pain to the patient’s comfort goal     Description  Target End Date: Prior to discharge or change in level of care      Document on Vitals flowsheet   1. Document pain using the appropriate pain scale per order or unit policy   2. Educate and implement non-pharmacologic comfort measures (i.e. relaxation, distraction, massage, cold/heat therapy, etc.)   3. Pain management medications as ordered   4. Reassess pain after pain med administration per policy   5. If opiods administered assess patient's response to pain medication is appropriate per POSS sedation scale   6. Follow pain management plan developed in collaboration with patient and interdisciplinary team (including palliative care or pain specialists if applicable)     Outcome: Progressing  Note: Patient reports pain at left gluteus PS 10/10. PRN Oxycodone 10 mg PO and Dilaudid 0.50 mg IV given. Monitored sedation level and side effects. Patient reports some relief from pain after medication administration but requires frequent re-dosing. Continued pain level assessment and monitoring during shift.       Problem: Knowledge Deficit - Standard  Goal: Patient and family/care givers will demonstrate understanding of  plan of care, disease process/condition, diagnostic tests and medications     Description  Target End Date: 1-3 days or as soon as patient condition allows      Document in Patient Education   1. Patient and family/caregiver oriented to unit, equipment, visitation policy and means for communicating concern   2. Complete/review Learning Assessment   3. Assess knowledge level of disease process/condition, treatment plan, diagnostic tests and medications   4. Explain disease process/condition, treatment plan, diagnostic tests and medications     Outcome: Progressing  Note: Patient updated on plan of care, and patient care for shift. Patient able to process instructions and explanations. Patient responsive to commands.     Problem: Respiratory  Goal: Patient will achieve/maintain optimum respiratory ventilation and gas exchange     Description  Target End Date: Prior to discharge or change in level of care      Document on Assessment flowsheet   1. Assess and monitor rate, rhythm, depth and effort of respiration   2. Breath sounds assessed qshift and/or as needed   3. Assess O2 saturation, administer/titrate oxygen as ordered   4. Position patient for maximum ventilatory efficiency   5. Turn, cough, and deep breath with splinting to improve effectiveness   6. Collaborate with RT to administer medication/treatments per order   7. Encourage use of incentive spirometer and encourage patient to cough after use and utilize splinting techniques if applicable   8. Airway suctioning   9. Monitor sputum production for changes in color, consistency and frequency   10. Perform frequent oral hygiene   11. Alternate physical activity with rest periods     Outcome: Progressing  Note: Patient on Room Air. Encouraged patient to turn side to side to promote pulmonary toileting. Encouraged and instructed coughing, deep breathing exercises Patient reports no SOB or Dyspnea.  CPAP/BiPap ordered at night for KACY; patient refused. Oral care  kit at bedside.    Problem: Hemodynamics  Goal: Patient's hemodynamics, fluid balance and neurologic status will be stable or improve    Description  Target End Date: Prior to discharge or change in level of care     Document on Assessment and I/O flowsheet templates     1. Monitor vital signs, pulse oximetry and cardiac monitor per provider order and/or policy   2. Maintain blood pressure per provider order   3. Hemodynamic monitoring per provider order   4. Manage IV fluids and IV infusions   5. Monitor intake and output   6. Daily weights per unit policy or provider order   7. Assess peripheral pulses and capillary refill   8. Assess color and body temperature     Outcome: Progressing  Note:  Monitored vitals and lab for abnormalities. Daily lab draws for WBC levels. Encouraged oral hydration and ensure IVF is infusing well. Telemetry monitoring in place, monitored for changes.

## 2023-08-13 NOTE — ASSESSMENT & PLAN NOTE
Urinalysis on 8/12/2023 was positive for nitrite, and a small amount of leukocyte Estrace.  Patient reports burning with initiation of urination.   No acute complaint at this time.  Asymptomatic-appears resolved at this time

## 2023-08-13 NOTE — PROGRESS NOTES
53yoM with left gluteal swelling and pain concerning for compartment syndrome.  Planning fasciotomies in the OR today.  See full dictated consultation for further details.

## 2023-08-13 NOTE — PROGRESS NOTES
Telemetry Report:      SR-ST   Rate 127-93   PVC (R)  0.16/0.08/0.24      Per [employer] from Monitor Room

## 2023-08-13 NOTE — ANESTHESIA PREPROCEDURE EVALUATION
Case: [id number] Anesthesia Start Date/Time: 08/13/23 1407    Procedure: FASCIOTOMY - GLUTEAL, COMPARTMENT SYNDROME (Left: Buttocks)    Anesthesia type: general    Location: [place] [address] / SURGERY [facility]    Surgeons: Akhil Castro M.D.      Buttock Abscess  Compartment Syndrome  Sepsis    Relevant Problems   ANESTHESIA   (positive) Sleep apnea      GI   (positive) GERD (gastroesophageal reflux disease)         (positive) JUDY (acute kidney injury) (HCC)       Physical Exam    Airway   Mallampati: II  TM distance: >3 FB  Neck ROM: full       Cardiovascular - normal exam  Rhythm: regular  Rate: abnormal  (-) murmur     Dental - normal exam           Pulmonary - normal exam  Breath sounds clear to auscultation     Abdominal    Neurological - normal exam                 Anesthesia Plan    ASA 4- EMERGENT   ASA physical status 4 criteria: sepsisASA physical status emergent criteria: acute deteriorating condition due to infection    Plan - general       Airway plan will be ETT          Induction: intravenous    Postoperative Plan: Postoperative administration of opioids is intended.    Pertinent diagnostic labs and testing reviewed    Informed Consent:    Anesthetic plan and risks discussed with patient.      Pre-operative pain 9/10

## 2023-08-13 NOTE — ANESTHESIA PROCEDURE NOTES
Peripheral IV    Date/Time: 8/13/2023 2:17 PM    Performed by: Tien Bains M.D.  Authorized by: Tien Bains M.D.    Size:  18 G  Laterality:  Left  Peripheral IV Location:  Hand  Site Prep:  Alcohol  Technique:  Direct puncture  Attempts:  1

## 2023-08-13 NOTE — PROGRESS NOTES
[facility] Internal Medicine Daily Progress Note    Date of Service  8/13/2023    R Team: R IM White Team   Attending: Mary Mcclain M.d.  Senior Resident: Dr. Ayala  Contact Number: [phone]    Chief Complaint  Abhishek Pacheco is a 53 y.o. male admitted 8/10/2023 with cellulitis of left buttock, likely secondary to home testosterone injection.    Hospital Course  Mr. Abhishek Pacheco is a 53yoM with chronic migraine (on Sumtatriptan), obesity (BMI 31.70), KACY (on nightly CPAP; s/p uvuloplasty, 10/2011) who presented 8/10/2023 with 3-day history of worsening left buttock pain and edema which started 2 days after injecting himself with testosterone in same region. The pain is constant, rated 8/10, worse with movement, and minimally improved with Tramadol and Prednisone (prescribed by PCP). He also reports chills, headache, and night sweats.      Patient reports taking testosterone shots thrice monthly for last 2 years. Exam is notable for generalized discomfort, clammy skin, tachycardia (to 112 bpm), hypoxemia (84-85% on RA), bilateral crackles in middle lung fields. No tenderness to abdominal palpation. His left buttock is somewhat tense, swollen, non-erythematous, moderately tender at gluteal cleft.      Labs show severe leukocytosis (33.7 K/uL, 86.3% PMNs), mildly elevated total bilirubin. CT pelvis (8/10/2023) shows subcutaneous inflammatory changes with enlargement of left gluteal musculature. CXR (8/10/2023) shows mildly thickened interlobular septa.      He receives Ancef 2g x1 in ED and is admitted for sepsis due to cellulitis of left buttock.     8/11: persistent pain, prn pain meds added      Interval Problem Update  8/13:   -Overnight, patient pulled out his IV line.  - Patient CPK increased from 1400 to 2391 likely secondary to the patient not getting IV fluids after pulling out his IV lines.  - Creatinine improved to 1.26.  - Orthopedics consulted secondary to spreading of erythema  down left thigh and upper left leg.  CT scan with contrast also ordered because of this in the setting of patient having increased tingling and numbing of the buttock region (left) and left thigh and upper leg.  - Reports last bowel movement was 2 days ago.  - Refused CPAP overnight  - Reports improvement in pain.  -Urinalysis on 8/12/2023 was positive for nitrite, and a small amount of leukocyte Estrace.  Patient reports burning with initiation of urination.   -N.p.o. pending possible orthopedic intervention    I have discussed this patient's plan of care and discharge plan at IDT rounds today with Case Management, Nursing, Nursing leadership, and other members of the IDT team.    Consultants/Specialty  orthopedics and N/A    Code Status  DNAR/DNI    Disposition  The patient is not medically cleared for discharge to home or a post-acute facility.      I have placed the appropriate orders for post-discharge needs.    Review of Systems  Review of Systems   Constitutional:  Negative for chills and fever.   Respiratory:  Negative for cough and shortness of breath.    Cardiovascular:  Negative for chest pain.   Gastrointestinal:  Negative for abdominal pain and diarrhea.   Genitourinary:  Negative for dysuria.   Musculoskeletal:  Positive for myalgias (Left calf and left buttock).   Skin:  Positive for rash (Erythema of left buttock).   Neurological:  Positive for tingling and sensory change.        Reports tingling and sensory change of left outer buttock, left thigh, and left upper leg.        Physical Exam  Temp:  [36.3 °C (97.3 °F)-36.9 °C (98.4 °F)] 36.5 °C (97.7 °F)  Pulse:  [] 104  Resp:  [18] 18  BP: (106-149)/() 146/94  SpO2:  [85 %-100 %] 90 %    Physical Exam  Vitals and nursing note reviewed.   Constitutional:       General: He is in acute distress.      Appearance: He is obese.   HENT:      Head: Normocephalic and atraumatic.      Nose: No rhinorrhea.   Eyes:      General: No scleral icterus.      Extraocular Movements: Extraocular movements intact.      Conjunctiva/sclera: Conjunctivae normal.   Cardiovascular:      Rate and Rhythm: Normal rate and regular rhythm.      Pulses: Normal pulses.      Heart sounds: Normal heart sounds.   Pulmonary:      Effort: Pulmonary effort is normal. No respiratory distress.      Breath sounds: Normal breath sounds. Rhonchi: diffusely heard.   Abdominal:      General: Abdomen is flat. There is no distension.      Palpations: Abdomen is soft.   Musculoskeletal:         General: Tenderness (Tenderness of left calf and left buttock with palpation.) present.      Cervical back: Normal range of motion.      Right lower leg: No edema.      Left lower leg: No edema.   Skin:     Capillary Refill: Capillary refill takes less than 2 seconds.      Coloration: Skin is not jaundiced.      Findings: Erythema (Erythema of left buttock, left back of thigh, and left upper leg.) and rash (left buttock) present.   Neurological:      General: No focal deficit present.      Mental Status: He is alert. Mental status is at baseline.      Motor: No weakness.   Psychiatric:         Mood and Affect: Mood normal.         Behavior: Behavior normal.         Fluids    Intake/Output Summary (Last 24 hours) at 8/13/2023 1331  Last data filed at 8/13/2023 0600  Gross per 24 hour   Intake 840 ml   Output 300 ml   Net 540 ml         Laboratory  Recent Labs     08/11/23  0130 08/12/23 0216 08/13/23  0047   WBC 32.3* 20.0* 20.2*   RBC 5.75 5.81 5.68   HEMOGLOBIN 17.1 16.9 16.6   HEMATOCRIT 50.2 51.3 49.0   MCV 87.3 88.3 86.3   MCH 29.7 29.1 29.2   MCHC 34.1 32.9 33.9   RDW 46.3 46.2 46.3   PLATELETCT 251 225 199   MPV 10.2 10.4 10.6       Recent Labs     08/11/23  0130 08/12/23  0216 08/13/23  0047   SODIUM 131* 133* 131*   POTASSIUM 4.2 3.3* 4.3   CHLORIDE 93* 94* 92*   CO2 21 24 24   GLUCOSE 147* 73 92   BUN 19 35* 34*   CREATININE 1.23 1.58* 1.26   CALCIUM 9.1 8.6 8.6                        Imaging  CT-EXTREMITY, LOWER WITH LEFT   Final Result      1.  Swelling and areas of decreased attenuation are identified within and throughout left gluteus musculature and extending down into the posterior compartment musculature in the left thigh. Consider infection or inflammation such as cellulitis or    myositis.      2.  Induration and subcutaneous fat and intramuscular fat planes is also noted consistent with cellulitis.      3.  No abscess or bone erosion identified.      CT-PELVIS WITH   Final Result      1.  Swelling and areas of decreased attenuation throughout the visualized left gluteus musculature are identified. Consider infection or inflammation such as myositis or cellulitis.      2.  Induration and subcutaneous fat is identified consistent with cellulitis.      3.  No fluid collection that would suggest abscess is identified at this time. No soft tissue emphysema is noted.      DX-CHEST-PORTABLE (1 VIEW)   Final Result      No acute cardiac or pulmonary abnormalities are identified.      CT-PELVIS WITH   Final Result      1.  Subcutaneous inflammatory changes about the left buttock suspicious for cellulitis.      2.  Enlargement of left gluteal musculature consistent with myositis and/or inflammatory change.             Assessment/Plan  Problem Representation:    * Cellulitis of left buttock- (present on admission)  Assessment & Plan  Reports 3-day history of left buttock pain after recent injection with testosterone at left buttock, which is tense, swollen, and moderately tender. CT pelvis (8/10/2023) shows subcutaneous inflammatory changes. enlargement of left gluteal musculature.   - Blood cultures x2 (NGTD). Lactic acid WNR.   - IV Ancef q8hrs continued pending cultures/sensitivities.   - Pain control  - If no resolution or improvement after 2 days of abx, will consider repeat imaging to assess for the presence of an abscess    UTI (urinary tract infection)  Assessment & Plan  Urinalysis on  8/12/2023 was positive for nitrite, and a small amount of leukocyte Estrace.  Patient reports burning with initiation of urination.  Continue antibiotics  Urine culture pending    Transaminitis  Assessment & Plan  On 8/13/2023, , ALT 71, increasing over the last couple days  Patient reports some concerns about constipation without obvious abdominal pain  Continue to monitor  Can consider right upper quadrant ultrasound in the right clinical picture    Rhabdomyolysis  Assessment & Plan  CPK 1400  Possibly secondary to mm tension in the setting of severe pain  - IVF  - Treat associated JUDY  - Pain control    JUDY (acute kidney injury) (HCC)  Assessment & Plan  8/11: Creatinine 1.0 admission, now 1.58.  It is likely secondary to systemic infection  8/13/2023: Resolved  - IV fluids for rhabdomyolysis as clinically indicated  - Renally dose meds  - Avoid nephrotoxins  - Continue to monitor with routine labs    Other constipation  Assessment & Plan  Upon presentation patient reports that he has not had a bowel movement in at least 3 to 4 days.  Scheduled bowel regimen/prep  Evaluate and assess for SBO if still without a bowel movement during hospitalization day 2; currently patient is able to pass gas  8/13: Reports having a bowel movement 2 days ago  Schedule bowel regimen considering pain medication    GERD (gastroesophageal reflux disease)  Assessment & Plan  History of. On Pantoprazole at home.  - Resumed.     Insomnia  Assessment & Plan  History of. On Zolpidem at home.  - Resumed.    Acute respiratory failure with hypoxemia (HCC)- (present on admission)  Assessment & Plan  Mild diffuse rhonchi heard on exam. Denies recent cough. CXR shows peripheral Kerley B lines. NT-proBNP slightly elevated.   - IV Lasix 20mg x1 ordered; strict I/Os, daily standing weights ordered.   - RT/OT protocol; wean from oxygen as tolerated.   - Would consider TTE given likelihood of HFpEF in setting of KACY    8/12: resolved, off of  "o2    Bilirubinemia  Assessment & Plan  Mild. Likely due to sepsis. No evidence of anemia on labs.     8/12: improving    Tobacco dependence- (present on admission)  Assessment & Plan  Smokes 4-5 \"puffs\" per day. Has 35 pack-years. Previously was on Chantix, but not for last few months.   - Smoking cessation encouraged.   - Nicotine patch ordered.     Sepsis due to cellulitis (HCC)- (present on admission)  Assessment & Plan  This is Sepsis Present on admission  SIRS criteria identified on my evaluation include: Tachycardia, with heart rate greater than 90 BPM, Tachypnea, with respirations greater than 20 per minute, Leukocytosis, with WBC greater than 12,000 and Bandemia, greater than 10% bands  Clinical indicators of end organ dysfunction include Acute Liver Failure, with bilirubin greater than 2  Source is cellulitis  Sepsis protocol initiated  Crystalloid Fluid Administration: Resuscitation volume of 0mL ordered. Reason that resuscitation volume of less than 30ml/kg was ordered concern for causing harm given pulmonary edema  IV antibiotics as appropriate for source of sepsis: IV Ancef   Reassessment: I have reassessed the patient's hemodynamic status  Orthopedic surgery consulted for possible compartment syndrome, recommendations appreciated  MRSA nares negative; lactic acid WNR.   - Blood cultures x2 (NGTD)  - Continued on IV Ancef 18hrs      Sleep apnea- (present on admission)  Assessment & Plan  - RT/OT protocol  - Resume CPAP at home settings    Obesity- (present on admission)  Assessment & Plan  Noted.   - Counseling deferred to primary.         VTE prophylaxis: heparin ppx    I have performed a physical exam and reviewed and updated ROS and Plan today (8/13/2023). In review of yesterday's note (8/12/2023), there are no changes except as documented above.          "

## 2023-08-13 NOTE — ASSESSMENT & PLAN NOTE
Elevation is likely due to Rhabdo and not intrinsic hepatic  Significant downtrending  Follow-up with CMP

## 2023-08-13 NOTE — PROGRESS NOTES
Patient requesting nutritional supplement prefers BOOST PLUS; updated Dr. ROBERTO Campos. Okay to give BOOST PLUS with dinner per patient's request.

## 2023-08-13 NOTE — ANESTHESIA TIME REPORT
Anesthesia Start and Stop Event Times     Date Time Event    8/13/2023 1318 Ready for Procedure     1407 Anesthesia Start     1635 Anesthesia Stop        Responsible Staff  08/13/23    Name Role Begin End    Tien Bains M.D. Anesth 1407 1635        Overtime Reason:  no overtime (within assigned shift)    Comments:

## 2023-08-13 NOTE — CARE PLAN
The patient is Watcher - Medium risk of patient condition declining or worsening    Shift Goals  Clinical Goals: Pain management, antibiotics  Patient Goals: pain control  Family Goals: CHRIS    Progress made toward(s) clinical / shift goals:        Problem: Pain - Standard  Goal: Alleviation of pain or a reduction in pain to the patient’s comfort goal  Outcome: Progressing  Flowsheets  Taken 8/13/2023 1355 by Chanel Bourne V R.NHaley  Pain Rating Scale (NPRS): 10  Taken 8/13/2023 1036 by Kelli Mariscal RHaleyNHaley  Non Verbal Scale:   Unlabored Breathing   Calm  Taken 8/11/2023 1034 by Selin Reeves R.N.  Garrido-Baker Scale: (pt napping) 0   Note: PRN pain medications available for pain management     Problem: Fall Risk  Goal: Patient will remain free from falls  Outcome: Progressing  Note: Fall risk factors assessed, patient educated on the importance of call light use, fall precautions implemented, patient forgets to press call light before getting out of bed, educated, and agreed to use call light as needed.

## 2023-08-13 NOTE — PROGRESS NOTES
Patient reports nausea; requested Zofran but is not due yet. Updated Dr. Torres for additional N/V medications PRN.

## 2023-08-13 NOTE — PROGRESS NOTES
Monitor summary:     Rhythm : SR-ST  Rate :   Ectopy : RARE PVC'S    FL=.13  QRS=.08  QT=.33        Per telemetry strip summary from monitor room.

## 2023-08-14 PROBLEM — M72.6 NECROTIZING FASCIITIS (HCC): Status: ACTIVE | Noted: 2023-08-11

## 2023-08-14 LAB
ALBUMIN SERPL BCP-MCNC: 2.6 G/DL (ref 3.2–4.9)
ALBUMIN/GLOB SERPL: 0.8 G/DL
ALP SERPL-CCNC: 60 U/L (ref 30–99)
ALT SERPL-CCNC: 65 U/L (ref 2–50)
ANION GAP SERPL CALC-SCNC: 12 MMOL/L (ref 7–16)
ANISOCYTOSIS BLD QL SMEAR: ABNORMAL
AST SERPL-CCNC: 201 U/L (ref 12–45)
BASOPHILS # BLD AUTO: 0 % (ref 0–1.8)
BASOPHILS # BLD: 0 K/UL (ref 0–0.12)
BILIRUB CONJ SERPL-MCNC: 0.8 MG/DL (ref 0.1–0.5)
BILIRUB INDIRECT SERPL-MCNC: 0.3 MG/DL (ref 0–1)
BILIRUB SERPL-MCNC: 1.1 MG/DL (ref 0.1–1.5)
BUN SERPL-MCNC: 34 MG/DL (ref 8–22)
CALCIUM ALBUM COR SERPL-MCNC: 9.3 MG/DL (ref 8.5–10.5)
CALCIUM SERPL-MCNC: 8.2 MG/DL (ref 8.5–10.5)
CHLORIDE SERPL-SCNC: 94 MMOL/L (ref 96–112)
CK SERPL-CCNC: 2189 U/L (ref 0–154)
CO2 SERPL-SCNC: 23 MMOL/L (ref 20–33)
COMMENT NL1176: NORMAL
CREAT SERPL-MCNC: 1.14 MG/DL (ref 0.5–1.4)
EOSINOPHIL # BLD AUTO: 0 K/UL (ref 0–0.51)
EOSINOPHIL NFR BLD: 0 % (ref 0–6.9)
ERYTHROCYTE [DISTWIDTH] IN BLOOD BY AUTOMATED COUNT: 47.8 FL (ref 35.9–50)
GFR SERPLBLD CREATININE-BSD FMLA CKD-EPI: 77 ML/MIN/1.73 M 2
GLOBULIN SER CALC-MCNC: 3.2 G/DL (ref 1.9–3.5)
GLUCOSE SERPL-MCNC: 135 MG/DL (ref 65–99)
HCT VFR BLD AUTO: 41.7 % (ref 42–52)
HGB BLD-MCNC: 13.9 G/DL (ref 14–18)
LYMPHOCYTES # BLD AUTO: 0.55 K/UL (ref 1–4.8)
LYMPHOCYTES NFR BLD: 2.3 % (ref 22–41)
MACROCYTES BLD QL SMEAR: ABNORMAL
MANUAL DIFF BLD: NORMAL
MCH RBC QN AUTO: 29.3 PG (ref 27–33)
MCHC RBC AUTO-ENTMCNC: 33.3 G/DL (ref 32.3–36.5)
MCV RBC AUTO: 87.8 FL (ref 81.4–97.8)
METAMYELOCYTES NFR BLD MANUAL: 7.8 %
MONOCYTES # BLD AUTO: 0.75 K/UL (ref 0–0.85)
MONOCYTES NFR BLD AUTO: 3.1 % (ref 0–13.4)
MORPHOLOGY BLD-IMP: NORMAL
MYELOCYTES NFR BLD MANUAL: 3.1 %
NEUTROPHILS # BLD AUTO: 19.98 K/UL (ref 1.82–7.42)
NEUTROPHILS NFR BLD: 76.6 % (ref 44–72)
NEUTS BAND NFR BLD MANUAL: 6.3 % (ref 0–10)
NRBC # BLD AUTO: 0.04 K/UL
NRBC BLD-RTO: 0.2 /100 WBC (ref 0–0.2)
PATHOLOGY CONSULT NOTE: NORMAL
PLATELET # BLD AUTO: 197 K/UL (ref 164–446)
PLATELET BLD QL SMEAR: NORMAL
PMV BLD AUTO: 10.6 FL (ref 9–12.9)
POTASSIUM SERPL-SCNC: 4.5 MMOL/L (ref 3.6–5.5)
PROMYELOCYTES NFR BLD MANUAL: 0.8 %
PROT SERPL-MCNC: 5.8 G/DL (ref 6–8.2)
RBC # BLD AUTO: 4.75 M/UL (ref 4.7–6.1)
RBC BLD AUTO: PRESENT
RHODAMINE-AURAMINE STN SPEC: NORMAL
RHODAMINE-AURAMINE STN SPEC: NORMAL
SIGNIFICANT IND 70042: NORMAL
SIGNIFICANT IND 70042: NORMAL
SITE SITE: NORMAL
SITE SITE: NORMAL
SODIUM SERPL-SCNC: 129 MMOL/L (ref 135–145)
SOURCE SOURCE: NORMAL
SOURCE SOURCE: NORMAL
TOXIC GRANULES BLD QL SMEAR: NORMAL
WBC # BLD AUTO: 24.1 K/UL (ref 4.8–10.8)

## 2023-08-14 PROCEDURE — 700111 HCHG RX REV CODE 636 W/ 250 OVERRIDE (IP): Mod: JZ | Performed by: HOSPITALIST

## 2023-08-14 PROCEDURE — 80053 COMPREHEN METABOLIC PANEL: CPT

## 2023-08-14 PROCEDURE — 82248 BILIRUBIN DIRECT: CPT

## 2023-08-14 PROCEDURE — 700102 HCHG RX REV CODE 250 W/ 637 OVERRIDE(OP)

## 2023-08-14 PROCEDURE — 700105 HCHG RX REV CODE 258: Performed by: HOSPITALIST

## 2023-08-14 PROCEDURE — 85007 BL SMEAR W/DIFF WBC COUNT: CPT

## 2023-08-14 PROCEDURE — 770001 HCHG ROOM/CARE - MED/SURG/GYN PRIV*

## 2023-08-14 PROCEDURE — 99254 IP/OBS CNSLTJ NEW/EST MOD 60: CPT | Mod: GC | Performed by: INTERNAL MEDICINE

## 2023-08-14 PROCEDURE — 82550 ASSAY OF CK (CPK): CPT

## 2023-08-14 PROCEDURE — 700105 HCHG RX REV CODE 258: Mod: JZ | Performed by: STUDENT IN AN ORGANIZED HEALTH CARE EDUCATION/TRAINING PROGRAM

## 2023-08-14 PROCEDURE — 85025 COMPLETE CBC W/AUTO DIFF WBC: CPT

## 2023-08-14 PROCEDURE — A9270 NON-COVERED ITEM OR SERVICE: HCPCS

## 2023-08-14 PROCEDURE — 99233 SBSQ HOSP IP/OBS HIGH 50: CPT | Performed by: HOSPITALIST

## 2023-08-14 RX ADMIN — NICOTINE 14 MG: 14 PATCH TRANSDERMAL at 05:18

## 2023-08-14 RX ADMIN — OXYCODONE HYDROCHLORIDE 5 MG: 10 TABLET ORAL at 20:42

## 2023-08-14 RX ADMIN — METAXALONE 400 MG: 800 TABLET ORAL at 11:16

## 2023-08-14 RX ADMIN — OMEPRAZOLE 20 MG: 20 CAPSULE, DELAYED RELEASE ORAL at 05:18

## 2023-08-14 RX ADMIN — ACETAMINOPHEN 1000 MG: 500 TABLET, FILM COATED ORAL at 17:17

## 2023-08-14 RX ADMIN — METAXALONE 400 MG: 800 TABLET ORAL at 17:16

## 2023-08-14 RX ADMIN — PIPERACILLIN AND TAZOBACTAM 4.5 G: 4; .5 INJECTION, POWDER, FOR SOLUTION INTRAVENOUS at 20:49

## 2023-08-14 RX ADMIN — LINEZOLID 600 MG: 600 INJECTION, SOLUTION INTRAVENOUS at 05:16

## 2023-08-14 RX ADMIN — OXYCODONE HYDROCHLORIDE 10 MG: 10 TABLET ORAL at 01:48

## 2023-08-14 RX ADMIN — SODIUM CHLORIDE, POTASSIUM CHLORIDE, SODIUM LACTATE AND CALCIUM CHLORIDE: 600; 310; 30; 20 INJECTION, SOLUTION INTRAVENOUS at 13:30

## 2023-08-14 RX ADMIN — ZOLPIDEM TARTRATE 10 MG: 5 TABLET ORAL at 20:42

## 2023-08-14 RX ADMIN — LINEZOLID 600 MG: 600 INJECTION, SOLUTION INTRAVENOUS at 17:17

## 2023-08-14 RX ADMIN — OXYCODONE HYDROCHLORIDE 5 MG: 10 TABLET ORAL at 16:05

## 2023-08-14 RX ADMIN — OXYCODONE HYDROCHLORIDE 5 MG: 10 TABLET ORAL at 11:14

## 2023-08-14 RX ADMIN — PIPERACILLIN AND TAZOBACTAM 4.5 G: 4; .5 INJECTION, POWDER, FOR SOLUTION INTRAVENOUS at 13:29

## 2023-08-14 RX ADMIN — ACETAMINOPHEN 1000 MG: 500 TABLET, FILM COATED ORAL at 05:18

## 2023-08-14 RX ADMIN — BACLOFEN 5 MG: 10 TABLET ORAL at 17:16

## 2023-08-14 RX ADMIN — BACLOFEN 5 MG: 10 TABLET ORAL at 11:14

## 2023-08-14 RX ADMIN — SODIUM CHLORIDE, POTASSIUM CHLORIDE, SODIUM LACTATE AND CALCIUM CHLORIDE: 600; 310; 30; 20 INJECTION, SOLUTION INTRAVENOUS at 05:07

## 2023-08-14 RX ADMIN — PIPERACILLIN AND TAZOBACTAM 4.5 G: 4; .5 INJECTION, POWDER, FOR SOLUTION INTRAVENOUS at 05:08

## 2023-08-14 RX ADMIN — ACETAMINOPHEN 1000 MG: 500 TABLET, FILM COATED ORAL at 11:14

## 2023-08-14 RX ADMIN — BACLOFEN 5 MG: 10 TABLET ORAL at 05:18

## 2023-08-14 RX ADMIN — METAXALONE 400 MG: 800 TABLET ORAL at 05:21

## 2023-08-14 ASSESSMENT — ENCOUNTER SYMPTOMS
CHILLS: 0
ABDOMINAL PAIN: 0
DIZZINESS: 0
SORE THROAT: 0
LOSS OF CONSCIOUSNESS: 0
SHORTNESS OF BREATH: 0
COUGH: 0
DIARRHEA: 0
DOUBLE VISION: 0
BLURRED VISION: 0
VOMITING: 0
HEADACHES: 0
PALPITATIONS: 0
BACK PAIN: 0
NAUSEA: 0
FEVER: 0

## 2023-08-14 ASSESSMENT — COGNITIVE AND FUNCTIONAL STATUS - GENERAL
MOVING FROM LYING ON BACK TO SITTING ON SIDE OF FLAT BED: A LITTLE
SUGGESTED CMS G CODE MODIFIER DAILY ACTIVITY: CK
DRESSING REGULAR UPPER BODY CLOTHING: A LITTLE
HELP NEEDED FOR BATHING: A LITTLE
CLIMB 3 TO 5 STEPS WITH RAILING: TOTAL
MOVING TO AND FROM BED TO CHAIR: A LITTLE
DAILY ACTIVITIY SCORE: 19
TOILETING: A LITTLE
STANDING UP FROM CHAIR USING ARMS: A LOT
DRESSING REGULAR LOWER BODY CLOTHING: A LITTLE
SUGGESTED CMS G CODE MODIFIER MOBILITY: CK
PERSONAL GROOMING: A LITTLE
MOBILITY SCORE: 15
WALKING IN HOSPITAL ROOM: A LOT

## 2023-08-14 ASSESSMENT — PAIN DESCRIPTION - PAIN TYPE
TYPE: ACUTE PAIN

## 2023-08-14 ASSESSMENT — FIBROSIS 4 INDEX: FIB4 SCORE: 6.58

## 2023-08-14 NOTE — PROGRESS NOTES
Hospital Medicine Daily Progress Note    Date of Service  8/14/2023    Chief Complaint  Abhishek Pacheco is a 53 y.o. male admitted 8/10/2023 with thigh pain    Hospital Course  52yo PMHx chronic migraine, BMI 33, KACY on CPAP, hypogonadism on IM testosterone.  Admitted to the floor on 8/10 with cellulititis at IM injection site in buttock.  Also noted to have resp failure and started on IV lasix.  Taken to the OR on 8/13 for I&D of large abscess and debridelment of necrotic tissue    Interval Problem Update  Pt states pain is much less today.      AFebrile  Sinus 90s    On 6 LNC  UOP 1050m/12hrs    I have discussed this patient's plan of care and discharge plan at IDT rounds today with Case Management, Nursing, Nursing leadership, and other members of the IDT team.    Consultants/Specialty  orthopedics    Code Status  DNAR/DNI    Disposition  The patient is not medically cleared for discharge to home or a post-acute facility.      I have placed the appropriate orders for post-discharge needs.    Review of Systems  Review of Systems   Constitutional:  Negative for chills and fever.   HENT:  Negative for nosebleeds and sore throat.    Eyes:  Negative for blurred vision and double vision.   Respiratory:  Negative for cough and shortness of breath.    Cardiovascular:  Negative for chest pain, palpitations and leg swelling.   Gastrointestinal:  Negative for abdominal pain, diarrhea, nausea and vomiting.   Genitourinary:  Negative for dysuria and urgency.   Musculoskeletal:  Negative for back pain.        L leg/thigh pain   Skin:  Negative for rash.   Neurological:  Negative for dizziness, loss of consciousness and headaches.        Physical Exam  Temp:  [35.9 °C (96.6 °F)-36.7 °C (98 °F)] 36.1 °C (97 °F)  Pulse:  [] 88  Resp:  [12-43] 15  BP: (103-154)/() 154/79  SpO2:  [90 %-99 %] 97 %    Physical Exam  Constitutional:       General: He is not in acute distress.     Appearance: He is  well-developed. He is not diaphoretic.   HENT:      Head: Normocephalic and atraumatic.   Eyes:      Conjunctiva/sclera: Conjunctivae normal.   Neck:      Vascular: No JVD.   Cardiovascular:      Rate and Rhythm: Normal rate.      Heart sounds: No murmur heard.     No gallop.   Pulmonary:      Effort: Pulmonary effort is normal. No respiratory distress.      Breath sounds: No stridor. No wheezing or rales.   Abdominal:      Palpations: Abdomen is soft.      Tenderness: There is no abdominal tenderness. There is no guarding or rebound.   Musculoskeletal:      Comments: Wound vac on lat aspect L thigh  Foot is warm and pink, cap refill normal     Skin:     General: Skin is warm and dry.      Capillary Refill: Capillary refill takes less than 2 seconds.      Findings: No rash.   Neurological:      Mental Status: He is oriented to person, place, and time.   Psychiatric:         Mood and Affect: Mood normal.         Behavior: Behavior normal.         Thought Content: Thought content normal.         Fluids    Intake/Output Summary (Last 24 hours) at 8/14/2023 0702  Last data filed at 8/14/2023 0600  Gross per 24 hour   Intake 5435.22 ml   Output 1550 ml   Net 3885.22 ml       Laboratory  Recent Labs     08/12/23  0216 08/13/23  0047 08/14/23  0235   WBC 20.0* 20.2* 24.1*   RBC 5.81 5.68 4.75   HEMOGLOBIN 16.9 16.6 13.9*   HEMATOCRIT 51.3 49.0 41.7*   MCV 88.3 86.3 87.8   MCH 29.1 29.2 29.3   MCHC 32.9 33.9 33.3   RDW 46.2 46.3 47.8   PLATELETCT 225 199 197   MPV 10.4 10.6 10.6     Recent Labs     08/12/23  0216 08/13/23  0047 08/14/23  0235   SODIUM 133* 131* 129*   POTASSIUM 3.3* 4.3 4.5   CHLORIDE 94* 92* 94*   CO2 24 24 23   GLUCOSE 73 92 135*   BUN 35* 34* 34*   CREATININE 1.58* 1.26 1.14   CALCIUM 8.6 8.6 8.2*                   Imaging  CT-EXTREMITY, LOWER WITH LEFT   Final Result      1.  Swelling and areas of decreased attenuation are identified within and throughout left gluteus musculature and extending down into  the posterior compartment musculature in the left thigh. Consider infection or inflammation such as cellulitis or    myositis.      2.  Induration and subcutaneous fat and intramuscular fat planes is also noted consistent with cellulitis.      3.  No abscess or bone erosion identified.      CT-PELVIS WITH   Final Result      1.  Swelling and areas of decreased attenuation throughout the visualized left gluteus musculature are identified. Consider infection or inflammation such as myositis or cellulitis.      2.  Induration and subcutaneous fat is identified consistent with cellulitis.      3.  No fluid collection that would suggest abscess is identified at this time. No soft tissue emphysema is noted.      DX-CHEST-PORTABLE (1 VIEW)   Final Result      No acute cardiac or pulmonary abnormalities are identified.      CT-PELVIS WITH   Final Result      1.  Subcutaneous inflammatory changes about the left buttock suspicious for cellulitis.      2.  Enlargement of left gluteal musculature consistent with myositis and/or inflammatory change.           Assessment/Plan  * Necrotizing fasciitis (HCC)- (present on admission)  Assessment & Plan  At site where he injects his IM testosterone  S/p I&D of large abscess and necrotic tissue with wound vac placement 8/13 Dr Castro  Plan on return to OR in next 48hrs  Looking forward to cultures from OR  On linezolid and Pip/Tazo  ID consult today    Nontraumatic compartment syndrome of left lower extremity  Assessment & Plan  Secondary to Nec Fasc  S/p release  Following BMP, CPK    Transaminitis  Assessment & Plan  Elevation is likely due to Rhabdo and not intrinsic hepatic  Follow daily  Check GGT  Consider further work up dependent upon trend and results of above    UTI (urinary tract infection)  Assessment & Plan  Urinalysis on 8/12/2023 was positive for nitrite, and a small amount of leukocyte Estrace.  Patient reports burning with initiation of urination.  Continue  "antibiotics  Urine culture pending    Rhabdomyolysis  Assessment & Plan  IVFs  Follow CPK and BMP    JUDY (acute kidney injury) ([facility])  Assessment & Plan  Pre-renal, Rhabdo  Cont IVFs  Daily BMP  Follow UOP  Renally dose medications as appropriate    Other constipation  Assessment & Plan  Bowel protocol    GERD (gastroesophageal reflux disease)  Assessment & Plan  History of. On Pantoprazole at home.  - Resumed.     Insomnia  Assessment & Plan  History of. On Zolpidem at home.  - Resumed.    Acute respiratory failure with hypoxemia (HCC)- (present on admission)  Assessment & Plan  Off O2 while awake  Requiring while asleep for his KACY    Bilirubinemia  Assessment & Plan  Mild. Likely due to sepsis. No evidence of anemia on labs.     8/12: improving    Tobacco dependence- (present on admission)  Assessment & Plan  Pt counseled x 5 mins.  Discussed pharm and non pharm options in addition to replacement    Sepsis due to cellulitis ([facility])- (present on admission)  Assessment & Plan  Present on admission  Source skin  Follow up on operative cultures  Empiric therapy for necrotizing fascitis  S/p I&D but will need return to the OR  VSS  OK to floor      Sleep apnea- (present on admission)  Assessment & Plan  - RT/OT protocol  Uses home CPAP \"when I need it\" at home    Obesity- (present on admission)  Assessment & Plan  Noted.   - Counseling deferred to primary.         VTE prophylaxis:   SCDs/TEDs      I have performed a physical exam and reviewed and updated ROS and Plan today (8/14/2023). In review of yesterday's note (8/13/2023), there are no changes except as documented above.        "

## 2023-08-14 NOTE — PROGRESS NOTES
Phone report received from LISHA Diaz. Patient arrived 1935 via bed and tele montioring. Assumed patient care. No signs of distress at this time. Tele monitor on, rhythm and monitor summary verified. All lines IV traced, medications and rates verified. Safety precautions in place. Call light and personal belongings within reach. Educated patient to use call light if assistance is needed. Will continue to monitor.

## 2023-08-14 NOTE — PROGRESS NOTES
"Patient becoming more agitated and refusing items of care. Patient becoming more impulsive. Patient A/Ox3. Disoriented to place. Patient reminded of weight bearing status and fall risks. Patient stated he \"will continue to do what he wants, and that we will continue to yell at him for taking off his oxygen or not calling for help.\" Patient limited to small area to ambulate. Patient got back to bed with FWW. Patient did not follow weight bearing status.   "

## 2023-08-14 NOTE — ASSESSMENT & PLAN NOTE
Secondary to necrotizing fasciitis, tense, tender upper thigh and lower leg  Status post incision and drainage.  Repeat surgical debridement 8/17/23  Well-healed wound and sutures minimal drains and SIERRA drain bulbs  Continue pain regimen as needed

## 2023-08-14 NOTE — PROGRESS NOTES
[facility] Acceptance Note    Called by medicine service for transfer to [facility].  60-year-old male underwent a left gluteal fasciotomy for necrotizing infection after self injecting testosterone.  Surgery requested IMCU admission postoperatively.  Patient is listed as DNR/DNI..  Okay for transfer to [facility] under the care of the hospitalist.

## 2023-08-14 NOTE — PROGRESS NOTES
53yoM with left gluteal/thigh necrotizing fasciitis/myositis s/p fasciectomy/debridement today.    S: Somnolent in PACU but wakes and follows commands    O:    Vitals:    08/13/23 1700   BP: 121/78   Pulse: (!) 114   Resp: 18   Temp:    SpO2: 97%     Exam:  General-NAD, somnolent but wakes and follows commands intermittently  LLE-thigh VAC in place with good seal, +EHL/FHL/TA/GS active motor, palp dp pulse    A: 53yoM with left gluteal/thigh necrotizing fasciitis/myositis s/p fasciectomy/debridement today.    Recs:  --readmit postop  --fu intraop cultures  --continue empiric abx, ID consultation  --continue VAC  --will need to return to the OR in 2-3 days for repeat debridement and VAC change

## 2023-08-14 NOTE — CARE PLAN
The patient is Stable - Low risk of patient condition declining or worsening    Shift Goals  Clinical Goals: Pain management, antibiotics  Patient Goals: pain control  Family Goals: CHRIS    Progress made toward(s) clinical / shift goals:    Problem: Knowledge Deficit - Standard  Goal: Patient and family/care givers will demonstrate understanding of plan of care, disease process/condition, diagnostic tests and medications  Outcome: Progressing     Problem: Pain - Standard  Goal: Alleviation of pain or a reduction in pain to the patient’s comfort goal  Outcome: Progressing     Problem: Fall Risk  Goal: Patient will remain free from falls  Outcome: Progressing     Problem: Respiratory  Goal: Patient will achieve/maintain optimum respiratory ventilation and gas exchange  Outcome: Progressing     Problem: Hemodynamics  Goal: Patient's hemodynamics, fluid balance and neurologic status will be stable or improve  Outcome: Progressing       Patient is not progressing towards the following goals:

## 2023-08-14 NOTE — OR NURSING
Patient transported to T632 on monitor with this RN. Bedside handoff with Melany HUSAIN. Transport uneventful.

## 2023-08-14 NOTE — OR NURSING
Report called to Melany HUSAIN. Plan of care discussed. Patient resting with even and unlabored respirations. VSS. Patient arouses easily to voice. Denies need for additional pain medication. No complaints of nausea. Wound vac in place. CMS intact LLE. 2+ pedal pulse easily palpable.

## 2023-08-14 NOTE — PROGRESS NOTES
Lab notified me of critical CPK of 2189, down from previous result of 2391. Will notify MD of downward trend.

## 2023-08-14 NOTE — OP REPORT
DATE OF SERVICE:  08/13/2023     PREOPERATIVE DIAGNOSIS:  Left gluteal and thigh swelling of concern for   cellulitis/myositis/compartment syndrome.     POSTOPERATIVE DIAGNOSIS:  Left gluteal and extensive thigh necrotizing   fasciitis/myositis.     PROCEDURES PERFORMED:  1.  Left gluteal and thigh fasciectomies.  2.  Left thigh excisional debridement of wound measuring 55x17 cm in maximum   dimensions, extensive devitalized muscle of the short external rotators,   gluteus bobby and a portion of hamstring musculature.  3.  Application of wound VAC, left thigh, greater than 50 square cm.     SURGEON:  Akhil Castro MD     ANESTHESIOLOGIST:  Tien Bains MD     ANESTHESIA:  General.     ESTIMATED BLOOD LOSS:  500 mL.     SPECIMENS:  1.  Multiple specimens for culture were sent to lab for aerobic and anaerobic   culture.  2.  Fascia of the hamstring muscle was sent to the pathology for histological   evaluation to confirm diagnosis of necrotizing fasciitis.     DRAINS:  Wound VAC at 125 mmHg low continuous pressure was applied to left   thigh wound.     INDICATIONS FOR PROCEDURE:  The patient is 53 years old.  He was admitted on   08/10/2023.  I was consulted today due to persistent swelling, pain and   erythema of the left gluteal area as well as the thigh.  He had CT imaging,   which showed muscle edema and swelling.  No evidence of obvious deep fluid   collection or abscess.  Clinically, his gluteal area was quite firm.  He had   pain with passive range of motion.  I had some concern for potential   underlying compartment syndrome clinically as well as he clearly had some   cellulitis as well.  We discussed risks, benefits and alternatives of surgery.    I recommended going to the operating room for exploration and fasciotomies   and other indicated procedures.  He signed informed consent preoperatively and   wished to proceed with surgery as outlined above.     DESCRIPTION OF PROCEDURE:  The patient was met in  the preoperative holding   area.  His surgical site was signed.  His consent was confirmed to be   accurate.  He was taken back to the operating room.  General anesthesia was   induced.  He was positioned into the right lateral decubitus position with an   axillary roll and a beanbag.  The left gluteal area and the entire lower   extremity were prepped and draped in the usual sterile fashion.  A formal   timeout was performed to confirm patient's correct name, correct surgical   site, correct procedure and correct laterality.  An incision consistent with a   posterior approach to the hip was performed with a scalpel down through skin   extending it posteriorly toward the posterior superior iliac spine consistent   with a Kocher-Langenbeck incision to expose the gluteal muscle fascia,   identified the iliotibial band and gluteal fascia which was split   longitudinally.  The gluteus bobby muscle at this level appeared healthy,   viable and mildly swollen.  I released the IT band and was able to incise some   of the fascia over the vastus lateralis, which appeared healthy and viable.    I then developed the fascial planes more posteriorly along the posterior   aspect of the gluteus bobby muscle and there was purulent drainage present   within the gluteal musculature and upon further evaluation and dissection more   posteriorly there was necrotic fascia as well as extensive necrotic gluteus   bobby muscle, this had appearance with a dishwater type fluid consistent   with necrotizing fasciitis.  I carefully dissected down through the gluteus   bobby muscle and adjacent to the short external rotators and identified the   sciatic nerve, retracted it more proximally up to the greater sciatic notch as   well as distally along the course of the hamstring muscles after   significantly extending the incision both proximally and distally to get   source control over the extent of the necrotizing soft tissue infection.     There was extensive necrotic muscle involving the short external rotators,   gluteus bobby, portion of the gluteus medius and minimus as well, which I   thoroughly debrided with combination of Bovie cautery and rongeur, again   taking care to localize the sciatic nerve throughout the excisional   debridement.  There was a necrotic-appearing film adjacent to the sciatic   nerve, but I was very careful not to debride that area extensively.  I had   ultimately required extension of the incision down to the proximal tibia over   Gerdy's tubercle and this seemed to be the extent distally of the devitalized   fascia and soft tissue, quite sometime was spent with rongeur, debriding as   much devitalized tissue as I felt was feasible and thorough irrigation of the   wounds were performed with Pulsavac using normal saline.  Ultimately, he did   lose some muscle mass mostly of the gluteus bobby muscle that was   devitalized, but sufficient hemostasis was present. After thorough lavage of   the wound was performed, I then placed a sheet of Adaptic over the exposed   sciatic nerve, white wound VAC sponge, another layer of Adaptic and then black   wound VAC sponge to the remaining portion of the wound. I was able to achieve   good seal at 125 mmHg low continuous pressure with Y adaptor and 2 track   pads.  He was then awokened from anesthesia, transferred on the rRison and   taken to postanesthesia care unit in stable condition.     PLAN:  1.  The patient for now should be toe-touch weightbearing to left lower   extremity with crutches.  2.  I recommend continuing wound VAC therapy.  3.  He should be continued on empiric antibiotic therapy and I recommend   following up intraoperative cultures and adjusting antibiotics accordingly and   obtaining formal infectious disease consultation.  4.  He will need to return to the operating room in 2-3 days for repeat wound   VAC change and further  debridement.        ______________________________  MD ELMER Thompson/CHIDI/TIERRA    DD:  08/13/2023 16:46  DT:  08/13/2023 17:38    Job#:  [id number]

## 2023-08-14 NOTE — OR NURSING
Patient's mother provided with update. Family states they will retrieve belongings from previous room. Updated on plans to transfer to [facility].

## 2023-08-14 NOTE — PROGRESS NOTES
Patient alert and oriented to person, place, situation, time, on room air, vitals stable, left for surgery 1345 via bed, accompanied by transport personnel

## 2023-08-14 NOTE — PROGRESS NOTES
53yoM with left gluteal/thigh necrotizing fasciitis/myositis s/p fasciectomy/debridement 8/13.    S: sleeping comfortably, not woken    O:    Vitals:    08/14/23 0800   BP: 129/75   Pulse: 89   Resp: (!) 26   Temp: 36.4 °C (97.5 °F)   SpO2: 98%     Exam:  General-NAD, sleeping comfortably  LLE-thigh VAC in place with good seal, palp dp pulse    A: 53yoM with left gluteal/thigh necrotizing fasciitis/myositis s/p fasciectomy/debridement 8/13.    Recs:  --fu intraop cultures  --continue empiric abx, ID consultation  --continue VAC  --will need to return to the OR tomorrow for repeat debridement and VAC change  --NPO p MN

## 2023-08-14 NOTE — CONSULTS
DATE OF SERVICE:  08/13/2023     ORTHOPEDIC CONSULTATION     REQUESTING PHYSICIAN:  Mary Mcclain MD, Hospitalist Service.     REASON FOR CONSULTATION:  Left gluteal swelling, concern for compartment   syndrome.     HISTORY OF PRESENT ILLNESS:  The patient is 53 years old.  He was admitted on   08/10/2023.  He had 3 days onset of worsening left buttock pain and edema,   which started 2 days after he stated he injected himself with testosterone in   that area.  He had increased pain, swelling.  It was concerned that he may   have been starting to develop a compartment syndrome, so I was consulted to   provide treatment recommendations.  He had CT imaging, which did not show   obvious fluid collection.  There was some concern for myositis and associated   cellulitis.  He has been treated with empiric antibiotic therapy.     PAST MEDICAL HISTORY:  ALLERGIES:  IODINE, PERCOCET.     PAST MEDICAL DIAGNOSES:  Chronic migraines, obesity, obstructive sleep apnea.     PAST SURGICAL HISTORY:  Uvuloplasty, appendectomy, knee arthroscopy, inguinal   herniorrhaphy, laminotomy, ulnar nerve transposition, shoulder surgery,   tonsillectomy.     SOCIAL HISTORY:  The patient smokes half pack of cigarettes a day.  Denies   illicit drug use.  Denies alcohol use.     PHYSICAL EXAMINATION:  VITAL SIGNS:  Temperature 97.7, heart rate 104, respiratory rate 18, blood   pressure 146/94, pulse oximetry 90% on 2 liters nasal cannula.  GENERAL APPEARANCE:  The patient is sleeping.  He is awoken from sleep.  He is   lying on his right side.  MUSCULOSKELETAL:  Left gluteal area is indurated, swollen, quite tender to   palpation and firm.  He has pain with attempted passive knee extension to the   posterior thigh.  There is no obvious open wound present.  He is able to dorsi   and plantarflex his foot and flex and extend his toes.  He has palpable   posterior tibial pulse distally.     DIAGNOSTIC IMAGING:  CT of the left lower extremity with  contrast this morning   per radiology report suggests the swelling and there has been decreased   attenuation throughout the left gluteal musculature extending down the   posterior compartment of the thigh concerning for infection versus   inflammation.  There is induration and swelling and subcutaneous tissues   consistent with cellulitis as well, but no obvious abscess or bony abnormality   is seen.     LABORATORY DATA:  White blood cell count is 20.2.     ASSESSMENT:  A 53-year-old male with left gluteal swelling concerning for   myositis or potentially infectious myositis versus possible gluteal   compartment syndrome.     RECOMMENDATIONS:  1.  I discussed findings with the patient.  I recommend going to the operating   room for fasciotomy and exploration to rule out potential underlying   infection as well as to preserve viability of gluteal musculature if in fact   he does have compartment syndrome.  2.  He is in agreement.  We discussed risks, benefits and alternatives to   surgery and we will try to get him to the operating room as soon as possible   for surgical management.        ______________________________  MD ELMER Thompson/JOO/KUSH    DD:  08/13/2023 13:41  DT:  08/13/2023 14:25    Job#:  [id number]

## 2023-08-14 NOTE — CARE PLAN
The patient is Watcher - Medium risk of patient condition declining or worsening    Shift Goals  Clinical Goals: Pain management, antibiotics  Patient Goals: pain control  Family Goals: CHRIS    Progress made toward(s) clinical / shift goals:    Problem: Knowledge Deficit - Standard  Goal: Patient and family/care givers will demonstrate understanding of plan of care, disease process/condition, diagnostic tests and medications  Outcome: Progressing     Problem: Pain - Standard  Goal: Alleviation of pain or a reduction in pain to the patient’s comfort goal  Outcome: Progressing     Problem: Incision Care  Goal: Optimal post surgical incision care  Outcome: Progressing  Note: Wound vac in place on I/D area, drainage amount charted       Patient is not progressing towards the following goals:

## 2023-08-14 NOTE — ANESTHESIA POSTPROCEDURE EVALUATION
Patient: Abhishek Pacheco    Procedure Summary     Date: 08/13/23 Room / Location: [place] [id number] / SURGERY [facility]    Anesthesia Start: 1407 Anesthesia Stop: 1635    Procedure: FASCIOTOMY - GLUTEAL, THIGH (Left: Buttocks) Diagnosis: (LEFT GLUTEAL AND THIGH NECROTIZING FASCITITS)    Surgeons: Akhil Castro M.D. Responsible Provider: Tien Bains M.D.    Anesthesia Type: general ASA Status: 4 - Emergent          Final Anesthesia Type: general  Last vitals  BP   Blood Pressure: 135/77    Temp   36.7 °C (98 °F)    Pulse   (!) 109   Resp   20    SpO2   94 %      Anesthesia Post Evaluation    Patient location during evaluation: PACU  Level of consciousness: awake and alert    Airway patency: patent  Anesthetic complications: no  Cardiovascular status: hemodynamically stable  Respiratory status: acceptable  Hydration status: euvolemic    PONV: none          No notable events documented.     Nurse Pain Score: 8 (NPRS)

## 2023-08-14 NOTE — PROGRESS NOTES
4 Eyes Skin Assessment Completed by LISHA Mosley and LISHA Lira.    Head WDL  Ears WDL  Nose WDL  Mouth WDL  Neck WDL  Breast/Chest WDL  Shoulder Blades WDL  Spine WDL  (R) Arm/Elbow/Hand WDL  (L) Arm/Elbow/Hand WDL  Abdomen WDL  Groin WDL  Scrotum/Coccyx/Buttocks Redness and Blanching  (R) Leg WDL  (L) Leg Incision with wound vac in place  (R) Heel/Foot/Toe WDL  (L) Heel/Foot/Toe WDL          Devices In Places ECG, Blood Pressure Cuff, Pulse Ox, Condom Cath, and Oxy Mask      Interventions In Place Low Air Loss Mattress    Possible Skin Injury No    Pictures Uploaded Into Epic N/A  Wound Consult Placed N/A  RN Wound Prevention Protocol Ordered Yes

## 2023-08-14 NOTE — CONSULTS
Consult Note: Infectious Disease      ID  53M admitted with L thigh Nec Fasc.     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    In detail, pt states that 3 days before admission he took a testosterone shot to his L Buttock, and within 3 days he developed L thigh pain/swelling with associated chills and night sweats. He does ride dirt bikes for fun. He denies any associated trauma or cat scratches. He also denies any recent infection, recent travel, camping or hiking.     He was seen in the ER  on 8/10 and was admitted for L thigh Cellulitis and started on Cefazolin. However, his white count did not respond to Cefazolin so on 8/12 Clinda was given for a 2 day course, and on 8/13 a repeat CT scan was performed which showed infection extending into posterior compartment muscles of the L thigh with induration which was concerning for Nec Fasc so Ortho was consulted. Orthopedic surgery was concerned for compartment syndrome so they immediately took pt to the OR and performed a Fasciotomy with debridement and placed a WV. At this time, the dx of Nec Fasc was confirmed and pt's abx were broadened to Linezolid/Pip-tazo.     Today, pt denies F/C/S. He continues to have  L Butt Soreness. He is on 6L Oxymask but he denies SOB. He is afebrile (Tm = 36.7).     Review of Systems  -denies N/V    Past Medical History  -see HPI    Past Surgical History  -see HPI    Medications     Prior to Admission Medications   Prescriptions Last Dose Informant Patient Reported? Taking?   TRELEGY ELLIPTA 200-62.5-25 MCG/ACT AEROSOL POWDER, BREATH ACTIVATED 8/11/2023 at 2100  Yes No   albuterol 108 (90 Base) MCG/ACT Aero Soln inhalation aerosol   No No   Sig:  Inhale 2 Puffs every 6 hours as needed for Shortness of Breath (cough).   pantoprazole (PROTONIX) 40 MG Tablet Delayed Response 8/10/2023 at 0900  No No   Sig: Take 1 Tablet by mouth every day.   testosterone cypionate (DEPO-TESTOSTERONE) 200 MG/ML Solution injection   Yes No   Sig: 3x a month   valACYclovir (VALTREX) 500 MG Tab   Yes No   varenicline (CHANTIX CONTINUING MONTH DENISE) 1 MG tablet Not Taking Patient Yes No   Sig: Chantix 1 mg tablet   TAKE 1 TABLET BY MOUTH TWICE DAILY   Patient not taking: Reported on 8/11/2023   varenicline (CHANTIX DENISE) 0.5 MG X 11 & 1 MG X 42 tablet Not Taking Patient Yes No   Patient not taking: Reported on 8/11/2023   zolpidem (AMBIEN) 10 MG Tab 8/10/2023 at 2100  Yes No   Sig: Take 10 mg by mouth.      Facility-Administered Medications: None        Allergies    Allergies   Allergen Reactions    Iodine Hives and Itching     After receiving iodine contrast for CT pt developed hives and itching    Pt developed breakthrough reaction of hives after being premedicated for contrast injection on 12/10/19    Percocet [Oxycodone-Acetaminophen] Nausea     And dizziness       Family History    family history is not on file.     Social History  -see HPI   Tobacco: see HPI   Alcohol: see HPI   Recreational drugs (illegal and prescription):  denies  -pt owns 15 horses, 3 cats, 1 dog       Physical Exam    Vitals:  Temp:  [35.9 °C (96.6 °F)-36.7 °C (98 °F)] 36.1 °C (97 °F)  Pulse:  [] 88  Resp:  [12-43] 15  BP: (103-154)/() 154/79  SpO2:  [90 %-99 %] 97 %    -General: NAD, converses well  -Cardio: no murmurs or gallops  -Resp: lungs CTAB, symmetric expansion  -Abd: soft and nontender, no guarding or rebound  -MSK: L lateral thigh with WV in place and serosang drainage only with no purulent drainage. There is no erythema/induration/crepitus/tenderness/warmth around the area of the WV.     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    Pt was admitted with L butt Nec Fasc.    Hospital Course:  -8/10 - pt was seen in the ER, admitted for L thigh Cellulitis and started on Cefazolin.   -8/12 - Cefazolin was added for a 2 day course because pt's WBC count was not responding to abx therapy  -8/13 - due to pt's continued persistent leukocytosis, a repeat CT scan was performed which showed infection extending into posterior compartment muscles of the L thigh with induration which was concerning for Nec Fasc so Ortho was consulted. Orthopedic surgery was concerned for compartment syndrome so they immediately took pt to the OR and performed a Fasciotomy with debridement and placed a WV. At this time, the dx of Nec Fasc was confirmed and pt's abx were broadened to Linezolid/Pip-tazo.   -8/14 - ID was consulted.     #L Thigh Necrotizing Soft Tissue Infection c/b Compartment Syndrome  (SIRS = 2/4 on admit)  -s/p fasctiotomy with debridement on 8/13  -s/p Cefazolin x4 days  -s/p Clinda x2 days  -Linezolid/Pip-tazo (day #2)  -WV    Recommendations:  -per Dr. Clark, continue Linezolid/Pip-tazo until Surgical cultures result        -Code Status = DNR/DNI     Juliocesar Garrido, PGY3  Internal Medicine Residency with [employer]     Plan has been discussed with Attending Physician.

## 2023-08-15 ENCOUNTER — ANESTHESIA EVENT (OUTPATIENT)
Dept: SURGERY | Facility: MEDICAL CENTER | Age: 53
DRG: 856 | End: 2023-08-15
Payer: COMMERCIAL

## 2023-08-15 ENCOUNTER — ANESTHESIA (OUTPATIENT)
Dept: SURGERY | Facility: MEDICAL CENTER | Age: 53
DRG: 856 | End: 2023-08-15
Payer: COMMERCIAL

## 2023-08-15 PROBLEM — D72.829 LEUKOCYTOSIS: Status: ACTIVE | Noted: 2023-08-15

## 2023-08-15 LAB
ALBUMIN SERPL BCP-MCNC: 2.3 G/DL (ref 3.2–4.9)
ALBUMIN/GLOB SERPL: 0.7 G/DL
ALP SERPL-CCNC: 75 U/L (ref 30–99)
ALT SERPL-CCNC: 52 U/L (ref 2–50)
ANION GAP SERPL CALC-SCNC: 9 MMOL/L (ref 7–16)
AST SERPL-CCNC: 150 U/L (ref 12–45)
BACTERIA BLD CULT: NORMAL
BACTERIA BLD CULT: NORMAL
BILIRUB SERPL-MCNC: 0.8 MG/DL (ref 0.1–1.5)
BUN SERPL-MCNC: 27 MG/DL (ref 8–22)
CALCIUM ALBUM COR SERPL-MCNC: 9.6 MG/DL (ref 8.5–10.5)
CALCIUM SERPL-MCNC: 8.2 MG/DL (ref 8.5–10.5)
CHLORIDE SERPL-SCNC: 100 MMOL/L (ref 96–112)
CO2 SERPL-SCNC: 27 MMOL/L (ref 20–33)
CREAT SERPL-MCNC: 0.8 MG/DL (ref 0.5–1.4)
ERYTHROCYTE [DISTWIDTH] IN BLOOD BY AUTOMATED COUNT: 50.6 FL (ref 35.9–50)
GFR SERPLBLD CREATININE-BSD FMLA CKD-EPI: 106 ML/MIN/1.73 M 2
GLOBULIN SER CALC-MCNC: 3.1 G/DL (ref 1.9–3.5)
GLUCOSE SERPL-MCNC: 168 MG/DL (ref 65–99)
HCT VFR BLD AUTO: 39.2 % (ref 42–52)
HGB BLD-MCNC: 12.7 G/DL (ref 14–18)
MCH RBC QN AUTO: 29.5 PG (ref 27–33)
MCHC RBC AUTO-ENTMCNC: 32.4 G/DL (ref 32.3–36.5)
MCV RBC AUTO: 91 FL (ref 81.4–97.8)
PLATELET # BLD AUTO: 214 K/UL (ref 164–446)
PMV BLD AUTO: 11 FL (ref 9–12.9)
POTASSIUM SERPL-SCNC: 4.2 MMOL/L (ref 3.6–5.5)
PROT SERPL-MCNC: 5.4 G/DL (ref 6–8.2)
RBC # BLD AUTO: 4.31 M/UL (ref 4.7–6.1)
SIGNIFICANT IND 70042: NORMAL
SIGNIFICANT IND 70042: NORMAL
SITE SITE: NORMAL
SITE SITE: NORMAL
SODIUM SERPL-SCNC: 136 MMOL/L (ref 135–145)
SOURCE SOURCE: NORMAL
SOURCE SOURCE: NORMAL
WBC # BLD AUTO: 26.8 K/UL (ref 4.8–10.8)

## 2023-08-15 PROCEDURE — 99233 SBSQ HOSP IP/OBS HIGH 50: CPT | Performed by: INTERNAL MEDICINE

## 2023-08-15 PROCEDURE — 700101 HCHG RX REV CODE 250: Performed by: ANESTHESIOLOGY

## 2023-08-15 PROCEDURE — 160002 HCHG RECOVERY MINUTES (STAT): Performed by: ORTHOPAEDIC SURGERY

## 2023-08-15 PROCEDURE — 700101 HCHG RX REV CODE 250: Performed by: ORTHOPAEDIC SURGERY

## 2023-08-15 PROCEDURE — 85027 COMPLETE CBC AUTOMATED: CPT

## 2023-08-15 PROCEDURE — 160009 HCHG ANES TIME/MIN: Performed by: ORTHOPAEDIC SURGERY

## 2023-08-15 PROCEDURE — 770006 HCHG ROOM/CARE - MED/SURG/GYN SEMI*

## 2023-08-15 PROCEDURE — 700111 HCHG RX REV CODE 636 W/ 250 OVERRIDE (IP): Performed by: ANESTHESIOLOGY

## 2023-08-15 PROCEDURE — 160038 HCHG SURGERY MINUTES - EA ADDL 1 MIN LEVEL 2: Performed by: ORTHOPAEDIC SURGERY

## 2023-08-15 PROCEDURE — 700111 HCHG RX REV CODE 636 W/ 250 OVERRIDE (IP): Mod: JZ | Performed by: HOSPITALIST

## 2023-08-15 PROCEDURE — 160048 HCHG OR STATISTICAL LEVEL 1-5: Performed by: ORTHOPAEDIC SURGERY

## 2023-08-15 PROCEDURE — 160035 HCHG PACU - 1ST 60 MINS PHASE I: Performed by: ORTHOPAEDIC SURGERY

## 2023-08-15 PROCEDURE — 99231 SBSQ HOSP IP/OBS SF/LOW 25: CPT | Mod: GC | Performed by: INTERNAL MEDICINE

## 2023-08-15 PROCEDURE — 160036 HCHG PACU - EA ADDL 30 MINS PHASE I: Performed by: ORTHOPAEDIC SURGERY

## 2023-08-15 PROCEDURE — 700111 HCHG RX REV CODE 636 W/ 250 OVERRIDE (IP): Performed by: ORTHOPAEDIC SURGERY

## 2023-08-15 PROCEDURE — A9270 NON-COVERED ITEM OR SERVICE: HCPCS

## 2023-08-15 PROCEDURE — 700111 HCHG RX REV CODE 636 W/ 250 OVERRIDE (IP)

## 2023-08-15 PROCEDURE — 700102 HCHG RX REV CODE 250 W/ 637 OVERRIDE(OP)

## 2023-08-15 PROCEDURE — 700105 HCHG RX REV CODE 258: Performed by: HOSPITALIST

## 2023-08-15 PROCEDURE — 0KBP0ZZ EXCISION OF LEFT HIP MUSCLE, OPEN APPROACH: ICD-10-PCS | Performed by: ORTHOPAEDIC SURGERY

## 2023-08-15 PROCEDURE — 80053 COMPREHEN METABOLIC PANEL: CPT

## 2023-08-15 PROCEDURE — 700105 HCHG RX REV CODE 258: Performed by: INTERNAL MEDICINE

## 2023-08-15 PROCEDURE — 700105 HCHG RX REV CODE 258: Mod: JZ | Performed by: ANESTHESIOLOGY

## 2023-08-15 PROCEDURE — 160027 HCHG SURGERY MINUTES - 1ST 30 MINS LEVEL 2: Performed by: ORTHOPAEDIC SURGERY

## 2023-08-15 PROCEDURE — 700111 HCHG RX REV CODE 636 W/ 250 OVERRIDE (IP): Mod: JZ | Performed by: INTERNAL MEDICINE

## 2023-08-15 RX ORDER — SODIUM CHLORIDE, SODIUM LACTATE, POTASSIUM CHLORIDE, CALCIUM CHLORIDE 600; 310; 30; 20 MG/100ML; MG/100ML; MG/100ML; MG/100ML
INJECTION, SOLUTION INTRAVENOUS
Status: DISCONTINUED | OUTPATIENT
Start: 2023-08-15 | End: 2023-08-15 | Stop reason: SURG

## 2023-08-15 RX ORDER — DEXAMETHASONE SODIUM PHOSPHATE 4 MG/ML
INJECTION, SOLUTION INTRA-ARTICULAR; INTRALESIONAL; INTRAMUSCULAR; INTRAVENOUS; SOFT TISSUE PRN
Status: DISCONTINUED | OUTPATIENT
Start: 2023-08-15 | End: 2023-08-15 | Stop reason: SURG

## 2023-08-15 RX ORDER — OXYCODONE HCL 5 MG/5 ML
5 SOLUTION, ORAL ORAL
Status: DISCONTINUED | OUTPATIENT
Start: 2023-08-15 | End: 2023-08-15 | Stop reason: HOSPADM

## 2023-08-15 RX ORDER — ONDANSETRON 2 MG/ML
4 INJECTION INTRAMUSCULAR; INTRAVENOUS
Status: DISCONTINUED | OUTPATIENT
Start: 2023-08-15 | End: 2023-08-15 | Stop reason: HOSPADM

## 2023-08-15 RX ORDER — SODIUM CHLORIDE, SODIUM LACTATE, POTASSIUM CHLORIDE, CALCIUM CHLORIDE 600; 310; 30; 20 MG/100ML; MG/100ML; MG/100ML; MG/100ML
INJECTION, SOLUTION INTRAVENOUS CONTINUOUS
Status: DISCONTINUED | OUTPATIENT
Start: 2023-08-15 | End: 2023-08-15 | Stop reason: HOSPADM

## 2023-08-15 RX ORDER — IPRATROPIUM BROMIDE AND ALBUTEROL SULFATE 2.5; .5 MG/3ML; MG/3ML
3 SOLUTION RESPIRATORY (INHALATION)
Status: DISCONTINUED | OUTPATIENT
Start: 2023-08-15 | End: 2023-08-15 | Stop reason: HOSPADM

## 2023-08-15 RX ORDER — MIDAZOLAM HYDROCHLORIDE 1 MG/ML
1 INJECTION INTRAMUSCULAR; INTRAVENOUS
Status: DISCONTINUED | OUTPATIENT
Start: 2023-08-15 | End: 2023-08-15 | Stop reason: HOSPADM

## 2023-08-15 RX ORDER — DIPHENHYDRAMINE HYDROCHLORIDE 50 MG/ML
12.5 INJECTION INTRAMUSCULAR; INTRAVENOUS
Status: DISCONTINUED | OUTPATIENT
Start: 2023-08-15 | End: 2023-08-15 | Stop reason: HOSPADM

## 2023-08-15 RX ORDER — OXYCODONE HCL 5 MG/5 ML
10 SOLUTION, ORAL ORAL
Status: DISCONTINUED | OUTPATIENT
Start: 2023-08-15 | End: 2023-08-15 | Stop reason: HOSPADM

## 2023-08-15 RX ORDER — TOBRAMYCIN 1.2 G/30ML
INJECTION, POWDER, LYOPHILIZED, FOR SOLUTION INTRAVENOUS
Status: DISCONTINUED | OUTPATIENT
Start: 2023-08-15 | End: 2023-08-15 | Stop reason: HOSPADM

## 2023-08-15 RX ORDER — MEPERIDINE HYDROCHLORIDE 25 MG/ML
25 INJECTION INTRAMUSCULAR; INTRAVENOUS; SUBCUTANEOUS
Status: DISCONTINUED | OUTPATIENT
Start: 2023-08-15 | End: 2023-08-15 | Stop reason: HOSPADM

## 2023-08-15 RX ORDER — HYDROMORPHONE HYDROCHLORIDE 1 MG/ML
0.1 INJECTION, SOLUTION INTRAMUSCULAR; INTRAVENOUS; SUBCUTANEOUS
Status: DISCONTINUED | OUTPATIENT
Start: 2023-08-15 | End: 2023-08-15 | Stop reason: HOSPADM

## 2023-08-15 RX ORDER — VANCOMYCIN HYDROCHLORIDE 1 G/20ML
INJECTION, POWDER, LYOPHILIZED, FOR SOLUTION INTRAVENOUS
Status: COMPLETED | OUTPATIENT
Start: 2023-08-15 | End: 2023-08-15

## 2023-08-15 RX ORDER — ONDANSETRON 2 MG/ML
INJECTION INTRAMUSCULAR; INTRAVENOUS PRN
Status: DISCONTINUED | OUTPATIENT
Start: 2023-08-15 | End: 2023-08-15 | Stop reason: SURG

## 2023-08-15 RX ORDER — HYDROMORPHONE HYDROCHLORIDE 1 MG/ML
0.2 INJECTION, SOLUTION INTRAMUSCULAR; INTRAVENOUS; SUBCUTANEOUS
Status: DISCONTINUED | OUTPATIENT
Start: 2023-08-15 | End: 2023-08-15 | Stop reason: HOSPADM

## 2023-08-15 RX ORDER — HYDROMORPHONE HYDROCHLORIDE 1 MG/ML
0.5 INJECTION, SOLUTION INTRAMUSCULAR; INTRAVENOUS; SUBCUTANEOUS
Status: DISCONTINUED | OUTPATIENT
Start: 2023-08-15 | End: 2023-08-15 | Stop reason: HOSPADM

## 2023-08-15 RX ORDER — LIDOCAINE HYDROCHLORIDE 20 MG/ML
INJECTION, SOLUTION EPIDURAL; INFILTRATION; INTRACAUDAL; PERINEURAL PRN
Status: DISCONTINUED | OUTPATIENT
Start: 2023-08-15 | End: 2023-08-15 | Stop reason: SURG

## 2023-08-15 RX ORDER — KETOROLAC TROMETHAMINE 30 MG/ML
INJECTION, SOLUTION INTRAMUSCULAR; INTRAVENOUS PRN
Status: DISCONTINUED | OUTPATIENT
Start: 2023-08-15 | End: 2023-08-15 | Stop reason: SURG

## 2023-08-15 RX ORDER — MAGNESIUM HYDROXIDE 1200 MG/15ML
LIQUID ORAL
Status: COMPLETED | OUTPATIENT
Start: 2023-08-15 | End: 2023-08-15

## 2023-08-15 RX ADMIN — SODIUM CHLORIDE, POTASSIUM CHLORIDE, SODIUM LACTATE AND CALCIUM CHLORIDE: 600; 310; 30; 20 INJECTION, SOLUTION INTRAVENOUS at 07:37

## 2023-08-15 RX ADMIN — FENTANYL CITRATE 50 MCG: 50 INJECTION, SOLUTION INTRAMUSCULAR; INTRAVENOUS at 08:45

## 2023-08-15 RX ADMIN — ONDANSETRON 4 MG: 2 INJECTION INTRAMUSCULAR; INTRAVENOUS at 07:51

## 2023-08-15 RX ADMIN — METAXALONE 400 MG: 800 TABLET ORAL at 11:04

## 2023-08-15 RX ADMIN — FENTANYL CITRATE 50 MCG: 50 INJECTION, SOLUTION INTRAMUSCULAR; INTRAVENOUS at 09:46

## 2023-08-15 RX ADMIN — OXYCODONE HYDROCHLORIDE 10 MG: 10 TABLET ORAL at 22:52

## 2023-08-15 RX ADMIN — OXYCODONE HYDROCHLORIDE 10 MG: 10 TABLET ORAL at 15:03

## 2023-08-15 RX ADMIN — BACLOFEN 5 MG: 10 TABLET ORAL at 17:32

## 2023-08-15 RX ADMIN — LINEZOLID 600 MG: 600 INJECTION, SOLUTION INTRAVENOUS at 05:39

## 2023-08-15 RX ADMIN — HYDROMORPHONE HYDROCHLORIDE 0.5 MG: 1 INJECTION, SOLUTION INTRAMUSCULAR; INTRAVENOUS; SUBCUTANEOUS at 09:59

## 2023-08-15 RX ADMIN — NICOTINE 14 MG: 14 PATCH TRANSDERMAL at 05:40

## 2023-08-15 RX ADMIN — OXYCODONE HYDROCHLORIDE 10 MG: 10 TABLET ORAL at 19:37

## 2023-08-15 RX ADMIN — OXYCODONE HYDROCHLORIDE 5 MG: 10 TABLET ORAL at 01:11

## 2023-08-15 RX ADMIN — ROCURONIUM BROMIDE 50 MG: 10 INJECTION, SOLUTION INTRAVENOUS at 07:43

## 2023-08-15 RX ADMIN — OMEPRAZOLE 20 MG: 20 CAPSULE, DELAYED RELEASE ORAL at 05:40

## 2023-08-15 RX ADMIN — BACLOFEN 5 MG: 10 TABLET ORAL at 05:40

## 2023-08-15 RX ADMIN — OXYCODONE HYDROCHLORIDE 5 MG: 10 TABLET ORAL at 00:47

## 2023-08-15 RX ADMIN — BACLOFEN 5 MG: 10 TABLET ORAL at 11:04

## 2023-08-15 RX ADMIN — HEPARIN SODIUM 5000 UNITS: 5000 INJECTION, SOLUTION INTRAVENOUS; SUBCUTANEOUS at 21:39

## 2023-08-15 RX ADMIN — FENTANYL CITRATE 50 MCG: 50 INJECTION, SOLUTION INTRAMUSCULAR; INTRAVENOUS at 08:15

## 2023-08-15 RX ADMIN — FENTANYL CITRATE 50 MCG: 50 INJECTION, SOLUTION INTRAMUSCULAR; INTRAVENOUS at 09:41

## 2023-08-15 RX ADMIN — KETOROLAC TROMETHAMINE 30 MG: 30 INJECTION, SOLUTION INTRAMUSCULAR; INTRAVENOUS at 07:51

## 2023-08-15 RX ADMIN — PIPERACILLIN AND TAZOBACTAM 4.5 G: 4; .5 INJECTION, POWDER, FOR SOLUTION INTRAVENOUS at 12:37

## 2023-08-15 RX ADMIN — DEXAMETHASONE SODIUM PHOSPHATE 4 MG: 4 INJECTION INTRA-ARTICULAR; INTRALESIONAL; INTRAMUSCULAR; INTRAVENOUS; SOFT TISSUE at 07:51

## 2023-08-15 RX ADMIN — METAXALONE 400 MG: 800 TABLET ORAL at 05:40

## 2023-08-15 RX ADMIN — METAXALONE 400 MG: 800 TABLET ORAL at 17:32

## 2023-08-15 RX ADMIN — PROPOFOL 200 MG: 10 INJECTION, EMULSION INTRAVENOUS at 07:43

## 2023-08-15 RX ADMIN — ACETAMINOPHEN 1000 MG: 500 TABLET, FILM COATED ORAL at 17:32

## 2023-08-15 RX ADMIN — LIDOCAINE HYDROCHLORIDE 80 MG: 20 INJECTION, SOLUTION EPIDURAL; INFILTRATION; INTRACAUDAL at 07:43

## 2023-08-15 RX ADMIN — PIPERACILLIN AND TAZOBACTAM 4.5 G: 4; .5 INJECTION, POWDER, FOR SOLUTION INTRAVENOUS at 05:36

## 2023-08-15 RX ADMIN — OXYCODONE HYDROCHLORIDE 10 MG: 10 TABLET ORAL at 04:22

## 2023-08-15 RX ADMIN — FENTANYL CITRATE 50 MCG: 50 INJECTION, SOLUTION INTRAMUSCULAR; INTRAVENOUS at 09:15

## 2023-08-15 RX ADMIN — SUGAMMADEX 200 MG: 100 INJECTION, SOLUTION INTRAVENOUS at 09:15

## 2023-08-15 RX ADMIN — LINEZOLID 600 MG: 600 INJECTION, SOLUTION INTRAVENOUS at 17:36

## 2023-08-15 RX ADMIN — ACETAMINOPHEN 1000 MG: 500 TABLET, FILM COATED ORAL at 05:40

## 2023-08-15 RX ADMIN — PIPERACILLIN AND TAZOBACTAM 4.5 G: 4; .5 INJECTION, POWDER, FOR SOLUTION INTRAVENOUS at 21:44

## 2023-08-15 RX ADMIN — MIDAZOLAM 2 MG: 1 INJECTION, SOLUTION INTRAMUSCULAR; INTRAVENOUS at 07:37

## 2023-08-15 RX ADMIN — FENTANYL CITRATE 100 MCG: 50 INJECTION, SOLUTION INTRAMUSCULAR; INTRAVENOUS at 07:43

## 2023-08-15 RX ADMIN — ACETAMINOPHEN 1000 MG: 500 TABLET, FILM COATED ORAL at 11:04

## 2023-08-15 ASSESSMENT — PAIN DESCRIPTION - PAIN TYPE
TYPE: ACUTE PAIN

## 2023-08-15 ASSESSMENT — COGNITIVE AND FUNCTIONAL STATUS - GENERAL
TOILETING: A LITTLE
MOVING FROM LYING ON BACK TO SITTING ON SIDE OF FLAT BED: A LITTLE
DRESSING REGULAR UPPER BODY CLOTHING: A LITTLE
MOBILITY SCORE: 17
HELP NEEDED FOR BATHING: A LITTLE
SUGGESTED CMS G CODE MODIFIER DAILY ACTIVITY: CJ
SUGGESTED CMS G CODE MODIFIER MOBILITY: CK
STANDING UP FROM CHAIR USING ARMS: A LITTLE
WALKING IN HOSPITAL ROOM: A LOT
MOVING TO AND FROM BED TO CHAIR: A LITTLE
DRESSING REGULAR LOWER BODY CLOTHING: A LITTLE
DAILY ACTIVITIY SCORE: 20
CLIMB 3 TO 5 STEPS WITH RAILING: A LOT

## 2023-08-15 ASSESSMENT — PAIN SCALES - GENERAL: PAIN_LEVEL: 9

## 2023-08-15 ASSESSMENT — PATIENT HEALTH QUESTIONNAIRE - PHQ9
2. FEELING DOWN, DEPRESSED, IRRITABLE, OR HOPELESS: NOT AT ALL
SUM OF ALL RESPONSES TO PHQ9 QUESTIONS 1 AND 2: 0
1. LITTLE INTEREST OR PLEASURE IN DOING THINGS: NOT AT ALL
SUM OF ALL RESPONSES TO PHQ9 QUESTIONS 1 AND 2: 0
1. LITTLE INTEREST OR PLEASURE IN DOING THINGS: NOT AT ALL
2. FEELING DOWN, DEPRESSED, IRRITABLE, OR HOPELESS: NOT AT ALL

## 2023-08-15 ASSESSMENT — ENCOUNTER SYMPTOMS
WEIGHT LOSS: 0
HALLUCINATIONS: 0
FOCAL WEAKNESS: 0
BLURRED VISION: 0
HEADACHES: 0
TINGLING: 0
PHOTOPHOBIA: 0
SPUTUM PRODUCTION: 0
CONSTIPATION: 0
COUGH: 0
SPEECH CHANGE: 0
FEVER: 0
DIZZINESS: 0
SHORTNESS OF BREATH: 0
ORTHOPNEA: 0
DOUBLE VISION: 0
VOMITING: 0
CHILLS: 0
BACK PAIN: 0
DIARRHEA: 0
NAUSEA: 0
PALPITATIONS: 0
NECK PAIN: 0
EYE PAIN: 0
SENSORY CHANGE: 0
TREMORS: 0
MYALGIAS: 1
ROS SKIN COMMENTS: WOUND
ABDOMINAL PAIN: 0

## 2023-08-15 ASSESSMENT — LIFESTYLE VARIABLES: SUBSTANCE_ABUSE: 0

## 2023-08-15 NOTE — OR NURSING
Pt arrives from OR to PACU at 0923. Pt identification verified by team, pt placed on all monitors with alarms audible, report and care of pt received from Anesthesiologist and RN. Assessment completed, pt changed into hospital gown and provided with warm blankets.

## 2023-08-15 NOTE — ANESTHESIA PROCEDURE NOTES
Airway    Date/Time: 8/15/2023 7:44 AM    Performed by: Deng Lamb M.D.  Authorized by: Deng Lamb M.D.    Location:  OR  Urgency:  Elective  Indications for Airway Management:  Anesthesia      Spontaneous Ventilation: absent    Sedation Level:  Deep  Preoxygenated: Yes    Patient Position:  Sniffing  Final Airway Type:  Endotracheal airway  Final Endotracheal Airway:  ETT  Cuffed: Yes    Technique Used for Successful ETT Placement:  Direct laryngoscopy    Insertion Site:  Oral  Blade Type:  Christiano  Laryngoscope Blade/Videolaryngoscope Blade Size:  3  ETT Size (mm):  8.0  Measured from:  Teeth  ETT to Teeth (cm):  24  Placement Verified by: auscultation and capnometry    Cormack-Lehane Classification:  Grade IIb - view of arytenoids or posterior of glottis only  Number of Attempts at Approach:  1

## 2023-08-15 NOTE — OP REPORT
DATE OF SERVICE:  08/15/2023     PREOPERATIVE DIAGNOSES:  Left gluteal and thigh necrotizing fasciitis and   myositis, status post fasciectomy and excisional debridement with wound VAC   application.     POSTOPERATIVE DIAGNOSES:  Left gluteal and thigh necrotizing fasciitis and   myositis, status post fasciectomy and excisional debridement with wound VAC   application.     PROCEDURE PERFORMED:    Excisional debridement of left thigh wound including skin, subcutaneous tissue   and muscle of wound measuring 60x17 cm in maximum dimensions.  2.   Application of wound VAC to left thigh greater than 50 square centimeters.     SURGEON:  Akhil Castro MD     ANESTHESIOLOGIST:  Deng Lamb MD     ANESTHESIA:  General.     ESTIMATED BLOOD LOSS:  150 mL     DRAINS:  Wound VAC at 125 mmHg low continuous pressure was applied to left   thigh wound.     INDICATIONS FOR PROCEDURE:  The patient is a 53 years old.  He was admitted to   the hospital on 08/10/2023.  Three days later, I was consulted due to   persistent swelling, redness in the left gluteal area and I recommended going   to the operating room to rule out compartment syndrome versus infectious   process.  He was found intraoperatively to have clear necrotizing fasciitis   and myositis requiring extensive fasciectomy and debridement of devitalized   muscle from the gluteal area down to the lateral thigh at the level of the   proximal tibia.  He was placed into a wound VAC therapy and readmitted to the   medical service in the ICU for monitoring.  He has been treated with empiric   antibiotic therapy and infectious disease has been consulted.  He has been   hemodynamically stable and overall doing fairly well, but has had increasing   leukocytosis.  Plan was to return to the operating room today for repeat   excisional debridement and wound VAC change as well as other indicated   procedures.  He signed informed consent preoperatively and wished to proceed   with  surgery as outlined above.     DESCRIPTION OF PROCEDURE:  The patient was met in the preoperative holding   area.  Surgical site was signed.  His consent was confirmed to be accurate.    He was taken back to the operating room and general anesthesia was induced.    He was positioned in the right lateral decubitus position with Stulberg   positioners and an axillary roll.  The wound VAC was removed from the left   thigh.  The left lower extremity was prepped and draped in the usual sterile   fashion.  A formal timeout was performed to confirm patient's correct name,   correct surgical site, correct procedure and correct laterality.  I evaluated   the wound and extended it a little bit more proximally toward the posterior   superior iliac spine through the subcutaneous planes.  There was some residual   devitalized necrotic muscle of the gluteus bobby as well as the short   external rotators traversing the sciatic nerve, which was in continuity and I   was able to palpate it all the way up to the greater sciatic notch and through   its course through the posterior hamstring musculature and was able to   protect it through the duration of the procedure.  I carefully debrided some   devitalized hamstring muscle with a rongeur adjacent to the sciatic nerve.  I   was able to excise a little bit more necrotic fascia at the distal thigh and   proximally at the posterior gluteal area.  There was a little bit of cloudy   fluid still present adjacent to the greater trochanter between the anterior   portion of the gluteus bobby that I had split as well as the iliotibial   band, which I thoroughly then irrigated the entire wound with Pulsavac using   normal saline of 3 liters.  I did not see any obvious clear proximal extension   or distal extension of the infection. I felt there was sufficient debridement   achieved at this point. I had excised more subcutaneous tissue, fascia and   muscle of both the gluteus bobby,  piriformis and short external rotator   muscles as well as a portion of the hamstring muscles with a Bovie cautery and   rongeur.  This wound was now measured about 60x17 cm in maximum dimensions.    I then was able to mix 2 vials each of vancomycin and tobramycin and placed   them within the deep soft tissue planes. I placed Adaptic over the sciatic   nerve, a white VAC sponge over the sciatic nerve, another layer of Adaptic to   protect the sciatic nerve from the black foam wound VAC.  I placed some black   foam sponges in the areas where the wound tunneled between the intermuscular   planes and over the greater trochanter deep to the gluteus bobby and   ultimately over the top of that to the skin edges, which I then adhered the   skin edges with VAC with staples and achieved a good seal with Ioban and a   wound VAC at 125 mmHg low continuous pressure.  He was then awoken from   anesthesia and transferred on the [facility] and taken to postanesthesia care unit   in stable condition.     PLAN:    1.  The patient will be readmitted postoperatively to the medical service.  2.  He should be toe touch weightbearing left lower extremity just for balance   and support, and take stress off the musculature of the hip area.  3.  He should be continued on antibiotics and recommend following up   infectious disease recommendations.  4.  Continue wound VAC therapy for now.  5.  The plan was to return to the operating room in 2-3 days for repeat wound   VAC change and further debridement if necessary.        ______________________________  MD ELMER Thompson/JEANA    DD:  08/15/2023 09:37  DT:  08/15/2023 11:23    Job#:  [id number]

## 2023-08-15 NOTE — PROGRESS NOTES
Hospital Medicine Daily Progress Note    Date of Service  8/15/2023    Chief Complaint  Abhishek Pacheco is a 53 y.o. male admitted 8/10/2023 with thigh pain    Hospital Course  52yo PMHx chronic migraine, BMI 33, AKCY on CPAP, hypogonadism on IM testosterone.  Admitted to the floor on 8/10 with cellulititis at IM injection site in buttock.  Also noted to have resp failure and started on IV lasix.  Taken to the OR on 8/13 for I&D of large abscess and debridelment of necrotic tissue    Interval Problem Update    08/15/23  I evaluated and examined him at the bedside.  He reported pain at the site of surgery.  He underwent excision debridement of left thigh wound including skin, subcutaneous tissue and muscle of wound measuring 60 x 70 cm in maximum dimensions.  I am continuing linezolid 600 mg IV every 12 hourly.  I am also continuing IV Zosyn.  Continue pain control.  I discussed plan of care with him.    I have discussed this patient's plan of care and discharge plan at IDT rounds today with Case Management, Nursing, Nursing leadership, and other members of the IDT team.    Consultants/Specialty  orthopedics    Code Status  DNAR/DNI    Disposition  The patient is not medically cleared for discharge to home or a post-acute facility.  Anticipate discharge to: skilled nursing facility    I have placed the appropriate orders for post-discharge needs.    Review of Systems  Review of Systems   Constitutional:  Negative for chills, fever and weight loss.   HENT:  Negative for hearing loss and tinnitus.    Eyes:  Negative for blurred vision, double vision, photophobia and pain.   Respiratory:  Negative for cough, sputum production and shortness of breath.    Cardiovascular:  Negative for chest pain, palpitations, orthopnea and leg swelling.   Gastrointestinal:  Negative for abdominal pain, constipation, diarrhea, nausea and vomiting.   Genitourinary:  Negative for dysuria, frequency and urgency.   Musculoskeletal:   Positive for myalgias. Negative for back pain, joint pain and neck pain.   Skin:  Negative for rash.        Wound   Neurological:  Negative for dizziness, tingling, tremors, sensory change, speech change, focal weakness and headaches.   Psychiatric/Behavioral:  Negative for hallucinations and substance abuse.    All other systems reviewed and are negative.       Physical Exam  Temp:  [35.9 °C (96.6 °F)-36.4 °C (97.6 °F)] 36 °C (96.8 °F)  Pulse:  [75-96] 85  Resp:  [15-19] 16  BP: (119-162)/(65-96) 125/70  SpO2:  [93 %-100 %] 93 %    Physical Exam  Vitals reviewed.   Constitutional:       General: He is not in acute distress.     Appearance: He is ill-appearing.   HENT:      Head: Normocephalic and atraumatic. No contusion.      Right Ear: External ear normal.      Left Ear: External ear normal.      Nose: Nose normal.      Mouth/Throat:      Pharynx: No oropharyngeal exudate.   Eyes:      General:         Right eye: No discharge.         Left eye: No discharge.      Pupils: Pupils are equal, round, and reactive to light.   Cardiovascular:      Rate and Rhythm: Normal rate and regular rhythm.      Heart sounds: No murmur heard.     No friction rub. No gallop.   Pulmonary:      Effort: Pulmonary effort is normal.      Breath sounds: No wheezing or rhonchi.   Abdominal:      General: Bowel sounds are normal. There is no distension.      Palpations: Abdomen is soft.      Tenderness: There is no abdominal tenderness. There is no rebound.   Musculoskeletal:         General: No swelling or tenderness.      Cervical back: No rigidity. No muscular tenderness.      Comments: Wound VAC on lateral left thigh.   Skin:     General: Skin is warm and dry.      Coloration: Skin is not jaundiced.   Neurological:      General: No focal deficit present.      Mental Status: He is alert.      Cranial Nerves: No cranial nerve deficit.      Sensory: No sensory deficit.   Psychiatric:         Mood and Affect: Mood normal.          Fluids    Intake/Output Summary (Last 24 hours) at 8/15/2023 1634  Last data filed at 8/15/2023 1500  Gross per 24 hour   Intake 5476.04 ml   Output 3920 ml   Net 1556.04 ml         Laboratory  Recent Labs     08/13/23  0047 08/14/23  0235 08/15/23  0125   WBC 20.2* 24.1* 26.8*   RBC 5.68 4.75 4.31*   HEMOGLOBIN 16.6 13.9* 12.7*   HEMATOCRIT 49.0 41.7* 39.2*   MCV 86.3 87.8 91.0   MCH 29.2 29.3 29.5   MCHC 33.9 33.3 32.4   RDW 46.3 47.8 50.6*   PLATELETCT 199 197 214   MPV 10.6 10.6 11.0       Recent Labs     08/13/23  0047 08/14/23  0235 08/15/23  0125   SODIUM 131* 129* 136   POTASSIUM 4.3 4.5 4.2   CHLORIDE 92* 94* 100   CO2 24 23 27   GLUCOSE 92 135* 168*   BUN 34* 34* 27*   CREATININE 1.26 1.14 0.80   CALCIUM 8.6 8.2* 8.2*                     Imaging  CT-EXTREMITY, LOWER WITH LEFT   Final Result      1.  Swelling and areas of decreased attenuation are identified within and throughout left gluteus musculature and extending down into the posterior compartment musculature in the left thigh. Consider infection or inflammation such as cellulitis or    myositis.      2.  Induration and subcutaneous fat and intramuscular fat planes is also noted consistent with cellulitis.      3.  No abscess or bone erosion identified.      CT-PELVIS WITH   Final Result      1.  Swelling and areas of decreased attenuation throughout the visualized left gluteus musculature are identified. Consider infection or inflammation such as myositis or cellulitis.      2.  Induration and subcutaneous fat is identified consistent with cellulitis.      3.  No fluid collection that would suggest abscess is identified at this time. No soft tissue emphysema is noted.      DX-CHEST-PORTABLE (1 VIEW)   Final Result      No acute cardiac or pulmonary abnormalities are identified.      CT-PELVIS WITH   Final Result      1.  Subcutaneous inflammatory changes about the left buttock suspicious for cellulitis.      2.  Enlargement of left gluteal  musculature consistent with myositis and/or inflammatory change.             Assessment/Plan  * Necrotizing fasciitis (HCC)- (present on admission)  Assessment & Plan  At site where he injects his IM testosterone  S/p I&D of large abscess and necrotic tissue with wound vac placement 8/13 Dr Castro  Plan on return to OR in next 48hrs  Looking forward to cultures from OR  Continuing IV linezolid 600 mg twice daily and also I am continuing IV Zosyn.  I am continuing IV pain medication with Dilaudid for severe pain and oxycodone for moderate pain.  Monitor for adverse effect of narcotic pain medications.  He underwent surgical intervention by orthopedic surgery on August 15, 2023.  I reviewed OP note.    Leukocytosis  Assessment & Plan  He found to have leukocytosis most likely secondary to infection.  Ordered CBC for tomorrow.    Nontraumatic compartment syndrome of left lower extremity  Assessment & Plan  Secondary to Nec Fasc  S/p release  I ordered CMP for tomorrow.  I ordered CPK for tomorrow.    Transaminitis  Assessment & Plan  Elevation is likely due to Rhabdo and not intrinsic hepatic  Follow daily  I ordered CMP for tomorrow.      UTI (urinary tract infection)  Assessment & Plan  08/15/23    Urinalysis on 8/12/2023 was positive for nitrite, and a small amount of leukocyte Estrace.  Patient reports burning with initiation of urination.  Continue antibiotics  No acute complaint at this time.      Rhabdomyolysis  Assessment & Plan  IVFs  Follow CPK and BMP    JUDY (acute kidney injury) ([facility])  Assessment & Plan  08/15/23    Pre-renal, Rhabdo  Function has significantly improved  Medication dose adjustment as per renal functions  Avoid nephrotoxins.  Ordered BMP for tomorrow  Continue to monitor closely.      Other constipation  Assessment & Plan  Bowel protocol    GERD (gastroesophageal reflux disease)  Assessment & Plan  Continuing outpatient PPI.    Insomnia  Assessment & Plan  History of. On Zolpidem at  "home.  - Resumed.    Acute respiratory failure with hypoxemia (HCC)- (present on admission)  Assessment & Plan  Currently he is off from oxygen and maintaining oxygen saturation.  Requiring while asleep for his KACY    Bilirubinemia  Assessment & Plan  08/15/23    Mild. Likely due to sepsis. No evidence of anemia on labs.     Significantly improved.  Ordered CMP for tomorrow.    Tobacco dependence- (present on admission)  Assessment & Plan  Pt counseled x 5 mins.  Discussed pharm and non pharm options in addition to replacement    Sepsis due to cellulitis (HCC)- (present on admission)  Assessment & Plan  Present on admission  Source skin  Follow up on operative cultures  Empiric therapy for necrotizing fascitis  Continuing the antibiotics.  Follow culture result from the surgery.      Sleep apnea- (present on admission)  Assessment & Plan  - RT/OT protocol  Uses home CPAP \"when I need it\" at home    Obesity- (present on admission)  Assessment & Plan  Noted.   - Counseling deferred to primary.      I discussed plan of care during multidisciplinary rounds regarding patient's current medical condition and plan of care.       VTE prophylaxis:    heparin ppx      I have performed a physical exam and reviewed and updated ROS and Plan today (8/15/2023). In review of yesterday's note (8/14/2023), there are no changes except as documented above.        "

## 2023-08-15 NOTE — ANESTHESIA TIME REPORT
Anesthesia Start and Stop Event Times     Date Time Event    8/15/2023 0726 Ready for Procedure     0737 Anesthesia Start     0929 Anesthesia Stop        Responsible Staff  08/15/23    Name Role Begin End    Deng Lamb M.D. Anesth 0737 0929        Overtime Reason:  no overtime (within assigned shift)    Comments:

## 2023-08-15 NOTE — CARE PLAN
The patient is Watcher - Medium risk of patient condition declining or worsening    Shift Goals  Clinical Goals: pain management, monitor drain output, abx  Patient Goals: pain control  Family Goals: CHRIS    Progress made toward(s) clinical / shift goals:       Problem: Knowledge Deficit - Standard  Goal: Patient and family/care givers will demonstrate understanding of plan of care, disease process/condition, diagnostic tests and medications  Outcome: Progressing  Updated pt with POC, verbalizes understanding     Problem: Pain - Standard  Goal: Alleviation of pain or a reduction in pain to the patient’s comfort goal  Outcome: Progressing  0-10 pain scale being used for pain assessment. Pt given appropriate prn pain medication according to pain assessment     Problem: Fall Risk  Goal: Patient will remain free from falls  Outcome: Progressing  Bed in low position, bed alarm on, non skid socks on patient, call light within reach     Problem: Hemodynamics  Goal: Patient's hemodynamics, fluid balance and neurologic status will be stable or improve  Outcome: Progressing     Problem: Incision Care  Goal: Optimal post surgical incision care  Outcome: Progressing       Patient is not progressing towards the following goals:

## 2023-08-15 NOTE — THERAPY
Physical Therapy Contact Note    Patient Name: Abhishek Pacheco  Age:  53 y.o., Sex:  male  Medical Record #: [id number]  Today's Date: 8/15/2023    Pt to OR for repeat debridement and wound vac change. Will follow up for eval as able/appropriate.     Chaya Hoover, PT, DPT  [phone]

## 2023-08-15 NOTE — OR SURGEON
Immediate Post OP Note    PreOp Diagnosis: Left gluteal and thigh necrotizing fasciitis/myositis s/p fasciectomy and debridement.      PostOp Diagnosis: same      Procedure(s):  LEFT THIGH WOUND DEBRIDEMENT AND VAC CHANGE - Wound Class: Dirty or Infected    Surgeon(s):  Akhil Castro M.D.    Anesthesiologist/Type of Anesthesia:  Anesthesiologist: Deng Lamb M.D./General    Surgical Staff:  Circulator: Marlen Mckinnon R.N.  Scrub Person: Daryl Loza    Specimens removed if any:  * No specimens in log *    Estimated Blood Loss: 150cc    Findings: see dictation    Complications: none known    PLAN:  --readmit postop  --TTWB LLE  --PT/OT for mobilization  --continue VAC therapy  --continue abx, fu ID recs  --okay to continue heparin and SCDs  --plan to return to OR in 2-3 days for repeat debridement and VAC change        8/15/2023 9:21 AM Akhil Castro M.D.

## 2023-08-15 NOTE — ANESTHESIA POSTPROCEDURE EVALUATION
Patient: Abhishek Pacheco    Procedure Summary     Date: 08/15/23 Room / Location: [place] [address] / SURGERY [facility]    Anesthesia Start: 0737 Anesthesia Stop: 0929    Procedure: LEFT THIGH WOUND DEBRIDEMENT AND VAC CHANGE (Left: Leg Lower) Diagnosis: (left gluteal/thigh necrotizing fasciitis/myositis s/p fasciectomy/debridement)    Surgeons: Akhli Castro M.D. Responsible Provider: Deng Lamb M.D.    Anesthesia Type: general ASA Status: 2 - Emergent          Final Anesthesia Type: general  Last vitals  BP   Blood Pressure: (!) 158/92    Temp   35.9 °C (96.6 °F)    Pulse   86   Resp   17    SpO2   99 %      Anesthesia Post Evaluation    Patient location during evaluation: PACU  Patient participation: complete - patient participated  Level of consciousness: awake and alert  Pain score: 9  Giant wound  Airway patency: patent  Anesthetic complications: no  Cardiovascular status: hemodynamically stable  Respiratory status: acceptable  Hydration status: euvolemic    PONV: none          No notable events documented.     Nurse Pain Score: 9 (NPRS)

## 2023-08-15 NOTE — ANESTHESIA PREPROCEDURE EVALUATION
Case: [id number] Date/Time: 08/15/23 0715    Procedure: IRRIGATION AND DEBRIDEMENT, WOUND LEFT THIGH (Left: Leg Lower)    Anesthesia type: General    Pre-op diagnosis: left gluteal/thigh necrotizing fasciitis/myositis s/p fasciectomy/debridement    Location: [place] [address] / SURGERY [facility]    Surgeons: Akhil Castro M.D.          Relevant Problems   ANESTHESIA   (positive) Sleep apnea      GI   (positive) GERD (gastroesophageal reflux disease)         (positive) JUDY (acute kidney injury) (HCC)       Physical Exam    Airway   Mallampati: II  TM distance: >3 FB  Neck ROM: full       Cardiovascular - normal exam  Rhythm: regular  Rate: normal  (-) murmur     Dental - normal exam           Pulmonary - normal exam  Breath sounds clear to auscultation     Abdominal    Neurological - normal exam                 Anesthesia Plan    ASA 2- EMERGENT       Plan - general       Airway plan will be ETT          Induction: intravenous    Postoperative Plan: Postoperative administration of opioids is intended.    Pertinent diagnostic labs and testing reviewed    Informed Consent:    Anesthetic plan and risks discussed with patient.    Use of blood products discussed with: patient whom consented to blood products.

## 2023-08-16 PROBLEM — F17.200 TOBACCO DEPENDENCE: Chronic | Status: RESOLVED | Noted: 2023-08-11 | Resolved: 2023-08-16

## 2023-08-16 LAB
ALBUMIN SERPL BCP-MCNC: 2.5 G/DL (ref 3.2–4.9)
ALBUMIN/GLOB SERPL: 0.8 G/DL
ALP SERPL-CCNC: 74 U/L (ref 30–99)
ALT SERPL-CCNC: 51 U/L (ref 2–50)
ANION GAP SERPL CALC-SCNC: 5 MMOL/L (ref 7–16)
AST SERPL-CCNC: 98 U/L (ref 12–45)
BACTERIA SPEC ANAEROBE CULT: ABNORMAL
BACTERIA TISS AEROBE CULT: ABNORMAL
BACTERIA WND AEROBE CULT: ABNORMAL
BILIRUB SERPL-MCNC: 0.6 MG/DL (ref 0.1–1.5)
BUN SERPL-MCNC: 24 MG/DL (ref 8–22)
CALCIUM ALBUM COR SERPL-MCNC: 9.2 MG/DL (ref 8.5–10.5)
CALCIUM SERPL-MCNC: 8 MG/DL (ref 8.5–10.5)
CHLORIDE SERPL-SCNC: 103 MMOL/L (ref 96–112)
CK SERPL-CCNC: 505 U/L (ref 0–154)
CO2 SERPL-SCNC: 30 MMOL/L (ref 20–33)
CREAT SERPL-MCNC: 0.9 MG/DL (ref 0.5–1.4)
ERYTHROCYTE [DISTWIDTH] IN BLOOD BY AUTOMATED COUNT: 52.8 FL (ref 35.9–50)
GFR SERPLBLD CREATININE-BSD FMLA CKD-EPI: 103 ML/MIN/1.73 M 2
GLOBULIN SER CALC-MCNC: 3 G/DL (ref 1.9–3.5)
GLUCOSE SERPL-MCNC: 80 MG/DL (ref 65–99)
GRAM STN SPEC: ABNORMAL
HCT VFR BLD AUTO: 36.8 % (ref 42–52)
HGB BLD-MCNC: 11.9 G/DL (ref 14–18)
MAGNESIUM SERPL-MCNC: 2.1 MG/DL (ref 1.5–2.5)
MCH RBC QN AUTO: 29.3 PG (ref 27–33)
MCHC RBC AUTO-ENTMCNC: 32.3 G/DL (ref 32.3–36.5)
MCV RBC AUTO: 90.6 FL (ref 81.4–97.8)
PLATELET # BLD AUTO: 215 K/UL (ref 164–446)
PMV BLD AUTO: 10.5 FL (ref 9–12.9)
POTASSIUM SERPL-SCNC: 4.3 MMOL/L (ref 3.6–5.5)
PROT SERPL-MCNC: 5.5 G/DL (ref 6–8.2)
RBC # BLD AUTO: 4.06 M/UL (ref 4.7–6.1)
SIGNIFICANT IND 70042: ABNORMAL
SITE SITE: ABNORMAL
SODIUM SERPL-SCNC: 138 MMOL/L (ref 135–145)
SOURCE SOURCE: ABNORMAL
WBC # BLD AUTO: 24.8 K/UL (ref 4.8–10.8)

## 2023-08-16 PROCEDURE — 700102 HCHG RX REV CODE 250 W/ 637 OVERRIDE(OP)

## 2023-08-16 PROCEDURE — 700111 HCHG RX REV CODE 636 W/ 250 OVERRIDE (IP)

## 2023-08-16 PROCEDURE — 97535 SELF CARE MNGMENT TRAINING: CPT

## 2023-08-16 PROCEDURE — A9270 NON-COVERED ITEM OR SERVICE: HCPCS

## 2023-08-16 PROCEDURE — 700111 HCHG RX REV CODE 636 W/ 250 OVERRIDE (IP): Mod: JZ | Performed by: INTERNAL MEDICINE

## 2023-08-16 PROCEDURE — A9270 NON-COVERED ITEM OR SERVICE: HCPCS | Performed by: INTERNAL MEDICINE

## 2023-08-16 PROCEDURE — 700102 HCHG RX REV CODE 250 W/ 637 OVERRIDE(OP): Performed by: INTERNAL MEDICINE

## 2023-08-16 PROCEDURE — 700111 HCHG RX REV CODE 636 W/ 250 OVERRIDE (IP): Performed by: INTERNAL MEDICINE

## 2023-08-16 PROCEDURE — 700105 HCHG RX REV CODE 258: Performed by: INTERNAL MEDICINE

## 2023-08-16 PROCEDURE — 85027 COMPLETE CBC AUTOMATED: CPT

## 2023-08-16 PROCEDURE — 36415 COLL VENOUS BLD VENIPUNCTURE: CPT

## 2023-08-16 PROCEDURE — 99231 SBSQ HOSP IP/OBS SF/LOW 25: CPT | Performed by: INTERNAL MEDICINE

## 2023-08-16 PROCEDURE — 99233 SBSQ HOSP IP/OBS HIGH 50: CPT | Performed by: INTERNAL MEDICINE

## 2023-08-16 PROCEDURE — 82550 ASSAY OF CK (CPK): CPT

## 2023-08-16 PROCEDURE — 770006 HCHG ROOM/CARE - MED/SURG/GYN SEMI*

## 2023-08-16 PROCEDURE — 83735 ASSAY OF MAGNESIUM: CPT

## 2023-08-16 PROCEDURE — 80053 COMPREHEN METABOLIC PANEL: CPT

## 2023-08-16 RX ORDER — OXYCODONE HYDROCHLORIDE 10 MG/1
10 TABLET ORAL
Status: DISCONTINUED | OUTPATIENT
Start: 2023-08-16 | End: 2023-08-17

## 2023-08-16 RX ORDER — HYDROMORPHONE HYDROCHLORIDE 1 MG/ML
1 INJECTION, SOLUTION INTRAMUSCULAR; INTRAVENOUS; SUBCUTANEOUS
Status: DISCONTINUED | OUTPATIENT
Start: 2023-08-16 | End: 2023-08-17

## 2023-08-16 RX ORDER — OXYCODONE HYDROCHLORIDE 5 MG/1
5 TABLET ORAL
Status: DISCONTINUED | OUTPATIENT
Start: 2023-08-16 | End: 2023-08-17

## 2023-08-16 RX ADMIN — PIPERACILLIN AND TAZOBACTAM 4.5 G: 4; .5 INJECTION, POWDER, FOR SOLUTION INTRAVENOUS at 04:27

## 2023-08-16 RX ADMIN — OXYCODONE HYDROCHLORIDE 10 MG: 10 TABLET ORAL at 02:15

## 2023-08-16 RX ADMIN — HEPARIN SODIUM 5000 UNITS: 5000 INJECTION, SOLUTION INTRAVENOUS; SUBCUTANEOUS at 14:40

## 2023-08-16 RX ADMIN — OXYCODONE HYDROCHLORIDE 10 MG: 10 TABLET ORAL at 06:01

## 2023-08-16 RX ADMIN — ACETAMINOPHEN 1000 MG: 500 TABLET, FILM COATED ORAL at 06:00

## 2023-08-16 RX ADMIN — METAXALONE 400 MG: 800 TABLET ORAL at 13:46

## 2023-08-16 RX ADMIN — OMEPRAZOLE 20 MG: 20 CAPSULE, DELAYED RELEASE ORAL at 06:16

## 2023-08-16 RX ADMIN — BACLOFEN 5 MG: 10 TABLET ORAL at 06:01

## 2023-08-16 RX ADMIN — HYDROMORPHONE HYDROCHLORIDE 0.5 MG: 1 INJECTION, SOLUTION INTRAMUSCULAR; INTRAVENOUS; SUBCUTANEOUS at 04:21

## 2023-08-16 RX ADMIN — POLYETHYLENE GLYCOL 3350 1 PACKET: 17 POWDER, FOR SOLUTION ORAL at 06:16

## 2023-08-16 RX ADMIN — MAGNESIUM HYDROXIDE 30 ML: 400 SUSPENSION ORAL at 06:16

## 2023-08-16 RX ADMIN — HEPARIN SODIUM 5000 UNITS: 5000 INJECTION, SOLUTION INTRAVENOUS; SUBCUTANEOUS at 20:54

## 2023-08-16 RX ADMIN — BACLOFEN 5 MG: 10 TABLET ORAL at 17:09

## 2023-08-16 RX ADMIN — BACLOFEN 5 MG: 10 TABLET ORAL at 13:46

## 2023-08-16 RX ADMIN — ACETAMINOPHEN 1000 MG: 500 TABLET, FILM COATED ORAL at 17:09

## 2023-08-16 RX ADMIN — OXYCODONE HYDROCHLORIDE 10 MG: 10 TABLET ORAL at 13:57

## 2023-08-16 RX ADMIN — HEPARIN SODIUM 5000 UNITS: 5000 INJECTION, SOLUTION INTRAVENOUS; SUBCUTANEOUS at 06:02

## 2023-08-16 RX ADMIN — ZOLPIDEM TARTRATE 10 MG: 5 TABLET ORAL at 00:16

## 2023-08-16 RX ADMIN — METAXALONE 400 MG: 800 TABLET ORAL at 19:51

## 2023-08-16 RX ADMIN — OXYCODONE HYDROCHLORIDE 10 MG: 10 TABLET ORAL at 20:54

## 2023-08-16 RX ADMIN — SENNOSIDES AND DOCUSATE SODIUM 2 TABLET: 50; 8.6 TABLET ORAL at 06:16

## 2023-08-16 RX ADMIN — METAXALONE 400 MG: 800 TABLET ORAL at 06:16

## 2023-08-16 RX ADMIN — PIPERACILLIN AND TAZOBACTAM 4.5 G: 4; .5 INJECTION, POWDER, FOR SOLUTION INTRAVENOUS at 13:53

## 2023-08-16 RX ADMIN — HYDROMORPHONE HYDROCHLORIDE 1 MG: 1 INJECTION, SOLUTION INTRAMUSCULAR; INTRAVENOUS; SUBCUTANEOUS at 10:02

## 2023-08-16 RX ADMIN — ACETAMINOPHEN 1000 MG: 500 TABLET, FILM COATED ORAL at 13:46

## 2023-08-16 RX ADMIN — PIPERACILLIN AND TAZOBACTAM 4.5 G: 4; .5 INJECTION, POWDER, FOR SOLUTION INTRAVENOUS at 21:01

## 2023-08-16 RX ADMIN — ZOLPIDEM TARTRATE 10 MG: 5 TABLET ORAL at 20:53

## 2023-08-16 RX ADMIN — NICOTINE 14 MG: 14 PATCH TRANSDERMAL at 06:01

## 2023-08-16 ASSESSMENT — ENCOUNTER SYMPTOMS
COUGH: 0
WEAKNESS: 0
FEVER: 0
HEARTBURN: 0
VOMITING: 0
ORTHOPNEA: 0
HEADACHES: 0
FOCAL WEAKNESS: 0
DIAPHORESIS: 1
NAUSEA: 0
HEMOPTYSIS: 0
BLURRED VISION: 0
DOUBLE VISION: 0
SHORTNESS OF BREATH: 0
DIZZINESS: 0

## 2023-08-16 ASSESSMENT — PAIN DESCRIPTION - PAIN TYPE
TYPE: ACUTE PAIN

## 2023-08-16 NOTE — PROGRESS NOTES
..4 Eyes Skin Assessment Completed by LISHA Rockwell and LISHA Palma.    Head WDL  Ears WDL  Nose WDL  Mouth Blisters on mouth  Neck WDL  Breast/Chest WDL  Shoulder Blades WDL  Spine WDL  (R) Arm/Elbow/Hand WDL  (L) Arm/Elbow/Hand WDL  Abdomen WDL  Groin WDL  Scrotum/Coccyx/Buttocks WDL  (R) Leg WDL  (L) Leg Wound Vac on posterior thigh and buttock  (R) Heel/Foot/Toe WDL  (L) Heel/Foot/Toe WDL          Devices In Places Blood Pressure Cuff and Oxy Mask      Interventions In Place Waffle Overlay and Pillows    Possible Skin Injury Yes    Pictures Uploaded Into Epic Yes  Wound Consult Placed Yes Wound Care Already Involved  RN Wound Prevention Protocol Ordered Yes

## 2023-08-16 NOTE — CARE PLAN
The patient is Watcher - Medium risk of patient condition declining or worsening    Shift Goals  Clinical Goals: I/D, Wound Vac change, Abx  Patient Goals: Pain management  Family Goals: Updates    Progress made toward(s) clinical / shift goals:    Problem: Knowledge Deficit - Standard  Goal: Patient and family/care givers will demonstrate understanding of plan of care, disease process/condition, diagnostic tests and medications  Outcome: Progressing     Problem: Pain - Standard  Goal: Alleviation of pain or a reduction in pain to the patient’s comfort goal  Outcome: Progressing     Problem: Fall Risk  Goal: Patient will remain free from falls  Outcome: Progressing       Patient is not progressing towards the following goals:

## 2023-08-16 NOTE — PROGRESS NOTES
"Seen in f/u for left thigh necrotizing fasciitis following testosterone injection  Today underwent further debridement of the site, more necrotic tissue noted  He continues on iv pip/tazobactam and linezolid   No fever    Scheduled Medications   Medication Dose Frequency    linezolid (ZYVOX) IV  600 mg Q12HRS    piperacillin-tazobactam  4.5 g Q8HRS    acetaminophen  1,000 mg TID    heparin  5,000 Units Q8HRS    metaxalone  400 mg TID    senna-docusate  2 Tablet BID    And    polyethylene glycol/lytes  1 Packet BID    And    magnesium hydroxide  30 mL BID    baclofen  5 mg TID    nicotine  14 mg Daily-0600    omeprazole  20 mg DAILY    zolpidem  10 mg QHS    Pharmacy Consult Request  1 Each PHARMACY TO DOSE       /70   Pulse 85   Temp 36 °C (96.8 °F) (Temporal)   Resp 16   Ht 1.778 m (5' 10\")   Wt 106 kg (233 lb 0.4 oz)   SpO2 93%      Exam deferred as patient sleeping soundly in post- op period    LABS:  MICRO:  0 Result Notes      Component 2 d ago   Significant Indicator POS Positive (POS) P    Source WND P    Site Gluteal abscess fluid P    Culture Result - Abnormal  P    Gram Stain Result Many WBCs.   Many mixed bacteria, no predominant organism seen.    Culture Result      Abnormal   Eikenella corrodens   Heavy growth   P      Culture Result      Abnormal   Streptococcus anginosus   Heavy growth   P      Culture Result      Abnormal   Staphylococcus epidermidis   Rare growth   P      Resulting Agency M              Assess: Necrotizing fasciitis secondary to Eikenella infection, which is a normal carmella found in the mouth. Not clear how it got to the hip. ? Contaminated needle tip from the injection.  Both the Eikenella and the Strep anginosus will be fully susceptible to the pip/tazo. Very likely the staph epi is not significant here and does not need treatment.    REC: continue pip/tazobactam            Stop linezolid- only useful in Clostridium and Strep pyogenes infection       "

## 2023-08-16 NOTE — PROGRESS NOTES
Patient kept stating that he was having 10/10 pain and could not wait till next dose of pain medication. Contacted provider on call Cecil Scruggs because Oxycodone and Dilaudid orders were linked together. Provider stating it was ok for him to get it as it is prescribed for breakthrough pain.

## 2023-08-16 NOTE — PROGRESS NOTES
[facility] Internal Medicine Daily Progress Note    Date of Service  8/16/2023    [facility] Team: R IM [facility] Team   Attending: Eugenio Tompkins M.d.  Senior Resident: Dr. Edson Mendiola MD  Intern:  Dr. Frank wallace MD  Contact Number: [phone]    Chief Complaint  Abhishek Pacheco is a 53 y.o. male admitted 8/10/2023 with pain, swelling in left buttock radiating down to leg.    Hospital Course  Mr. Abhishek Pacheco is a 53yoM with chronic migraine (on Sumtatriptan), obesity (BMI 31.70), KACY (on nightly CPAP; s/p uvuloplasty, 10/2011) who presented 8/10/2023 with 3-day history of worsening left buttock pain and edema which started 2 days after injecting himself with testosterone in same region. The pain is constant, rated 8/10, worse with movement, and minimally improved with Tramadol and Prednisone (prescribed by PCP). He also reports chills, headache, and night sweats.      Patient reports taking testosterone shots thrice monthly for last 2 years.     Admission findings:  Exam is notable for generalized discomfort, clammy skin, tachycardia (to 112 bpm), hypoxemia (84-85% on RA), bilateral crackles in middle lung fields. No tenderness to abdominal palpation. His left buttock is somewhat tense, swollen, non-erythematous, moderately tender at gluteal cleft.      Labs show severe leukocytosis (33.7 K/uL, 86.3% PMNs), mildly elevated total bilirubin. CT pelvis (8/10/2023) shows subcutaneous inflammatory changes with enlargement of left gluteal musculature. CXR (8/10/2023) shows mildly thickened interlobular septa.    He receives Ancef 2g x1 in ED and is admitted for sepsis due to cellulitis of left buttock.   8/15/23:  Left gluteal and thigh necrotizing fasciitis and   myositis, status post fasciectomy and excisional debridement with wound VAC   application.  8/16/23: WBC, CPK trending down      Interval Problem Update  Patient was seen and evaluated at bedside this AM.  Patient seems to be in intense pain discomfort  "diaphoresis.  Overnight lack of sleep due to increasing pain despite PRN pain management.   left lateral thigh with intense tenderness.  Left lower leg with tense tender skin and warmth.  Patient was in discomfort due to pain. \" I couldnot sleep all night due to pain\"  Vitals are stable with no overnight fever.  WBC, CPK trending down.   Repeat I&D tomorrow    I have discussed this patient's plan of care and discharge plan at IDT rounds today with Case Management, Nursing, Nursing leadership, and other members of the IDT team.    Consultants/Specialty  general surgery    Code Status  DNAR/DNI    Disposition  The patient is not medically cleared for discharge to home or a post-acute facility.      I have placed the appropriate orders for post-discharge needs.    Review of Systems  Review of Systems   Constitutional:  Positive for diaphoresis and malaise/fatigue. Negative for fever.   Eyes:  Negative for blurred vision and double vision.   Respiratory:  Negative for cough, hemoptysis and shortness of breath.    Cardiovascular:  Negative for chest pain and orthopnea.   Gastrointestinal:  Negative for heartburn, nausea and vomiting.   Musculoskeletal:         Severe left leg pain thigh and lower leg   Neurological:  Negative for dizziness, focal weakness, weakness and headaches.        Physical Exam  Temp:  [35.9 °C (96.6 °F)-36.6 °C (97.9 °F)] 36.2 °C (97.1 °F)  Pulse:  [75-99] 86  Resp:  [15-19] 18  BP: (123-162)/(65-96) 153/70  SpO2:  [93 %-100 %] 93 %    Physical Exam  Constitutional:       Appearance: Normal appearance.      Comments: Distressed, discomfort due to pain   HENT:      Mouth/Throat:      Mouth: Mucous membranes are moist.      Pharynx: Oropharynx is clear.   Eyes:      Extraocular Movements: Extraocular movements intact.      Pupils: Pupils are equal, round, and reactive to light.   Cardiovascular:      Rate and Rhythm: Normal rate and regular rhythm.      Pulses: Normal pulses.      Heart sounds: " Normal heart sounds.   Pulmonary:      Effort: Respiratory distress present.      Comments: Increased  respiratory effort due to severe pain  Abdominal:      General: Abdomen is flat.      Palpations: Abdomen is soft.   Musculoskeletal:      Comments: S/p surgical debridement left leg with wound VAC on buttock.  Tender left lower leg     Neurological:      General: No focal deficit present.      Mental Status: He is alert.         Fluids    Intake/Output Summary (Last 24 hours) at 8/16/2023 0817  Last data filed at 8/16/2023 0814  Gross per 24 hour   Intake 4749.11 ml   Output 5830 ml   Net -1080.89 ml       Laboratory  Recent Labs     08/14/23  0235 08/15/23  0125 08/16/23  0209   WBC 24.1* 26.8* 24.8*   RBC 4.75 4.31* 4.06*   HEMOGLOBIN 13.9* 12.7* 11.9*   HEMATOCRIT 41.7* 39.2* 36.8*   MCV 87.8 91.0 90.6   MCH 29.3 29.5 29.3   MCHC 33.3 32.4 32.3   RDW 47.8 50.6* 52.8*   PLATELETCT 197 214 215   MPV 10.6 11.0 10.5     Recent Labs     08/14/23  0235 08/15/23  0125 08/16/23  0209   SODIUM 129* 136 138   POTASSIUM 4.5 4.2 4.3   CHLORIDE 94* 100 103   CO2 23 27 30   GLUCOSE 135* 168* 80   BUN 34* 27* 24*   CREATININE 1.14 0.80 0.90   CALCIUM 8.2* 8.2* 8.0*                   Imaging  CT-EXTREMITY, LOWER WITH LEFT   Final Result      1.  Swelling and areas of decreased attenuation are identified within and throughout left gluteus musculature and extending down into the posterior compartment musculature in the left thigh. Consider infection or inflammation such as cellulitis or    myositis.      2.  Induration and subcutaneous fat and intramuscular fat planes is also noted consistent with cellulitis.      3.  No abscess or bone erosion identified.      CT-PELVIS WITH   Final Result      1.  Swelling and areas of decreased attenuation throughout the visualized left gluteus musculature are identified. Consider infection or inflammation such as myositis or cellulitis.      2.  Induration and subcutaneous fat is identified  consistent with cellulitis.      3.  No fluid collection that would suggest abscess is identified at this time. No soft tissue emphysema is noted.      DX-CHEST-PORTABLE (1 VIEW)   Final Result      No acute cardiac or pulmonary abnormalities are identified.      CT-PELVIS WITH   Final Result      1.  Subcutaneous inflammatory changes about the left buttock suspicious for cellulitis.      2.  Enlargement of left gluteal musculature consistent with myositis and/or inflammatory change.           Assessment/Plan  Problem Representation:    * Necrotizing fasciitis (HCC)- (present on admission)  Assessment & Plan  At site where he injects his IM testosterone reports severe pain and unable to sleep  Plan:  S/p I&D of large abscess and necrotic tissue with wound vac placement 8/13 by Dr Castro  Plan on return to OR  tomorrow morning  Looking forward to cultures from OR  continuing IV Zosyn.  WBC trending down  Dilaudid for severe pain and oxycodone for moderate pain.  Monitor for adverse effect of narcotic pain medications.  He underwent surgical intervention by orthopedic surgery on August 15, 2023.  I reviewed OP note.    Leukocytosis  Assessment & Plan  He found to have leukocytosis most likely secondary to deep tissue infection.  Significant downtrending.  Work-up with repeat CBC    Nontraumatic compartment syndrome of left lower extremity  Assessment & Plan  Secondary to necrotizing fasciitis, tense, tender upper thigh and lower leg  Status post incision and drainage, repeat I&D tomorrow 8/17/2023  Continue pain regimen as needed    Transaminitis  Assessment & Plan  Elevation is likely due to Rhabdo and not intrinsic hepatic  Significant downtrending  Follow-up with [facility]    UTI (urinary tract infection)  Assessment & Plan  Urinalysis on 8/12/2023 was positive for nitrite, and a small amount of leukocyte Estrace.  Patient reports burning with initiation of urination.   No acute complaint at this time.   Asymptomatic      Rhabdomyolysis  Assessment & Plan  Plan:  IV fluids  Follow CPK and BMP      JUDY (acute kidney injury) (HCC)  Assessment & Plan  08/15/23    Likely prerenal JUDY due to hypovolemia secondary to sepsis  Renal function has significantly improved creatinine trending down back to baseline  Medication dose adjustment as per renal functions  Avoid nephrotoxins.  IV fluids  Continue to monitor closely.      Other constipation  Assessment & Plan  Bowel protocol    GERD (gastroesophageal reflux disease)  Assessment & Plan  Continuing outpatient PPI.    Insomnia  Assessment & Plan  History of. On Zolpidem at home.  - Resumed.    Acute respiratory failure with hypoxemia (HCC)- (present on admission)  Assessment & Plan  Likely secondary to sepsis and comorbid KACY     Plan:   Currently he is off from oxygen and maintaining oxygen saturation.  Requiring while asleep for his KACY      Bilirubinemia  Assessment & Plan  Miguel Angel trending down  Significantly improved.  Follow-up with CMP.    Sepsis due to cellulitis (HCC)- (present on admission)  Assessment & Plan  Sepsis secondary to testosterone injection in muscles:    Pain:  Follow up on operative cultures  Empiric therapy for necrotizing fascitis be continued  Follow-up with CBC, CMP  Acute hydration, monitor for signs of endorgan dysfunction       Sleep apnea- (present on admission)  Assessment & Plan  - RT/OT protocol  Uses home CPAP at home  Monitor for polycythemia secondary to hypoxia      Obesity- (present on admission)  Assessment & Plan  BMI 33.44    Plan:  - Counseling deferred to primary.  -Patient was counseled on decrease calorie intake  -Follow-up with lipid panel, A1c         VTE prophylaxis: SCDs/TEDs    I have performed a physical exam and reviewed and updated ROS and Plan today (8/16/2023). In review of yesterday's note (8/15/2023), there are no changes except as documented above.

## 2023-08-16 NOTE — THERAPY
"Physical Therapy Contact Note    Patient Name: Abhishek Pacheco  Age:  53 y.o., Sex:  male  Medical Record #: [id number]  Today's Date: 8/16/2023    PLOF and home environment data gathered from patient. Pt was provided with education and model demonstration for TTWB of the LLE for transfers, ambulation. Pt was instructed to attempt mobility and stated \"Oh no Im not getting up there is no chance\". Pt was educated on importance of out of bed mobility however continued to refuse. RN aware.        08/16/23 1046   Precautions   Precautions Fall Risk;Toe Touch Weight Bearing Left Lower Extremity   Prior Living Situation   Housing / Facility 1 Story House   Steps Into Home 2   Equipment Owned None   Lives with - Patient's Self Care Capacity Alone and Able to Care For Self   Prior Level of Functional Mobility   Bed Mobility Independent   Transfer Status Independent   Ambulation Independent   Ambulation Distance community   Assistive Devices Used None   Stairs Independent   Bed Mobility    Supine to Sit Refused   Interdisciplinary Plan of Care Collaboration   IDT Collaboration with  Nursing   Session Information   Date / Session Number  8/16- Needs Rosas Isaac only       "

## 2023-08-16 NOTE — HOSPITAL COURSE
Mr. Abhishek Pacheco is a 53yoM with chronic migraine (on Sumtatriptan), obesity (BMI 31.70), KACY (on nightly CPAP; s/p uvuloplasty, 10/2011) who presented 8/10/2023 with 3-day history of worsening left buttock pain and edema which started 2 days after injecting himself with testosterone in same region. The pain is constant, rated 8/10, worse with movement, and minimally improved with Tramadol and Prednisone (prescribed by PCP). He also reports chills, headache, and night sweats.      Patient reports taking testosterone shots thrice monthly for last 2 years.     Admission findings:  Exam is notable for generalized discomfort, clammy skin, tachycardia (to 112 bpm), hypoxemia (84-85% on RA), bilateral crackles in middle lung fields. No tenderness to abdominal palpation. His left buttock is somewhat tense, swollen, non-erythematous, moderately tender at gluteal cleft.      Labs show severe leukocytosis (33.7 K/uL, 86.3% PMNs), mildly elevated total bilirubin. CT pelvis (8/10/2023) shows subcutaneous inflammatory changes with enlargement of left gluteal musculature. CXR (8/10/2023) shows mildly thickened interlobular septa.    He receives Ancef 2g x1 in ED and is admitted for sepsis due to cellulitis of left buttock.   8/15/23: Left gluteal and thigh necrotizing fasciitis and   myositis, status post fasciectomy and excisional debridement with wound VAC   application.  8/17/23: Repeat incision and drainage by general surgery followed by wound VAC.

## 2023-08-16 NOTE — PROGRESS NOTES
"      Orthopaedic Progress Note    Interval changes:  Patient doing well   Left glute vac dressing CDI without leak  Return to OR tomorrow for repeat I&D  NPO after midnight     ROS - Patient denies any new issues.  Pain well controlled.    BP (!) 153/70   Pulse 86   Temp 36.2 °C (97.1 °F) (Temporal)   Resp 18   Ht 1.778 m (5' 10\")   Wt 106 kg (233 lb 0.4 oz)   SpO2 93%     Patient seen and examined  No acute distress  Breathing non labored  RRR  Left hip vac dressing in place without leak, DNVI, moves all toes, cap refill <2 sec.     Recent Labs     08/14/23  0235 08/15/23  0125 08/16/23  0209   WBC 24.1* 26.8* 24.8*   RBC 4.75 4.31* 4.06*   HEMOGLOBIN 13.9* 12.7* 11.9*   HEMATOCRIT 41.7* 39.2* 36.8*   MCV 87.8 91.0 90.6   MCH 29.3 29.5 29.3   MCHC 33.3 32.4 32.3   RDW 47.8 50.6* 52.8*   PLATELETCT 197 214 215   MPV 10.6 11.0 10.5       Active Hospital Problems    Diagnosis     Leukocytosis [D72.829]     UTI (urinary tract infection) [N39.0]     Transaminitis [R74.01]     Nontraumatic compartment syndrome of left lower extremity [M79.A22]     JUDY (acute kidney injury) (HCC) [N17.9]     Rhabdomyolysis [M62.82]     Necrotizing fasciitis (HCC) [M72.6]      Reports 3-day history of left buttock pain after recent injection with testosterone at left buttock, which is tense, swollen, and moderately tender. CT pelvis (8/10/2023) shows subcutaneous inflammatory changes with enlargement of left gluteal musculature.      Tobacco dependence [F17.200]      Smokes 4-5 \"puffs\" per day. Has 35 pack-years. Previously was on Chantix, but not for last few months.   - Smoking cessation encouraged.   - Nicotine patch ordered.         Bilirubinemia [E80.6]      Mild. Likely due to sepsis. No evidence of anemia on labs.   - Direct bili ordered. Will monitor.         Acute respiratory failure with hypoxemia (HCC) [J96.01]      Mild diffuse rhonchi heard on exam. CXR shows peripheral Kerley B lines. NT-proBNP slightly elevated.   - " IV Lasix 20mg x1 ordered; strict I/Os, daily standing weights ordered.   - RT/OT protocol; wean from oxygen as tolerated.   - Would consider TTE given likelihood of HFpEF in setting of KACY        Insomnia [G47.00]      History of. On Zolpidem at home.  - Resumed.        GERD (gastroesophageal reflux disease) [K21.9]      History of. On Pantoprazole at home.  - Resumed.         Other constipation [K59.09]     Sepsis due to cellulitis (HCC) [L03.90, A41.9]     Sleep apnea [G47.30]     Obesity [E66.9]        Assessment/Plan:  Patient doing well   Left glute vac dressing CDI without leak  Return to OR tomorrow for repeat I&D  NPO after midnight   POD#1 S/P  Excisional debridement of left thigh wound including skin, subcutaneous tissue and muscle of wound measuring 60x17 cm in maximum dimensions.  Wt bearing status - TTWB LLE  Wound care/Drains -  vac dressings to be left in place  Future Procedures - tomorrow  Sutures/Staples out- 14-21 days post operatively. Removal will completed by ortho mid levels only.  PT/OT-initiated  Antibiotics: zosyn 4.5g IV q8  DVT Prophylaxis- TEDS/SCDs/Foot pumps/heparin  Barraza-not needed per ortho  Case Coordination for Discharge Planning - Disposition per therapy recs.

## 2023-08-16 NOTE — DISCHARGE PLANNING
Patients brother calls today asking for help in getting more information about his brother. Even if information cannot be given to him, then perhaps more information to his elderly mother that is at the bedside. He is not entirely sure of the patients  and will confirm that with his Mom and call back. Message sent to S1 CM to notify of this request.

## 2023-08-16 NOTE — PROGRESS NOTES
"Seen in f/u for left thigh necrotizing fasciitis following testosterone injection  Yesterday  underwent further debridement of the site, more necrotic tissue noted.Scheduled for additional procedure tomorrow.  He continues on iv pip/tazobactam No fever  Pain is under  better control   Has gotten out of bed    Scheduled Medications   Medication Dose Frequency    piperacillin-tazobactam  4.5 g Q8HRS    acetaminophen  1,000 mg TID    heparin  5,000 Units Q8HRS    metaxalone  400 mg TID    senna-docusate  2 Tablet BID    And    polyethylene glycol/lytes  1 Packet BID    And    magnesium hydroxide  30 mL BID    baclofen  5 mg TID    nicotine  14 mg Daily-0600    omeprazole  20 mg DAILY    zolpidem  10 mg QHS    Pharmacy Consult Request  1 Each PHARMACY TO DOSE       BP (!) 146/83   Pulse 70   Temp 36.8 °C (98.2 °F) (Temporal)   Resp 20   Ht 1.778 m (5' 10\")   Wt 106 kg (233 lb 0.4 oz)   SpO2 96%        Alert, more comfortable appearing  No rash   Wound vac left thigh   Able to move toes and foot   LABS:  MICRO:  Culture Result - Abnormal     Culture Result  Abnormal   Bacteroides fragilis Group   Heavy growth     Resulting Agency M         Contains abnormal data CULTURE WOUND W/ GRAM STAIN  Order: [id number]  S      Component 3 d ago   Significant Indicator POS Positive (POS)    Source WND    Site Gluteal abscess fluid    Culture Result - Abnormal     Gram Stain Result Many WBCs.   Many mixed bacteria, no predominant organism seen.    Culture Result  Abnormal   Eikenella corrodens   Heavy growth     Culture Result  Abnormal   Streptococcus anginosus   Heavy growth     Culture Result  Abnormal   Staphylococcus epidermidis   Rare growth     Resulting Agency M        Susceptibility     Staphylococcus epidermidis     KEITH     Ampicillin/sulbactam <=8/4 mcg/mL Sensitive     Azithromycin >4 mcg/mL Resistant     Cefazolin <=8 mcg/mL Sensitive     Cefepime <=4 mcg/mL Sensitive     Clindamycin <=0.25 mcg/mL Sensitive     " Daptomycin <=0.5 mcg/mL Sensitive     Erythromycin >4 mcg/mL Resistant     Oxacillin <=0.25 mcg/mL Sensitive     Pip/Tazobactam <=8 mcg/mL Sensitive     Tetracycline <=4 mcg/mL Sensitive     Trimeth/Sulfa <=0.5/9.5 m... Sensitive     Vancomycin 2 mcg/mL Sensitive                         Assess: Clinically appears to be improved and more stable. Necrotizing fasciitis secondary to Eikenella infection, which is a normal carmella found in the mouth. Not clear how it got to the hip. ? Contaminated needle tip from the injection.  Both the Eikenella and the Strep anginosus will be fully susceptible to the pip/tazo. The staph epi is not significant here but is also susceptible to pip/tazo. This antibiotic also provides 100% activity against B. Fragilis     REC: continue pip/tazobactam            Will monitor surgical status and assist when ready for discharge

## 2023-08-16 NOTE — CARE PLAN
The patient is Watcher - Medium risk of patient condition declining or worsening    Shift Goals  Clinical Goals: Pain management  Patient Goals: Adequate rest, Pain Control  Family Goals: CHRIS    Progress made toward(s) clinical / shift goals:      Problem: Pain - Standard  Goal: Alleviation of pain or a reduction in pain to the patient’s comfort goal  Description: Target End Date:  Prior to discharge or change in level of care    Document on Vitals flowsheet    1.  Document pain using the appropriate pain scale per order or unit policy  2.  Educate and implement non-pharmacologic comfort measures (i.e. relaxation, distraction, massage, cold/heat therapy, etc.)  3.  Pain management medications as ordered  4.  Reassess pain after pain med administration per policy  5.  If opiods administered assess patient's response to pain medication is appropriate per POSS sedation scale  6.  Follow pain management plan developed in collaboration with patient and interdisciplinary team (including palliative care or pain specialists if applicable)  Outcome: Progressing  Note: Spoke to IMCU nurse who stated that patient's pain had been well controlled on Oxycodone 10 mg. Patient stated that his pain was a 10/10 even pain medication administration. Consulted with on call hospitalist about dilaudid prn as orders for Oxycodone and dilaudid were linked together. Provider stated that it was ok for patient to have IV dilaudid for breakthrough pain. Patient also using pillows to reposition himself to help with the pain.      Problem: Fall Risk  Goal: Patient will remain free from falls  Description: Target End Date:  Prior to discharge or change in level of care    Document interventions on the [facility] Fall Risk Assessment    1.  Assess for fall risk factors  2.  Implement fall precautions  Outcome: Progressing  Note: Tried to place bed alarm for patient but patient declined it as he stated the bed alarm strip was causing his pain to  increase. Educated patient on the importance of havinga bed alarm but patient still wished to decline. Patient did agree to have bed frame on. Patient has general weakness and he needs a walker to be able to ambulate. Provided patient with urinal to help with urinary output. Bed set to lowest position and locked. Bedside table and call light placed within reach. Patient uses call light appropriately.

## 2023-08-16 NOTE — CARE PLAN
The patient is Stable - Low risk of patient condition declining or worsening    Shift Goals  Clinical Goals: Pain management, wound vac management  Patient Goals: sleep better today  Family Goals: not present, CHRIS    Progress made toward(s) clinical / shift goals:      Problem: Pain - Standard  Goal: Alleviation of pain or a reduction in pain to the patient’s comfort goal  Outcome: Progressing  Flowsheets  Taken 8/16/2023 1002 by Kelli Mariscal R.N.  Pain Rating Scale (NPRS): 10  Taken 8/16/2023 0700 by Kelli Mariscal R.N.  Non Verbal Scale:   Unlabored Breathing   Calm   Sleeping  Taken 8/11/2023 1034 by Selin Reeves R.N.  Garrido-Baker Scale: (pt napping) 0   Note: PRN pain medications administered for pain control      Problem: Fall Risk  Goal: Patient will remain free from falls  Outcome: Progressing  Note: Fall risk factors assessed, all fall risk precautions implemented

## 2023-08-17 ENCOUNTER — ANESTHESIA (OUTPATIENT)
Dept: SURGERY | Facility: MEDICAL CENTER | Age: 53
DRG: 856 | End: 2023-08-17
Payer: COMMERCIAL

## 2023-08-17 ENCOUNTER — ANESTHESIA EVENT (OUTPATIENT)
Dept: SURGERY | Facility: MEDICAL CENTER | Age: 53
DRG: 856 | End: 2023-08-17
Payer: COMMERCIAL

## 2023-08-17 LAB
ALBUMIN SERPL BCP-MCNC: 2.5 G/DL (ref 3.2–4.9)
ALBUMIN/GLOB SERPL: 0.8 G/DL
ALP SERPL-CCNC: 92 U/L (ref 30–99)
ALT SERPL-CCNC: 44 U/L (ref 2–50)
ANION GAP SERPL CALC-SCNC: 9 MMOL/L (ref 7–16)
ANISOCYTOSIS BLD QL SMEAR: ABNORMAL
AST SERPL-CCNC: 60 U/L (ref 12–45)
BACTERIA BLD CULT: NORMAL
BACTERIA BLD CULT: NORMAL
BASOPHILS # BLD AUTO: 0 % (ref 0–1.8)
BASOPHILS # BLD: 0 K/UL (ref 0–0.12)
BILIRUB SERPL-MCNC: 0.6 MG/DL (ref 0.1–1.5)
BUN SERPL-MCNC: 17 MG/DL (ref 8–22)
CALCIUM ALBUM COR SERPL-MCNC: 9 MG/DL (ref 8.5–10.5)
CALCIUM SERPL-MCNC: 7.8 MG/DL (ref 8.5–10.5)
CHLORIDE SERPL-SCNC: 100 MMOL/L (ref 96–112)
CK SERPL-CCNC: 191 U/L (ref 0–154)
CO2 SERPL-SCNC: 26 MMOL/L (ref 20–33)
CREAT SERPL-MCNC: 0.94 MG/DL (ref 0.5–1.4)
EKG IMPRESSION: NORMAL
EOSINOPHIL # BLD AUTO: 0 K/UL (ref 0–0.51)
EOSINOPHIL NFR BLD: 0 % (ref 0–6.9)
ERYTHROCYTE [DISTWIDTH] IN BLOOD BY AUTOMATED COUNT: 51.2 FL (ref 35.9–50)
GFR SERPLBLD CREATININE-BSD FMLA CKD-EPI: 97 ML/MIN/1.73 M 2
GLOBULIN SER CALC-MCNC: 3.2 G/DL (ref 1.9–3.5)
GLUCOSE SERPL-MCNC: 92 MG/DL (ref 65–99)
HCT VFR BLD AUTO: 39.9 % (ref 42–52)
HGB BLD-MCNC: 12.9 G/DL (ref 14–18)
LYMPHOCYTES # BLD AUTO: 1.44 K/UL (ref 1–4.8)
LYMPHOCYTES NFR BLD: 4.3 % (ref 22–41)
MANUAL DIFF BLD: NORMAL
MCH RBC QN AUTO: 29 PG (ref 27–33)
MCHC RBC AUTO-ENTMCNC: 32.3 G/DL (ref 32.3–36.5)
MCV RBC AUTO: 89.7 FL (ref 81.4–97.8)
METAMYELOCYTES NFR BLD MANUAL: 3.4 %
MICROCYTES BLD QL SMEAR: ABNORMAL
MONOCYTES # BLD AUTO: 0 K/UL (ref 0–0.85)
MONOCYTES NFR BLD AUTO: 0 % (ref 0–13.4)
MORPHOLOGY BLD-IMP: NORMAL
MYELOCYTES NFR BLD MANUAL: 4.3 %
NEUTROPHILS # BLD AUTO: 29.57 K/UL (ref 1.82–7.42)
NEUTROPHILS NFR BLD: 80.2 % (ref 44–72)
NEUTS BAND NFR BLD MANUAL: 7.8 % (ref 0–10)
NRBC # BLD AUTO: 0.21 K/UL
NRBC BLD-RTO: 0.6 /100 WBC (ref 0–0.2)
PLATELET # BLD AUTO: 259 K/UL (ref 164–446)
PLATELET BLD QL SMEAR: NORMAL
PMV BLD AUTO: 10.3 FL (ref 9–12.9)
POTASSIUM SERPL-SCNC: 4.6 MMOL/L (ref 3.6–5.5)
PROT SERPL-MCNC: 5.7 G/DL (ref 6–8.2)
RBC # BLD AUTO: 4.45 M/UL (ref 4.7–6.1)
RBC BLD AUTO: PRESENT
SIGNIFICANT IND 70042: NORMAL
SIGNIFICANT IND 70042: NORMAL
SITE SITE: NORMAL
SITE SITE: NORMAL
SODIUM SERPL-SCNC: 135 MMOL/L (ref 135–145)
SOURCE SOURCE: NORMAL
SOURCE SOURCE: NORMAL
TOXIC GRANULES BLD QL SMEAR: NORMAL
WBC # BLD AUTO: 33.6 K/UL (ref 4.8–10.8)

## 2023-08-17 PROCEDURE — 93010 ELECTROCARDIOGRAM REPORT: CPT | Performed by: INTERNAL MEDICINE

## 2023-08-17 PROCEDURE — 770006 HCHG ROOM/CARE - MED/SURG/GYN SEMI*

## 2023-08-17 PROCEDURE — 110371 HCHG SHELL REV 272: Performed by: ORTHOPAEDIC SURGERY

## 2023-08-17 PROCEDURE — 700102 HCHG RX REV CODE 250 W/ 637 OVERRIDE(OP)

## 2023-08-17 PROCEDURE — 99232 SBSQ HOSP IP/OBS MODERATE 35: CPT | Performed by: INTERNAL MEDICINE

## 2023-08-17 PROCEDURE — 700111 HCHG RX REV CODE 636 W/ 250 OVERRIDE (IP): Performed by: ANESTHESIOLOGY

## 2023-08-17 PROCEDURE — 85025 COMPLETE CBC W/AUTO DIFF WBC: CPT

## 2023-08-17 PROCEDURE — 700111 HCHG RX REV CODE 636 W/ 250 OVERRIDE (IP): Mod: JZ | Performed by: INTERNAL MEDICINE

## 2023-08-17 PROCEDURE — 82550 ASSAY OF CK (CPK): CPT

## 2023-08-17 PROCEDURE — A9270 NON-COVERED ITEM OR SERVICE: HCPCS

## 2023-08-17 PROCEDURE — 700111 HCHG RX REV CODE 636 W/ 250 OVERRIDE (IP)

## 2023-08-17 PROCEDURE — 700102 HCHG RX REV CODE 250 W/ 637 OVERRIDE(OP): Performed by: INTERNAL MEDICINE

## 2023-08-17 PROCEDURE — A9270 NON-COVERED ITEM OR SERVICE: HCPCS | Performed by: INTERNAL MEDICINE

## 2023-08-17 PROCEDURE — 36415 COLL VENOUS BLD VENIPUNCTURE: CPT

## 2023-08-17 PROCEDURE — 160009 HCHG ANES TIME/MIN: Performed by: ORTHOPAEDIC SURGERY

## 2023-08-17 PROCEDURE — 700101 HCHG RX REV CODE 250: Performed by: ANESTHESIOLOGY

## 2023-08-17 PROCEDURE — 700111 HCHG RX REV CODE 636 W/ 250 OVERRIDE (IP): Performed by: ORTHOPAEDIC SURGERY

## 2023-08-17 PROCEDURE — 93005 ELECTROCARDIOGRAM TRACING: CPT | Performed by: ORTHOPAEDIC SURGERY

## 2023-08-17 PROCEDURE — 700102 HCHG RX REV CODE 250 W/ 637 OVERRIDE(OP): Performed by: ANESTHESIOLOGY

## 2023-08-17 PROCEDURE — 0KBP0ZZ EXCISION OF LEFT HIP MUSCLE, OPEN APPROACH: ICD-10-PCS | Performed by: ORTHOPAEDIC SURGERY

## 2023-08-17 PROCEDURE — 160048 HCHG OR STATISTICAL LEVEL 1-5: Performed by: ORTHOPAEDIC SURGERY

## 2023-08-17 PROCEDURE — 160002 HCHG RECOVERY MINUTES (STAT): Performed by: ORTHOPAEDIC SURGERY

## 2023-08-17 PROCEDURE — 80053 COMPREHEN METABOLIC PANEL: CPT

## 2023-08-17 PROCEDURE — 160027 HCHG SURGERY MINUTES - 1ST 30 MINS LEVEL 2: Performed by: ORTHOPAEDIC SURGERY

## 2023-08-17 PROCEDURE — 700105 HCHG RX REV CODE 258: Mod: JZ | Performed by: ANESTHESIOLOGY

## 2023-08-17 PROCEDURE — 85007 BL SMEAR W/DIFF WBC COUNT: CPT

## 2023-08-17 PROCEDURE — 160038 HCHG SURGERY MINUTES - EA ADDL 1 MIN LEVEL 2: Performed by: ORTHOPAEDIC SURGERY

## 2023-08-17 PROCEDURE — 700105 HCHG RX REV CODE 258: Performed by: INTERNAL MEDICINE

## 2023-08-17 PROCEDURE — 160035 HCHG PACU - 1ST 60 MINS PHASE I: Performed by: ORTHOPAEDIC SURGERY

## 2023-08-17 PROCEDURE — 160036 HCHG PACU - EA ADDL 30 MINS PHASE I: Performed by: ORTHOPAEDIC SURGERY

## 2023-08-17 PROCEDURE — 700111 HCHG RX REV CODE 636 W/ 250 OVERRIDE (IP): Performed by: INTERNAL MEDICINE

## 2023-08-17 PROCEDURE — A9270 NON-COVERED ITEM OR SERVICE: HCPCS | Performed by: ANESTHESIOLOGY

## 2023-08-17 RX ORDER — HYDROMORPHONE HYDROCHLORIDE 2 MG/ML
INJECTION, SOLUTION INTRAMUSCULAR; INTRAVENOUS; SUBCUTANEOUS PRN
Status: DISCONTINUED | OUTPATIENT
Start: 2023-08-17 | End: 2023-08-17 | Stop reason: SURG

## 2023-08-17 RX ORDER — HYDROMORPHONE HYDROCHLORIDE 1 MG/ML
0.1 INJECTION, SOLUTION INTRAMUSCULAR; INTRAVENOUS; SUBCUTANEOUS
Status: DISCONTINUED | OUTPATIENT
Start: 2023-08-17 | End: 2023-08-17 | Stop reason: HOSPADM

## 2023-08-17 RX ORDER — HYDROMORPHONE HYDROCHLORIDE 1 MG/ML
0.2 INJECTION, SOLUTION INTRAMUSCULAR; INTRAVENOUS; SUBCUTANEOUS
Status: DISCONTINUED | OUTPATIENT
Start: 2023-08-17 | End: 2023-08-17 | Stop reason: HOSPADM

## 2023-08-17 RX ORDER — HYDROMORPHONE HYDROCHLORIDE 1 MG/ML
0.4 INJECTION, SOLUTION INTRAMUSCULAR; INTRAVENOUS; SUBCUTANEOUS
Status: DISCONTINUED | OUTPATIENT
Start: 2023-08-17 | End: 2023-08-17 | Stop reason: HOSPADM

## 2023-08-17 RX ORDER — HALOPERIDOL 5 MG/ML
1 INJECTION INTRAMUSCULAR
Status: DISCONTINUED | OUTPATIENT
Start: 2023-08-17 | End: 2023-08-17 | Stop reason: HOSPADM

## 2023-08-17 RX ORDER — VANCOMYCIN HYDROCHLORIDE 500 MG/10ML
INJECTION, POWDER, LYOPHILIZED, FOR SOLUTION INTRAVENOUS
Status: COMPLETED | OUTPATIENT
Start: 2023-08-17 | End: 2023-08-17

## 2023-08-17 RX ORDER — HYDROMORPHONE HYDROCHLORIDE 1 MG/ML
1 INJECTION, SOLUTION INTRAMUSCULAR; INTRAVENOUS; SUBCUTANEOUS
Status: DISCONTINUED | OUTPATIENT
Start: 2023-08-17 | End: 2023-08-18

## 2023-08-17 RX ORDER — SODIUM CHLORIDE, SODIUM LACTATE, POTASSIUM CHLORIDE, CALCIUM CHLORIDE 600; 310; 30; 20 MG/100ML; MG/100ML; MG/100ML; MG/100ML
INJECTION, SOLUTION INTRAVENOUS CONTINUOUS
Status: DISCONTINUED | OUTPATIENT
Start: 2023-08-17 | End: 2023-08-17 | Stop reason: HOSPADM

## 2023-08-17 RX ORDER — OXYCODONE HYDROCHLORIDE 15 MG/1
15 TABLET ORAL
Status: DISCONTINUED | OUTPATIENT
Start: 2023-08-17 | End: 2023-08-29 | Stop reason: HOSPADM

## 2023-08-17 RX ORDER — MIDAZOLAM HYDROCHLORIDE 1 MG/ML
INJECTION INTRAMUSCULAR; INTRAVENOUS PRN
Status: DISCONTINUED | OUTPATIENT
Start: 2023-08-17 | End: 2023-08-17 | Stop reason: SURG

## 2023-08-17 RX ORDER — LIDOCAINE HYDROCHLORIDE 20 MG/ML
INJECTION, SOLUTION EPIDURAL; INFILTRATION; INTRACAUDAL; PERINEURAL PRN
Status: DISCONTINUED | OUTPATIENT
Start: 2023-08-17 | End: 2023-08-17 | Stop reason: SURG

## 2023-08-17 RX ORDER — OXYCODONE HCL 5 MG/5 ML
5 SOLUTION, ORAL ORAL
Status: COMPLETED | OUTPATIENT
Start: 2023-08-17 | End: 2023-08-17

## 2023-08-17 RX ORDER — ONDANSETRON 2 MG/ML
4 INJECTION INTRAMUSCULAR; INTRAVENOUS
Status: DISCONTINUED | OUTPATIENT
Start: 2023-08-17 | End: 2023-08-17 | Stop reason: HOSPADM

## 2023-08-17 RX ORDER — SODIUM CHLORIDE, SODIUM LACTATE, POTASSIUM CHLORIDE, CALCIUM CHLORIDE 600; 310; 30; 20 MG/100ML; MG/100ML; MG/100ML; MG/100ML
INJECTION, SOLUTION INTRAVENOUS
Status: DISCONTINUED | OUTPATIENT
Start: 2023-08-17 | End: 2023-08-17 | Stop reason: SURG

## 2023-08-17 RX ORDER — ONDANSETRON 2 MG/ML
INJECTION INTRAMUSCULAR; INTRAVENOUS PRN
Status: DISCONTINUED | OUTPATIENT
Start: 2023-08-17 | End: 2023-08-17 | Stop reason: SURG

## 2023-08-17 RX ORDER — TOBRAMYCIN 1.2 G/30ML
INJECTION, POWDER, LYOPHILIZED, FOR SOLUTION INTRAVENOUS
Status: DISCONTINUED | OUTPATIENT
Start: 2023-08-17 | End: 2023-08-17 | Stop reason: HOSPADM

## 2023-08-17 RX ORDER — DIPHENHYDRAMINE HYDROCHLORIDE 50 MG/ML
12.5 INJECTION INTRAMUSCULAR; INTRAVENOUS
Status: DISCONTINUED | OUTPATIENT
Start: 2023-08-17 | End: 2023-08-17 | Stop reason: HOSPADM

## 2023-08-17 RX ORDER — OXYCODONE HYDROCHLORIDE 10 MG/1
10 TABLET ORAL
Status: DISCONTINUED | OUTPATIENT
Start: 2023-08-17 | End: 2023-08-29 | Stop reason: HOSPADM

## 2023-08-17 RX ORDER — MEPERIDINE HYDROCHLORIDE 25 MG/ML
6.25 INJECTION INTRAMUSCULAR; INTRAVENOUS; SUBCUTANEOUS
Status: DISCONTINUED | OUTPATIENT
Start: 2023-08-17 | End: 2023-08-17 | Stop reason: HOSPADM

## 2023-08-17 RX ORDER — OXYCODONE HCL 5 MG/5 ML
10 SOLUTION, ORAL ORAL
Status: COMPLETED | OUTPATIENT
Start: 2023-08-17 | End: 2023-08-17

## 2023-08-17 RX ORDER — DEXAMETHASONE SODIUM PHOSPHATE 4 MG/ML
INJECTION, SOLUTION INTRA-ARTICULAR; INTRALESIONAL; INTRAMUSCULAR; INTRAVENOUS; SOFT TISSUE PRN
Status: DISCONTINUED | OUTPATIENT
Start: 2023-08-17 | End: 2023-08-17 | Stop reason: SURG

## 2023-08-17 RX ADMIN — SENNOSIDES AND DOCUSATE SODIUM 2 TABLET: 50; 8.6 TABLET ORAL at 17:14

## 2023-08-17 RX ADMIN — HYDROMORPHONE HYDROCHLORIDE 0.4 MG: 1 INJECTION, SOLUTION INTRAMUSCULAR; INTRAVENOUS; SUBCUTANEOUS at 12:56

## 2023-08-17 RX ADMIN — PIPERACILLIN AND TAZOBACTAM 4.5 G: 4; .5 INJECTION, POWDER, FOR SOLUTION INTRAVENOUS at 15:54

## 2023-08-17 RX ADMIN — ACETAMINOPHEN 1000 MG: 500 TABLET, FILM COATED ORAL at 05:05

## 2023-08-17 RX ADMIN — ACETAMINOPHEN 1000 MG: 500 TABLET, FILM COATED ORAL at 17:14

## 2023-08-17 RX ADMIN — OXYCODONE HYDROCHLORIDE 15 MG: 15 TABLET ORAL at 23:51

## 2023-08-17 RX ADMIN — HYDROMORPHONE HYDROCHLORIDE 1 MG: 1 INJECTION, SOLUTION INTRAMUSCULAR; INTRAVENOUS; SUBCUTANEOUS at 02:59

## 2023-08-17 RX ADMIN — HYDROMORPHONE HYDROCHLORIDE 1 MG: 2 INJECTION INTRAMUSCULAR; INTRAVENOUS; SUBCUTANEOUS at 12:12

## 2023-08-17 RX ADMIN — PROPOFOL 200 MG: 10 INJECTION, EMULSION INTRAVENOUS at 10:32

## 2023-08-17 RX ADMIN — BACLOFEN 5 MG: 10 TABLET ORAL at 05:05

## 2023-08-17 RX ADMIN — HYDROMORPHONE HYDROCHLORIDE 1 MG: 2 INJECTION INTRAMUSCULAR; INTRAVENOUS; SUBCUTANEOUS at 11:38

## 2023-08-17 RX ADMIN — DEXAMETHASONE SODIUM PHOSPHATE 4 MG: 4 INJECTION INTRA-ARTICULAR; INTRALESIONAL; INTRAMUSCULAR; INTRAVENOUS; SOFT TISSUE at 11:21

## 2023-08-17 RX ADMIN — BACLOFEN 5 MG: 10 TABLET ORAL at 15:53

## 2023-08-17 RX ADMIN — ZOLPIDEM TARTRATE 10 MG: 5 TABLET ORAL at 19:59

## 2023-08-17 RX ADMIN — MIDAZOLAM 2 MG: 1 INJECTION, SOLUTION INTRAMUSCULAR; INTRAVENOUS at 10:28

## 2023-08-17 RX ADMIN — OXYCODONE HYDROCHLORIDE 15 MG: 15 TABLET ORAL at 18:14

## 2023-08-17 RX ADMIN — OMEPRAZOLE 20 MG: 20 CAPSULE, DELAYED RELEASE ORAL at 05:05

## 2023-08-17 RX ADMIN — HYDROMORPHONE HYDROCHLORIDE 1 MG: 1 INJECTION, SOLUTION INTRAMUSCULAR; INTRAVENOUS; SUBCUTANEOUS at 08:29

## 2023-08-17 RX ADMIN — OXYCODONE HYDROCHLORIDE 10 MG: 5 SOLUTION ORAL at 12:55

## 2023-08-17 RX ADMIN — METAXALONE 400 MG: 800 TABLET ORAL at 05:05

## 2023-08-17 RX ADMIN — PIPERACILLIN AND TAZOBACTAM 4.5 G: 4; .5 INJECTION, POWDER, FOR SOLUTION INTRAVENOUS at 05:14

## 2023-08-17 RX ADMIN — HYDROMORPHONE HYDROCHLORIDE 0.4 MG: 1 INJECTION, SOLUTION INTRAMUSCULAR; INTRAVENOUS; SUBCUTANEOUS at 13:00

## 2023-08-17 RX ADMIN — FENTANYL CITRATE 100 MCG: 50 INJECTION, SOLUTION INTRAMUSCULAR; INTRAVENOUS at 10:44

## 2023-08-17 RX ADMIN — PIPERACILLIN AND TAZOBACTAM 4.5 G: 4; .5 INJECTION, POWDER, FOR SOLUTION INTRAVENOUS at 20:07

## 2023-08-17 RX ADMIN — LIDOCAINE HYDROCHLORIDE 100 MG: 20 INJECTION, SOLUTION EPIDURAL; INFILTRATION; INTRACAUDAL at 10:32

## 2023-08-17 RX ADMIN — HYDROMORPHONE HYDROCHLORIDE 0.2 MG: 1 INJECTION, SOLUTION INTRAMUSCULAR; INTRAVENOUS; SUBCUTANEOUS at 13:05

## 2023-08-17 RX ADMIN — OXYCODONE HYDROCHLORIDE 10 MG: 10 TABLET ORAL at 07:31

## 2023-08-17 RX ADMIN — HEPARIN SODIUM 5000 UNITS: 5000 INJECTION, SOLUTION INTRAVENOUS; SUBCUTANEOUS at 05:06

## 2023-08-17 RX ADMIN — OXYCODONE HYDROCHLORIDE 10 MG: 10 TABLET ORAL at 00:14

## 2023-08-17 RX ADMIN — HYDROMORPHONE HYDROCHLORIDE 1 MG: 1 INJECTION, SOLUTION INTRAMUSCULAR; INTRAVENOUS; SUBCUTANEOUS at 19:59

## 2023-08-17 RX ADMIN — METAXALONE 400 MG: 800 TABLET ORAL at 17:18

## 2023-08-17 RX ADMIN — ONDANSETRON 4 MG: 2 INJECTION INTRAMUSCULAR; INTRAVENOUS at 11:21

## 2023-08-17 RX ADMIN — HYDROMORPHONE HYDROCHLORIDE 1 MG: 1 INJECTION, SOLUTION INTRAMUSCULAR; INTRAVENOUS; SUBCUTANEOUS at 17:13

## 2023-08-17 RX ADMIN — FENTANYL CITRATE 100 MCG: 50 INJECTION, SOLUTION INTRAMUSCULAR; INTRAVENOUS at 10:32

## 2023-08-17 RX ADMIN — SODIUM CHLORIDE, POTASSIUM CHLORIDE, SODIUM LACTATE AND CALCIUM CHLORIDE: 600; 310; 30; 20 INJECTION, SOLUTION INTRAVENOUS at 10:28

## 2023-08-17 ASSESSMENT — PAIN DESCRIPTION - PAIN TYPE
TYPE: ACUTE PAIN;SURGICAL PAIN
TYPE: ACUTE PAIN
TYPE: ACUTE PAIN;SURGICAL PAIN
TYPE: ACUTE PAIN
TYPE: ACUTE PAIN;SURGICAL PAIN
TYPE: ACUTE PAIN;NEUROPATHIC PAIN

## 2023-08-17 ASSESSMENT — ENCOUNTER SYMPTOMS
HEARTBURN: 0
BLURRED VISION: 0
ORTHOPNEA: 0
SHORTNESS OF BREATH: 0
NAUSEA: 0
DIZZINESS: 0
FOCAL WEAKNESS: 0
DOUBLE VISION: 0
HEADACHES: 0
DIAPHORESIS: 0
FEVER: 0
COUGH: 0
HEMOPTYSIS: 0
VOMITING: 0
WEAKNESS: 0

## 2023-08-17 ASSESSMENT — COGNITIVE AND FUNCTIONAL STATUS - GENERAL
DRESSING REGULAR LOWER BODY CLOTHING: A LOT
HELP NEEDED FOR BATHING: A LITTLE
STANDING UP FROM CHAIR USING ARMS: A LITTLE
CLIMB 3 TO 5 STEPS WITH RAILING: A LOT
TOILETING: A LITTLE
DAILY ACTIVITIY SCORE: 19
DRESSING REGULAR UPPER BODY CLOTHING: A LITTLE
TURNING FROM BACK TO SIDE WHILE IN FLAT BAD: A LOT
SUGGESTED CMS G CODE MODIFIER DAILY ACTIVITY: CK
WALKING IN HOSPITAL ROOM: A LOT
MOVING TO AND FROM BED TO CHAIR: A LITTLE
MOVING FROM LYING ON BACK TO SITTING ON SIDE OF FLAT BED: A LITTLE
SUGGESTED CMS G CODE MODIFIER MOBILITY: CK
MOBILITY SCORE: 15

## 2023-08-17 ASSESSMENT — FIBROSIS 4 INDEX: FIB4 SCORE: 1.82

## 2023-08-17 ASSESSMENT — PAIN SCALES - GENERAL: PAIN_LEVEL: 4

## 2023-08-17 NOTE — OR NURSING
Pt on 5 L oxy-mask at this time, while sleeping.  No c/o nausea, tolerating PO fluids and medication.  LLE lateral thigh surgical site CDI, Wound Vac.  Pt has x 4 SIERRA drain, compressed, patent and draining serosanguineous fluid.  Left pedal pulse Surgical pain tolerable per pt, 4/10.  VSS, afebrile, STOCKTON, A/O x4.     No belongings in PACU.   Oxygen tank 75% full.

## 2023-08-17 NOTE — THERAPY
Occupational Therapy Contact Note    Patient Name: Abhishek Pacheco  Age:  53 y.o., Sex:  male  Medical Record #: [id number]  Today's Date: 8/17/2023 08/17/23 0956   Interdisciplinary Plan of Care Collaboration   Collaboration Comments OT eval attempted, pt off floor for I&D. Will re-attempt as able/appropriate

## 2023-08-17 NOTE — PROGRESS NOTES
Bedside report received and patient care assumed. Pt is resting in bed, A&O4, with complaints of 8/10 left sided leg/hip pain where his infection and wound drain are, and is on room air. All moderate fall precautions are in place, belongings at bedside table.  Pt was updated on POC, no questions or concerns. Pt educated on use of call light for assistance.

## 2023-08-17 NOTE — CARE PLAN
The patient is Watcher - Medium risk of patient condition declining or worsening    Shift Goals  Clinical Goals: Surgical debridement, pain management, IV abx  Patient Goals: pain control, rest, ambulation as tolerated  Family Goals: update on time for surgery in AM when available    Progress made toward(s) clinical / shift goals:    Problem: Knowledge Deficit - Standard  Goal: Patient and family/care givers will demonstrate understanding of plan of care, disease process/condition, diagnostic tests and medications  Outcome: Progressing  Note: Patient understands his admitting diagnosis and understands that he is to be having a surgery performed to clean out his wound today.      Problem: Fall Risk  Goal: Patient will remain free from falls  Outcome: Progressing  Note: Patient uses the call light appropriately and acknowledges that he is unable to ambulate by himself. Patient has remained free of falls throughout the shift and current admission.

## 2023-08-17 NOTE — CARE PLAN
The patient is Stable - Low risk of patient condition declining or worsening    Shift Goals  Clinical Goals: NPO for surg/I+D, pain mgmt, IV abx  Patient Goals: decreased pain  Family Goals: CHRIS    Progress made toward(s) clinical / shift goals:      Problem: Knowledge Deficit - Standard  Goal: Patient and family/care givers will demonstrate understanding of plan of care, disease process/condition, diagnostic tests and medications  Outcome: Progressing  Note: Pt successfully had I/D, concerned regarding movement of L leg. Wiggling toes, microbend in knee, pulse palpable. MD aware.      Problem: Pain - Standard  Goal: Alleviation of pain or a reduction in pain to the patient’s comfort goal  Outcome: Progressing  Note: Stressed importance of PRN oxy prior to IV pain medication for longer pain relief.       Patient is not progressing towards the following goals:

## 2023-08-17 NOTE — PROGRESS NOTES
53yoM with left gluteal/thigh necrotizing fasciitis/myositis s/p fasciectomy/debridement 8/13 and repeat debridement 8/15.    S: parents at bedside, NPO awaiting surgery, having thigh pain but otherwise feeling better overall    O:    Vitals:    08/17/23 0937   BP: 135/75   Pulse: 82   Resp: 16   Temp: 36.1 °C (96.9 °F)   SpO2: 97%     Exam:  General-NAD, sleeping comfortably  LLE-thigh VAC in place with good seal, NVI distally palp dp pulse    A: 53yoM with left gluteal/thigh necrotizing fasciitis/myositis s/p fasciectomy/debridement 8/13 and repeat debridement 8/15.    Recs:  --continue empiric abx per ID recs  --continue VAC  --planning return to the OR today for repeat debridement and VAC change

## 2023-08-17 NOTE — OR SURGEON
Immediate Post OP Note    PreOp Diagnosis: Left gluteal/thigh necrotizing fasciitis/myositis s/p I&D/VAC      PostOp Diagnosis: same      Procedure(s):  IRRIGATION AND DEBRIDEMENT, WOUND - THIGH, SECONDARY WOUND CLOSURE - Wound Class: Dirty or Infected  INCISIONAL WOUND VAC APPLICATION - Wound Class: Dirty or Infected    Surgeon(s):  Akhil Castro M.D.    Anesthesiologist/Type of Anesthesia:  Anesthesiologist: Laurita Payton M.D./General    Surgical Staff:  Circulator: Weston Seymour R.N.  Scrub Person: Emily Zayas    Specimens removed if any:  * No specimens in log *    Estimated Blood Loss: 100cc    Findings: see dictation    Complications: none known    PLAN:  --readmit postop  --TTWB LLE with crutches  --PT/OT for mobilization  --continue multiple SIERRA drains  --continue incisional VAC  --continue abx per ID recs  --no plan for further surgery unless develops clinical signs of worsening and then would need MRI of pelvis and lower extremity to rule out recurrent/residual infection        8/17/2023 12:31 PM Akhil Castro M.D.

## 2023-08-17 NOTE — ANESTHESIA TIME REPORT
Anesthesia Start and Stop Event Times     Date Time Event    8/17/2023 1022 Ready for Procedure     1028 Anesthesia Start     1231 Anesthesia Stop        Responsible Staff  08/17/23    Name Role Begin End    Laurita Payton M.D. Anesth 1028 1231        Overtime Reason:  no overtime (within assigned shift)    Comments:

## 2023-08-17 NOTE — ANESTHESIA PROCEDURE NOTES
Airway    Date/Time: 8/17/2023 10:33 AM    Performed by: Laurita Payton M.D.  Authorized by: Laurita Payton M.D.    Location:  OR  Urgency:  Elective  Difficult Airway: No    Indications for Airway Management:  Anesthesia      Spontaneous Ventilation: absent    Sedation Level:  Deep  Preoxygenated: Yes    Mask Difficulty Assessment:  1 - vent by mask  Final Airway Type:  Supraglottic airway  Final Supraglottic Airway:  Standard LMA    SGA Size:  4  Number of Attempts at Approach:  1  Number of Other Approaches Attempted:  0

## 2023-08-17 NOTE — ANESTHESIA POSTPROCEDURE EVALUATION
Patient: Abhishek Pacheco    Procedure Summary     Date: 08/17/23 Room / Location: [facility] 06 / SURGERY [facility]    Anesthesia Start: 1028 Anesthesia Stop: 1231    Procedures:       IRRIGATION AND DEBRIDEMENT, WOUND - THIGH, SECONDARY WOUND CLOSURE (Left: Leg Upper)      INCISIONAL WOUND VAC APPLICATION (Left: Leg Upper) Diagnosis: (Complex open wound)    Surgeons: Akhil Castro M.D. Responsible Provider: Laurita Payton M.D.    Anesthesia Type: general ASA Status: 2          Final Anesthesia Type: general  Last vitals  BP   Blood Pressure: 125/80    Temp   35.9 °C (96.6 °F)    Pulse   88   Resp   18    SpO2   98 %      Anesthesia Post Evaluation    Patient location during evaluation: PACU  Patient participation: complete - patient participated  Level of consciousness: awake and alert  Pain score: 4    Airway patency: patent  Anesthetic complications: no  Cardiovascular status: hemodynamically stable  Respiratory status: acceptable  Hydration status: euvolemic    PONV: none          No notable events documented.     Nurse Pain Score: 4 (NPRS)

## 2023-08-17 NOTE — ANESTHESIA PREPROCEDURE EVALUATION
Case: [id number] Date/Time: 08/17/23 0915    Procedures:       IRRIGATION AND DEBRIDEMENT, WOUND - THIGH (Left)      APPLICATION OR REPLACEMENT, WOUND VAC    Location: [facility] OR 06 / SURGERY [facility]    Surgeons: Akhil Castro M.D.      52yo male for I&D leg  H/o GERD, sleep apnea    Relevant Problems   ANESTHESIA   (positive) Sleep apnea      GI   (positive) GERD (gastroesophageal reflux disease)         (positive) JUDY (acute kidney injury) (HCC)       Physical Exam    Airway   Mallampati: II  TM distance: >3 FB  Neck ROM: full       Cardiovascular - normal exam  Rhythm: regular  Rate: normal  (-) murmur     Dental - normal exam           Pulmonary - normal exam  Breath sounds clear to auscultation     Abdominal    Neurological - normal exam                 Anesthesia Plan    ASA 2       Plan - general       Airway plan will be LMA          Induction: intravenous    Postoperative Plan: Postoperative administration of opioids is intended.    Pertinent diagnostic labs and testing reviewed    Informed Consent:    Anesthetic plan and risks discussed with patient.    Use of blood products discussed with: patient whom consented to blood products.

## 2023-08-17 NOTE — PROGRESS NOTES
[facility] Internal Medicine Daily Progress Note    Date of Service  8/17/2023    [facility] Team: [facility] Team   Attending: Eugenio Tompkins M.d.  Senior Resident: Dr. Edson Mendiola MD  Intern:  Dr. Frank wallace MD  Contact Number: [phone]    Chief Complaint  Abhishek Pacheco is a 53 y.o. male admitted 8/10/2023 with pain, swelling in left buttock radiating down to leg.    Hospital Course  Mr. Abhishek Pacheco is a 53yoM with chronic migraine (on Sumtatriptan), obesity (BMI 31.70), KACY (on nightly CPAP; s/p uvuloplasty, 10/2011) who presented 8/10/2023 with 3-day history of worsening left buttock pain and edema which started 2 days after injecting himself with testosterone in same region. The pain is constant, rated 8/10, worse with movement, and minimally improved with Tramadol and Prednisone (prescribed by PCP). He also reports chills, headache, and night sweats.      Patient reports taking testosterone shots thrice monthly for last 2 years.     Admission findings:  Exam is notable for generalized discomfort, clammy skin, tachycardia (to 112 bpm), hypoxemia (84-85% on RA), bilateral crackles in middle lung fields. No tenderness to abdominal palpation. His left buttock is somewhat tense, swollen, non-erythematous, moderately tender at gluteal cleft.      Labs show severe leukocytosis (33.7 K/uL, 86.3% PMNs), mildly elevated total bilirubin. CT pelvis (8/10/2023) shows subcutaneous inflammatory changes with enlargement of left gluteal musculature. CXR (8/10/2023) shows mildly thickened interlobular septa.    He receives Ancef 2g x1 in ED and is admitted for sepsis due to cellulitis of left buttock.   8/15/23:  Left gluteal and thigh necrotizing fasciitis and   myositis, status post fasciectomy and excisional debridement with wound VAC   application.  8/16/23: WBC, CPK trending down  8/17/23: Repeat incision and drainage by general surgery      Interval Problem Update  Patient was seen and evaluated at bedside  this AM.  No overnight acute event reported by night resident.  Scheduled for repeat debridement today.  Moderate improvement in the pain, no overnight fevers sweating.  Wound VAC in place.  Vitals are stable.  WBC trending up, H&H stable, CPK downtrending 191    I have discussed this patient's plan of care and discharge plan at IDT rounds today with Case Management, Nursing, Nursing leadership, and other members of the IDT team.    Consultants/Specialty  general surgery    Code Status  DNAR/DNI    Disposition  The patient is not medically cleared for discharge to home or a post-acute facility.      I have placed the appropriate orders for post-discharge needs.    Review of Systems  Review of Systems   Constitutional:  Positive for malaise/fatigue. Negative for diaphoresis and fever.   Eyes:  Negative for blurred vision and double vision.   Respiratory:  Negative for cough, hemoptysis and shortness of breath.    Cardiovascular:  Negative for chest pain and orthopnea.   Gastrointestinal:  Negative for heartburn, nausea and vomiting.   Musculoskeletal:         Severe left leg pain thigh and lower leg   Neurological:  Negative for dizziness, focal weakness, weakness and headaches.        Physical Exam  Temp:  [36.1 °C (96.9 °F)-36.8 °C (98.2 °F)] 36.1 °C (96.9 °F)  Pulse:  [70-84] 82  Resp:  [16-20] 16  BP: (135-154)/(72-91) 135/75  SpO2:  [95 %-97 %] 97 %    Physical Exam  Constitutional:       Appearance: Normal appearance.      Comments: Distressed, discomfort due to pain   HENT:      Mouth/Throat:      Mouth: Mucous membranes are moist.      Pharynx: Oropharynx is clear.   Eyes:      Extraocular Movements: Extraocular movements intact.      Pupils: Pupils are equal, round, and reactive to light.   Cardiovascular:      Rate and Rhythm: Normal rate and regular rhythm.      Pulses: Normal pulses.      Heart sounds: Normal heart sounds.   Pulmonary:      Effort: No respiratory distress.      Comments: Moderate  improvement in respiratory effort  Abdominal:      General: Abdomen is flat.      Palpations: Abdomen is soft.   Musculoskeletal:      Comments: S/p surgical debridement left leg with wound VAC on buttock.  Tender left lower leg     Neurological:      General: No focal deficit present.      Mental Status: He is alert.         Fluids    Intake/Output Summary (Last 24 hours) at 8/17/2023 1102  Last data filed at 8/17/2023 0726  Gross per 24 hour   Intake 240 ml   Output 2700 ml   Net -2460 ml         Laboratory  Recent Labs     08/15/23  0125 08/16/23  0209 08/17/23  0155   WBC 26.8* 24.8* 33.6*   RBC 4.31* 4.06* 4.45*   HEMOGLOBIN 12.7* 11.9* 12.9*   HEMATOCRIT 39.2* 36.8* 39.9*   MCV 91.0 90.6 89.7   MCH 29.5 29.3 29.0   MCHC 32.4 32.3 32.3   RDW 50.6* 52.8* 51.2*   PLATELETCT 214 215 259   MPV 11.0 10.5 10.3       Recent Labs     08/15/23  0125 08/16/23  0209 08/17/23  0155   SODIUM 136 138 135   POTASSIUM 4.2 4.3 4.6   CHLORIDE 100 103 100   CO2 27 30 26   GLUCOSE 168* 80 92   BUN 27* 24* 17   CREATININE 0.80 0.90 0.94   CALCIUM 8.2* 8.0* 7.8*                     Imaging  CT-EXTREMITY, LOWER WITH LEFT   Final Result      1.  Swelling and areas of decreased attenuation are identified within and throughout left gluteus musculature and extending down into the posterior compartment musculature in the left thigh. Consider infection or inflammation such as cellulitis or    myositis.      2.  Induration and subcutaneous fat and intramuscular fat planes is also noted consistent with cellulitis.      3.  No abscess or bone erosion identified.      CT-PELVIS WITH   Final Result      1.  Swelling and areas of decreased attenuation throughout the visualized left gluteus musculature are identified. Consider infection or inflammation such as myositis or cellulitis.      2.  Induration and subcutaneous fat is identified consistent with cellulitis.      3.  No fluid collection that would suggest abscess is identified at this  time. No soft tissue emphysema is noted.      DX-CHEST-PORTABLE (1 VIEW)   Final Result      No acute cardiac or pulmonary abnormalities are identified.      CT-PELVIS WITH   Final Result      1.  Subcutaneous inflammatory changes about the left buttock suspicious for cellulitis.      2.  Enlargement of left gluteal musculature consistent with myositis and/or inflammatory change.             Assessment/Plan  Problem Representation:    * Necrotizing fasciitis (HCC)- (present on admission)  Assessment & Plan  At site where he injects his IM testosterone reports severe pain and unable to sleep  Plan:  S/p I&D of large abscess and necrotic tissue with wound vac placement 8/13 by Dr Castro  Plan on return to OR  tomorrow morning  Looking forward to cultures from OR  continuing IV Zosyn.  WBC trending down  Dilaudid for severe pain and oxycodone for moderate pain.  Monitor for adverse effect of narcotic pain medications.  8/17/2 3 repeat surgical debridement followed by wound VAC    Leukocytosis  Assessment & Plan  He found to have leukocytosis most likely secondary to deep tissue infection.  Significant up trending.  Work-up with repeat CBC    Nontraumatic compartment syndrome of left lower extremity  Assessment & Plan  Secondary to necrotizing fasciitis, tense, tender upper thigh and lower leg  Status post incision and drainage.  Repeat surgical debridement 8/17/23  Continue pain regimen as needed    Transaminitis  Assessment & Plan  Elevation is likely due to Rhabdo and not intrinsic hepatic  Significant downtrending  Follow-up with CMP    UTI (urinary tract infection)  Assessment & Plan  Urinalysis on 8/12/2023 was positive for nitrite, and a small amount of leukocyte Estrace.  Patient reports burning with initiation of urination.   No acute complaint at this time.  Asymptomatic      Rhabdomyolysis  Assessment & Plan  Plan:  IV fluids  Potassium normal  No lab  abnormalities concerning for rhabdomyolysis    down trending  Continue monitor for worsening condition    JUDY (acute kidney injury) (HCC)  Assessment & Plan  08/15/23    Likely prerenal JUDY due to hypovolemia secondary to sepsis  Renal function has significantly improved creatinine trending down back to baseline  Medication dose adjustment as per renal functions  Avoid nephrotoxins.  IV fluids  Continue to monitor closely.  8/17/2023 resolved      Other constipation  Assessment & Plan  Bowel protocol    GERD (gastroesophageal reflux disease)  Assessment & Plan  Continuing outpatient PPI.    Insomnia  Assessment & Plan  History of. On Zolpidem at home.  - Resumed.    Acute respiratory failure with hypoxemia (HCC)- (present on admission)  Assessment & Plan  Likely secondary to sepsis and comorbid KACY     Plan:   Currently he is off from oxygen and maintaining oxygen saturation.  Requiring while asleep for his KACY      Bilirubinemia  Assessment & Plan  Miguel Angel trending down  Significantly improved.  Follow-up with CMP.    Sepsis due to cellulitis (HCC)- (present on admission)  Assessment & Plan  Sepsis secondary to testosterone injection in muscles:    Pain:  Follow up on operative cultures  Empiric therapy for necrotizing fascitis be continued  Follow-up with CBC, CMP  Acute hydration, monitor for signs of endorgan dysfunction       Sleep apnea- (present on admission)  Assessment & Plan  - RT/OT protocol  Uses home CPAP at home  Monitor for polycythemia secondary to hypoxia      Obesity- (present on admission)  Assessment & Plan  BMI 33.44    Plan:  - Counseling deferred to primary.  -Patient was counseled on decrease calorie intake  -Follow-up with lipid panel, A1c         VTE prophylaxis: SCDs/TEDs    I have performed a physical exam and reviewed and updated ROS and Plan today (8/17/2023). In review of yesterday's note (8/16/2023), there are no changes except as documented above.

## 2023-08-17 NOTE — OR NURSING
1314: pt's Mother Prerna phoned and updated on pt status in Recovery and transfer back to [facility].

## 2023-08-17 NOTE — OP REPORT
DATE OF SERVICE:  08/17/2023     PREOPERATIVE DIAGNOSES:  1.  Left gluteal and thigh necrotizing fasciitis and myositis, status post   fasciectomy and extensive debridement of infected devitalized muscle.  2.  Status post wound VAC application, left thigh wound.     POSTOPERATIVE DIAGNOSES:  1.  Left gluteal and thigh necrotizing fasciitis and myositis, status post   fasciectomy and extensive debridement of infected devitalized muscle.  2.  Status post wound VAC application, left thigh wound.     PROCEDURES PERFORMED:  1.  Excisional debridement of left leg wound including skin, subcutaneous   tissue and muscle of wound measuring 17x60 cm in maximum dimensions.  2.  Secondary closure of left leg wound measuring 60 cm in length.  3.  Application of wound VAC, less than 50 sq. cm (incisional) left thigh.     SURGEON:  Akhil Castro MD     ANESTHESIOLOGIST:  Laurita Payton MD     ANESTHESIA:  General.     ESTIMATED BLOOD LOSS:  100 mL.     DRAINS:  1.  A total of four 19-Italian SIERRA drains were placed into the deep wound, both   intramuscular and subcutaneous.  2.  Incisional wound VAC at 125 mmHg was applied to the incision, lateral   thigh.     INDICATIONS FOR PROCEDURE:  The patient is 53 years old.  He sustained a   necrotizing soft tissue infection.  He was admitted on 8/10/2023 and I was   consulted on 8/13/2023 for evaluation and taken him to the operating room that   day for incision and exploration and found extensive signs of necrotizing   fasciitis and necrotizing myositis of the gluteal and thigh regions requiring   extensive debridement and fasciectomy.  I applied wound VAC therapy at that   point and taken back to the operating room on 08/15/2023 for repeat excisional   debridement.  There was some residual devitalized muscle and infected fluid   collections that debrided once again, placed vancomycin and tobramycin powder   within the wound and then rewound VAC the wound.  The plan was to return to    the operating room today for repeat exploration and further debridement if   necessary and wound VAC change.  He signed informed consent preoperatively and   wished to proceed with surgery as outlined above.     DESCRIPTION OF PROCEDURE:  The patient was met in the preoperative holding   area.  His surgical site was signed.  His consent was confirmed to be   accurate.  He was taken back to the operating room and general anesthesia was   induced.  He was positioned in the right lateral decubitus position with an   axillary roll and Stulberg positioners.  The wound VAC was removed.  The left   lower extremity including the exposed wound were prepped and draped in the   usual sterile fashion.  A formal timeout was performed to confirm patient's   correct name, correct surgical site, correct procedure and correct laterality.    I explored the wound.  There was just a minimal amount of residual   devitalized muscle basically at the margins were previous muscle had been   resected both the gluteus bobby and short external rotators proximally.  The   sciatic nerve was identified within the plane of the wound and it still in   continuity.  I explored several intermuscular intervals where they were packed   with wound VAC to prevent recurrent fluid collection and abscess formation.    There was a small amount of purulence and intermuscular areas both of the   hamstring musculature as well as the gluteal musculature, which were well   decompressed and devitalized with a rongeur.  Some of I explored thoroughly   the wounds both proximally, distally, anteriorly and posteriorly through the   subcutaneous planes and areas of previous fascial planes, which had been   excised and there was no sign of significant devitalized residual infected   fluid.  I traversed the sciatic nerve to the greater sciatic notch, palpating   it and thoroughly irrigating it.  There was no evidence of obvious infection   tracking into the greater  sciatic notch or the pelvis.  The wound was   thoroughly irrigated with the Pulsavac using normal saline and ultimately 9   liters.  At this point given the overall and healthy appearance of the   residual tissue and no sign of significant residual infection, I felt that it   would be beneficial to close the wound over several drains.  I placed several   19-Armenian Hemovac drains both within deep to the gluteal musculature and along   the lateral proximal femur and vastus lateralis as well as deep within the   intramuscular planes within the hamstring muscles as well as in the   subcutaneous planes.  They were #1 though 4.  One and 2 more distally and deep   and 3 and 4 more distal and subcutaneous.  I then had placed a total of 3   grams of vancomycin and 3.6 grams of tobramycin powder within deep   intramuscular planes as well as over the surface of the wound for local   antibiotic administration, and then reapproximated the subcutaneous tissue   layers with 0 PDS and the skin edges with staples.  I then placed Adaptic over   the 60 cm incision, which I reapproximated and placed an incisional wound VAC   at 125 mmHg low continuous pressure.  I was able to achieve a good seal.  A   SIERRA drain had good suction as well.  I placed drain sponges over those areas   and tape.  He was then transferred in supine position, woken up from   anesthesia and taken to postanesthesia care unit in stable condition.     PLAN:  1.  The patient will be readmitted to the medical service postop.  2.  He should be toe touch weightbearing left lower extremity with crutches.  3.  He should work with physical and occupational therapy for mobilization and   gait training.  4.  I recommend continuing SIERRA drain for now until there is minimal output.  5.  I recommend continuing incisional wound VAC and I have consulted the   inpatient team to change it after 5-7 days and then if there is minimal   output, the wound VAC could be transitioned to a  Prevena Plus customizable   incisional wound VAC.  6.  He should continue antibiotics directed by the infectious disease service.  7.  I have no plans for return to the operating room unless he shows clinical   signs of developing recurrent and/or residual wound infection at that point   prior to any surgical intervention.  He will require advanced imaging,   specifically MRI of the pelvis and the lower extremity to rule out any   residual/recurrent deep infection.        ______________________________  MD ELMER Thompson/SHARAD    DD:  08/17/2023 12:44  DT:  08/17/2023 14:04    Job#:  [id number]

## 2023-08-18 LAB
ANION GAP SERPL CALC-SCNC: 9 MMOL/L (ref 7–16)
ANISOCYTOSIS BLD QL SMEAR: ABNORMAL
BASOPHILS # BLD AUTO: 0 % (ref 0–1.8)
BASOPHILS # BLD: 0 K/UL (ref 0–0.12)
BUN SERPL-MCNC: 24 MG/DL (ref 8–22)
CALCIUM SERPL-MCNC: 8.1 MG/DL (ref 8.5–10.5)
CHLORIDE SERPL-SCNC: 99 MMOL/L (ref 96–112)
CO2 SERPL-SCNC: 24 MMOL/L (ref 20–33)
CREAT SERPL-MCNC: 1.11 MG/DL (ref 0.5–1.4)
EOSINOPHIL # BLD AUTO: 0 K/UL (ref 0–0.51)
EOSINOPHIL NFR BLD: 0 % (ref 0–6.9)
ERYTHROCYTE [DISTWIDTH] IN BLOOD BY AUTOMATED COUNT: 51 FL (ref 35.9–50)
GFR SERPLBLD CREATININE-BSD FMLA CKD-EPI: 80 ML/MIN/1.73 M 2
GLUCOSE SERPL-MCNC: 145 MG/DL (ref 65–99)
HCT VFR BLD AUTO: 37.5 % (ref 42–52)
HGB BLD-MCNC: 12 G/DL (ref 14–18)
LYMPHOCYTES # BLD AUTO: 1.42 K/UL (ref 1–4.8)
LYMPHOCYTES NFR BLD: 4.5 % (ref 22–41)
MACROCYTES BLD QL SMEAR: ABNORMAL
MANUAL DIFF BLD: NORMAL
MCH RBC QN AUTO: 28.9 PG (ref 27–33)
MCHC RBC AUTO-ENTMCNC: 32 G/DL (ref 32.3–36.5)
MCV RBC AUTO: 90.4 FL (ref 81.4–97.8)
METAMYELOCYTES NFR BLD MANUAL: 8.1 %
MONOCYTES # BLD AUTO: 2.28 K/UL (ref 0–0.85)
MONOCYTES NFR BLD AUTO: 7.2 % (ref 0–13.4)
MORPHOLOGY BLD-IMP: NORMAL
MYELOCYTES NFR BLD MANUAL: 4.5 %
NEUTROPHILS # BLD AUTO: 23.92 K/UL (ref 1.82–7.42)
NEUTROPHILS NFR BLD: 73 % (ref 44–72)
NEUTS BAND NFR BLD MANUAL: 2.7 % (ref 0–10)
NRBC # BLD AUTO: 0.06 K/UL
NRBC BLD-RTO: 0.2 /100 WBC (ref 0–0.2)
PLATELET # BLD AUTO: 260 K/UL (ref 164–446)
PLATELET BLD QL SMEAR: NORMAL
PMV BLD AUTO: 10.5 FL (ref 9–12.9)
POTASSIUM SERPL-SCNC: 4.9 MMOL/L (ref 3.6–5.5)
RBC # BLD AUTO: 4.15 M/UL (ref 4.7–6.1)
RBC BLD AUTO: PRESENT
SODIUM SERPL-SCNC: 132 MMOL/L (ref 135–145)
WBC # BLD AUTO: 31.6 K/UL (ref 4.8–10.8)

## 2023-08-18 PROCEDURE — 700111 HCHG RX REV CODE 636 W/ 250 OVERRIDE (IP): Mod: JZ | Performed by: INTERNAL MEDICINE

## 2023-08-18 PROCEDURE — 97166 OT EVAL MOD COMPLEX 45 MIN: CPT

## 2023-08-18 PROCEDURE — 700105 HCHG RX REV CODE 258: Performed by: INTERNAL MEDICINE

## 2023-08-18 PROCEDURE — 36415 COLL VENOUS BLD VENIPUNCTURE: CPT

## 2023-08-18 PROCEDURE — 700102 HCHG RX REV CODE 250 W/ 637 OVERRIDE(OP)

## 2023-08-18 PROCEDURE — 80048 BASIC METABOLIC PNL TOTAL CA: CPT

## 2023-08-18 PROCEDURE — A9270 NON-COVERED ITEM OR SERVICE: HCPCS

## 2023-08-18 PROCEDURE — 99232 SBSQ HOSP IP/OBS MODERATE 35: CPT | Mod: GC | Performed by: INTERNAL MEDICINE

## 2023-08-18 PROCEDURE — 85025 COMPLETE CBC W/AUTO DIFF WBC: CPT

## 2023-08-18 PROCEDURE — 700111 HCHG RX REV CODE 636 W/ 250 OVERRIDE (IP)

## 2023-08-18 PROCEDURE — 97162 PT EVAL MOD COMPLEX 30 MIN: CPT

## 2023-08-18 PROCEDURE — 770001 HCHG ROOM/CARE - MED/SURG/GYN PRIV*

## 2023-08-18 PROCEDURE — 85007 BL SMEAR W/DIFF WBC COUNT: CPT

## 2023-08-18 RX ORDER — IBUPROFEN 600 MG/1
600 TABLET ORAL EVERY 6 HOURS PRN
Status: DISCONTINUED | OUTPATIENT
Start: 2023-08-18 | End: 2023-08-20

## 2023-08-18 RX ORDER — HYDROMORPHONE HYDROCHLORIDE 2 MG/1
1 TABLET ORAL
Status: DISCONTINUED | OUTPATIENT
Start: 2023-08-18 | End: 2023-08-20

## 2023-08-18 RX ADMIN — BACLOFEN 5 MG: 10 TABLET ORAL at 16:29

## 2023-08-18 RX ADMIN — HYDROMORPHONE HYDROCHLORIDE 1 MG: 1 INJECTION, SOLUTION INTRAMUSCULAR; INTRAVENOUS; SUBCUTANEOUS at 05:39

## 2023-08-18 RX ADMIN — METAXALONE 400 MG: 800 TABLET ORAL at 16:28

## 2023-08-18 RX ADMIN — HEPARIN SODIUM 5000 UNITS: 5000 INJECTION, SOLUTION INTRAVENOUS; SUBCUTANEOUS at 21:59

## 2023-08-18 RX ADMIN — OXYCODONE HYDROCHLORIDE 15 MG: 15 TABLET ORAL at 04:05

## 2023-08-18 RX ADMIN — METAXALONE 400 MG: 800 TABLET ORAL at 04:05

## 2023-08-18 RX ADMIN — ACETAMINOPHEN 1000 MG: 500 TABLET, FILM COATED ORAL at 11:26

## 2023-08-18 RX ADMIN — PIPERACILLIN AND TAZOBACTAM 4.5 G: 4; .5 INJECTION, POWDER, FOR SOLUTION INTRAVENOUS at 22:07

## 2023-08-18 RX ADMIN — PIPERACILLIN AND TAZOBACTAM 4.5 G: 4; .5 INJECTION, POWDER, FOR SOLUTION INTRAVENOUS at 04:06

## 2023-08-18 RX ADMIN — ACETAMINOPHEN 1000 MG: 500 TABLET, FILM COATED ORAL at 04:05

## 2023-08-18 RX ADMIN — OXYCODONE HYDROCHLORIDE 15 MG: 15 TABLET ORAL at 16:28

## 2023-08-18 RX ADMIN — HEPARIN SODIUM 5000 UNITS: 5000 INJECTION, SOLUTION INTRAVENOUS; SUBCUTANEOUS at 14:00

## 2023-08-18 RX ADMIN — ACETAMINOPHEN 1000 MG: 500 TABLET, FILM COATED ORAL at 16:29

## 2023-08-18 RX ADMIN — OMEPRAZOLE 20 MG: 20 CAPSULE, DELAYED RELEASE ORAL at 04:05

## 2023-08-18 RX ADMIN — OXYCODONE HYDROCHLORIDE 15 MG: 15 TABLET ORAL at 19:41

## 2023-08-18 RX ADMIN — METAXALONE 400 MG: 800 TABLET ORAL at 11:26

## 2023-08-18 RX ADMIN — SENNOSIDES AND DOCUSATE SODIUM 2 TABLET: 50; 8.6 TABLET ORAL at 04:05

## 2023-08-18 RX ADMIN — ZOLPIDEM TARTRATE 10 MG: 5 TABLET ORAL at 21:58

## 2023-08-18 RX ADMIN — HYDROMORPHONE HYDROCHLORIDE 1 MG: 1 INJECTION, SOLUTION INTRAMUSCULAR; INTRAVENOUS; SUBCUTANEOUS at 00:54

## 2023-08-18 RX ADMIN — BACLOFEN 5 MG: 10 TABLET ORAL at 11:26

## 2023-08-18 RX ADMIN — BACLOFEN 5 MG: 10 TABLET ORAL at 04:05

## 2023-08-18 RX ADMIN — POLYETHYLENE GLYCOL 3350 1 PACKET: 17 POWDER, FOR SOLUTION ORAL at 04:06

## 2023-08-18 RX ADMIN — HYDROMORPHONE HYDROCHLORIDE 1 MG: 2 TABLET ORAL at 13:56

## 2023-08-18 RX ADMIN — MAGNESIUM HYDROXIDE 30 ML: 400 SUSPENSION ORAL at 04:06

## 2023-08-18 RX ADMIN — OXYCODONE HYDROCHLORIDE 15 MG: 15 TABLET ORAL at 08:54

## 2023-08-18 RX ADMIN — HYDROMORPHONE HYDROCHLORIDE 1 MG: 2 TABLET ORAL at 21:57

## 2023-08-18 RX ADMIN — HYDROMORPHONE HYDROCHLORIDE 1 MG: 1 INJECTION, SOLUTION INTRAMUSCULAR; INTRAVENOUS; SUBCUTANEOUS at 09:58

## 2023-08-18 RX ADMIN — PIPERACILLIN AND TAZOBACTAM 4.5 G: 4; .5 INJECTION, POWDER, FOR SOLUTION INTRAVENOUS at 13:44

## 2023-08-18 RX ADMIN — OXYCODONE HYDROCHLORIDE 15 MG: 15 TABLET ORAL at 12:43

## 2023-08-18 ASSESSMENT — ACTIVITIES OF DAILY LIVING (ADL): TOILETING: INDEPENDENT

## 2023-08-18 ASSESSMENT — PAIN DESCRIPTION - PAIN TYPE
TYPE: ACUTE PAIN

## 2023-08-18 ASSESSMENT — COGNITIVE AND FUNCTIONAL STATUS - GENERAL
TURNING FROM BACK TO SIDE WHILE IN FLAT BAD: A LOT
HELP NEEDED FOR BATHING: TOTAL
WALKING IN HOSPITAL ROOM: A LOT
MOBILITY SCORE: 12
PERSONAL GROOMING: A LITTLE
MOVING FROM LYING ON BACK TO SITTING ON SIDE OF FLAT BED: A LOT
DRESSING REGULAR UPPER BODY CLOTHING: A LITTLE
TOILETING: TOTAL
SUGGESTED CMS G CODE MODIFIER MOBILITY: CL
MOVING TO AND FROM BED TO CHAIR: A LOT
DRESSING REGULAR LOWER BODY CLOTHING: TOTAL
DAILY ACTIVITIY SCORE: 13
CLIMB 3 TO 5 STEPS WITH RAILING: TOTAL
STANDING UP FROM CHAIR USING ARMS: A LITTLE
SUGGESTED CMS G CODE MODIFIER DAILY ACTIVITY: CL

## 2023-08-18 ASSESSMENT — ENCOUNTER SYMPTOMS
VOMITING: 0
FEVER: 0
DOUBLE VISION: 0
WEAKNESS: 0
SHORTNESS OF BREATH: 0
FOCAL WEAKNESS: 0
DIAPHORESIS: 0
HEADACHES: 0
BLURRED VISION: 0
HEMOPTYSIS: 0
COUGH: 0
HEARTBURN: 0
DIZZINESS: 0
NAUSEA: 0
ORTHOPNEA: 0

## 2023-08-18 ASSESSMENT — GAIT ASSESSMENTS: GAIT LEVEL OF ASSIST: REFUSED

## 2023-08-18 ASSESSMENT — PAIN SCALES - WONG BAKER: WONGBAKER_NUMERICALRESPONSE: HURTS AS MUCH AS POSSIBLE

## 2023-08-18 NOTE — PROGRESS NOTES
Admitted to room T320 via transport in [facility] from S1 at 1110.  Received report from LISHA Rodriguez.  Patient reports pain at 7 on a scale of 0-10. Educated patient regarding pharmacologic and non pharmacologic modalities for pain management. Oriented to room call light and smoking policy.  Reviewed plan of care (equipment, incentive spirometer, sequential compression devices, medications, activity, diet, fall precautions, skin care, and pain) with patient and family.     AA&Ox4. Denies CP/SOB.  See 2 RN skin note  Tolerating regular diet. Denies N/V.  + void. + BM. Last BM 8/17.  Pt ambulates x2 with FWW.   SCD's refused.  Pt on room air      Plan of care discussed, all questions answered. Educated on the importance of calling before getting OOB and pt verbalizes understanding. Call light is within reach, treaded slipper socks on, bed in lowest/ locked position, hourly rounding in place, all needs met at this time.

## 2023-08-18 NOTE — PROGRESS NOTES
Transport at bedside for patient transfer to [facility], all personal belongings in patient's possession.

## 2023-08-18 NOTE — PROGRESS NOTES
"      Orthopaedic Progress Note    Interval changes:  Patient doing well post op  Vac CDI without leak  SIERRA drains in place     ROS - Patient denies any new issues.  Pain well controlled.    /84   Pulse 99   Temp 36.6 °C (97.9 °F) (Temporal)   Resp 17   Ht 1.778 m (5' 10\")   Wt 98.2 kg (216 lb 7.9 oz)   SpO2 93%     Patient seen and examined  No acute distress  Breathing non labored  RRR  Left hip vac dressing in place without leak, SIERRA drains in place, DNVI, moves all toes, cap refill <2 sec.     Recent Labs     08/16/23  0209 08/17/23  0155 08/18/23  0216   WBC 24.8* 33.6* 31.6*   RBC 4.06* 4.45* 4.15*   HEMOGLOBIN 11.9* 12.9* 12.0*   HEMATOCRIT 36.8* 39.9* 37.5*   MCV 90.6 89.7 90.4   MCH 29.3 29.0 28.9   MCHC 32.3 32.3 32.0*   RDW 52.8* 51.2* 51.0*   PLATELETCT 215 259 260   MPV 10.5 10.3 10.5       Active Hospital Problems    Diagnosis     Leukocytosis [D72.829]     UTI (urinary tract infection) [N39.0]     Transaminitis [R74.01]     Nontraumatic compartment syndrome of left lower extremity [M79.A22]     JUDY (acute kidney injury) (HCC) [N17.9]     Rhabdomyolysis [M62.82]     Necrotizing fasciitis (HCC) [M72.6]      Reports 3-day history of left buttock pain after recent injection with testosterone at left buttock, which is tense, swollen, and moderately tender. CT pelvis (8/10/2023) shows subcutaneous inflammatory changes with enlargement of left gluteal musculature.      Bilirubinemia [E80.6]      Mild. Likely due to sepsis. No evidence of anemia on labs.   - Direct bili ordered. Will monitor.       Acute respiratory failure with hypoxemia (HCC) [J96.01]      Mild diffuse rhonchi heard on exam. CXR shows peripheral Kerley B lines. NT-proBNP slightly elevated.   - IV Lasix 20mg x1 ordered; strict I/Os, daily standing weights ordered.   - RT/OT protocol; wean from oxygen as tolerated.   - Would consider TTE given likelihood of HFpEF in setting of KACY      Insomnia [G47.00]      History of. On Zolpidem " at home.  - Resumed.      GERD (gastroesophageal reflux disease) [K21.9]      History of. On Pantoprazole at home.  - Resumed.       Other constipation [K59.09]     Sepsis due to cellulitis (HCC) [L03.90, A41.9]     Sleep apnea [G47.30]     Obesity [E66.9]        Assessment/Plan:  Patient doing well post op  Vac CDI without leak  SIERRA drains in place   POD#1 S/P:  1.  Excisional debridement of left leg wound including skin, subcutaneous tissue and muscle of wound measuring 17x60 cm in maximum dimensions.  2.  Secondary closure of left leg wound measuring 60 cm in length.  3.  Application of wound VAC, less than 50 sq. cm (incisional) left thigh  POD#3 S/P  Excisional debridement of left thigh wound including skin, subcutaneous tissue and muscle of wound measuring 60x17 cm in maximum dimensions.  Wt bearing status - TTWB LLE  Wound care/Drains -  vac dressings to be left in place x7-10 days   Future Procedures - none planned   Sutures/Staples out- 14-21 days post operatively. Removal will completed by ortho mid levels only.  PT/OT-initiated  Antibiotics: zosyn 4.5g IV q8  DVT Prophylaxis- TEDS/SCDs/Foot pumps/heparin  Barraza-not needed per ortho  Case Coordination for Discharge Planning - Disposition per therapy recs.

## 2023-08-18 NOTE — PROGRESS NOTES
Bedside report received and patient care assumed. Pt is resting in bed, A&O4, with complaints of 9-10/10 LLE post surgical pain, and is on room air. All moderate fall precautions are in place, belongings at bedside table.  Pt was updated on POC, no questions or concerns. Pt educated on use of call light for assistance.

## 2023-08-18 NOTE — THERAPY
"Occupational Therapy   Initial Evaluation     Patient Name: Abhishek Pacheco  Age:  53 y.o., Sex:  male  Medical Record #: [id number]  Today's Date: 8/18/2023     Precautions  Precautions: (P) Fall Risk, Toe Touch Weight Bearing Left Lower Extremity  Comments: (P) SIERRA x4, wound vac    Assessment  Patient is 53 y.o. male who presents to acute for pain and swelling in L buttock from injecting testosterone. Pt found to have UTI, Rhabdomyolysis, sepsis, necrotizing fascitis and nontraumatic compartment syndrome. Pt is now s/p fasciectomy/debridement on 8/13, and repeat debridement on 8/15. Pt primarily limited by pain in L buttock during session. Pt demo'd SPT txf w/ FWW [place] [facility], ultimately requiring total A for toilet hygiene and LB dressing. Pt demo'd impaired balance, functional mobility and activity tolerance. Will continue to follow.     Plan    Occupational Therapy Initial Treatment Plan   Treatment Interventions: (P) Self Care / Activities of Daily Living, Neuro Re-Education / Balance, Therapeutic Exercises, Therapeutic Activity, Adaptive Equipment  Treatment Frequency: (P) 3 Times per Week  Duration: (P) Until Therapy Goals Met    DC Equipment Recommendations: (P) Unable to determine at this time  Discharge Recommendations: (P) Recommend post-acute placement for additional occupational therapy services prior to discharge home (once pain is managed anticipate quick progression)     Subjective    \"I think I need one more day to just heal\"     Objective       08/18/23 0918   Charge Group   OT Evaluation OT Evaluation Mod   Total Time Spent   OT Evaluation (Minutes) 25   Initial Contact Note    Initial Contact Note Order Received and Verified, Occupational Therapy Evaluation in Progress with Full Report to Follow.   Prior Living Situation   Prior Services Home-Independent   Housing / Facility 1 Story House   Bathroom Set up Walk In Shower   Equipment Owned None   Lives with - Patient's Self Care Capacity Alone and " Able to Care For Self   Comments Pt reports he has friends and family who can assist as needed   Prior Level of ADL Function   Self Feeding Independent   Grooming / Hygiene Independent   Bathing Independent   Dressing Independent   Toileting Independent   Prior Level of IADL Function   Medication Management Independent   Laundry Independent   Kitchen Mobility Independent   Finances Independent   Home Management Independent   Shopping Independent   Prior Level Of Mobility Independent Without Device in Community;Independent Without Device in Home   Driving / Transportation Driving Independent   Occupation (Pre-Hospital Vocational)   (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[employer])   Precautions   Precautions Fall Risk;Toe Touch Weight Bearing Left Lower Extremity   Comments SIERRA x4, wound vac   Vitals   O2 (LPM) 0   O2 Delivery Device None - Room Air   Pain 0 - 10 Group   Therapist Pain Assessment Nurse Notified;During Activity;10  (Pain in L LE, agreeable to session, RN aware)   Cognition    Level of Consciousness Alert   Comments internally distracted by pain   Active ROM Upper Body   Active ROM Upper Body  WDL   Strength Upper Body   Upper Body Strength  WDL   Coordination Upper Body   Coordination WDL   Balance Assessment   Sitting Balance (Static) Fair   Sitting Balance (Dynamic) Fair   Standing Balance (Static) Fair -   Standing Balance (Dynamic) Poor +   Weight Shift Sitting Fair   Weight Shift Standing Poor   Comments weight shifted in sitting to keep weight off L glute.   Bed Mobility    Supine to Sit Moderate Assist   Sit to Supine Minimal Assist   Scooting Supervised   ADL Assessment   Grooming Supervision  (bed level)   Upper Body Dressing Minimal Assist  (gown)   Lower Body Dressing Total Assist  (socks)   Toileting Total Assist  (wiped buttocks after pt attempted to have BM)   How much help from another person does the patient currently need...   Putting on and taking off regular lower body clothing? 1   Bathing  (including washing, rinsing, and drying)? 1   Toileting, which includes using a toilet, bedpan, or urinal? 1   Putting on and taking off regular upper body clothing? 3   Taking care of personal grooming such as brushing teeth? 3   Eating meals? 4   6 Clicks Daily Activity Score 13   Functional Mobility   Sit to Stand Contact Guard Assist   Bed, Chair, Wheelchair Transfer Contact Guard Assist   Transfer Method Stand Pivot   Mobility SPT from EOB<>BSC w/ FWW   Edema / Skin Assessment   Edema / Skin  Not Assessed   Activity Tolerance   Sitting in Chair 30 seconds on BSC   Sitting Edge of Bed 2 min   Standing 5 min   Comments limited in sitting due to pain   Patient / Family Goals   Patient / Family Goal #1 For his pain to be managed   Short Term Goals   Short Term Goal # 1 Pt will demo LB dressing w/ SPV   Short Term Goal # 2 Pt will demo toilet hygiene w/ SPV   Short Term Goal # 3 Pt will demo standing grooming w/ SPV   Short Term Goal # 4 Pt will demo ADL txf w/ SPV   Education Group   Role of Occupational Therapist Patient Response Patient;Acceptance;Explanation;Demonstration;Verbal Demonstration;Action Demonstration   Occupational Therapy Initial Treatment Plan    Treatment Interventions Self Care / Activities of Daily Living;Neuro Re-Education / Balance;Therapeutic Exercises;Therapeutic Activity;Adaptive Equipment   Treatment Frequency 3 Times per Week   Duration Until Therapy Goals Met   Problem List   Problem List Decreased Active Daily Living Skills;Decreased Homemaking Skills;Decreased Functional Mobility;Decreased Activity Tolerance;Impaired Postural Control / Balance   Anticipated Discharge Equipment and Recommendations   DC Equipment Recommendations Unable to determine at this time   Discharge Recommendations Recommend post-acute placement for additional occupational therapy services prior to discharge home  (once pain is managed anticipate quick progression)   Interdisciplinary Plan of Care Collaboration    IDT Collaboration with  Nursing   Patient Position at End of Therapy In Bed;Call Light within Reach;Tray Table within Reach;Phone within Reach   Collaboration Comments report given   Session Information   Date / Session Number  8/18, 1 (1/3, 8/24)

## 2023-08-18 NOTE — PROGRESS NOTES
"Educated the patient on his high fall risk and the need for a bed alarm. Patient refused the bed alarm, stating \"its too painful to roll over, I don't want it.\"  "

## 2023-08-18 NOTE — THERAPY
Physical Therapy   Initial Evaluation     Patient Name: Abhishek Pacheco  Age:  53 y.o., Sex:  male  Medical Record #: [id number]  Today's Date: 8/18/2023     Precautions  Precautions: Fall Risk;Toe Touch Weight Bearing Left Lower Extremity  Comments: SIERRA x4, wound vac    Assessment  Patient is 53 y.o. male admitted with pain and swelling in L buttock from injecting testosterone. Pt found to have a UTI, rhabdomyolysis, JUDY, sepsis, necrotizing fascitis and nontraumatic compartment syndrome. Pt is now s/p fasciectomy/debridement 8/13 and repeat debridement on 8/15. PMH includes chronic migraines, obesity, KACY. Pt significantly limited by pain requiring mod assist for bed mobility, CGA for STS and [facility]PT to [place] from [facility]. Anticipate pt will progress well with mobility when pain control improves. PT will cont while pt is in acute care setting to address deficits.     Plan    Physical Therapy Initial Treatment Plan   Treatment Plan : Bed Mobility, Gait Training, Neuro Re-Education / Balance, Self Care / Home Evaluation, Therapeutic Activities, Therapeutic Exercise, Stair Training  Treatment Frequency: 3 Times per Week  Duration: Until Therapy Goals Met    DC Equipment Recommendations: Unable to determine at this time  Discharge Recommendations: Recommend post-acute placement for additional physical therapy services prior to discharge home        08/18/23 0912   Prior Living Situation   Prior Services Home-Independent   Housing / Facility 1 Story House   Steps Into Home 2   Steps In Home 0   Equipment Owned None   Lives with - Patient's Self Care Capacity Alone and Able to Care For Self   Comments pt reports having friends and family who can assist as needed   Prior Level of Functional Mobility   Bed Mobility Independent   Transfer Status Independent   Ambulation Independent   Ambulation Distance community   Assistive Devices Used None   Stairs Independent   Comments indepenent prior   Cognition    Level of Consciousness  Alert   Comments very focused on pain   Active ROM Lower Body    Active ROM Lower Body  X   Comments unable to perform L hip ROM 2/2 pain   Strength Lower Body   Lower Body Strength  X   Comments unable to assess LLE due to pain   Sensation Lower Body   Lower Extremity Sensation   WDL   Other Treatments   Other Treatments Provided education provided for TTWB   Balance Assessment   Sitting Balance (Static) Fair   Sitting Balance (Dynamic) Fair   Standing Balance (Static) Fair -   Standing Balance (Dynamic) Poor +   Weight Shift Sitting Poor   Weight Shift Standing Poor   Comments difficulty sitting with weight on L glut; standing with FWW   Bed Mobility    Supine to Sit Moderate Assist   Sit to Supine Minimal Assist   Scooting Minimal Assist   Comments limited by pain   Gait Analysis   Gait Level Of Assist Refused   Weight Bearing Status TTWB LLE   Functional Mobility   Sit to Stand Contact Guard Assist   Bed, Chair, Wheelchair Transfer Contact Guard Assist   Transfer Method Stand Pivot   Mobility EOB, STS, SPT to and from commode   Edema / Skin Assessment   Edema / Skin  Not Assessed   Patient / Family Goals    Patient / Family Goal #1 decrease pain   Short Term Goals    Short Term Goal # 1 pt will be able to complete supine<>sitting from flat bed with SPV in 6tx in order to dc home   Short Term Goal # 2 pt will be able to complete functional transfers with FWW and SPV in 6tx in order to progress to home   Short Term Goal # 3 pt will be able to ambulate 50ft with FWW and SPV in 6tx in order to dc home   Short Term Goal # 4 pt will be able to negotiate 2 steps with LRAD and SPV in 6tx in order to enter and exit home   Anticipated Discharge Equipment and Recommendations   DC Equipment Recommendations Unable to determine at this time   Discharge Recommendations Recommend post-acute placement for additional physical therapy services prior to discharge home

## 2023-08-18 NOTE — CARE PLAN
The patient is Watcher - Medium risk of patient condition declining or worsening    Shift Goals  Clinical Goals: infection prevention, post surgery recovery  Patient Goals: pain control, rest, repositioning  Family Goals: CHRIS    Progress made toward(s) clinical / shift goals:    Problem: Knowledge Deficit - Standard  Goal: Patient and family/care givers will demonstrate understanding of plan of care, disease process/condition, diagnostic tests and medications  Outcome: Progressing  Note: Patient understands his diagnosis, plan of care, and discharge barriers.       Patient is not progressing towards the following goals:      Problem: Pain - Standard  Goal: Alleviation of pain or a reduction in pain to the patient’s comfort goal  Outcome: Not Progressing  Flowsheets (Taken 8/18/2023 0601)  Pain Rating Scale (NPRS): 10  Note: Patient has consistently rated his pain equal to or greater than 10 throughout the shift even with the administration of prn pain medications, which the patient requires on the clock to help with his pain.

## 2023-08-18 NOTE — PROGRESS NOTES
[facility] Internal Medicine Daily Progress Note    Date of Service  8/18/2023    [facility] Team: R IM [facility] Team   Attending: Eugenio Tompkins M.d.  Senior Resident: Dr. Edson Mendiola MD  Intern:  Dr. Frank wallace MD  Contact Number: [phone]    Chief Complaint  Abhishek Pacheco is a 53 y.o. male admitted 8/10/2023 with pain, swelling in left buttock radiating down to leg.    Hospital Course  Mr. Abhishek Pacheco is a 53yoM with chronic migraine (on Sumtatriptan), obesity (BMI 31.70), KACY (on nightly CPAP; s/p uvuloplasty, 10/2011) who presented 8/10/2023 with 3-day history of worsening left buttock pain and edema which started 2 days after injecting himself with testosterone in same region. The pain is constant, rated 8/10, worse with movement, and minimally improved with Tramadol and Prednisone (prescribed by PCP). He also reports chills, headache, and night sweats.      Patient reports taking testosterone shots thrice monthly for last 2 years.     Admission findings:  Exam is notable for generalized discomfort, clammy skin, tachycardia (to 112 bpm), hypoxemia (84-85% on RA), bilateral crackles in middle lung fields. No tenderness to abdominal palpation. His left buttock is somewhat tense, swollen, non-erythematous, moderately tender at gluteal cleft.      Labs show severe leukocytosis (33.7 K/uL, 86.3% PMNs), mildly elevated total bilirubin. CT pelvis (8/10/2023) shows subcutaneous inflammatory changes with enlargement of left gluteal musculature. CXR (8/10/2023) shows mildly thickened interlobular septa.    He receives Ancef 2g x1 in ED and is admitted for sepsis due to cellulitis of left buttock.   8/15/23:  Left gluteal and thigh necrotizing fasciitis and   myositis, status post fasciectomy and excisional debridement with wound VAC   application.  8/16/23: WBC, CPK trending down  8/17/23: Repeat incision and drainage by general surgery      Interval Problem Update  Patient was seen and evaluated at bedside  this AM.  No overnight acute event reported by night team.  S/p debridement, significant improvement in pain, patient appears less distressed than yesterday.   no overnight fevers sweating, vomiting.  Wound VAC in place.  Vitals are stable.  WBC trending down, H&H stable.    I have discussed this patient's plan of care and discharge plan at IDT rounds today with Case Management, Nursing, Nursing leadership, and other members of the IDT team.    Consultants/Specialty  general surgery    Code Status  DNAR/DNI    Disposition  The patient is not medically cleared for discharge to home or a post-acute facility.      I have placed the appropriate orders for post-discharge needs.    Review of Systems  Review of Systems   Constitutional:  Positive for malaise/fatigue. Negative for diaphoresis and fever.   Eyes:  Negative for blurred vision and double vision.   Respiratory:  Negative for cough, hemoptysis and shortness of breath.    Cardiovascular:  Negative for chest pain and orthopnea.   Gastrointestinal:  Negative for heartburn, nausea and vomiting.   Musculoskeletal:         Improvement in pain s/p surgical debridement.   Neurological:  Negative for dizziness, focal weakness, weakness and headaches.        Physical Exam  Temp:  [35.9 °C (96.6 °F)-36.8 °C (98.3 °F)] 36.7 °C (98 °F)  Pulse:  [82-99] 84  Resp:  [12-20] 18  BP: (110-164)/(62-96) 132/62  SpO2:  [93 %-99 %] 93 %    Physical Exam  Constitutional:       Appearance: Normal appearance.      Comments: Appearing well and comfortable post debridement   HENT:      Mouth/Throat:      Mouth: Mucous membranes are moist.      Pharynx: Oropharynx is clear.   Eyes:      Extraocular Movements: Extraocular movements intact.      Pupils: Pupils are equal, round, and reactive to light.   Cardiovascular:      Rate and Rhythm: Normal rate and regular rhythm.      Pulses: Normal pulses.      Heart sounds: Normal heart sounds.   Pulmonary:      Effort: No respiratory distress.    Abdominal:      General: Abdomen is flat.      Palpations: Abdomen is soft.   Neurological:      General: No focal deficit present.      Mental Status: He is alert.         Fluids    Intake/Output Summary (Last 24 hours) at 8/18/2023 0732  Last data filed at 8/18/2023 0539  Gross per 24 hour   Intake 1795 ml   Output 1540 ml   Net 255 ml         Laboratory  Recent Labs     08/16/23 0209 08/17/23 0155 08/18/23 0216   WBC 24.8* 33.6* 31.6*   RBC 4.06* 4.45* 4.15*   HEMOGLOBIN 11.9* 12.9* 12.0*   HEMATOCRIT 36.8* 39.9* 37.5*   MCV 90.6 89.7 90.4   MCH 29.3 29.0 28.9   MCHC 32.3 32.3 32.0*   RDW 52.8* 51.2* 51.0*   PLATELETCT 215 259 260   MPV 10.5 10.3 10.5       Recent Labs     08/16/23 0209 08/17/23 0155 08/18/23 0216   SODIUM 138 135 132*   POTASSIUM 4.3 4.6 4.9   CHLORIDE 103 100 99   CO2 30 26 24   GLUCOSE 80 92 145*   BUN 24* 17 24*   CREATININE 0.90 0.94 1.11   CALCIUM 8.0* 7.8* 8.1*                     Imaging  CT-EXTREMITY, LOWER WITH LEFT   Final Result      1.  Swelling and areas of decreased attenuation are identified within and throughout left gluteus musculature and extending down into the posterior compartment musculature in the left thigh. Consider infection or inflammation such as cellulitis or    myositis.      2.  Induration and subcutaneous fat and intramuscular fat planes is also noted consistent with cellulitis.      3.  No abscess or bone erosion identified.      CT-PELVIS WITH   Final Result      1.  Swelling and areas of decreased attenuation throughout the visualized left gluteus musculature are identified. Consider infection or inflammation such as myositis or cellulitis.      2.  Induration and subcutaneous fat is identified consistent with cellulitis.      3.  No fluid collection that would suggest abscess is identified at this time. No soft tissue emphysema is noted.      DX-CHEST-PORTABLE (1 VIEW)   Final Result      No acute cardiac or pulmonary abnormalities are identified.       CT-PELVIS WITH   Final Result      1.  Subcutaneous inflammatory changes about the left buttock suspicious for cellulitis.      2.  Enlargement of left gluteal musculature consistent with myositis and/or inflammatory change.             Assessment/Plan  Problem Representation:    * Necrotizing fasciitis (HCC)- (present on admission)  Assessment & Plan  At site where he injects his IM testosterone reports severe pain and unable to sleep  Plan:  S/p I&D of large abscess and necrotic tissue with wound vac placement 8/13 by Dr Castro  Looking forward to cultures from OR  continuing IV Zosyn per ID recs.  WBC trending down  Dilaudid for severe pain and oxycodone for moderate pain.  Monitor for adverse effect of narcotic pain medications.  8/17/2 3 s/p repeat surgical debridement followed by wound VAC.  PRN pain management    Leukocytosis  Assessment & Plan  He found to have leukocytosis most likely secondary to deep tissue infection.  Significant up trending.  Work-up with repeat CBC    Nontraumatic compartment syndrome of left lower extremity  Assessment & Plan  Secondary to necrotizing fasciitis, tense, tender upper thigh and lower leg  Status post incision and drainage.  Repeat surgical debridement 8/17/23  Continue pain regimen as needed    Transaminitis  Assessment & Plan  Elevation is likely due to Rhabdo and not intrinsic hepatic  Significant downtrending  Follow-up with [facility]    UTI (urinary tract infection)  Assessment & Plan  Urinalysis on 8/12/2023 was positive for nitrite, and a small amount of leukocyte Estrace.  Patient reports burning with initiation of urination.   No acute complaint at this time.  Asymptomatic      Rhabdomyolysis  Assessment & Plan  Plan:  IV fluids  Potassium normal  No lab  abnormalities concerning for rhabdomyolysis   down trending  Continue monitor for worsening condition    JUDY (acute kidney injury) (HCC)  Assessment & Plan  08/15/23    Likely prerenal JUDY due to hypovolemia  secondary to sepsis  Renal function has significantly improved creatinine trending down back to baseline  Medication dose adjustment as per renal functions  Avoid nephrotoxins.  IV fluids  Continue to monitor closely.  8/17/2023 resolved      Other constipation  Assessment & Plan  Bowel protocol    GERD (gastroesophageal reflux disease)  Assessment & Plan  Denies heartburn, chest pain, nausea  Continuing outpatient PPI.    Insomnia  Assessment & Plan  History of. On Zolpidem at home.  - Resumed.    Acute respiratory failure with hypoxemia (HCC)- (present on admission)  Assessment & Plan  Likely secondary to sepsis and comorbid KACY     Plan:   Currently he is off from oxygen and maintaining oxygen saturation.  Requiring while asleep for his KACY  O2 sat maintained on room air      Bilirubinemia  Assessment & Plan  Resolved.  Follow-up with CMP if jaundiced, pale stool, dark urine.    Sepsis due to cellulitis (HCC)- (present on admission)  Assessment & Plan  Sepsis secondary to testosterone injection in muscles:    Pain:  Follow up on operative cultures  Empiric therapy for necrotizing fascitis be continued  Follow-up with CBC, CMP  adequete hydration, monitor for signs of endorgan dysfunction       Sleep apnea- (present on admission)  Assessment & Plan  - RT/OT protocol  Uses home CPAP at home  Monitor for polycythemia secondary to hypoxia      Obesity- (present on admission)  Assessment & Plan  BMI 33.44    Plan:  - Counseling deferred to primary.  -Patient was counseled on decrease calorie intake  -Follow-up with lipid panel, A1c         VTE prophylaxis: SCDs/TEDs    I have performed a physical exam and reviewed and updated ROS and Plan today (8/18/2023). In review of yesterday's note (8/17/2023), there are no changes except as documented above.

## 2023-08-18 NOTE — DISCHARGE PLANNING
Case Management Discharge Planning    Admission Date: 8/10/2023  GMLOS: 9  ALOS: 8    6-Clicks ADL Score: 19  6-Clicks Mobility Score: 12  PT and/or OT Eval ordered: Yes  Post-acute Referrals Ordered: Yes  Post-acute Choice Obtained: Yes  Has referral(s) been sent to post-acute provider:  Yes      Anticipated Discharge Dispo: Discharge Disposition: Discharged to home/self care (01)    DME Needed: No    Action(s) Taken: OTHER    Patient discussed in IDT rounds. Patient is pending PT/OT eval and I&D.     No other CM needs identified at this time.     Escalations Completed: None    Medically Clear: No    Next Steps: Pending medical clearance.     Barriers to Discharge: Medical clearance    Is the patient up for discharge tomorrow: No

## 2023-08-18 NOTE — PROGRESS NOTES
4 Eyes Skin Assessment Completed by LISHA Mayfield and LISHA Chinchilla.    Head WDL  Ears WDL  Nose WDL  Mouth WDL  Neck WDL  Breast/Chest WDL  Shoulder Blades WDL  Spine WDL  (R) Arm/Elbow/Hand WDL  (L) Arm/Elbow/Hand WDL  Abdomen WDL  Groin WDL  Scrotum/Coccyx/Buttocks WDL  (R) Leg WDL  (L) Leg WV L hip, x4 SIERRA drains to hip and knee; CHRIS DIP  (R) Heel/Foot/Toe Edema  (L) Heel/Foot/Toe Edema          Devices In Places Blood Pressure Cuff and Pulse Ox, WV, x4 SIERRA      Interventions In Place Pillows, Dri-Viral Pads, and Heels Loaded W/Pillows    Possible Skin Injury No    Pictures Uploaded Into Epic N/A  Wound Consult Placed Yes already following  RN Wound Prevention Protocol Ordered No

## 2023-08-18 NOTE — WOUND TEAM
Wound consult received regarding patient VAC changes. Patient went to OR today and incisional VAC placed by MD. Per MD Castro wound team to change incisional VAC in 5-7 days, if minimal output is present, okay to transition to prevena plus instead of black foam incisional. VAC orders in place. Wound team to see patient in 5-7 days.

## 2023-08-19 LAB
ALBUMIN SERPL BCP-MCNC: 2.7 G/DL (ref 3.2–4.9)
ALBUMIN/GLOB SERPL: 0.8 G/DL
ALP SERPL-CCNC: 51 U/L (ref 30–99)
ALT SERPL-CCNC: 27 U/L (ref 2–50)
ANION GAP SERPL CALC-SCNC: 9 MMOL/L (ref 7–16)
AST SERPL-CCNC: 28 U/L (ref 12–45)
BILIRUB SERPL-MCNC: 0.5 MG/DL (ref 0.1–1.5)
BUN SERPL-MCNC: 18 MG/DL (ref 8–22)
CALCIUM ALBUM COR SERPL-MCNC: 8.9 MG/DL (ref 8.5–10.5)
CALCIUM SERPL-MCNC: 7.9 MG/DL (ref 8.5–10.5)
CHLORIDE SERPL-SCNC: 101 MMOL/L (ref 96–112)
CO2 SERPL-SCNC: 25 MMOL/L (ref 20–33)
CREAT SERPL-MCNC: 1.17 MG/DL (ref 0.5–1.4)
ERYTHROCYTE [DISTWIDTH] IN BLOOD BY AUTOMATED COUNT: 50.9 FL (ref 35.9–50)
GFR SERPLBLD CREATININE-BSD FMLA CKD-EPI: 75 ML/MIN/1.73 M 2
GLOBULIN SER CALC-MCNC: 3.3 G/DL (ref 1.9–3.5)
GLUCOSE SERPL-MCNC: 103 MG/DL (ref 65–99)
HCT VFR BLD AUTO: 33.6 % (ref 42–52)
HGB BLD-MCNC: 10.9 G/DL (ref 14–18)
MCH RBC QN AUTO: 29.4 PG (ref 27–33)
MCHC RBC AUTO-ENTMCNC: 32.4 G/DL (ref 32.3–36.5)
MCV RBC AUTO: 90.6 FL (ref 81.4–97.8)
PLATELET # BLD AUTO: 321 K/UL (ref 164–446)
PMV BLD AUTO: 10.4 FL (ref 9–12.9)
POTASSIUM SERPL-SCNC: 4.4 MMOL/L (ref 3.6–5.5)
PROT SERPL-MCNC: 6 G/DL (ref 6–8.2)
RBC # BLD AUTO: 3.71 M/UL (ref 4.7–6.1)
SODIUM SERPL-SCNC: 135 MMOL/L (ref 135–145)
WBC # BLD AUTO: 21.9 K/UL (ref 4.8–10.8)

## 2023-08-19 PROCEDURE — A9270 NON-COVERED ITEM OR SERVICE: HCPCS

## 2023-08-19 PROCEDURE — 99232 SBSQ HOSP IP/OBS MODERATE 35: CPT | Mod: GC | Performed by: INTERNAL MEDICINE

## 2023-08-19 PROCEDURE — 36415 COLL VENOUS BLD VENIPUNCTURE: CPT

## 2023-08-19 PROCEDURE — 770001 HCHG ROOM/CARE - MED/SURG/GYN PRIV*

## 2023-08-19 PROCEDURE — 700102 HCHG RX REV CODE 250 W/ 637 OVERRIDE(OP)

## 2023-08-19 PROCEDURE — 700111 HCHG RX REV CODE 636 W/ 250 OVERRIDE (IP): Mod: JZ | Performed by: INTERNAL MEDICINE

## 2023-08-19 PROCEDURE — 85027 COMPLETE CBC AUTOMATED: CPT

## 2023-08-19 PROCEDURE — 80053 COMPREHEN METABOLIC PANEL: CPT

## 2023-08-19 PROCEDURE — 700105 HCHG RX REV CODE 258: Performed by: INTERNAL MEDICINE

## 2023-08-19 PROCEDURE — 700111 HCHG RX REV CODE 636 W/ 250 OVERRIDE (IP)

## 2023-08-19 RX ADMIN — OXYCODONE HYDROCHLORIDE 15 MG: 15 TABLET ORAL at 21:50

## 2023-08-19 RX ADMIN — OXYCODONE HYDROCHLORIDE 15 MG: 15 TABLET ORAL at 00:11

## 2023-08-19 RX ADMIN — HYDROMORPHONE HYDROCHLORIDE 1 MG: 2 TABLET ORAL at 15:12

## 2023-08-19 RX ADMIN — OXYCODONE HYDROCHLORIDE 15 MG: 15 TABLET ORAL at 03:28

## 2023-08-19 RX ADMIN — ZOLPIDEM TARTRATE 10 MG: 5 TABLET ORAL at 21:51

## 2023-08-19 RX ADMIN — HYDROMORPHONE HYDROCHLORIDE 1 MG: 2 TABLET ORAL at 02:05

## 2023-08-19 RX ADMIN — PIPERACILLIN AND TAZOBACTAM 4.5 G: 4; .5 INJECTION, POWDER, FOR SOLUTION INTRAVENOUS at 04:54

## 2023-08-19 RX ADMIN — METAXALONE 400 MG: 800 TABLET ORAL at 17:26

## 2023-08-19 RX ADMIN — OMEPRAZOLE 20 MG: 20 CAPSULE, DELAYED RELEASE ORAL at 04:43

## 2023-08-19 RX ADMIN — PIPERACILLIN AND TAZOBACTAM 4.5 G: 4; .5 INJECTION, POWDER, FOR SOLUTION INTRAVENOUS at 22:00

## 2023-08-19 RX ADMIN — NICOTINE 14 MG: 14 PATCH TRANSDERMAL at 04:44

## 2023-08-19 RX ADMIN — HEPARIN SODIUM 5000 UNITS: 5000 INJECTION, SOLUTION INTRAVENOUS; SUBCUTANEOUS at 04:43

## 2023-08-19 RX ADMIN — OXYCODONE HYDROCHLORIDE 15 MG: 15 TABLET ORAL at 08:00

## 2023-08-19 RX ADMIN — HYDROMORPHONE HYDROCHLORIDE 1 MG: 2 TABLET ORAL at 05:24

## 2023-08-19 RX ADMIN — METAXALONE 400 MG: 800 TABLET ORAL at 04:44

## 2023-08-19 RX ADMIN — OXYCODONE HYDROCHLORIDE 15 MG: 15 TABLET ORAL at 16:08

## 2023-08-19 RX ADMIN — HYDROMORPHONE HYDROCHLORIDE 1 MG: 2 TABLET ORAL at 12:47

## 2023-08-19 RX ADMIN — HEPARIN SODIUM 5000 UNITS: 5000 INJECTION, SOLUTION INTRAVENOUS; SUBCUTANEOUS at 13:46

## 2023-08-19 RX ADMIN — PIPERACILLIN AND TAZOBACTAM 4.5 G: 4; .5 INJECTION, POWDER, FOR SOLUTION INTRAVENOUS at 13:14

## 2023-08-19 RX ADMIN — HEPARIN SODIUM 5000 UNITS: 5000 INJECTION, SOLUTION INTRAVENOUS; SUBCUTANEOUS at 21:51

## 2023-08-19 RX ADMIN — ACETAMINOPHEN 1000 MG: 500 TABLET, FILM COATED ORAL at 04:45

## 2023-08-19 RX ADMIN — METAXALONE 400 MG: 800 TABLET ORAL at 12:47

## 2023-08-19 RX ADMIN — HYDROMORPHONE HYDROCHLORIDE 1 MG: 2 TABLET ORAL at 09:25

## 2023-08-19 RX ADMIN — ACETAMINOPHEN 1000 MG: 500 TABLET, FILM COATED ORAL at 12:48

## 2023-08-19 RX ADMIN — ACETAMINOPHEN 1000 MG: 500 TABLET, FILM COATED ORAL at 17:26

## 2023-08-19 RX ADMIN — SENNOSIDES AND DOCUSATE SODIUM 2 TABLET: 50; 8.6 TABLET ORAL at 04:43

## 2023-08-19 ASSESSMENT — ENCOUNTER SYMPTOMS
FEVER: 0
HEARTBURN: 0
FOCAL WEAKNESS: 0
VOMITING: 0
NAUSEA: 0
DOUBLE VISION: 0
DIAPHORESIS: 0
COUGH: 0
WEAKNESS: 0
ORTHOPNEA: 0
HEADACHES: 0
BLURRED VISION: 0
DIZZINESS: 0
SHORTNESS OF BREATH: 0
HEMOPTYSIS: 0

## 2023-08-19 ASSESSMENT — PAIN DESCRIPTION - PAIN TYPE
TYPE: ACUTE PAIN
TYPE: SURGICAL PAIN
TYPE: ACUTE PAIN

## 2023-08-19 NOTE — DISCHARGE PLANNING
HTH/SCP TCN chart review completed. Collaborated with URIEL Morin prior to meeting with the pt. The most current review of medical record, knowledge of pt's PLOF and social support, LACE+ score of 30, 6 clicks scores of 12 mobility were considered.      Pt seen at bedside. Introduced TCN program. Provided education regarding post acute levels of care. Discussed HTH/SCP plan benefits (Meds to Beds, medical uber and [facility] transitional care). Pt verbalizes understanding.    Note that pt has been evaluated by PT and OT with most recent recs for post acute placement. Pt is hopeful for progression to home with HH services if indicated though is currently agreeable to post acute placement if recommended at time of medical clearance. He reports his primary concern at this time is pain control. He reports that if he dc's to home he would need a FWW at that time as well given his TTWB status.     No additional provider requests at this time. Given aforementioned, choice proactively obtained for IRF, SNF, HH, and DME (FWW), faxed to [facility] and given to CM. Note that if able to progress with therapy/pain management, pt is hopeful for dc to home with HH and a FWW. If unable to progress to this level, he is agreeable to post acute placement as currently recommended by therapy (SNF/IRF). TCN will continue to follow and collaborate with discharge planning team as additional post acute needs arise. Thank you.     Completed today:  PT/OT with recommendations for post acute placement on 8/18  Choice obtained: IRF, SNF, HH, DME (FWW)  [facility] referral not sent

## 2023-08-19 NOTE — PROGRESS NOTES
[facility] Internal Medicine Daily Progress Note    Date of Service  8/19/2023    [facility] Team: R IM [facility] Team   Attending: Eugenio Tompkins M.d.  Senior Resident: Dr. Joss Mendiola DO  Intern:  Dr. Frank wallace MD  Contact Number: [phone]    Chief Complaint  Abhishek Pacheco is a 53 y.o. male admitted 8/10/2023 with pain, swelling in left buttock radiating down to leg.    Hospital Course  Mr. Abhishek Pacheco is a 53yoM with chronic migraine (on Sumtatriptan), obesity (BMI 31.70), KACY (on nightly CPAP; s/p uvuloplasty, 10/2011) who presented 8/10/2023 with 3-day history of worsening left buttock pain and edema which started 2 days after injecting himself with testosterone in same region. The pain is constant, rated 8/10, worse with movement, and minimally improved with Tramadol and Prednisone (prescribed by PCP). He also reports chills, headache, and night sweats.      Patient reports taking testosterone shots thrice monthly for last 2 years.     Admission findings:  Exam is notable for generalized discomfort, clammy skin, tachycardia (to 112 bpm), hypoxemia (84-85% on RA), bilateral crackles in middle lung fields. No tenderness to abdominal palpation. His left buttock is somewhat tense, swollen, non-erythematous, moderately tender at gluteal cleft.      Labs show severe leukocytosis (33.7 K/uL, 86.3% PMNs), mildly elevated total bilirubin. CT pelvis (8/10/2023) shows subcutaneous inflammatory changes with enlargement of left gluteal musculature. CXR (8/10/2023) shows mildly thickened interlobular septa.    He receives Ancef 2g x1 in ED and is admitted for sepsis due to cellulitis of left buttock.   8/15/23:  Left gluteal and thigh necrotizing fasciitis and   myositis, status post fasciectomy and excisional debridement with wound VAC   application.  8/16/23: WBC, CPK trending down  8/17/23: Repeat incision and drainage by general surgery      Interval Problem Update  -No acute events overnight  -S/p  debridement, significant improvement in pain, although patient still complains of pain especially of stabbing sensation with movement.  -no overnight fevers sweating, vomiting. Wound VAC in place.  -WBC trending down, H&H stable.    I have discussed this patient's plan of care and discharge plan at IDT rounds today with Case Management, Nursing, Nursing leadership, and other members of the IDT team.    Consultants/Specialty  general surgery    Code Status  DNAR/DNI    Disposition  The patient is not medically cleared for discharge to home or a post-acute facility.      I have placed the appropriate orders for post-discharge needs.    Review of Systems  Review of Systems   Constitutional:  Positive for malaise/fatigue. Negative for diaphoresis and fever.   Eyes:  Negative for blurred vision and double vision.   Respiratory:  Negative for cough, hemoptysis and shortness of breath.    Cardiovascular:  Negative for chest pain and orthopnea.   Gastrointestinal:  Negative for heartburn, nausea and vomiting.   Musculoskeletal:         Improvement in pain s/p surgical debridement.   Neurological:  Negative for dizziness, focal weakness, weakness and headaches.        Physical Exam  Temp:  [36.1 °C (97 °F)-36.7 °C (98.1 °F)] 36.1 °C (97 °F)  Pulse:  [] 112  Resp:  [16-19] 19  BP: (109-115)/(75-80) 109/80  SpO2:  [92 %-95 %] 95 %    Physical Exam  Constitutional:       General: He is not in acute distress.     Appearance: Normal appearance.      Comments: Appearing well and comfortable post debridement   HENT:      Head: Normocephalic and atraumatic.      Right Ear: External ear normal.      Left Ear: External ear normal.      Mouth/Throat:      Mouth: Mucous membranes are moist.      Pharynx: Oropharynx is clear.   Eyes:      Extraocular Movements: Extraocular movements intact.      Pupils: Pupils are equal, round, and reactive to light.   Cardiovascular:      Rate and Rhythm: Normal rate and regular rhythm.       Pulses: Normal pulses.      Heart sounds: Normal heart sounds.   Pulmonary:      Effort: Pulmonary effort is normal. No respiratory distress.   Abdominal:      General: Abdomen is flat. There is no distension.      Palpations: Abdomen is soft.      Tenderness: There is no abdominal tenderness.   Musculoskeletal:         General: Signs of injury present.      Right lower leg: No edema.      Left lower leg: No edema.   Neurological:      General: No focal deficit present.      Mental Status: He is alert and oriented to person, place, and time. Mental status is at baseline.   Psychiatric:         Mood and Affect: Mood normal.         Behavior: Behavior normal.         Thought Content: Thought content normal.         Judgment: Judgment normal.         Fluids    Intake/Output Summary (Last 24 hours) at 8/19/2023 1606  Last data filed at 8/19/2023 1510  Gross per 24 hour   Intake 480 ml   Output 1330 ml   Net -850 ml       Laboratory  Recent Labs     08/17/23  0155 08/18/23  0216 08/19/23  0948   WBC 33.6* 31.6* 21.9*   RBC 4.45* 4.15* 3.71*   HEMOGLOBIN 12.9* 12.0* 10.9*   HEMATOCRIT 39.9* 37.5* 33.6*   MCV 89.7 90.4 90.6   MCH 29.0 28.9 29.4   MCHC 32.3 32.0* 32.4   RDW 51.2* 51.0* 50.9*   PLATELETCT 259 260 321   MPV 10.3 10.5 10.4     Recent Labs     08/17/23  0155 08/18/23  0216 08/19/23  0948   SODIUM 135 132* 135   POTASSIUM 4.6 4.9 4.4   CHLORIDE 100 99 101   CO2 26 24 25   GLUCOSE 92 145* 103*   BUN 17 24* 18   CREATININE 0.94 1.11 1.17   CALCIUM 7.8* 8.1* 7.9*                   Imaging  CT-EXTREMITY, LOWER WITH LEFT   Final Result      1.  Swelling and areas of decreased attenuation are identified within and throughout left gluteus musculature and extending down into the posterior compartment musculature in the left thigh. Consider infection or inflammation such as cellulitis or    myositis.      2.  Induration and subcutaneous fat and intramuscular fat planes is also noted consistent with cellulitis.      3.   No abscess or bone erosion identified.      CT-PELVIS WITH   Final Result      1.  Swelling and areas of decreased attenuation throughout the visualized left gluteus musculature are identified. Consider infection or inflammation such as myositis or cellulitis.      2.  Induration and subcutaneous fat is identified consistent with cellulitis.      3.  No fluid collection that would suggest abscess is identified at this time. No soft tissue emphysema is noted.      DX-CHEST-PORTABLE (1 VIEW)   Final Result      No acute cardiac or pulmonary abnormalities are identified.      CT-PELVIS WITH   Final Result      1.  Subcutaneous inflammatory changes about the left buttock suspicious for cellulitis.      2.  Enlargement of left gluteal musculature consistent with myositis and/or inflammatory change.             Assessment/Plan  Problem Representation:    * Necrotizing fasciitis (HCC)- (present on admission)  Assessment & Plan  Secondary to IM testosterone. reports severe pain and unable to sleep     -S/p I&D of large abscess and necrotic tissue with wound vac placement 8/13 by Dr Castro   -Monitoring cultures from OR, susceptibilities.   -continuing IV Zosyn per ID recs.   -WBC trending down.  No fevers and patient notes improvement in pain.   -Pain management with Dilaudid for severe pain and oxycodone 10, 15 for moderate pain.  Monitor for adverse effect of narcotic pain medications. 8/17/2 3 s/p repeat surgical debridement followed by wound VAC.  Wound care/consult ongoing.    JUDY (acute kidney injury) (HCC)  Assessment & Plan  08/15/23    Likely prerenal JUDY due to hypovolemia secondary to sepsis  Renal function has significantly improved creatinine trending down back to baseline  Medication dose adjustment as per renal functions  Avoid nephrotoxins.  IV fluids  Continue to monitor closely.  8/17/2023 resolved      Sepsis due to cellulitis (HCC)- (present on admission)  Assessment & Plan  Sepsis secondary to  testosterone injection in muscles:     -Follow up on operative cultures   -Empiric therapy for necrotizing fascitis be continued   -Follow-up with CBC, CMP. Adequete hydration, monitor for signs of endorgan dysfunction       Leukocytosis  Assessment & Plan  He found to have leukocytosis most likely secondary to deep tissue infection.  Continuing to monitor    Nontraumatic compartment syndrome of left lower extremity  Assessment & Plan  Secondary to necrotizing fasciitis, tense, tender upper thigh and lower leg  Status post incision and drainage.  Repeat surgical debridement 8/17/23  Continue pain regimen as needed    Transaminitis  Assessment & Plan  Elevation is likely due to Rhabdo and not intrinsic hepatic  Significant downtrending  Follow-up with CMP    UTI (urinary tract infection)  Assessment & Plan  Urinalysis on 8/12/2023 was positive for nitrite, and a small amount of leukocyte Estrace.  Patient reports burning with initiation of urination.   No acute complaint at this time.  Asymptomatic-appears resolved at this time      Rhabdomyolysis  Assessment & Plan  Likely secondary to significant muscle injury in context of muscle abscess in context of other problems.  Possibly contributing to acute kidney injury.  CPK downtrending from approximately 2300.  Most recent CPK equals 171.  Appears resolved at this time and no need for continuing to trend.      Other constipation  Assessment & Plan  Bowel protocol    GERD (gastroesophageal reflux disease)  Assessment & Plan  Denies heartburn, chest pain, nausea  Continuing outpatient PPI.    Insomnia  Assessment & Plan  History of. On Zolpidem at home.  -Holding medication in context of significant pain management on board.    Acute respiratory failure with hypoxemia (HCC)- (present on admission)  Assessment & Plan  Likely secondary to sepsis and comorbid KACY     Plan:   Currently he is off from oxygen and maintaining oxygen saturation.  Requiring while asleep for his  KACY  O2 sat maintained on room air      Bilirubinemia  Assessment & Plan  Resolved.  Follow-up with CMP if jaundiced, pale stool, dark urine.    Sleep apnea- (present on admission)  Assessment & Plan  - RT/OT protocol  Uses home CPAP at home  Monitor for polycythemia secondary to hypoxia      Obesity- (present on admission)  Assessment & Plan  BMI 33.44    Plan:  - Counseling deferred to primary.  -Patient was counseled on decrease calorie intake  -Follow-up with lipid panel, A1c         VTE prophylaxis: SCDs/TEDs    I have performed a physical exam and reviewed and updated ROS and Plan today (8/19/2023). In review of yesterday's note (8/18/2023), there are no changes except as documented above.

## 2023-08-19 NOTE — DISCHARGE PLANNING
PM&R referral from Dr. Chery. Medical/ Surgical debility. Ongoing medical management as well as therapy need. [facility] provider. Physiatry to consult per protocol. Potential friend support.

## 2023-08-19 NOTE — DISCHARGE PLANNING
Received choice form at: 1044  Agency/Facility name: [facility]  Referral sent per choice form at:  1048      Received choice form at: 1019  [facility] name[facility]  Referral sent per choice form at:  1047

## 2023-08-19 NOTE — PROGRESS NOTES
"      Orthopaedic Progress Note    Interval changes:  Patient doing well    Vac CDI without leak  SIERRA drains in place     ROS - Patient denies any new issues.  Pain well controlled.    /80   Pulse (!) 112   Temp 36.1 °C (97 °F) (Temporal)   Resp 19   Ht 1.778 m (5' 10\")   Wt 98.2 kg (216 lb 7.9 oz)   SpO2 95%     Patient seen and examined  No acute distress  Breathing non labored  RRR  Left hip vac dressing in place without leak, SIERRA drains in place, DNVI, moves all toes, cap refill <2 sec.     Recent Labs     08/17/23  0155 08/18/23  0216 08/19/23  0948   WBC 33.6* 31.6* 21.9*   RBC 4.45* 4.15* 3.71*   HEMOGLOBIN 12.9* 12.0* 10.9*   HEMATOCRIT 39.9* 37.5* 33.6*   MCV 89.7 90.4 90.6   MCH 29.0 28.9 29.4   MCHC 32.3 32.0* 32.4   RDW 51.2* 51.0* 50.9*   PLATELETCT 259 260 321   MPV 10.3 10.5 10.4       Active Hospital Problems    Diagnosis     Leukocytosis [D72.829]     UTI (urinary tract infection) [N39.0]     Transaminitis [R74.01]     Nontraumatic compartment syndrome of left lower extremity [M79.A22]     JUDY (acute kidney injury) (HCC) [N17.9]     Rhabdomyolysis [M62.82]     Necrotizing fasciitis (HCC) [M72.6]      Reports 3-day history of left buttock pain after recent injection with testosterone at left buttock, which is tense, swollen, and moderately tender. CT pelvis (8/10/2023) shows subcutaneous inflammatory changes with enlargement of left gluteal musculature.      Bilirubinemia [E80.6]      Mild. Likely due to sepsis. No evidence of anemia on labs.   - Direct bili ordered. Will monitor.       Acute respiratory failure with hypoxemia (HCC) [J96.01]      Mild diffuse rhonchi heard on exam. CXR shows peripheral Kerley B lines. NT-proBNP slightly elevated.   - IV Lasix 20mg x1 ordered; strict I/Os, daily standing weights ordered.   - RT/OT protocol; wean from oxygen as tolerated.   - Would consider TTE given likelihood of HFpEF in setting of KACY      Insomnia [G47.00]      History of. On Zolpidem at " home.  - Resumed.      GERD (gastroesophageal reflux disease) [K21.9]      History of. On Pantoprazole at home.  - Resumed.       Other constipation [K59.09]     Sepsis due to cellulitis (HCC) [L03.90, A41.9]     Sleep apnea [G47.30]     Obesity [E66.9]        Assessment/Plan:  Patient doing well    Vac CDI without leak  SIERRA drains in place   POD#2 S/P:  1.  Excisional debridement of left leg wound including skin, subcutaneous tissue and muscle of wound measuring 17x60 cm in maximum dimensions.  2.  Secondary closure of left leg wound measuring 60 cm in length.  3.  Application of wound VAC, less than 50 sq. cm (incisional) left thigh  POD#4 S/P  Excisional debridement of left thigh wound including skin, subcutaneous tissue and muscle of wound measuring 60x17 cm in maximum dimensions.  Wt bearing status - TTWB LLE  Wound care/Drains -  vac dressings to be left in place x7-10 days   Future Procedures - none planned   Sutures/Staples out- 14-21 days post operatively. Removal will completed by ortho mid levels only.  PT/OT-initiated  Antibiotics: zosyn 4.5g IV q8  DVT Prophylaxis- TEDS/SCDs/Foot pumps/heparin  Barraza-not needed per ortho  Case Coordination for Discharge Planning - Disposition per therapy recs.

## 2023-08-19 NOTE — CARE PLAN
The patient is Watcher - Medium risk of patient condition declining or worsening    Shift Goals  Clinical Goals: pain control, monitor drains, monitor [place], abx  Patient Goals: pain control, rest  Family Goals: none present    Progress made toward(s) clinical / shift goals:      Patient's pain will remain somewhat controlled with PRN and scheduled pain medication. Patient's skin integrity will be maintained with frequent skin assessments, routine linen changes, and closely monitoring wound vac.    Problem: Pain - Standard  Goal: Alleviation of pain or a reduction in pain to the patient’s comfort goal  Outcome: Progressing     Problem: Skin Integrity  Goal: Skin integrity is maintained or improved  Outcome: Progressing

## 2023-08-19 NOTE — DISCHARGE PLANNING
RNCM collaborated with [facility] to acquire choice. Per Juancarlos, pt first preference is Home with HH and a FWW if pain is controlled.    Pt choiced:   1) [facility] (with a FWW), 2) [facility], then 3) [facility].     Preemptive choices scanned to [employer] for future planning.

## 2023-08-19 NOTE — CARE PLAN
The patient is Stable - Low risk of patient condition declining or worsening    Shift Goals  Clinical Goals: pain, mobility  Patient Goals: pain  Family Goals: not present    Progress made toward(s) clinical / shift goals:  YES    Problem: Knowledge Deficit - Standard  Goal: Patient and family/care givers will demonstrate understanding of plan of care, disease process/condition, diagnostic tests and medications  Outcome: Progressing     Problem: Pain - Standard  Goal: Alleviation of pain or a reduction in pain to the patient’s comfort goal  Outcome: Progressing     Problem: Fall Risk  Goal: Patient will remain free from falls  Outcome: Progressing     Problem: Respiratory  Goal: Patient will achieve/maintain optimum respiratory ventilation and gas exchange  Outcome: Progressing     Problem: Hemodynamics  Goal: Patient's hemodynamics, fluid balance and neurologic status will be stable or improve  Outcome: Progressing     Problem: Incision Care  Goal: Optimal post surgical incision care  Outcome: Progressing     Problem: Skin Integrity  Goal: Skin integrity is maintained or improved  Outcome: Progressing       Patient is not progressing towards the following goals:

## 2023-08-20 ENCOUNTER — HOSPITAL ENCOUNTER (INPATIENT)
Facility: REHABILITATION | Age: 53
End: 2023-08-20
Attending: PHYSICAL MEDICINE & REHABILITATION | Admitting: PHYSICAL MEDICINE & REHABILITATION
Payer: COMMERCIAL

## 2023-08-20 LAB
ALBUMIN SERPL BCP-MCNC: 2.7 G/DL (ref 3.2–4.9)
ALBUMIN/GLOB SERPL: 0.8 G/DL
ALP SERPL-CCNC: 52 U/L (ref 30–99)
ALT SERPL-CCNC: 27 U/L (ref 2–50)
ANION GAP SERPL CALC-SCNC: 6 MMOL/L (ref 7–16)
AST SERPL-CCNC: 27 U/L (ref 12–45)
BILIRUB SERPL-MCNC: 0.5 MG/DL (ref 0.1–1.5)
BUN SERPL-MCNC: 18 MG/DL (ref 8–22)
CALCIUM ALBUM COR SERPL-MCNC: 8.9 MG/DL (ref 8.5–10.5)
CALCIUM SERPL-MCNC: 7.9 MG/DL (ref 8.5–10.5)
CHLORIDE SERPL-SCNC: 99 MMOL/L (ref 96–112)
CO2 SERPL-SCNC: 27 MMOL/L (ref 20–33)
CREAT SERPL-MCNC: 1.08 MG/DL (ref 0.5–1.4)
ERYTHROCYTE [DISTWIDTH] IN BLOOD BY AUTOMATED COUNT: 51.2 FL (ref 35.9–50)
GFR SERPLBLD CREATININE-BSD FMLA CKD-EPI: 82 ML/MIN/1.73 M 2
GLOBULIN SER CALC-MCNC: 3.3 G/DL (ref 1.9–3.5)
GLUCOSE SERPL-MCNC: 90 MG/DL (ref 65–99)
HCT VFR BLD AUTO: 32.3 % (ref 42–52)
HGB BLD-MCNC: 10.5 G/DL (ref 14–18)
MCH RBC QN AUTO: 29.7 PG (ref 27–33)
MCHC RBC AUTO-ENTMCNC: 32.5 G/DL (ref 32.3–36.5)
MCV RBC AUTO: 91.2 FL (ref 81.4–97.8)
PLATELET # BLD AUTO: 383 K/UL (ref 164–446)
PMV BLD AUTO: 10.4 FL (ref 9–12.9)
POTASSIUM SERPL-SCNC: 4.1 MMOL/L (ref 3.6–5.5)
PROT SERPL-MCNC: 6 G/DL (ref 6–8.2)
RBC # BLD AUTO: 3.54 M/UL (ref 4.7–6.1)
SODIUM SERPL-SCNC: 132 MMOL/L (ref 135–145)
WBC # BLD AUTO: 19.3 K/UL (ref 4.8–10.8)

## 2023-08-20 PROCEDURE — 36415 COLL VENOUS BLD VENIPUNCTURE: CPT

## 2023-08-20 PROCEDURE — 99232 SBSQ HOSP IP/OBS MODERATE 35: CPT | Mod: GC | Performed by: INTERNAL MEDICINE

## 2023-08-20 PROCEDURE — 700102 HCHG RX REV CODE 250 W/ 637 OVERRIDE(OP)

## 2023-08-20 PROCEDURE — 770001 HCHG ROOM/CARE - MED/SURG/GYN PRIV*

## 2023-08-20 PROCEDURE — A9270 NON-COVERED ITEM OR SERVICE: HCPCS

## 2023-08-20 PROCEDURE — 700105 HCHG RX REV CODE 258

## 2023-08-20 PROCEDURE — 700105 HCHG RX REV CODE 258: Performed by: INTERNAL MEDICINE

## 2023-08-20 PROCEDURE — 700111 HCHG RX REV CODE 636 W/ 250 OVERRIDE (IP): Mod: JZ | Performed by: INTERNAL MEDICINE

## 2023-08-20 PROCEDURE — 85027 COMPLETE CBC AUTOMATED: CPT

## 2023-08-20 PROCEDURE — 700111 HCHG RX REV CODE 636 W/ 250 OVERRIDE (IP): Mod: JZ

## 2023-08-20 PROCEDURE — 700111 HCHG RX REV CODE 636 W/ 250 OVERRIDE (IP)

## 2023-08-20 PROCEDURE — 99255 IP/OBS CONSLTJ NEW/EST HI 80: CPT | Performed by: PHYSICAL MEDICINE & REHABILITATION

## 2023-08-20 PROCEDURE — 80053 COMPREHEN METABOLIC PANEL: CPT

## 2023-08-20 RX ORDER — NAPROXEN 500 MG/1
500 TABLET ORAL 2 TIMES DAILY
Status: DISCONTINUED | OUTPATIENT
Start: 2023-08-20 | End: 2023-08-29 | Stop reason: HOSPADM

## 2023-08-20 RX ORDER — HYDROMORPHONE HYDROCHLORIDE 2 MG/1
1 TABLET ORAL
Status: CANCELLED | OUTPATIENT
Start: 2023-08-20

## 2023-08-20 RX ADMIN — HYDROMORPHONE HYDROCHLORIDE 1 MG: 2 TABLET ORAL at 12:45

## 2023-08-20 RX ADMIN — NAPROXEN 500 MG: 500 TABLET ORAL at 10:11

## 2023-08-20 RX ADMIN — HYDROMORPHONE HYDROCHLORIDE 1 MG: 2 TABLET ORAL at 02:35

## 2023-08-20 RX ADMIN — METAXALONE 400 MG: 800 TABLET ORAL at 17:32

## 2023-08-20 RX ADMIN — ACETAMINOPHEN 1000 MG: 500 TABLET, FILM COATED ORAL at 06:19

## 2023-08-20 RX ADMIN — OMEPRAZOLE 20 MG: 20 CAPSULE, DELAYED RELEASE ORAL at 06:19

## 2023-08-20 RX ADMIN — OXYCODONE HYDROCHLORIDE 15 MG: 15 TABLET ORAL at 01:02

## 2023-08-20 RX ADMIN — ACETAMINOPHEN 1000 MG: 500 TABLET, FILM COATED ORAL at 17:01

## 2023-08-20 RX ADMIN — PIPERACILLIN AND TAZOBACTAM 4.5 G: 4; .5 INJECTION, POWDER, FOR SOLUTION INTRAVENOUS at 04:32

## 2023-08-20 RX ADMIN — ZOLPIDEM TARTRATE 10 MG: 5 TABLET ORAL at 20:43

## 2023-08-20 RX ADMIN — PIPERACILLIN AND TAZOBACTAM 4.5 G: 4; .5 INJECTION, POWDER, FOR SOLUTION INTRAVENOUS at 12:48

## 2023-08-20 RX ADMIN — OXYCODONE HYDROCHLORIDE 15 MG: 15 TABLET ORAL at 17:46

## 2023-08-20 RX ADMIN — HEPARIN SODIUM 5000 UNITS: 5000 INJECTION, SOLUTION INTRAVENOUS; SUBCUTANEOUS at 21:34

## 2023-08-20 RX ADMIN — HEPARIN SODIUM 5000 UNITS: 5000 INJECTION, SOLUTION INTRAVENOUS; SUBCUTANEOUS at 06:19

## 2023-08-20 RX ADMIN — ACETAMINOPHEN 1000 MG: 500 TABLET, FILM COATED ORAL at 12:45

## 2023-08-20 RX ADMIN — OXYCODONE HYDROCHLORIDE 15 MG: 15 TABLET ORAL at 23:46

## 2023-08-20 RX ADMIN — HEPARIN SODIUM 5000 UNITS: 5000 INJECTION, SOLUTION INTRAVENOUS; SUBCUTANEOUS at 15:26

## 2023-08-20 RX ADMIN — METAXALONE 400 MG: 800 TABLET ORAL at 14:01

## 2023-08-20 RX ADMIN — NAPROXEN 500 MG: 500 TABLET ORAL at 17:01

## 2023-08-20 RX ADMIN — OXYCODONE HYDROCHLORIDE 15 MG: 15 TABLET ORAL at 04:23

## 2023-08-20 RX ADMIN — PIPERACILLIN AND TAZOBACTAM 4.5 G: 4; .5 INJECTION, POWDER, FOR SOLUTION INTRAVENOUS at 20:48

## 2023-08-20 RX ADMIN — METAXALONE 400 MG: 800 TABLET ORAL at 04:23

## 2023-08-20 RX ADMIN — OXYCODONE HYDROCHLORIDE 15 MG: 15 TABLET ORAL at 08:16

## 2023-08-20 ASSESSMENT — PAIN DESCRIPTION - PAIN TYPE
TYPE: ACUTE PAIN;SURGICAL PAIN
TYPE: SURGICAL PAIN
TYPE: ACUTE PAIN;SURGICAL PAIN
TYPE: SURGICAL PAIN

## 2023-08-20 ASSESSMENT — ENCOUNTER SYMPTOMS
VOMITING: 0
FEVER: 0
DIAPHORESIS: 0
DOUBLE VISION: 0
DIZZINESS: 0
SHORTNESS OF BREATH: 0
HEMOPTYSIS: 0
WEAKNESS: 0
FOCAL WEAKNESS: 0
ORTHOPNEA: 0
BLURRED VISION: 0
HEADACHES: 0
HEARTBURN: 0
COUGH: 0
NAUSEA: 0

## 2023-08-20 NOTE — CARE PLAN
Problem: Skin Integrity  Goal: Skin integrity is maintained or improved  Outcome: Progressing   The patient is Stable - Low risk of patient condition declining or worsening    Shift Goals  Clinical Goals: Pain, mobility, IV abx  Patient Goals: pain, comfort, rest  Family Goals: Not present    Progress made toward(s) clinical / shift goals:        Patient vital signs were stable. Pt. Didn't fall within the shift. Pt. Verbalized understanding with care provided for him. Pt. Incision site was clean and dry.

## 2023-08-20 NOTE — PROGRESS NOTES
"      Orthopaedic Progress Note    Interval changes:  Patient doing well    Vac CDI without leak  SIERRA drains x4 in place    ROS - Patient denies any new issues.  Pain well controlled.    /76   Pulse (!) 104   Temp 36.7 °C (98.1 °F) (Temporal)   Resp 19   Ht 1.778 m (5' 10\")   Wt 98.2 kg (216 lb 7.9 oz)   SpO2 94%     Patient seen and examined  No acute distress  Breathing non labored  RRR  Left hip vac dressing in place without leak, SIERRA drains x4 in place with serosanguinous, DNVI, moves all toes, cap refill <2 sec.     Recent Labs     08/18/23  0216 08/19/23  0948 08/20/23  0116   WBC 31.6* 21.9* 19.3*   RBC 4.15* 3.71* 3.54*   HEMOGLOBIN 12.0* 10.9* 10.5*   HEMATOCRIT 37.5* 33.6* 32.3*   MCV 90.4 90.6 91.2   MCH 28.9 29.4 29.7   MCHC 32.0* 32.4 32.5   RDW 51.0* 50.9* 51.2*   PLATELETCT 260 321 383   MPV 10.5 10.4 10.4       Active Hospital Problems    Diagnosis     Leukocytosis [D72.829]     UTI (urinary tract infection) [N39.0]     Transaminitis [R74.01]     Nontraumatic compartment syndrome of left lower extremity [M79.A22]     JUDY (acute kidney injury) (HCC) [N17.9]     Rhabdomyolysis [M62.82]     Necrotizing fasciitis (HCC) [M72.6]      Reports 3-day history of left buttock pain after recent injection with testosterone at left buttock, which is tense, swollen, and moderately tender. CT pelvis (8/10/2023) shows subcutaneous inflammatory changes with enlargement of left gluteal musculature.      Bilirubinemia [E80.6]      Mild. Likely due to sepsis. No evidence of anemia on labs.   - Direct bili ordered. Will monitor.       Acute respiratory failure with hypoxemia (HCC) [J96.01]      Mild diffuse rhonchi heard on exam. CXR shows peripheral Kerley B lines. NT-proBNP slightly elevated.   - IV Lasix 20mg x1 ordered; strict I/Os, daily standing weights ordered.   - RT/OT protocol; wean from oxygen as tolerated.   - Would consider TTE given likelihood of HFpEF in setting of KACY      Insomnia [G47.00]      " History of. On Zolpidem at home.  - Resumed.      GERD (gastroesophageal reflux disease) [K21.9]      History of. On Pantoprazole at home.  - Resumed.       Other constipation [K59.09]     Sepsis due to cellulitis (HCC) [L03.90, A41.9]     Sleep apnea [G47.30]     Obesity [E66.9]        Assessment/Plan:  Patient doing well    Vac CDI without leak  SIERRA drains in place   POD#3 S/P:  1.  Excisional debridement of left leg wound including skin, subcutaneous tissue and muscle of wound measuring 17x60 cm in maximum dimensions.  2.  Secondary closure of left leg wound measuring 60 cm in length.  3.  Application of wound VAC, less than 50 sq. cm (incisional) left thigh  POD#5 S/P  Excisional debridement of left thigh wound including skin, subcutaneous tissue and muscle of wound measuring 60x17 cm in maximum dimensions.  Wt bearing status - TTWB LLE  Wound care/Drains -  vac dressings to be left in place x7-10 days   Future Procedures - none planned   Sutures/Staples out- 14-21 days post operatively. Removal will completed by ortho mid levels only.  PT/OT-initiated  Antibiotics: zosyn 4.5g IV Q8  DVT Prophylaxis- TEDS/SCDs/Foot pumps/heparin  Barraza-not needed per ortho  Case Coordination for Discharge Planning - Disposition per therapy recs.

## 2023-08-20 NOTE — NON-PROVIDER
[facility] INTERNAL MEDICINE PROGRESS NOTE     Attending: Dr. Tompkins    Resident: Dr. Chery    PATIENT: Abhishek Pacheco; [id number]; 1970    ID: 53 y.o. male w PMH of testosterone self-injections, KACY, and chronic migraines, here HOD10 for L buttock swelling radiating down the leg.    SUBJECTIVE: Pt had no acute events overnight. He reports that he is feeling better today, both in terms of an improvement in pain and a continual absence of fevers.     His pain is currently well-controlled with dilaudid 1mg oxycodone 10-15mg Q3h PRN, though it is exacerbated upon movement.    He notes no new symptoms. That said, he does endorse continual chills and night sweats, along with an intermittent headache.    OBJECTIVE:     Vitals:    08/19/23 1756 08/19/23 1918 08/20/23 0435 08/20/23 0737   BP:  116/64 115/61 126/76   Pulse: (!) 104 88 85 (!) 104   Resp: 19 19 19 19   Temp: 35.9 °C (96.7 °F) (!) 35.8 °C (96.4 °F) 36.8 °C (98.2 °F) 36.7 °C (98.1 °F)   TempSrc: Temporal Temporal Temporal Temporal   SpO2: 97% 97% 94% 94%   Weight:       Height:           Intake/Output Summary (Last 24 hours) at 8/20/2023 1255  Last data filed at 8/20/2023 0600  Gross per 24 hour   Intake --   Output 1495 ml   Net -1495 ml       PE:  General: Pt mildly distressed from pain, when visualizing between oxycodone doses.  Cardiovascular: RRR with no M/R/G.  Respiratory: Symmetrical chest. CTAB with no W/R/R  Abdomen: soft, NT/ND, no masses, +BS   EXT:  Wound vac present w/ SIERRA drains along L leg, draining purulent-appearing fluid. Overlying area appears c/d/I.  Neuro: non focal with no numbness, tingling or changes in sensation    LABS:  Recent Labs     08/18/23  0216 08/19/23  0948 08/20/23  0116   WBC 31.6* 21.9* 19.3*   RBC 4.15* 3.71* 3.54*   HEMOGLOBIN 12.0* 10.9* 10.5*   HEMATOCRIT 37.5* 33.6* 32.3*   MCV 90.4 90.6 91.2   MCH 28.9 29.4 29.7   RDW 51.0* 50.9* 51.2*   PLATELETCT 260 321 383   MPV 10.5 10.4 10.4   NEUTSPOLYS 73.00*  --   --   "  LYMPHOCYTES 4.50*  --   --    MONOCYTES 7.20  --   --    EOSINOPHILS 0.00  --   --    BASOPHILS 0.00  --   --    RBCMORPHOLO Present  --   --      Recent Labs     08/18/23  0216 08/19/23  0948 08/20/23  0116   SODIUM 132* 135 132*   POTASSIUM 4.9 4.4 4.1   CHLORIDE 99 101 99   CO2 24 25 27   BUN 24* 18 18   CREATININE 1.11 1.17 1.08   CALCIUM 8.1* 7.9* 7.9*   ALBUMIN  --  2.7* 2.7*     Estimated GFR/CRCL = Estimated Creatinine Clearance: 94 mL/min (by C-G formula based on SCr of 1.08 mg/dL).  Recent Labs     08/18/23 0216 08/19/23  0948 08/20/23  0116   GLUCOSE 145* 103* 90     Recent Labs     08/19/23  0948 08/20/23  0116   ASTSGOT 28 27   ALTSGPT 27 27   TBILIRUBIN 0.5 0.5   ALKPHOSPHAT 51 52   GLOBULIN 3.3 3.3             No results for input(s): \"INR\", \"APTT\", \"FIBRINOGEN\" in the last 72 hours.    Invalid input(s): \"DIMER\"      PROBLEM LIST:  No problems updated.    ASSESSMENT/PLAN: 53 y.o. male w PMH of testosterone self-injections, KACY, and chronic migraines, here HOD10 for L buttock swelling radiating down the leg.    Necrotizing Fasciitis- likely 2nd to IM testosterone injections, featuring pain out of proportion to exam, and s/p I&D w/ wound vac and SIERRA drain placement. Await culture results, and in meantime continue Zosyn 4.5g Q8h. Titrate pt down on pain medications due to adverse effects, via trialing cessation of dilaudid. Appreciate wound care's assistance.  Sepsis- likely 2nd to necrotizing fasciitis, meeting SIRS criteria for tachycardia (104) and elevated WBC (19.3) currently. Continue IV fluids, zosyn, and await culture results till sensitivities can be found.  Compartment syndrome in LLE- resolved post-I&D, though continue pain control and monitor for re-emergence.  Rhabdomylosis- recent CPK of 191, elevated but down from 2300. Repeat lab either today or tomorrow to monitor for resolution, or possible etiology of JUDY.  JUDY- most recent BUN 18 and Cr 1.1, though previously prerenal pattern " present 2nd to rhabdo. Avoid nephrotoxins, continue fluids, and monitor for re-emergence.  Acute hypoxemic respiratory failure- present upon admission, likely 2nd to sepsis. Pt currently satting 94% RA during day, so monitor.  Transaminitis- elevated LFTs upon admission likely 2nd to rhabdo. Most recent ALT 27 and AST 27, so monitor for any deterioration.  UTI- dysuria present w/ positive nitrites shortly after admission. No complaints at this time so only monitor for re-emergence as indicator of need for antibiotic treatment.  GERD- continue protonix 40mg  KACY- continue following RT/OT recs

## 2023-08-20 NOTE — PROGRESS NOTES
[facility] Internal Medicine Daily Progress Note    Date of Service  8/20/2023    [facility] Team: [facility] Team   Attending: Eugenio Tompkins M.d.  Senior Resident: Dr. Joss Mendiola DO  Intern:  Dr. Frank wallace MD  Contact Number: [phone]    Chief Complaint  Abhishek Pacheco is a 53 y.o. male admitted 8/10/2023 with pain, swelling in left buttock radiating down to leg.    Hospital Course  Mr. Abhishek Pacheco is a 53yoM with chronic migraine (on Sumtatriptan), obesity (BMI 31.70), KACY (on nightly CPAP; s/p uvuloplasty, 10/2011) who presented 8/10/2023 with 3-day history of worsening left buttock pain and edema which started 2 days after injecting himself with testosterone in same region. The pain is constant, rated 8/10, worse with movement, and minimally improved with Tramadol and Prednisone (prescribed by PCP). He also reports chills, headache, and night sweats.      Patient reports taking testosterone shots thrice monthly for last 2 years.     Admission findings:  Exam is notable for generalized discomfort, clammy skin, tachycardia (to 112 bpm), hypoxemia (84-85% on RA), bilateral crackles in middle lung fields. No tenderness to abdominal palpation. His left buttock is somewhat tense, swollen, non-erythematous, moderately tender at gluteal cleft.      Labs show severe leukocytosis (33.7 K/uL, 86.3% PMNs), mildly elevated total bilirubin. CT pelvis (8/10/2023) shows subcutaneous inflammatory changes with enlargement of left gluteal musculature. CXR (8/10/2023) shows mildly thickened interlobular septa.    He receives Ancef 2g x1 in ED and is admitted for sepsis due to cellulitis of left buttock.   8/15/23:  Left gluteal and thigh necrotizing fasciitis and   myositis, status post fasciectomy and excisional debridement with wound VAC   application.  8/16/23: WBC, CPK trending down  8/17/23: Repeat incision and drainage by general surgery      Interval Problem Update  No overnight acute event reported by  night team.  Patient was seen and evaluated at bedside this AM.  Significant improvement in pain, tenderness.  although patient still complains of pain especially of stabbing sensation with movement.  no overnight fevers sweating, vomiting. Wound VAC in place.  WBC trending down, H&H stable.    I have discussed this patient's plan of care and discharge plan at IDT rounds today with Case Management, Nursing, Nursing leadership, and other members of the IDT team.    Consultants/Specialty  general surgery    Code Status  DNAR/DNI    Disposition  The patient is not medically cleared for discharge to home or a post-acute facility.      I have placed the appropriate orders for post-discharge needs.    Review of Systems  Review of Systems   Constitutional:  Positive for malaise/fatigue. Negative for diaphoresis and fever.   Eyes:  Negative for blurred vision and double vision.   Respiratory:  Negative for cough, hemoptysis and shortness of breath.    Cardiovascular:  Negative for chest pain and orthopnea.   Gastrointestinal:  Negative for heartburn, nausea and vomiting.   Musculoskeletal:         Improvement in pain s/p surgical debridement.   Neurological:  Negative for dizziness, focal weakness, weakness and headaches.        Physical Exam  Temp:  [35.8 °C (96.4 °F)-36.8 °C (98.2 °F)] 36.7 °C (98.1 °F)  Pulse:  [] 104  Resp:  [19] 19  BP: (115-126)/(61-76) 126/76  SpO2:  [94 %-97 %] 94 %    Physical Exam  Constitutional:       General: He is not in acute distress.     Appearance: Normal appearance.      Comments: Appearing well and comfortable post debridement   HENT:      Head: Normocephalic and atraumatic.      Right Ear: External ear normal.      Left Ear: External ear normal.      Mouth/Throat:      Mouth: Mucous membranes are moist.      Pharynx: Oropharynx is clear.   Eyes:      Extraocular Movements: Extraocular movements intact.      Pupils: Pupils are equal, round, and reactive to light.   Cardiovascular:       Rate and Rhythm: Normal rate and regular rhythm.      Pulses: Normal pulses.      Heart sounds: Normal heart sounds.   Pulmonary:      Effort: Pulmonary effort is normal. No respiratory distress.   Abdominal:      General: Abdomen is flat. There is no distension.      Palpations: Abdomen is soft.      Tenderness: There is no abdominal tenderness.   Musculoskeletal:         General: Signs of injury present.      Right lower leg: No edema.      Left lower leg: No edema.   Neurological:      General: No focal deficit present.      Mental Status: He is alert and oriented to person, place, and time. Mental status is at baseline.   Psychiatric:         Mood and Affect: Mood normal.         Behavior: Behavior normal.         Thought Content: Thought content normal.         Judgment: Judgment normal.         Fluids    Intake/Output Summary (Last 24 hours) at 8/20/2023 1355  Last data filed at 8/20/2023 0600  Gross per 24 hour   Intake --   Output 1495 ml   Net -1495 ml         Laboratory  Recent Labs     08/18/23  0216 08/19/23  0948 08/20/23  0116   WBC 31.6* 21.9* 19.3*   RBC 4.15* 3.71* 3.54*   HEMOGLOBIN 12.0* 10.9* 10.5*   HEMATOCRIT 37.5* 33.6* 32.3*   MCV 90.4 90.6 91.2   MCH 28.9 29.4 29.7   MCHC 32.0* 32.4 32.5   RDW 51.0* 50.9* 51.2*   PLATELETCT 260 321 383   MPV 10.5 10.4 10.4       Recent Labs     08/18/23  0216 08/19/23  0948 08/20/23  0116   SODIUM 132* 135 132*   POTASSIUM 4.9 4.4 4.1   CHLORIDE 99 101 99   CO2 24 25 27   GLUCOSE 145* 103* 90   BUN 24* 18 18   CREATININE 1.11 1.17 1.08   CALCIUM 8.1* 7.9* 7.9*                     Imaging  CT-EXTREMITY, LOWER WITH LEFT   Final Result      1.  Swelling and areas of decreased attenuation are identified within and throughout left gluteus musculature and extending down into the posterior compartment musculature in the left thigh. Consider infection or inflammation such as cellulitis or    myositis.      2.  Induration and subcutaneous fat and intramuscular  fat planes is also noted consistent with cellulitis.      3.  No abscess or bone erosion identified.      CT-PELVIS WITH   Final Result      1.  Swelling and areas of decreased attenuation throughout the visualized left gluteus musculature are identified. Consider infection or inflammation such as myositis or cellulitis.      2.  Induration and subcutaneous fat is identified consistent with cellulitis.      3.  No fluid collection that would suggest abscess is identified at this time. No soft tissue emphysema is noted.      DX-CHEST-PORTABLE (1 VIEW)   Final Result      No acute cardiac or pulmonary abnormalities are identified.      CT-PELVIS WITH   Final Result      1.  Subcutaneous inflammatory changes about the left buttock suspicious for cellulitis.      2.  Enlargement of left gluteal musculature consistent with myositis and/or inflammatory change.             Assessment/Plan  Problem Representation:    * Necrotizing fasciitis (HCC)- (present on admission)  Assessment & Plan  Secondary to IM testosterone. reports severe pain and unable to sleep     -S/p I&D of large abscess and necrotic tissue with wound vac placement 8/13 by Dr Castro   -Monitoring cultures from OR, susceptibilities.   -continuing IV Zosyn per ID recs.   -WBC trending down.  No fevers and patient notes improvement in pain.   -Pain management with ibuprofen and oxycodone 10, 15 for moderate pain.  Monitor for adverse effect of narcotic pain medications. 8/17/2 3 s/p repeat surgical              debridement followed by wound VAC.  Wound care/consult ongoing.    Leukocytosis  Assessment & Plan  He found to have leukocytosis most likely secondary to deep tissue infection.  Continuing to monitor    Nontraumatic compartment syndrome of left lower extremity  Assessment & Plan  Secondary to necrotizing fasciitis, tense, tender upper thigh and lower leg  Status post incision and drainage.  Repeat surgical debridement 8/17/23  Continue pain regimen as  needed    Transaminitis  Assessment & Plan  Elevation is likely due to Rhabdo and not intrinsic hepatic  Significant downtrending  Follow-up with CMP    UTI (urinary tract infection)  Assessment & Plan  Urinalysis on 8/12/2023 was positive for nitrite, and a small amount of leukocyte Estrace.  Patient reports burning with initiation of urination.   No acute complaint at this time.  Asymptomatic-appears resolved at this time      Rhabdomyolysis  Assessment & Plan  Likely secondary to significant muscle injury in context of muscle abscess in context of other problems.  Possibly contributing to acute kidney injury.  CPK downtrending from approximately 2300.  Most recent CPK equals 171.  Appears resolved at this time and no need for continuing to trend.      JUDY (acute kidney injury) (HCC)  Assessment & Plan  08/15/23    Likely prerenal JUDY due to hypovolemia secondary to sepsis  Renal function has significantly improved creatinine trending down back to baseline  Medication dose adjustment as per renal functions  Avoid nephrotoxins.  IV fluids  Continue to monitor closely.  8/17/2023 resolved      Other constipation  Assessment & Plan  Bowel protocol    GERD (gastroesophageal reflux disease)  Assessment & Plan  Obesity likely a contributing factor.  Advised calorie restriction, regular exercise, dinner 4 hours before bedtime  Denies heartburn, chest pain, nausea  Continuing outpatient PPI.    Insomnia  Assessment & Plan  History of. On Zolpidem at home.  -Holding medication in context of significant pain management on board.    Acute respiratory failure with hypoxemia (HCC)- (present on admission)  Assessment & Plan  Likely secondary to sepsis and comorbid KACY     Plan:   Currently he is off from oxygen and maintaining oxygen saturation.  Requiring while asleep for his KACY  O2 sat maintained on room air      Bilirubinemia  Assessment & Plan  Resolved.  Follow-up with CMP if jaundiced, pale stool, dark urine.    Sepsis  due to cellulitis (HCC)- (present on admission)  Assessment & Plan  Sepsis secondary to testosterone injection in muscles:     -Follow up on operative cultures   -Empiric therapy for necrotizing fascitis be continued   -Follow-up with CBC, CMP. Adequete hydration, monitor for signs of endorgan dysfunction       Sleep apnea- (present on admission)  Assessment & Plan  - RT/OT protocol  Uses home CPAP at home  Monitor for polycythemia secondary to hypoxia      Obesity- (present on admission)  Assessment & Plan  BMI 33.44    Plan:  - Counseling deferred to primary.  -Patient was counseled on decrease calorie intake  -Follow-up with lipid panel, A1c         VTE prophylaxis: SCDs/TEDs    I have performed a physical exam and reviewed and updated ROS and Plan today (8/20/2023). In review of yesterday's note (8/19/2023), there are no changes except as documented above.

## 2023-08-20 NOTE — CONSULTS
Physical Medicine and Rehabilitation Consultation              Date of initial consultation: 8/20/2023  Requesting provider: ordered by Frank Chery M.D. at 08/19/23 0956   Consulting provider: Ciarra Wolfe D.O.  Reason for consultation: assess for acute inpatient rehab appropriateness  LOS: 10 Day(s)    Chief complaint: Left buttock pain    HPI: The patient is a 53 y.o.  male with a past medical history of migraines on sumatriptan, KACY on CPAP, and history of testosterone use.;  who presented on 8/10/2023  5:32 PM with left buttock pain.  Per documentation, patient has been taking testosterone shots 3 times a month for the last 2 years.  Patient developed new left buttock pain and swelling.  Upon evaluation in the emergency department CT pelvis was obtained that showed subcutaneous inflammatory changes and enlargement of the left gluteal musculature patient had leukocytosis, WBC 33.7, and elevated CPK..  Orthopedic surgery was consulted, and patient required surgical intervention for worsening left gluteal and extensive thigh necrotizing fasciitis, patient was taken to the OR on 8/13 for left gluteal and thigh fasciectomy's and left thigh excisional debridement with wound measuring 55 x 17 cm in maximum dimensions.  Performed by Dr. Castro.  Patient has a wound VAC placed after surgery.  Patient returned to the OR on 8/15 for left thigh wound debridement and wound VAC change performed by Dr. Castro.  And patient returned to the OR for the third time on 8/17 for excisional debridement of left leg wound and secondary closure of left leg wound measuring 60 cm in length with wound VAC application.  Postop, patient is on Zosyn, stop date 8/21.  Patient's hospital course has been notable for leukocytosis which has improved to 19.3 as of 8/20.  Patient has postop acute blood loss anemia with hemoglobin 10.5.  Patient's pain has been controlled with Tylenol and oxycodone.  Of note, patient is on Ambien at  "night.    Patient seen and examined at bedside. Patient reports he feels ok. Is restless in chair, appears uncomfortable. States \" I have tubes coming out of me, its supposed to be uncomfortable.\" Does not report HA, lightheadedness, SOB, CP, abdominal pain, or changes in numbness/tingling/weakness. Reports intermittent numbness in L  foot, but resolves with movement. Denies LLE weakness.   Reviewed levels of rehab and value of post acute care for wound management and mobility, patient only willing to consider home with HH.       Social Hx:  Patient lives alone in a 1 story house, has friends and family locally that an help    2 ELIER  At prior level of function patient was Independent with mobility and ADLs.       Employment: Works as a  at [employer]  Tobacco: Former smoker, 35-pack-year history, quit 10 months ago  Alcohol: 1 shot of vodka per day  Drugs: Denies    THERAPY:  Restrictions: Fall Risk, TTWB LLE   PT: Functional mobility   8/18 contact-guard assist sit to stand, contact-guard assist transfers, refused gait, min assist bed mobility    OT: ADLs  8/18 min assist upper body dressing, total assist lower body dressing, total assist toileting, contact-guard assist sit to stand    SLP:   None    IMAGING:  DX-CHEST-PORTABLE (1 VIEW)   Final Result       No acute cardiac or pulmonary abnormalities are identified.       CT-PELVIS WITH   Final Result       1.  Subcutaneous inflammatory changes about the left buttock suspicious for cellulitis.       2.  Enlargement of left gluteal musculature consistent with myositis and/or inflammatory change.       PROCEDURES:  8/13 left gluteal thigh fasciectomy's, excisional debridement of wound, and application of wound VAC performed by Dr. Castro   8/15 left thigh wound debridement and VAC change performed by Dr. Castro  8/17 excisional debridement of left wound measuring 17.60 cm in maximum dimensions and secondary closure of left leg wound and application of " wound VAC performed by Dr. Castro      PMH:  Past Medical History:   Diagnosis Date    Apnea, sleep     Back pain     Chickenpox     Cough     Daytime sleepiness     Dizziness     Frequent headaches     Gasping for breath     Heart burn     Heartburn     Morning headache     Pain     right knee    Renal disorder 2013    Kidney stone    Shortness of breath     Sleep apnea     no CPAP had ENT surgery    Snoring     Sore muscles     Swelling of lower extremity     Wears glasses     Weight loss        PSH:  Past Surgical History:   Procedure Laterality Date    IRRIGATION & DEBRIDEMENT GENERAL Left 8/17/2023    Procedure: IRRIGATION AND DEBRIDEMENT, WOUND - THIGH, SECONDARY WOUND CLOSURE;  Surgeon: Akhil Castro M.D.;  Location: [facility];  Service: Orthopedics    APPLICATION OR REPLACEMENT, WOUND VAC Left 8/17/2023    Procedure: INCISIONAL WOUND VAC APPLICATION;  Surgeon: Akhil Castro M.D.;  Location: [facility];  Service: Orthopedics    IRRIGATION & DEBRIDEMENT GENERAL Left 8/15/2023    Procedure: LEFT THIGH WOUND DEBRIDEMENT AND VAC CHANGE;  Surgeon: Akhil Castro M.D.;  Location: [facility];  Service: Orthopedics    FASCIOTOMY Left 8/13/2023    Procedure: FASCIOTOMY - GLUTEAL, THIGH;  Surgeon: Akhil Castro M.D.;  Location: [facility];  Service: Orthopedics    INGUINAL HERNIA REPAIR ROBOTIC XI Bilateral 9/17/2021    Procedure: REPAIR, HERNIA, INGUINAL, ROBOT-ASSISTED, USING DA ELOISE XI - WITH MESH PLACEMENT;  Surgeon: Estuardo Castellon M.D.;  Location: [facility];  Service: Gen Robotic    AR REMOVAL OF SPERM CORD LESION Left 4/29/2021    Procedure: EXCISION,LESION OR LIPOMA,SPERMATIC CORD - SPERMATIC CORD MASS.;  Surgeon: Anant Riddle M.D.;  Location: [facility];  Service: Urology    AR REMOVAL OF SPERM DUCT(S) Bilateral 4/29/2021    Procedure: VASECTOMY;  Surgeon: Anant Riddle M.D.;  Location: [facility];  Service: Urology     SHOULDER ARTHROSCOPY W/ ROTATOR CUFF REPAIR Left 2/7/2018    Procedure: SHOULDER ARTHROSCOPY W/ ROTATOR CUFF REPAIR;  Surgeon: Kiet Ramirez M.D.;  Location: SURGERY [facility];  Service: Orthopedics    SHOULDER ARTHROSCOPY W/ BICIPITAL TENODESIS REPAIR  2/7/2018    Procedure: SHOULDER ARTHROSCOPY with open BICIPITAL TENODESIS REPAIR;  Surgeon: Kiet Ramirez M.D.;  Location: SURGERY [facility];  Service: Orthopedics    TONSILLECTOMY AND ADENOIDECTOMY  10/12/2011    Performed by ROBERTO ALEMAN at SURGERY SAME DAY [facility]    SEPTOTURBINOPLASTY  10/12/2011    Performed by ROBERTO ALEMAN at SURGERY SAME DAY [facility]    UVULOPHARYNGOPALATOPLASTY  10/12/2011    Performed by ROBERTO ALEMAN at SURGERY SAME DAY [facility]S    DEBRIDEMENT  11/2/2010    Performed by BAUTISTA KURTZ at SURGERY SAME DAY [facility] ORS    CYST EXCISION  11/2/2010    Performed by BAUTISTA KURTZ at SURGERY SAME DAY [facility] ORS    KNEE ARTHROSCOPY  11/2/2010    Performed by BAUTISTA KURTZ at SURGERY SAME DAY [facility] ORS    APPENDECTOMY  2009    OTHER ORTHOPEDIC SURGERY  2007    right shoulder    RECONSTRUCTION, KNEE, ACL Right 2003    NERVE ULNAR TRANSFER  2001    ARTHROSCOPY, KNEE      LAMINOTOMY      OTHER ORTHOPEDIC SURGERY      spine c2 c3 nerves clipped    TONSILLECTOMY         FHX:  History reviewed. No pertinent family history.    Medications:  Current Facility-Administered Medications   Medication Dose    naproxen (Naprosyn) tablet 500 mg  500 mg    oxyCODONE immediate release (Roxicodone) tablet 10 mg  10 mg    Or    oxycodone (Oxy-IR) immediate release tablet 15 mg  15 mg    piperacillin-tazobactam (Zosyn) 4.5 g in  mL IVPB  4.5 g    acetaminophen (Tylenol) tablet 1,000 mg  1,000 mg    heparin injection 5,000 Units  5,000 Units    metaxalone (Skelaxin) tablet 400 mg  400 mg    senna-docusate (Pericolace Or Senokot S) 8.6-50 MG per tablet 2 Tablet  2 Tablet    And    polyethylene  "glycol/lytes (Miralax) PACKET 1 Packet  1 Packet    And    magnesium hydroxide (Milk Of Magnesia) suspension 30 mL  30 mL    And    bisacodyl (Dulcolax) suppository 10 mg  10 mg    calcium carbonate (Tums) chewable tab 1,000 mg  1,000 mg    ondansetron (Zofran) syringe/vial injection 4 mg  4 mg    promethazine (Phenergan) tablet 25 mg  25 mg    albuterol (Proventil) 2.5mg/0.5ml nebulizer solution 2.5 mg  2.5 mg    Respiratory Therapy Consult      labetalol (Normodyne/Trandate) injection 10 mg  10 mg    nicotine (Nicoderm) 14 MG/24HR 14 mg  14 mg    And    nicotine polacrilex (Nicorette) 2 MG piece 2 mg  2 mg    albuterol inhaler 2 Puff  2 Puff    omeprazole (PriLOSEC) capsule 20 mg  20 mg    zolpidem (Ambien) tablet 10 mg  10 mg    Pharmacy Consult Request ...Pain Management Review 1 Each  1 Each       Allergies:  Allergies   Allergen Reactions    Iodine Hives and Itching     After receiving iodine contrast for CT pt developed hives and itching    Pt developed breakthrough reaction of hives after being premedicated for contrast injection on 12/10/19    Percocet [Oxycodone-Acetaminophen] Nausea     And dizziness       Physical Exam:  Vitals: /76   Pulse (!) 104   Temp 36.7 °C (98.1 °F) (Temporal)   Resp 19   Ht 1.778 m (5' 10\")   Wt 98.2 kg (216 lb 7.9 oz)   SpO2 94%   Gen: NAD, seated in recliner   Head:  NC/AT  Eyes/ Nose/ Mouth: PERRLA, moist mucous membranes  Cardio: RRR, good distal perfusion, warm extremities  Pulm: normal respiratory effort, no cyanosis, on RA   Abd: Soft NTND, negative borborygmi   Ext: No peripheral edema. No calf tenderness. No clubbing.  SIERRA drains in place     Mental status:  A&Ox4 (person, place, date, situation) answers questions appropriately follows commands  Speech: fluent, no aphasia or dysarthria    CRANIAL NERVES:  2,3: visual acuity grossly intact, PERRL  3,4,6: EOMI bilaterally, no nystagmus or diplopia  5: sensation intact to light touch bilaterally and symmetric  7: " no facial asymmetry  8: hearing grossly intact    Motor:      Upper Extremity  Myotome R L   Shoulder flexion C5 5/5 5/5   Elbow flexion C5 5/5 5/5   Wrist extension C6 5/5 5/5   Elbow extension C7 5/5 5/5   Finger flexion C8 5/5 5/5   Finger abduction T1 5/5 5/5     Lower Extremity Myotome R L   Hip flexion L2 5/5 4/5   Knee extension L3 5/5 4/5   Ankle dorsiflexion L4 5/5 5/5   Toe extension L5 5/5 5/5   Ankle plantarflexion S1 5/5 5/5       Negative Pronator drift bilaterally    Sensory:   intact to light touch through out    DTRs: 2+ in bilateral  biceps  No clonus at bilateral ankles  Negative Peterson b/l     Tone: no spasticity noted, no cogwheeling noted    Coordination:   intact fine motor with fingers bilaterally      Labs: Reviewed and significant for   Recent Labs     08/18/23 0216 08/19/23  0948 08/20/23  0116   RBC 4.15* 3.71* 3.54*   HEMOGLOBIN 12.0* 10.9* 10.5*   HEMATOCRIT 37.5* 33.6* 32.3*   PLATELETCT 260 321 383     Recent Labs     08/18/23 0216 08/19/23  0948 08/20/23  0116   SODIUM 132* 135 132*   POTASSIUM 4.9 4.4 4.1   CHLORIDE 99 101 99   CO2 24 25 27   GLUCOSE 145* 103* 90   BUN 24* 18 18   CREATININE 1.11 1.17 1.08   CALCIUM 8.1* 7.9* 7.9*     Recent Results (from the past 24 hour(s))   CBC WITHOUT DIFFERENTIAL    Collection Time: 08/20/23  1:16 AM   Result Value Ref Range    WBC 19.3 (H) 4.8 - 10.8 K/uL    RBC 3.54 (L) 4.70 - 6.10 M/uL    Hemoglobin 10.5 (L) 14.0 - 18.0 g/dL    Hematocrit 32.3 (L) 42.0 - 52.0 %    MCV 91.2 81.4 - 97.8 fL    MCH 29.7 27.0 - 33.0 pg    MCHC 32.5 32.3 - 36.5 g/dL    RDW 51.2 (H) 35.9 - 50.0 fL    Platelet Count 383 164 - 446 K/uL    MPV 10.4 9.0 - 12.9 fL   Comp Metabolic Panel    Collection Time: 08/20/23  1:16 AM   Result Value Ref Range    Sodium 132 (L) 135 - 145 mmol/L    Potassium 4.1 3.6 - 5.5 mmol/L    Chloride 99 96 - 112 mmol/L    Co2 27 20 - 33 mmol/L    Anion Gap 6.0 (L) 7.0 - 16.0    Glucose 90 65 - 99 mg/dL    Bun 18 8 - 22 mg/dL    Creatinine  1.08 0.50 - 1.40 mg/dL    Calcium 7.9 (L) 8.5 - 10.5 mg/dL    Correct Calcium 8.9 8.5 - 10.5 mg/dL    AST(SGOT) 27 12 - 45 U/L    ALT(SGPT) 27 2 - 50 U/L    Alkaline Phosphatase 52 30 - 99 U/L    Total Bilirubin 0.5 0.1 - 1.5 mg/dL    Albumin 2.7 (L) 3.2 - 4.9 g/dL    Total Protein 6.0 6.0 - 8.2 g/dL    Globulin 3.3 1.9 - 3.5 g/dL    A-G Ratio 0.8 g/dL   ESTIMATED GFR    Collection Time: 08/20/23  1:16 AM   Result Value Ref Range    GFR (CKD-EPI) 82 >60 mL/min/1.73 m 2         ASSESSMENT:  Patient is a 53 y.o. male admitted with left lower extremity compartment syndrome and necrotizing fasciitis    C Code / Diagnosis to Support: 0008.9 - Orthopaedic Disorders: Other Orthopaedic    Rehabilitation: Impaired ADLs and mobility  Patient is a poor candidate for inpatient rehab based on desire to only consider home with HH. Does not want to consider post acute rehab, unable to provide all details about IRF to patient, only willing to go home     Barriers to transfer include: Insurance authorization, TCCs to verify disposition, medical clearance and bed availability     Additional Recommendations:  Left gluteal necrotizing fasciitis  Compartment syndrome of the left lower extremity  -Originally presented with left lower extremity gluteal pain, located at the site of prior testosterone injections  - Patient with worsening compartment syndrome, elevated CPK and worsening necrotizing fasciitis of left gluteal wound  - Orthopedic surgery consulted, has returned to the OR x3  - 8/13 left gluteal thigh fasciectomy's, excisional debridement of wound, and application of wound VAC performed by Dr. Castro   -8/15 left thigh wound debridement and VAC change performed by Dr. Castro  - 8/17 excisional debridement of left wound measuring 17.60 cm in maximum dimensions and secondary closure of left leg wound and application of wound VAC by Dr. Castro  - Continues to have wound VAC in place, wound VAC planning to be in place 7 to 10  days, current vera flow wound VAC is a barrier to admission to inpatient rehab  - Continue with PT/OT      Leukocytosis  -WBCs improving  - 8/20 WBCs 19.3, afebrile  - On Zosyn, stop date 8/21    UTI  -8/12 UA positive for nitrates, is on Zosyn, dysuria has resolved    Insomnia  - Remains on patient's home dose Ambien    Dispo:  - patient is currently functioning below their level of baseline, at currently level of function recommend post acute rehab however patient only willing to consider home with HH   - anticipate patient will have improved mobility when not needing to ambulate with Wound vac and when pain improved   - patient will need to demonstrate improved function and safety to support home with HH.   - PM&R will follow peripherally         Medical Complexity:  Left gluteal necrotizing fasciitis  Compartment syndrome of the left lower extremity  JUDY  Leukocytosis  UTI  Insomnia  Impaired mobility and ADLs      DVT PPX: Heparin      Thank you for allowing us to participate in the care of this patient.     Patient was seen for 82 minutes on unit/floor of which > 50% of time was spent on counseling and coordination of care regarding the above, including prognosis, risk reduction, benefits of treatment, and options for next stage of care.    Ciarra Wolfe D.O.   Physical Medicine and Rehabilitation     Please note that this dictation was created using voice recognition software. I have made every reasonable attempt to correct obvious errors, but there may be errors of grammar and possibly content that I did not discover before finalizing the note.

## 2023-08-21 PROCEDURE — A9270 NON-COVERED ITEM OR SERVICE: HCPCS

## 2023-08-21 PROCEDURE — 700111 HCHG RX REV CODE 636 W/ 250 OVERRIDE (IP)

## 2023-08-21 PROCEDURE — 700102 HCHG RX REV CODE 250 W/ 637 OVERRIDE(OP)

## 2023-08-21 PROCEDURE — 770001 HCHG ROOM/CARE - MED/SURG/GYN PRIV*

## 2023-08-21 PROCEDURE — 99232 SBSQ HOSP IP/OBS MODERATE 35: CPT | Mod: GC | Performed by: INTERNAL MEDICINE

## 2023-08-21 PROCEDURE — 700111 HCHG RX REV CODE 636 W/ 250 OVERRIDE (IP): Mod: JZ

## 2023-08-21 PROCEDURE — 700105 HCHG RX REV CODE 258

## 2023-08-21 RX ORDER — HYDROMORPHONE HYDROCHLORIDE 1 MG/ML
0.5 INJECTION, SOLUTION INTRAMUSCULAR; INTRAVENOUS; SUBCUTANEOUS ONCE
Status: COMPLETED | OUTPATIENT
Start: 2023-08-21 | End: 2023-08-21

## 2023-08-21 RX ORDER — HYDROMORPHONE HYDROCHLORIDE 2 MG/1
0.5 TABLET ORAL
Status: DISCONTINUED | OUTPATIENT
Start: 2023-08-21 | End: 2023-08-24

## 2023-08-21 RX ADMIN — NAPROXEN 500 MG: 500 TABLET ORAL at 17:52

## 2023-08-21 RX ADMIN — HYDROMORPHONE HYDROCHLORIDE 0.5 MG: 1 INJECTION, SOLUTION INTRAMUSCULAR; INTRAVENOUS; SUBCUTANEOUS at 18:11

## 2023-08-21 RX ADMIN — METAXALONE 400 MG: 800 TABLET ORAL at 17:52

## 2023-08-21 RX ADMIN — NAPROXEN 500 MG: 500 TABLET ORAL at 05:26

## 2023-08-21 RX ADMIN — OXYCODONE HYDROCHLORIDE 10 MG: 10 TABLET ORAL at 08:35

## 2023-08-21 RX ADMIN — OXYCODONE HYDROCHLORIDE 15 MG: 15 TABLET ORAL at 22:31

## 2023-08-21 RX ADMIN — METAXALONE 400 MG: 800 TABLET ORAL at 05:25

## 2023-08-21 RX ADMIN — OXYCODONE HYDROCHLORIDE 10 MG: 10 TABLET ORAL at 15:30

## 2023-08-21 RX ADMIN — PIPERACILLIN AND TAZOBACTAM 4.5 G: 4; .5 INJECTION, POWDER, FOR SOLUTION INTRAVENOUS at 22:13

## 2023-08-21 RX ADMIN — ACETAMINOPHEN 1000 MG: 500 TABLET, FILM COATED ORAL at 05:26

## 2023-08-21 RX ADMIN — OXYCODONE HYDROCHLORIDE 10 MG: 10 TABLET ORAL at 12:05

## 2023-08-21 RX ADMIN — ACETAMINOPHEN 1000 MG: 500 TABLET, FILM COATED ORAL at 17:52

## 2023-08-21 RX ADMIN — PIPERACILLIN AND TAZOBACTAM 4.5 G: 4; .5 INJECTION, POWDER, FOR SOLUTION INTRAVENOUS at 15:37

## 2023-08-21 RX ADMIN — OXYCODONE HYDROCHLORIDE 15 MG: 15 TABLET ORAL at 03:45

## 2023-08-21 RX ADMIN — OXYCODONE HYDROCHLORIDE 15 MG: 15 TABLET ORAL at 19:32

## 2023-08-21 RX ADMIN — ZOLPIDEM TARTRATE 10 MG: 5 TABLET ORAL at 22:14

## 2023-08-21 RX ADMIN — HEPARIN SODIUM 5000 UNITS: 5000 INJECTION, SOLUTION INTRAVENOUS; SUBCUTANEOUS at 13:30

## 2023-08-21 RX ADMIN — OMEPRAZOLE 20 MG: 20 CAPSULE, DELAYED RELEASE ORAL at 05:26

## 2023-08-21 RX ADMIN — PIPERACILLIN AND TAZOBACTAM 4.5 G: 4; .5 INJECTION, POWDER, FOR SOLUTION INTRAVENOUS at 05:33

## 2023-08-21 RX ADMIN — ACETAMINOPHEN 1000 MG: 500 TABLET, FILM COATED ORAL at 12:05

## 2023-08-21 RX ADMIN — HEPARIN SODIUM 5000 UNITS: 5000 INJECTION, SOLUTION INTRAVENOUS; SUBCUTANEOUS at 22:14

## 2023-08-21 RX ADMIN — NICOTINE 14 MG: 14 PATCH TRANSDERMAL at 05:26

## 2023-08-21 RX ADMIN — HEPARIN SODIUM 5000 UNITS: 5000 INJECTION, SOLUTION INTRAVENOUS; SUBCUTANEOUS at 05:25

## 2023-08-21 RX ADMIN — METAXALONE 400 MG: 800 TABLET ORAL at 12:05

## 2023-08-21 ASSESSMENT — PAIN DESCRIPTION - PAIN TYPE
TYPE: ACUTE PAIN;SURGICAL PAIN

## 2023-08-21 ASSESSMENT — ENCOUNTER SYMPTOMS
BLURRED VISION: 0
HEMOPTYSIS: 0
HEARTBURN: 0
COUGH: 0
DIZZINESS: 0
NAUSEA: 0
WEAKNESS: 0
SHORTNESS OF BREATH: 0
DIAPHORESIS: 0
ORTHOPNEA: 0
HEADACHES: 0
VOMITING: 0
FOCAL WEAKNESS: 0
DOUBLE VISION: 0
FEVER: 0

## 2023-08-21 NOTE — PROGRESS NOTES
"      Orthopaedic Progress Note    Interval changes:  Patient doing well    Vac CDI without leak  SIERRA drains x4 in place  Not cleared for DC by ortho    ROS - Patient denies any new issues.  Pain well controlled.    /75   Pulse 81   Temp 36.8 °C (98.2 °F) (Temporal)   Resp 18   Ht 1.778 m (5' 10\")   Wt 98.2 kg (216 lb 7.9 oz)   SpO2 95%     Patient seen and examined  No acute distress  Breathing non labored  RRR  Left hip vac dressing in place without leak, SIERRA drains x4 in place with serosanguinous, DNVI, moves all toes, cap refill <2 sec.     Recent Labs     08/19/23  0948 08/20/23  0116   WBC 21.9* 19.3*   RBC 3.71* 3.54*   HEMOGLOBIN 10.9* 10.5*   HEMATOCRIT 33.6* 32.3*   MCV 90.6 91.2   MCH 29.4 29.7   MCHC 32.4 32.5   RDW 50.9* 51.2*   PLATELETCT 321 383   MPV 10.4 10.4       Active Hospital Problems    Diagnosis     Leukocytosis [D72.829]     UTI (urinary tract infection) [N39.0]     Transaminitis [R74.01]     Nontraumatic compartment syndrome of left lower extremity [M79.A22]     JUDY (acute kidney injury) (HCC) [N17.9]     Rhabdomyolysis [M62.82]     Necrotizing fasciitis (HCC) [M72.6]      Reports 3-day history of left buttock pain after recent injection with testosterone at left buttock, which is tense, swollen, and moderately tender. CT pelvis (8/10/2023) shows subcutaneous inflammatory changes with enlargement of left gluteal musculature.      Bilirubinemia [E80.6]      Mild. Likely due to sepsis. No evidence of anemia on labs.   - Direct bili ordered. Will monitor.       Acute respiratory failure with hypoxemia (HCC) [J96.01]      Mild diffuse rhonchi heard on exam. CXR shows peripheral Kerley B lines. NT-proBNP slightly elevated.   - IV Lasix 20mg x1 ordered; strict I/Os, daily standing weights ordered.   - RT/OT protocol; wean from oxygen as tolerated.   - Would consider TTE given likelihood of HFpEF in setting of KACY      Insomnia [G47.00]      History of. On Zolpidem at home.  - Resumed.   "    GERD (gastroesophageal reflux disease) [K21.9]      History of. On Pantoprazole at home.  - Resumed.       Other constipation [K59.09]     Sepsis due to cellulitis (HCC) [L03.90, A41.9]     Sleep apnea [G47.30]     Obesity [E66.9]        Assessment/Plan:  Patient doing well    Vac CDI without leak  SIERRA drains in place   POD#4 S/P:  1.  Excisional debridement of left leg wound including skin, subcutaneous tissue and muscle of wound measuring 17x60 cm in maximum dimensions.  2.  Secondary closure of left leg wound measuring 60 cm in length.  3.  Application of wound VAC, less than 50 sq. cm (incisional) left thigh  POD#6 S/P  Excisional debridement of left thigh wound including skin, subcutaneous tissue and muscle of wound measuring 60x17 cm in maximum dimensions.  Wt bearing status - TTWB LLE  Wound care/Drains -  vac dressings to be left in place x7-10 days   Future Procedures - none planned   Sutures/Staples out- 14-21 days post operatively. Removal will completed by ortho mid levels only.  PT/OT-initiated  Antibiotics: zosyn 4.5g IV Q8  DVT Prophylaxis- TEDS/SCDs/Foot pumps/heparin  Barraza-not needed per ortho  Case Coordination for Discharge Planning - Disposition per therapy recs.

## 2023-08-21 NOTE — PROGRESS NOTES
[facility] Internal Medicine Daily Progress Note    Date of Service  8/21/2023    [facility] Team: R IM [facility] Team   Attending: Eugenio Tompkins M.d.  Senior Resident: Dr. Joss Mendiola DO  Intern:  Dr. Frank wallace MD  Contact Number: [phone]    Chief Complaint  Abhishek Pacheco is a 53 y.o. male admitted 8/10/2023 with pain, swelling in left buttock radiating down to leg.    Hospital Course  . Abhishek Pacheco is a 53yoM with chronic migraine (on Sumtatriptan), obesity (BMI 31.70), KACY (on nightly CPAP; s/p uvuloplasty, 10/2011) who presented 8/10/2023 with 3-day history of worsening left buttock pain and edema which started 2 days after injecting himself with testosterone in same region. The pain is constant, rated 8/10, worse with movement, and minimally improved with Tramadol and Prednisone (prescribed by PCP). He also reports chills, headache, and night sweats.      Patient reports taking testosterone shots thrice monthly for last 2 years.     Admission findings:  Exam is notable for generalized discomfort, clammy skin, tachycardia (to 112 bpm), hypoxemia (84-85% on RA), bilateral crackles in middle lung fields. No tenderness to abdominal palpation. His left buttock is somewhat tense, swollen, non-erythematous, moderately tender at gluteal cleft.      Labs show severe leukocytosis (33.7 K/uL, 86.3% PMNs), mildly elevated total bilirubin. CT pelvis (8/10/2023) shows subcutaneous inflammatory changes with enlargement of left gluteal musculature. CXR (8/10/2023) shows mildly thickened interlobular septa.    He receives Ancef 2g x1 in ED and is admitted for sepsis due to cellulitis of left buttock.   8/15/23: Left gluteal and thigh necrotizing fasciitis and   myositis, status post fasciectomy and excisional debridement with wound VAC   application.  8/17/23: Repeat incision and drainage by general surgery followed by wound VAC.       Interval Problem Update  No overnight acute event reported by night team.   Patient was seen and evaluated at bedside this AM.    Significant improvement in pain, tenderness.  no overnight fevers, sweating, vomiting. Wound VAC in place.  WBC trending down, H&H stable.    I have discussed this patient's plan of care and discharge plan at IDT rounds today with Case Management, Nursing, Nursing leadership, and other members of the IDT team.    Consultants/Specialty  general surgery    Code Status  DNAR/DNI    Disposition  The patient is not medically cleared for discharge to home or a post-acute facility.    I have placed the appropriate orders for post-discharge needs.    Review of Systems  Review of Systems   Constitutional:  Positive for malaise/fatigue. Negative for diaphoresis and fever.   Eyes:  Negative for blurred vision and double vision.   Respiratory:  Negative for cough, hemoptysis and shortness of breath.    Cardiovascular:  Negative for chest pain and orthopnea.   Gastrointestinal:  Negative for heartburn, nausea and vomiting.   Musculoskeletal:         Improvement in pain s/p surgical debridement.   Skin:         States I am sweating because of antibiotics   Neurological:  Negative for dizziness, focal weakness, weakness and headaches.        Physical Exam  Temp:  [36.6 °C (97.9 °F)-37 °C (98.6 °F)] 36.8 °C (98.2 °F)  Pulse:  [81-85] 81  Resp:  [16-18] 18  BP: (102-132)/(67-75) 125/75  SpO2:  [94 %-97 %] 95 %    Physical Exam  Constitutional:       General: He is not in acute distress.     Appearance: Normal appearance.      Comments: Appearing well and comfortable post debridement   HENT:      Head: Normocephalic and atraumatic.      Right Ear: External ear normal.      Left Ear: External ear normal.      Mouth/Throat:      Mouth: Mucous membranes are moist.      Pharynx: Oropharynx is clear.   Eyes:      Extraocular Movements: Extraocular movements intact.      Pupils: Pupils are equal, round, and reactive to light.   Cardiovascular:      Rate and Rhythm: Normal rate and  regular rhythm.      Pulses: Normal pulses.      Heart sounds: Normal heart sounds.   Pulmonary:      Effort: Pulmonary effort is normal. No respiratory distress.   Abdominal:      General: Abdomen is flat. There is no distension.      Palpations: Abdomen is soft.      Tenderness: There is no abdominal tenderness.   Musculoskeletal:         General: Signs of injury present.      Right lower leg: No edema.      Left lower leg: No edema.   Neurological:      General: No focal deficit present.      Mental Status: He is alert and oriented to person, place, and time. Mental status is at baseline.   Psychiatric:         Mood and Affect: Mood normal.         Behavior: Behavior normal.         Thought Content: Thought content normal.         Judgment: Judgment normal.       Fluids    Intake/Output Summary (Last 24 hours) at 8/21/2023 1242  Last data filed at 8/21/2023 0400  Gross per 24 hour   Intake --   Output 280 ml   Net -280 ml         Laboratory  Recent Labs     08/19/23  0948 08/20/23  0116   WBC 21.9* 19.3*   RBC 3.71* 3.54*   HEMOGLOBIN 10.9* 10.5*   HEMATOCRIT 33.6* 32.3*   MCV 90.6 91.2   MCH 29.4 29.7   MCHC 32.4 32.5   RDW 50.9* 51.2*   PLATELETCT 321 383   MPV 10.4 10.4       Recent Labs     08/19/23  0948 08/20/23  0116   SODIUM 135 132*   POTASSIUM 4.4 4.1   CHLORIDE 101 99   CO2 25 27   GLUCOSE 103* 90   BUN 18 18   CREATININE 1.17 1.08   CALCIUM 7.9* 7.9*                     Imaging  CT-EXTREMITY, LOWER WITH LEFT   Final Result      1.  Swelling and areas of decreased attenuation are identified within and throughout left gluteus musculature and extending down into the posterior compartment musculature in the left thigh. Consider infection or inflammation such as cellulitis or    myositis.      2.  Induration and subcutaneous fat and intramuscular fat planes is also noted consistent with cellulitis.      3.  No abscess or bone erosion identified.      CT-PELVIS WITH   Final Result      1.  Swelling and  areas of decreased attenuation throughout the visualized left gluteus musculature are identified. Consider infection or inflammation such as myositis or cellulitis.      2.  Induration and subcutaneous fat is identified consistent with cellulitis.      3.  No fluid collection that would suggest abscess is identified at this time. No soft tissue emphysema is noted.      DX-CHEST-PORTABLE (1 VIEW)   Final Result      No acute cardiac or pulmonary abnormalities are identified.      CT-PELVIS WITH   Final Result      1.  Subcutaneous inflammatory changes about the left buttock suspicious for cellulitis.      2.  Enlargement of left gluteal musculature consistent with myositis and/or inflammatory change.             Assessment/Plan  Problem Representation:    * Necrotizing fasciitis (HCC)- (present on admission)  Assessment & Plan  Secondary to IM testosterone. reports severe pain and unable to sleep     -S/p I&D of large abscess and necrotic tissue with wound vac placement 8/13 by Dr Castro   -Monitoring cultures from OR, susceptibilities.   -continuing IV Zosyn per ID recs.   -WBC trending down.  No fevers and patient notes improvement in pain.   -Pain management with ibuprofen and oxycodone 10, 15 for moderate pain.  Monitor for adverse effect of narcotic pain medications. 8/17/2 3 s/p repeat surgical              debridement followed by wound VAC.  Wound care/consult ongoing.    Nontraumatic compartment syndrome of left lower extremity  Assessment & Plan  Secondary to necrotizing fasciitis, tense, tender upper thigh and lower leg  Status post incision and drainage.  Repeat surgical debridement 8/17/23  Continue pain regimen as needed    Rhabdomyolysis  Assessment & Plan  Likely secondary to significant muscle injury in context of muscle abscess in context of other problems.  Possibly contributing to acute kidney injury.  CPK downtrending from approximately 2300.  Most recent CPK equals 171.  Creatinine, potassium  normal, resolved at this time and no need for continuing to trend.      Sepsis due to cellulitis (HCC)- (present on admission)  Assessment & Plan  Sepsis secondary to testosterone injection in muscles:     -Follow up on operative cultures   -Empiric therapy for necrotizing fascitis be continued   -Follow-up with CBC, CMP. Adequete hydration, monitor for signs of endorgan dysfunction       Leukocytosis  Assessment & Plan  He found to have leukocytosis most likely secondary to deep tissue infection.  Continuing to monitor    Transaminitis  Assessment & Plan  Elevation is likely due to Rhabdo and not intrinsic hepatic  Significant downtrending  Follow-up with CMP    UTI (urinary tract infection)  Assessment & Plan  Urinalysis on 8/12/2023 was positive for nitrite, and a small amount of leukocyte Estrace.  Patient reports burning with initiation of urination.   No acute complaint at this time.  Asymptomatic-appears resolved at this time      JUDY (acute kidney injury) ([facility])  Assessment & Plan  08/15/23    Likely prerenal JUDY due to hypovolemia secondary to sepsis  Renal function has significantly improved creatinine trending down back to baseline  Medication dose adjustment as per renal functions  Avoid nephrotoxins.  IV fluids  Continue to monitor closely.  8/17/2023 resolved      Other constipation  Assessment & Plan  Bowel protocol    GERD (gastroesophageal reflux disease)  Assessment & Plan  Obesity likely a contributing factor.  Advised calorie restriction, regular exercise, dinner 4 hours before bedtime  Denies heartburn, chest pain, nausea  Continuing outpatient PPI.    Insomnia  Assessment & Plan  History of. On Zolpidem at home.  -Holding medication in context of significant pain management on board.    Acute respiratory failure with hypoxemia (HCC)- (present on admission)  Assessment & Plan  Likely secondary to sepsis and comorbid KACY     Plan:   Currently he is off from oxygen and maintaining oxygen  saturation.  Requiring while asleep for his KACY  O2 sat maintained on room air      Bilirubinemia  Assessment & Plan  Resolved.  Follow-up with CMP if jaundiced, pale stool, dark urine.    Sleep apnea- (present on admission)  Assessment & Plan  - RT/OT protocol  Uses home CPAP at home  Monitor for polycythemia secondary to hypoxia      Obesity- (present on admission)  Assessment & Plan  BMI 33.44    Plan:  - Counseling deferred to primary.  -Patient was counseled on decrease calorie intake  -Follow-up with lipid panel, A1c         VTE prophylaxis: SCDs/TEDs    I have performed a physical exam and reviewed and updated ROS and Plan today (8/21/2023). In review of yesterday's note (8/20/2023), there are no changes except as documented above.

## 2023-08-21 NOTE — DISCHARGE PLANNING
Care Transition Team Assessment    In the case of an emergency, pt's legal NOK is mother, Prerna Pacheco     RNCMmet with pt at bedside and obtained the information used in this assessment. Pt verified accuracy of facesheet. Pt lives in a single story home alone. Pt states he is in the process of divorce.   Pt uses [facility]. Prior to current hospitalization, pt was completely independent in ADLS/IADLS. Pt drives and is able to attend necessary MD appointments. . Pt has a good support system. Pt denies any hx of substance use and denies any dx of mh.  Owns no DME. States he has family and friends that are able to assist if needed.  Information Source:pt  Orientation Level: Oriented X4  Information Given By: Patient  Informant's Name: Abhishek  Who is responsible for making decisions for patient? : Patient         Elopement Risk  Legal Hold: No  Ambulatory or Self Mobile in Wheelchair: Yes  Disoriented: No  Psychiatric Symptoms: None  History of Wandering: No  Elopement this Admit: No  Vocalizing Wanting to Leave: No  Displays Behaviors, Body Language Wanting to Leave: No-Not at Risk for Elopement  Elopement Risk: Not at Risk for Elopement    Interdisciplinary Discharge Planning  Does Admitting Nurse Feel This Could be a Complex Discharge?: No  Lives with - Patient's Self Care Capacity: Alone and Able to Care For Self  Patient or legal guardian wants to designate a caregiver: No  Support Systems: Family Member(s), Friends / Neighbors  Housing / Facility: 1 Story House  Do You Take your Prescribed Medications Regularly: Yes  Mobility Issues: No  Prior Services: None  Durable Medical Equipment: Not Applicable    Discharge Preparedness  What is your plan after discharge?: Home health care  Prior Functional Level: Ambulatory, Drives Self, Independent with Activities of Daily Living, Independent with Medication Management  Difficulity with ADLs: None  Difficulity with IADLs: None    Functional  Assesment  Prior Functional Level: Ambulatory, Drives Self, Independent with Activities of Daily Living, Independent with Medication Management    Finances  Prescription Coverage: Yes    Vision / Hearing Impairment  Vision Impairment : Yes  Right Eye Vision: Impaired, Wears Glasses  Left Eye Vision: Impaired, Wears Glasses  Hearing Impairment : No              Domestic Abuse  Have you ever been the victim of abuse or violence?: No  Physical Abuse or Sexual Abuse: No  Verbal Abuse or Emotional Abuse: No  Possible Abuse/Neglect Reported to:: Not Applicable    Psychological Assessment  History of Substance Abuse: None  History of Psychiatric Problems: No         Anticipated Discharge Information  Discharge Disposition: D/T to home under A care in anticipation of covered skilled care (06)

## 2023-08-21 NOTE — DISCHARGE PLANNING
Case Management Discharge Planning    Admission Date: 8/10/2023  GMLOS: 9  ALOS: 11    6-Clicks ADL Score: 13  6-Clicks Mobility Score: 12  PT and/or OT Eval ordered: Yes  Post-acute Referrals Ordered: Yes  Post-acute Choice Obtained: Yes  Has referral(s) been sent to post-acute provider:  No, no order yet      Anticipated Discharge Dispo: Discharge Disposition: D/T to home under HHA care in anticipation of covered skilled care (06)    DME Needed: Yes, needs FWW. Obtained choice for [facility], no order yet.    Action(s) Taken: Updated Provider/Nurse on Discharge Plan, DC Assessment Complete (See below), Choice obtained, and Referral(s) sent    Escalations Completed: None    Medically Clear: No    Next Steps: Pt desires to go home with HH, gave choice for [facility] and [facility]. No HH order yet. Will need FWW, gave choice for [facility], no order yet. Pt currently has incisional wound vac, anticipte removal tomorrow,    Barriers to Discharge: Medical clearance and Pending Insurance Authorization    Is the patient up for discharge tomorrow: No

## 2023-08-21 NOTE — CARE PLAN
The patient is Stable - Low risk of patient condition declining or worsening    Shift Goals  Clinical Goals: pain control, mobility, IV abx  Patient Goals: pain control, mobility  Family Goals: not present    Progress made toward(s) clinical / shift goals:      Problem: Pain - Standard  Goal: Alleviation of pain or a reduction in pain to the patient’s comfort goal  Outcome: Progressing     Problem: Skin Integrity  Goal: Skin integrity is maintained or improved  Outcome: Progressing       Patient is not progressing towards the following goals:

## 2023-08-21 NOTE — CARE PLAN
Problem: Pain - Standard  Goal: Alleviation of pain or a reduction in pain to the patient’s comfort goal  Outcome: Progressing     Problem: Skin Integrity  Goal: Skin integrity is maintained or improved  Outcome: Progressing   The patient is Stable - Low risk of patient condition declining or worsening    Shift Goals  Clinical Goals: pain control,safety  Patient Goals: pain control  Family Goals: N/A    Progress made toward(s) clinical / shift goals:  Will continue with pain assessment and management.     Patient is not progressing towards the following goals:

## 2023-08-21 NOTE — DISCHARGE PLANNING
Received choice form @: 0953  Agency/Facility name: [facility]  Sent referral per choice form @: No referral sent .Pending orders,    Received choice form @: 0953  Agency/Facility name: [facility]  Sent referral per choice form @: 1028

## 2023-08-22 LAB
ALBUMIN SERPL BCP-MCNC: 2.9 G/DL (ref 3.2–4.9)
BUN SERPL-MCNC: 22 MG/DL (ref 8–22)
CALCIUM ALBUM COR SERPL-MCNC: 8.7 MG/DL (ref 8.5–10.5)
CALCIUM SERPL-MCNC: 7.8 MG/DL (ref 8.5–10.5)
CHLORIDE SERPL-SCNC: 104 MMOL/L (ref 96–112)
CO2 SERPL-SCNC: 25 MMOL/L (ref 20–33)
CREAT SERPL-MCNC: 1.06 MG/DL (ref 0.5–1.4)
ERYTHROCYTE [DISTWIDTH] IN BLOOD BY AUTOMATED COUNT: 51.3 FL (ref 35.9–50)
GFR SERPLBLD CREATININE-BSD FMLA CKD-EPI: 84 ML/MIN/1.73 M 2
GLUCOSE SERPL-MCNC: 121 MG/DL (ref 65–99)
HCT VFR BLD AUTO: 31.2 % (ref 42–52)
HGB BLD-MCNC: 9.9 G/DL (ref 14–18)
MCH RBC QN AUTO: 29.1 PG (ref 27–33)
MCHC RBC AUTO-ENTMCNC: 31.7 G/DL (ref 32.3–36.5)
MCV RBC AUTO: 91.8 FL (ref 81.4–97.8)
PHOSPHATE SERPL-MCNC: 3.4 MG/DL (ref 2.5–4.5)
PLATELET # BLD AUTO: 559 K/UL (ref 164–446)
PMV BLD AUTO: 10 FL (ref 9–12.9)
POTASSIUM SERPL-SCNC: 4.2 MMOL/L (ref 3.6–5.5)
RBC # BLD AUTO: 3.4 M/UL (ref 4.7–6.1)
SODIUM SERPL-SCNC: 136 MMOL/L (ref 135–145)
WBC # BLD AUTO: 8.8 K/UL (ref 4.8–10.8)

## 2023-08-22 PROCEDURE — A9270 NON-COVERED ITEM OR SERVICE: HCPCS

## 2023-08-22 PROCEDURE — 700111 HCHG RX REV CODE 636 W/ 250 OVERRIDE (IP)

## 2023-08-22 PROCEDURE — 700102 HCHG RX REV CODE 250 W/ 637 OVERRIDE(OP)

## 2023-08-22 PROCEDURE — 99232 SBSQ HOSP IP/OBS MODERATE 35: CPT | Mod: GC | Performed by: HOSPITALIST

## 2023-08-22 PROCEDURE — 85027 COMPLETE CBC AUTOMATED: CPT

## 2023-08-22 PROCEDURE — A9270 NON-COVERED ITEM OR SERVICE: HCPCS | Performed by: PHYSICIAN ASSISTANT

## 2023-08-22 PROCEDURE — 700105 HCHG RX REV CODE 258

## 2023-08-22 PROCEDURE — 80069 RENAL FUNCTION PANEL: CPT

## 2023-08-22 PROCEDURE — 700111 HCHG RX REV CODE 636 W/ 250 OVERRIDE (IP): Mod: JZ

## 2023-08-22 PROCEDURE — 700102 HCHG RX REV CODE 250 W/ 637 OVERRIDE(OP): Performed by: PHYSICIAN ASSISTANT

## 2023-08-22 PROCEDURE — 36415 COLL VENOUS BLD VENIPUNCTURE: CPT

## 2023-08-22 PROCEDURE — 770001 HCHG ROOM/CARE - MED/SURG/GYN PRIV*

## 2023-08-22 RX ORDER — MORPHINE SULFATE 15 MG/1
15 TABLET, FILM COATED, EXTENDED RELEASE ORAL EVERY 12 HOURS
Status: DISCONTINUED | OUTPATIENT
Start: 2023-08-22 | End: 2023-08-29 | Stop reason: HOSPADM

## 2023-08-22 RX ADMIN — NAPROXEN 500 MG: 500 TABLET ORAL at 16:05

## 2023-08-22 RX ADMIN — METAXALONE 400 MG: 800 TABLET ORAL at 16:05

## 2023-08-22 RX ADMIN — OXYCODONE HYDROCHLORIDE 15 MG: 15 TABLET ORAL at 16:05

## 2023-08-22 RX ADMIN — OXYCODONE HYDROCHLORIDE 15 MG: 15 TABLET ORAL at 20:57

## 2023-08-22 RX ADMIN — PIPERACILLIN AND TAZOBACTAM 4.5 G: 4; .5 INJECTION, POWDER, FOR SOLUTION INTRAVENOUS at 13:44

## 2023-08-22 RX ADMIN — METAXALONE 400 MG: 800 TABLET ORAL at 04:38

## 2023-08-22 RX ADMIN — OXYCODONE HYDROCHLORIDE 15 MG: 15 TABLET ORAL at 08:51

## 2023-08-22 RX ADMIN — ACETAMINOPHEN 1000 MG: 500 TABLET, FILM COATED ORAL at 04:39

## 2023-08-22 RX ADMIN — HEPARIN SODIUM 5000 UNITS: 5000 INJECTION, SOLUTION INTRAVENOUS; SUBCUTANEOUS at 13:39

## 2023-08-22 RX ADMIN — MORPHINE SULFATE 15 MG: 15 TABLET, FILM COATED, EXTENDED RELEASE ORAL at 17:21

## 2023-08-22 RX ADMIN — ACETAMINOPHEN 1000 MG: 500 TABLET, FILM COATED ORAL at 11:56

## 2023-08-22 RX ADMIN — ACETAMINOPHEN 1000 MG: 500 TABLET, FILM COATED ORAL at 17:21

## 2023-08-22 RX ADMIN — ZOLPIDEM TARTRATE 10 MG: 5 TABLET ORAL at 22:32

## 2023-08-22 RX ADMIN — OXYCODONE HYDROCHLORIDE 15 MG: 15 TABLET ORAL at 11:57

## 2023-08-22 RX ADMIN — OMEPRAZOLE 20 MG: 20 CAPSULE, DELAYED RELEASE ORAL at 04:38

## 2023-08-22 RX ADMIN — PIPERACILLIN AND TAZOBACTAM 4.5 G: 4; .5 INJECTION, POWDER, FOR SOLUTION INTRAVENOUS at 22:33

## 2023-08-22 RX ADMIN — METAXALONE 400 MG: 800 TABLET ORAL at 11:56

## 2023-08-22 RX ADMIN — OXYCODONE HYDROCHLORIDE 15 MG: 15 TABLET ORAL at 01:25

## 2023-08-22 RX ADMIN — OXYCODONE HYDROCHLORIDE 15 MG: 15 TABLET ORAL at 04:39

## 2023-08-22 RX ADMIN — NAPROXEN 500 MG: 500 TABLET ORAL at 04:39

## 2023-08-22 RX ADMIN — HEPARIN SODIUM 5000 UNITS: 5000 INJECTION, SOLUTION INTRAVENOUS; SUBCUTANEOUS at 04:39

## 2023-08-22 RX ADMIN — NICOTINE 14 MG: 14 PATCH TRANSDERMAL at 04:37

## 2023-08-22 RX ADMIN — PIPERACILLIN AND TAZOBACTAM 4.5 G: 4; .5 INJECTION, POWDER, FOR SOLUTION INTRAVENOUS at 04:45

## 2023-08-22 RX ADMIN — HEPARIN SODIUM 5000 UNITS: 5000 INJECTION, SOLUTION INTRAVENOUS; SUBCUTANEOUS at 22:32

## 2023-08-22 ASSESSMENT — PAIN DESCRIPTION - PAIN TYPE
TYPE: ACUTE PAIN;SURGICAL PAIN

## 2023-08-22 ASSESSMENT — ENCOUNTER SYMPTOMS
ORTHOPNEA: 0
WEAKNESS: 0
HEADACHES: 0
FEVER: 0
FOCAL WEAKNESS: 0
COUGH: 0
BLURRED VISION: 0
DIZZINESS: 0
SHORTNESS OF BREATH: 0
NAUSEA: 0
VOMITING: 0
HEMOPTYSIS: 0
DOUBLE VISION: 0
HEARTBURN: 0
DIAPHORESIS: 1

## 2023-08-22 NOTE — CARE PLAN
Problem: Pain - Standard  Goal: Alleviation of pain or a reduction in pain to the patient’s comfort goal  Outcome: Progressing     Problem: Skin Integrity  Goal: Skin integrity is maintained or improved  Outcome: Progressing       The patient is Watcher - Medium risk of patient condition declining or worsening    Shift Goals  Clinical Goals: IV antibiotics, monitor SIERRA drains, pain control  Patient Goals: Pain control  Family Goals: CHRIS    Progress made toward(s) clinical / shift goals:  Taught pt 0-10 pain scale and non-pharmacological method of pain management, encouraged to verbalize when in pain. Administered PRN pain meds as needed.  IV antibiotics administered per MAR. Monitoring output for the four SIERRA drains.     Patient is not progressing towards the following goals:

## 2023-08-22 NOTE — THERAPY
Physical Therapy Contact Note    Patient Name: Abhishek Pacheco  Age:  53 y.o., Sex:  male  Medical Record #: [id number]  Today's Date: 8/22/2023 08/22/23 1016   Treatment Variance   Reason For Missed Therapy Non-Medical - Other (Please Comment)  (pt did not want to get up)   Interdisciplinary Plan of Care Collaboration   IDT Collaboration with  Nursing   Collaboration Comments Atttempted to assist pt with mobility. Pt requested not to get up due to pain. RN stated that pt has been up with them to the bathroom with FWW. Will attempt to assist pt at another time.

## 2023-08-22 NOTE — THERAPY
Occupational Therapy Contact Note    Attempted OT treatment. Pt lying in bed. He reports he has been up multiple times today to the bathroom. Discussed how patient is hoping to return home even though therapist that last saw him is recommending placement. He reports that he will be able to do all of his ADLs. Discussed that last time seen he needed assistance with dressing and toileting tasks. Pt reported he'd be able to do that now. He was able to demonstrate that he could reach his LLE but was unable to reach his RLE due to pain. Offered to teach him compensatory strategies during ADLs, he refused today stating he just got settled. Will attempt tomorrow as able.    Ruchi Prasad, OTR/L

## 2023-08-22 NOTE — PROGRESS NOTES
"      Orthopaedic Progress Note    Interval changes:  Patient with pain control issues ms contin 15mg po BID added   Vac CDI without leak  SIERRA drains x4 in place  Not cleared for DC by ortho    ROS - Patient denies any new issues, except per above.    /76   Pulse 98   Temp 37.1 °C (98.8 °F) (Temporal)   Resp 18   Ht 1.778 m (5' 10\")   Wt 98.2 kg (216 lb 7.9 oz)   SpO2 94%     Patient seen and examined  No acute distress  Breathing non labored  RRR  Left hip vac dressing in place without leak, SIERRA drains x4 in place with serosanguinous, DNVI, moves all toes, cap refill <2 sec.     Recent Labs     08/20/23  0116 08/22/23  0332   WBC 19.3* 8.8   RBC 3.54* 3.40*   HEMOGLOBIN 10.5* 9.9*   HEMATOCRIT 32.3* 31.2*   MCV 91.2 91.8   MCH 29.7 29.1   MCHC 32.5 31.7*   RDW 51.2* 51.3*   PLATELETCT 383 559*   MPV 10.4 10.0       Active Hospital Problems    Diagnosis     Leukocytosis [D72.829]     UTI (urinary tract infection) [N39.0]     Transaminitis [R74.01]     Nontraumatic compartment syndrome of left lower extremity [M79.A22]     JUDY (acute kidney injury) (HCC) [N17.9]     Rhabdomyolysis [M62.82]     Necrotizing fasciitis (HCC) [M72.6]      Reports 3-day history of left buttock pain after recent injection with testosterone at left buttock, which is tense, swollen, and moderately tender. CT pelvis (8/10/2023) shows subcutaneous inflammatory changes with enlargement of left gluteal musculature.      Bilirubinemia [E80.6]      Mild. Likely due to sepsis. No evidence of anemia on labs.   - Direct bili ordered. Will monitor.       Acute respiratory failure with hypoxemia (HCC) [J96.01]      Mild diffuse rhonchi heard on exam. CXR shows peripheral Kerley B lines. NT-proBNP slightly elevated.   - IV Lasix 20mg x1 ordered; strict I/Os, daily standing weights ordered.   - RT/OT protocol; wean from oxygen as tolerated.   - Would consider TTE given likelihood of HFpEF in setting of KACY      Insomnia [G47.00]      History of. " On Zolpidem at home.  - Resumed.      GERD (gastroesophageal reflux disease) [K21.9]      History of. On Pantoprazole at home.  - Resumed.       Other constipation [K59.09]     Sepsis due to cellulitis (HCC) [L03.90, A41.9]     Sleep apnea [G47.30]     Obesity [E66.9]        Assessment/Plan:  Patient with pain control issues ms contin 15mg po BID added   Vac CDI without leak  SIERRA drains x4 in place  Not cleared for DC by ortho  POD#5 S/P:  1.  Excisional debridement of left leg wound including skin, subcutaneous tissue and muscle of wound measuring 17x60 cm in maximum dimensions.  2.  Secondary closure of left leg wound measuring 60 cm in length.  3.  Application of wound VAC, less than 50 sq. cm (incisional) left thigh  POD#6 S/P  Excisional debridement of left thigh wound including skin, subcutaneous tissue and muscle of wound measuring 60x17 cm in maximum dimensions.  Wt bearing status - TTWB LLE  Wound care/Drains -  vac dressings to be left in place x7-10 days, SIERRA drains in place until output is minimal  Future Procedures - none planned   Sutures/Staples out- 14-21 days post operatively. Removal will completed by ortho mid levels only.  PT/OT-initiated  Antibiotics: zosyn 4.5g IV Q8  DVT Prophylaxis- TEDS/SCDs/Foot pumps/heparin  Braraza-not needed per ortho  Case Coordination for Discharge Planning - Disposition per therapy recs.

## 2023-08-22 NOTE — DISCHARGE PLANNING
HTH/SCP TCN chart review completed. Collaborated with URIEL Pat. Per collaboration with URIEL, anticipate removal of wound vac tomorrow.  Current discharge considerations are for home with HH services and FWW and close outpatient f/u when medically cleared.  Patient seen at bedside and is refusing any post-acute placement.  TCN will continue to follow and collaborate with discharge planning team as additional post acute needs arise. Thank you.    Completed:  PT/OT with recommendations for post acute placement on 8/18  Choice obtained: IRF, SNF, HH, DME (FWW)  [facility] referral not sent

## 2023-08-22 NOTE — PROGRESS NOTES
[facility] Internal Medicine Daily Progress Note    Date of Service  8/22/2023    R Team: R IM Hill Team   Attending: CRISTIAN Lacy M.d.  Senior Resident: Dr. Joss Mendiola DO  Intern:  Dr. Frank wallace MD  Contact Number: [phone]    Chief Complaint  Abhishek Pacheco is a 53 y.o. male admitted 8/10/2023 with pain, swelling in left buttock radiating down to leg.    Hospital Course  . Abhishek Pacheco is a 53yoM with chronic migraine (on Sumtatriptan), obesity (BMI 31.70), KACY (on nightly CPAP; s/p uvuloplasty, 10/2011) who presented 8/10/2023 with 3-day history of worsening left buttock pain and edema which started 2 days after injecting himself with testosterone in same region. The pain is constant, rated 8/10, worse with movement, and minimally improved with Tramadol and Prednisone (prescribed by PCP). He also reports chills, headache, and night sweats.      Patient reports taking testosterone shots thrice monthly for last 2 years.     Admission findings:  Exam is notable for generalized discomfort, clammy skin, tachycardia (to 112 bpm), hypoxemia (84-85% on RA), bilateral crackles in middle lung fields. No tenderness to abdominal palpation. His left buttock is somewhat tense, swollen, non-erythematous, moderately tender at gluteal cleft.      Labs show severe leukocytosis (33.7 K/uL, 86.3% PMNs), mildly elevated total bilirubin. CT pelvis (8/10/2023) shows subcutaneous inflammatory changes with enlargement of left gluteal musculature. CXR (8/10/2023) shows mildly thickened interlobular septa.    He receives Ancef 2g x1 in ED and is admitted for sepsis due to cellulitis of left buttock.   8/15/23: Left gluteal and thigh necrotizing fasciitis and   myositis, status post fasciectomy and excisional debridement with wound VAC   application.  8/17/23: Repeat incision and drainage by general surgery followed by wound VAC.       Interval Problem Update  No overnight acute event reported by night team.   Patient was seen and evaluated at bedside this AM.    Significant improvement in pain and tenderness.  no overnight fevers, sweating, vomiting. Wound VAC in place.  WBC normal, H&H stable, Renal panel unchanged.    I have discussed this patient's plan of care and discharge plan at IDT rounds today with Case Management, Nursing, Nursing leadership, and other members of the IDT team.    Consultants/Specialty  general surgery    Code Status  DNAR/DNI    Disposition  The patient is not medically cleared for discharge to home or a post-acute facility.      I have placed the appropriate orders for post-discharge needs.    Review of Systems  Review of Systems   Constitutional:  Positive for diaphoresis and malaise/fatigue. Negative for fever.   Eyes:  Negative for blurred vision and double vision.   Respiratory:  Negative for cough, hemoptysis and shortness of breath.    Cardiovascular:  Negative for chest pain and orthopnea.   Gastrointestinal:  Negative for heartburn, nausea and vomiting.   Musculoskeletal:         Improvement in pain s/p surgical debridement.   Skin:         States I am sweating because of antibiotics   Neurological:  Negative for dizziness, focal weakness, weakness and headaches.        Physical Exam  Temp:  [36.5 °C (97.7 °F)-37.1 °C (98.8 °F)] 37.1 °C (98.8 °F)  Pulse:  [84-98] 98  Resp:  [17-18] 18  BP: (124-136)/(57-78) 133/76  SpO2:  [94 %-97 %] 94 %    Physical Exam  Constitutional:       General: He is not in acute distress.     Appearance: Normal appearance. He is not ill-appearing.   HENT:      Head: Normocephalic and atraumatic.      Right Ear: External ear normal.      Left Ear: External ear normal.      Mouth/Throat:      Mouth: Mucous membranes are moist.      Pharynx: Oropharynx is clear.   Eyes:      Extraocular Movements: Extraocular movements intact.      Pupils: Pupils are equal, round, and reactive to light.   Cardiovascular:      Rate and Rhythm: Normal rate and regular rhythm.       Pulses: Normal pulses.      Heart sounds: Normal heart sounds.   Pulmonary:      Effort: Pulmonary effort is normal. No respiratory distress.   Abdominal:      General: Abdomen is flat. There is no distension.      Palpations: Abdomen is soft.      Tenderness: There is no abdominal tenderness.   Musculoskeletal:         General: Signs of injury present.      Right lower leg: No edema.      Left lower leg: No edema.      Comments: Wound vac left leg   Neurological:      General: No focal deficit present.      Mental Status: He is alert and oriented to person, place, and time. Mental status is at baseline.   Psychiatric:         Mood and Affect: Mood normal.         Behavior: Behavior normal.         Thought Content: Thought content normal.         Judgment: Judgment normal.         Fluids    Intake/Output Summary (Last 24 hours) at 8/22/2023 1545  Last data filed at 8/22/2023 1200  Gross per 24 hour   Intake --   Output 1075 ml   Net -1075 ml         Laboratory  Recent Labs     08/20/23  0116 08/22/23  0332   WBC 19.3* 8.8   RBC 3.54* 3.40*   HEMOGLOBIN 10.5* 9.9*   HEMATOCRIT 32.3* 31.2*   MCV 91.2 91.8   MCH 29.7 29.1   MCHC 32.5 31.7*   RDW 51.2* 51.3*   PLATELETCT 383 559*   MPV 10.4 10.0       Recent Labs     08/20/23  0116 08/22/23  0332   SODIUM 132* 136   POTASSIUM 4.1 4.2   CHLORIDE 99 104   CO2 27 25   GLUCOSE 90 121*   BUN 18 22   CREATININE 1.08 1.06   CALCIUM 7.9* 7.8*                     Imaging  CT-EXTREMITY, LOWER WITH LEFT   Final Result      1.  Swelling and areas of decreased attenuation are identified within and throughout left gluteus musculature and extending down into the posterior compartment musculature in the left thigh. Consider infection or inflammation such as cellulitis or    myositis.      2.  Induration and subcutaneous fat and intramuscular fat planes is also noted consistent with cellulitis.      3.  No abscess or bone erosion identified.      CT-PELVIS WITH   Final Result      1.   Swelling and areas of decreased attenuation throughout the visualized left gluteus musculature are identified. Consider infection or inflammation such as myositis or cellulitis.      2.  Induration and subcutaneous fat is identified consistent with cellulitis.      3.  No fluid collection that would suggest abscess is identified at this time. No soft tissue emphysema is noted.      DX-CHEST-PORTABLE (1 VIEW)   Final Result      No acute cardiac or pulmonary abnormalities are identified.      CT-PELVIS WITH   Final Result      1.  Subcutaneous inflammatory changes about the left buttock suspicious for cellulitis.      2.  Enlargement of left gluteal musculature consistent with myositis and/or inflammatory change.             Assessment/Plan  Problem Representation:    * Necrotizing fasciitis (HCC)- (present on admission)  Assessment & Plan  Secondary to Intramuscular inj of  Testosterone in buttock and thigh x3 months    -S/p I&D of large abscess and necrotic tissue with wound vac placement 8/13 by Dr Castro  -Monitoring cultures from OR, susceptibilities.  -continuing IV Zosyn per ID recs.  -WBC in normal range  -Pain management with ibuprofen and oxycodone 10, 15 for moderate pain.  Monitor for adverse effect of narcotic pain medications.  -8/17/2 3 s/p repeat surgical  debridement followed by wound VAC.  Wound care/consult ongoing.    Nontraumatic compartment syndrome of left lower extremity  Assessment & Plan  Secondary to necrotizing fasciitis, tense, tender upper thigh and lower leg  Status post incision and drainage.  Repeat surgical debridement 8/17/23  Continue pain regimen as needed    Rhabdomyolysis  Assessment & Plan  Likely secondary to significant muscle injury in context of muscle abscess in context of other problems.  Possibly contributing to acute kidney injury.  CPK downtrending from approximately 2300.  Most recent CPK equals 171.  Creatinine, potassium normal, resolved at this time and no need  for continuing to trend.      JUDY (acute kidney injury) (HCC)  Assessment & Plan  08/15/23    Likely prerenal JUDY due to hypovolemia secondary to sepsis  Renal function has significantly improved creatinine trending down back to baseline  Medication dose adjustment as per renal functions  Avoid nephrotoxins.  IV fluids  Continue to monitor closely.  8/17/2023 resolved      Sepsis due to cellulitis (HCC)- (present on admission)  Assessment & Plan  Sepsis secondary to testosterone injection in muscles:     -Follow up on operative cultures   -Empiric therapy for necrotizing fascitis be continued   -Follow-up with CBC, CMP. Adequete hydration, monitor for signs of endorgan dysfunction       Leukocytosis  Assessment & Plan  He found to have leukocytosis most likely secondary to deep tissue infection.  Continuing to monitor    Transaminitis  Assessment & Plan  Elevation is likely due to Rhabdo and not intrinsic hepatic  Significant downtrending  Follow-up with CMP    UTI (urinary tract infection)  Assessment & Plan  Urinalysis on 8/12/2023 was positive for nitrite, and a small amount of leukocyte Estrace.  Patient reports burning with initiation of urination.   No acute complaint at this time.  Asymptomatic-appears resolved at this time      Other constipation  Assessment & Plan  Bowel protocol    GERD (gastroesophageal reflux disease)  Assessment & Plan  Obesity likely a contributing factor.  Advised calorie restriction, regular exercise, dinner 4 hours before bedtime  Denies heartburn, chest pain, nausea  Continuing outpatient PPI.    Insomnia  Assessment & Plan  History of. On Zolpidem at home.  -Holding medication in context of significant pain management on board.    Acute respiratory failure with hypoxemia (HCC)- (present on admission)  Assessment & Plan  Likely secondary to sepsis and comorbid KACY     Plan:   Currently he is off from oxygen and maintaining oxygen saturation.  Requiring while asleep for his KACY  O2  sat maintained on room air      Bilirubinemia  Assessment & Plan  Resolved.  Follow-up with CMP if jaundiced, pale stool, dark urine.    Sleep apnea- (present on admission)  Assessment & Plan  - RT/OT protocol  Uses home CPAP at home  Monitor for polycythemia secondary to hypoxia      Obesity- (present on admission)  Assessment & Plan  BMI 33.44    Plan:  - Counseling deferred to primary.  -Patient was counseled on decrease calorie intake  -Follow-up with lipid panel, A1c         VTE prophylaxis: SCDs/TEDs    I have performed a physical exam and reviewed and updated ROS and Plan today (8/22/2023). In review of yesterday's note (8/21/2023), there are no changes except as documented above.

## 2023-08-22 NOTE — CARE PLAN
The patient is Stable - Low risk of patient condition declining or worsening    Shift Goals  Clinical Goals: IV abx, wound V.A.C monitoring, SIERRA drain care/monitoring  Patient Goals: pain control, sleep  Family Goals: CHRIS    Progress made toward(s) clinical / shift goals:      IV antibiotics infused this shift per MD orders upon reinsertion of new peripheral IV.     Problem: Pain - Standard  Goal: Alleviation of pain or a reduction in pain to the patient’s comfort goal  Outcome: Progressing  Note: Patient having complaints of severe, persistent pain in left leg. Prescribed prn oxycodone 15 mg given q3h to aid in pain management and patient comfort with some affect. Patient states that pain relief lasts only about 2 to 2.5 hours when pain intensity returns. Repositioning with pillow support throughout this shift along with prescribed prn pain regimen to aid in patient comfort and decrease pain.      Problem: Respiratory  Goal: Patient will achieve/maintain optimum respiratory ventilation and gas exchange  Outcome: Progressing  Flowsheets (Taken 8/22/2023 0310)  O2 Delivery Device: None - Room Air     Problem: Skin Integrity  Goal: Skin integrity is maintained or improved  Outcome: Progressing  Note: Wound V.A.C remains in place with minimal output this shift.        Patient is not progressing towards the following goals:

## 2023-08-22 NOTE — PROGRESS NOTES
1800: 10/10 pain after wound vac was snagged on chair. Spoke with dr. Marie . Will add ONCE dose iv.5 dilaudid.     Texted MD regarding, his order was PO. However verbal order was IV I have alerted MD and will give the verbal order now.

## 2023-08-23 PROCEDURE — 99232 SBSQ HOSP IP/OBS MODERATE 35: CPT | Mod: GC | Performed by: HOSPITALIST

## 2023-08-23 PROCEDURE — 700102 HCHG RX REV CODE 250 W/ 637 OVERRIDE(OP): Performed by: PHYSICIAN ASSISTANT

## 2023-08-23 PROCEDURE — 700111 HCHG RX REV CODE 636 W/ 250 OVERRIDE (IP): Mod: JZ

## 2023-08-23 PROCEDURE — 700102 HCHG RX REV CODE 250 W/ 637 OVERRIDE(OP)

## 2023-08-23 PROCEDURE — A9270 NON-COVERED ITEM OR SERVICE: HCPCS | Performed by: PHYSICIAN ASSISTANT

## 2023-08-23 PROCEDURE — A9270 NON-COVERED ITEM OR SERVICE: HCPCS

## 2023-08-23 PROCEDURE — 770001 HCHG ROOM/CARE - MED/SURG/GYN PRIV*

## 2023-08-23 PROCEDURE — 700105 HCHG RX REV CODE 258

## 2023-08-23 PROCEDURE — 700111 HCHG RX REV CODE 636 W/ 250 OVERRIDE (IP)

## 2023-08-23 RX ADMIN — OXYCODONE HYDROCHLORIDE 15 MG: 15 TABLET ORAL at 15:14

## 2023-08-23 RX ADMIN — NICOTINE 14 MG: 14 PATCH TRANSDERMAL at 05:07

## 2023-08-23 RX ADMIN — ZOLPIDEM TARTRATE 10 MG: 5 TABLET ORAL at 21:40

## 2023-08-23 RX ADMIN — OMEPRAZOLE 20 MG: 20 CAPSULE, DELAYED RELEASE ORAL at 05:08

## 2023-08-23 RX ADMIN — OXYCODONE HYDROCHLORIDE 15 MG: 15 TABLET ORAL at 00:06

## 2023-08-23 RX ADMIN — METAXALONE 400 MG: 800 TABLET ORAL at 05:08

## 2023-08-23 RX ADMIN — HYDROMORPHONE HYDROCHLORIDE 0.5 MG: 2 TABLET ORAL at 02:21

## 2023-08-23 RX ADMIN — PIPERACILLIN AND TAZOBACTAM 4.5 G: 4; .5 INJECTION, POWDER, FOR SOLUTION INTRAVENOUS at 13:15

## 2023-08-23 RX ADMIN — NAPROXEN 500 MG: 500 TABLET ORAL at 17:29

## 2023-08-23 RX ADMIN — ANTACID TABLETS 1000 MG: 500 TABLET, CHEWABLE ORAL at 02:22

## 2023-08-23 RX ADMIN — MORPHINE SULFATE 15 MG: 15 TABLET, FILM COATED, EXTENDED RELEASE ORAL at 17:29

## 2023-08-23 RX ADMIN — HEPARIN SODIUM 5000 UNITS: 5000 INJECTION, SOLUTION INTRAVENOUS; SUBCUTANEOUS at 05:09

## 2023-08-23 RX ADMIN — NAPROXEN 500 MG: 500 TABLET ORAL at 05:08

## 2023-08-23 RX ADMIN — OXYCODONE HYDROCHLORIDE 15 MG: 15 TABLET ORAL at 21:39

## 2023-08-23 RX ADMIN — METAXALONE 400 MG: 800 TABLET ORAL at 17:29

## 2023-08-23 RX ADMIN — ACETAMINOPHEN 1000 MG: 500 TABLET, FILM COATED ORAL at 18:35

## 2023-08-23 RX ADMIN — ACETAMINOPHEN 1000 MG: 500 TABLET, FILM COATED ORAL at 05:09

## 2023-08-23 RX ADMIN — OXYCODONE HYDROCHLORIDE 15 MG: 15 TABLET ORAL at 06:17

## 2023-08-23 RX ADMIN — OXYCODONE HYDROCHLORIDE 15 MG: 15 TABLET ORAL at 03:03

## 2023-08-23 RX ADMIN — METAXALONE 400 MG: 800 TABLET ORAL at 13:08

## 2023-08-23 RX ADMIN — MORPHINE SULFATE 15 MG: 15 TABLET, FILM COATED, EXTENDED RELEASE ORAL at 05:08

## 2023-08-23 RX ADMIN — HEPARIN SODIUM 5000 UNITS: 5000 INJECTION, SOLUTION INTRAVENOUS; SUBCUTANEOUS at 21:10

## 2023-08-23 RX ADMIN — PIPERACILLIN AND TAZOBACTAM 4.5 G: 4; .5 INJECTION, POWDER, FOR SOLUTION INTRAVENOUS at 06:19

## 2023-08-23 RX ADMIN — PIPERACILLIN AND TAZOBACTAM 4.5 G: 4; .5 INJECTION, POWDER, FOR SOLUTION INTRAVENOUS at 21:15

## 2023-08-23 RX ADMIN — OXYCODONE HYDROCHLORIDE 15 MG: 15 TABLET ORAL at 10:39

## 2023-08-23 RX ADMIN — ACETAMINOPHEN 1000 MG: 500 TABLET, FILM COATED ORAL at 13:08

## 2023-08-23 RX ADMIN — HEPARIN SODIUM 5000 UNITS: 5000 INJECTION, SOLUTION INTRAVENOUS; SUBCUTANEOUS at 13:08

## 2023-08-23 RX ADMIN — OXYCODONE HYDROCHLORIDE 15 MG: 15 TABLET ORAL at 18:35

## 2023-08-23 ASSESSMENT — PAIN DESCRIPTION - PAIN TYPE
TYPE: ACUTE PAIN
TYPE: ACUTE PAIN
TYPE: ACUTE PAIN;SURGICAL PAIN

## 2023-08-23 ASSESSMENT — COGNITIVE AND FUNCTIONAL STATUS - GENERAL
DRESSING REGULAR LOWER BODY CLOTHING: TOTAL
MOBILITY SCORE: 15
STANDING UP FROM CHAIR USING ARMS: A LITTLE
SUGGESTED CMS G CODE MODIFIER DAILY ACTIVITY: CK
MOVING FROM LYING ON BACK TO SITTING ON SIDE OF FLAT BED: A LITTLE
MOVING TO AND FROM BED TO CHAIR: A LITTLE
HELP NEEDED FOR BATHING: A LITTLE
SUGGESTED CMS G CODE MODIFIER MOBILITY: CK
WALKING IN HOSPITAL ROOM: A LOT
CLIMB 3 TO 5 STEPS WITH RAILING: TOTAL
TOILETING: A LITTLE
TURNING FROM BACK TO SIDE WHILE IN FLAT BAD: A LITTLE
DAILY ACTIVITIY SCORE: 19

## 2023-08-23 ASSESSMENT — ENCOUNTER SYMPTOMS
DIAPHORESIS: 1
HEADACHES: 0
BLURRED VISION: 0
VOMITING: 0
FEVER: 0
WEAKNESS: 0
SHORTNESS OF BREATH: 0
NAUSEA: 0
HEARTBURN: 0
COUGH: 0
DOUBLE VISION: 0
FOCAL WEAKNESS: 0
DIZZINESS: 0
ORTHOPNEA: 0
HEMOPTYSIS: 0

## 2023-08-23 NOTE — PROGRESS NOTES
Assumed care of patient at 1900. Report received from day RN. Pt sleeping in bed. Fall precautions maintained all needs met at this time.

## 2023-08-23 NOTE — PROGRESS NOTES
[facility] Internal Medicine Daily Progress Note    Date of Service  8/23/2023    R Team: R IM [facility] Team   Attending: CRISTINA Lacy M.d.  Senior Resident: Dr. Joss Mendiola DO  Intern:  Dr. Frank wallace MD  Contact Number: [phone]    Chief Complaint  Abhishek Pacheco is a 53 y.o. male admitted 8/10/2023 with pain, swelling in left buttock radiating down to leg.    Hospital Course  . Abhishek Pacheco is a 53yoM with chronic migraine (on Sumtatriptan), obesity (BMI 31.70), KACY (on nightly CPAP; s/p uvuloplasty, 10/2011) who presented 8/10/2023 with 3-day history of worsening left buttock pain and edema which started 2 days after injecting himself with testosterone in same region. The pain is constant, rated 8/10, worse with movement, and minimally improved with Tramadol and Prednisone (prescribed by PCP). He also reports chills, headache, and night sweats.      Patient reports taking testosterone shots thrice monthly for last 2 years.     Admission findings:  Exam is notable for generalized discomfort, clammy skin, tachycardia (to 112 bpm), hypoxemia (84-85% on RA), bilateral crackles in middle lung fields. No tenderness to abdominal palpation. His left buttock is somewhat tense, swollen, non-erythematous, moderately tender at gluteal cleft.      Labs show severe leukocytosis (33.7 K/uL, 86.3% PMNs), mildly elevated total bilirubin. CT pelvis (8/10/2023) shows subcutaneous inflammatory changes with enlargement of left gluteal musculature. CXR (8/10/2023) shows mildly thickened interlobular septa.    He receives Ancef 2g x1 in ED and is admitted for sepsis due to cellulitis of left buttock.   8/15/23: Left gluteal and thigh necrotizing fasciitis and   myositis, status post fasciectomy and excisional debridement with wound VAC   application.  8/17/23: Repeat incision and drainage by general surgery followed by wound VAC.       Interval Problem Update  No overnight acute event reported by night team.   Patient was seen and evaluated at bedside this AM.    Significant improvement in pain and tenderness.  no overnight fevers, sweating, vomiting. Wound VAC in place.  WBC normal, H&H stable    I have discussed this patient's plan of care and discharge plan at IDT rounds today with Case Management, Nursing, Nursing leadership, and other members of the IDT team.    Consultants/Specialty  general surgery    Code Status  DNAR/DNI    Disposition  The patient is medically cleared for discharge to home or a post-acute facility.      I have placed the appropriate orders for post-discharge needs.    Review of Systems  Review of Systems   Constitutional:  Positive for diaphoresis and malaise/fatigue. Negative for fever.   Eyes:  Negative for blurred vision and double vision.   Respiratory:  Negative for cough, hemoptysis and shortness of breath.    Cardiovascular:  Negative for chest pain and orthopnea.   Gastrointestinal:  Negative for heartburn, nausea and vomiting.   Musculoskeletal:         Improvement in pain s/p surgical debridement.   Skin:         States I am sweating because of antibiotics   Neurological:  Negative for dizziness, focal weakness, weakness and headaches.        Physical Exam  Temp:  [36.1 °C (97 °F)-37 °C (98.6 °F)] 36.1 °C (97 °F)  Pulse:  [70-86] 79  Resp:  [17-18] 17  BP: ()/(63-78) 125/63  SpO2:  [94 %-96 %] 95 %    Physical Exam  Constitutional:       General: He is not in acute distress.     Appearance: Normal appearance. He is not ill-appearing.   HENT:      Head: Normocephalic and atraumatic.      Right Ear: External ear normal.      Left Ear: External ear normal.      Mouth/Throat:      Mouth: Mucous membranes are moist.      Pharynx: Oropharynx is clear.   Eyes:      Extraocular Movements: Extraocular movements intact.      Pupils: Pupils are equal, round, and reactive to light.   Cardiovascular:      Rate and Rhythm: Normal rate and regular rhythm.      Pulses: Normal pulses.      Heart  sounds: Normal heart sounds.   Pulmonary:      Effort: Pulmonary effort is normal. No respiratory distress.   Abdominal:      General: Abdomen is flat. There is no distension.      Palpations: Abdomen is soft.      Tenderness: There is no abdominal tenderness.   Musculoskeletal:         General: Signs of injury present.      Right lower leg: No edema.      Left lower leg: No edema.      Comments: Wound vac left leg   Neurological:      General: No focal deficit present.      Mental Status: He is alert and oriented to person, place, and time. Mental status is at baseline.   Psychiatric:         Mood and Affect: Mood normal.         Behavior: Behavior normal.         Thought Content: Thought content normal.         Judgment: Judgment normal.         Fluids    Intake/Output Summary (Last 24 hours) at 8/23/2023 1554  Last data filed at 8/23/2023 0755  Gross per 24 hour   Intake 240 ml   Output 415 ml   Net -175 ml         Laboratory  Recent Labs     08/22/23  0332   WBC 8.8   RBC 3.40*   HEMOGLOBIN 9.9*   HEMATOCRIT 31.2*   MCV 91.8   MCH 29.1   MCHC 31.7*   RDW 51.3*   PLATELETCT 559*   MPV 10.0       Recent Labs     08/22/23  0332   SODIUM 136   POTASSIUM 4.2   CHLORIDE 104   CO2 25   GLUCOSE 121*   BUN 22   CREATININE 1.06   CALCIUM 7.8*                     Imaging  CT-EXTREMITY, LOWER WITH LEFT   Final Result      1.  Swelling and areas of decreased attenuation are identified within and throughout left gluteus musculature and extending down into the posterior compartment musculature in the left thigh. Consider infection or inflammation such as cellulitis or    myositis.      2.  Induration and subcutaneous fat and intramuscular fat planes is also noted consistent with cellulitis.      3.  No abscess or bone erosion identified.      CT-PELVIS WITH   Final Result      1.  Swelling and areas of decreased attenuation throughout the visualized left gluteus musculature are identified. Consider infection or inflammation  such as myositis or cellulitis.      2.  Induration and subcutaneous fat is identified consistent with cellulitis.      3.  No fluid collection that would suggest abscess is identified at this time. No soft tissue emphysema is noted.      DX-CHEST-PORTABLE (1 VIEW)   Final Result      No acute cardiac or pulmonary abnormalities are identified.      CT-PELVIS WITH   Final Result      1.  Subcutaneous inflammatory changes about the left buttock suspicious for cellulitis.      2.  Enlargement of left gluteal musculature consistent with myositis and/or inflammatory change.             Assessment/Plan  Problem Representation:    * Necrotizing fasciitis (HCC)- (present on admission)  Assessment & Plan  Secondary to Intramuscular inj of  Testosterone in buttock and thigh x3 months    -S/p I&D of large abscess and necrotic tissue with wound vac placement 8/13 by Dr Castro  -continuing IV Zosyn per ID recs.  -WBC in normal range  -Pain management with ibuprofen and oxycodone 10, 15 for moderate pain.  Monitor for adverse effect of narcotic pain medications.  -8/17/23 s/p repeat surgical  debridement followed by wound VAC.  Wound care/consult ongoing.  -Per general surgery: Repeat MRI of the pelvis and the lower extremity to rule out any   residual/recurrent deep infection.    Nontraumatic compartment syndrome of left lower extremity  Assessment & Plan  Secondary to necrotizing fasciitis, tense, tender upper thigh and lower leg  Status post incision and drainage.  Repeat surgical debridement 8/17/23  Continue pain regimen as needed    Rhabdomyolysis  Assessment & Plan  Likely secondary to significant muscle injury in context of muscle abscess in context of other problems.  Possibly contributing to acute kidney injury.  CPK downtrending from approximately 2300.  Most recent CPK equals 171.  Creatinine, potassium normal, resolved at this time and no need for continuing to trend.      JUDY (acute kidney injury) (HCC)  Assessment  & Plan  08/15/23    Likely prerenal JUDY due to hypovolemia secondary to sepsis(distributive shock)  Renal function has significantly improved creatinine trending down back to baseline  Medication dose adjustment as per renal functions  Avoid nephrotoxins.  IV fluids  Continue to monitor closely.  8/17/2023 resolved      Sepsis due to cellulitis (HCC)- (present on admission)  Assessment & Plan  Sepsis secondary to testosterone injection in muscles:    -Empiric therapy for necrotizing fascitis be continued  -Follow-up with CBC, CMP. Adequete hydration, monitor for signs of endorgan dysfunction  -Resolved       Leukocytosis  Assessment & Plan  He found to have leukocytosis most likely secondary to deep tissue infection.  Continuing to monitor    Transaminitis  Assessment & Plan  Elevation is likely due to Rhabdo and not intrinsic hepatic  Significant downtrending  Follow-up with CMP    UTI (urinary tract infection)  Assessment & Plan  Urinalysis on 8/12/2023 was positive for nitrite, and a small amount of leukocyte Estrace.  Patient reports burning with initiation of urination.   No acute complaint at this time.  Asymptomatic-appears resolved at this time      Other constipation  Assessment & Plan  Bowel protocol    GERD (gastroesophageal reflux disease)  Assessment & Plan  Obesity likely a contributing factor.  Advised calorie restriction, regular exercise, dinner 4 hours before bedtime  Denies heartburn, chest pain, nausea  Continuing outpatient PPI.    Insomnia  Assessment & Plan  History of. On Zolpidem at home.  -Holding medication in context of significant pain management on board.    Acute respiratory failure with hypoxemia (HCC)- (present on admission)  Assessment & Plan  Likely secondary to sepsis and comorbid KACY     Plan:   Currently he is off from oxygen and maintaining oxygen saturation.  Requiring while asleep for his KACY  O2 sat maintained on room air      Bilirubinemia  Assessment &  Plan  Resolved.  Follow-up with CMP if jaundiced, pale stool, dark urine.    Sleep apnea- (present on admission)  Assessment & Plan  - RT/OT protocol  Uses home CPAP at home  Monitor for polycythemia secondary to hypoxia      Obesity- (present on admission)  Assessment & Plan  BMI 33.44    Plan:  - Counseling deferred to primary.  -Patient was counseled on decrease calorie intake  -Follow-up with lipid panel, A1c         VTE prophylaxis: SCDs/TEDs    I have performed a physical exam and reviewed and updated ROS and Plan today (8/23/2023). In review of yesterday's note (8/22/2023), there are no changes except as documented above.

## 2023-08-23 NOTE — PROGRESS NOTES
"     Orthopedic PA Progress Note    Interval changes:  Patient doing well. WBC improving which correlates with clinical improvement  LLE vac intact without leak  SIERRA drains still with moderate drainage- unable to remove at this time  TTWB  Wound team to do vac exchange and can transition to Prevena plus customizable incisional vac   Not cleared for d/c from ortho POV     ROS - Patient denies any new issues.  Denies any numbness or tingling. Pain well controlled.    /70   Pulse 71   Temp 36.6 °C (97.9 °F) (Temporal)   Resp 17   Ht 1.778 m (5' 10\")   Wt 98.2 kg (216 lb 7.9 oz)   SpO2 94%     Patient seen and examined  No acute distress  Breathing non labored  RRR  LLE: SIERRA drains with 245 cc total output last 24 hrs. Wound vac intact without leak. Patient clearly fires tibialis anterior, EHL, and gastrocnemius/soleus. Sensation is intact to light touch throughout superficial peroneal, deep peroneal, tibial, saphenous, and sural nerve distributions. Strong and palpable 2+ dorsalis pedis and posterior tibial pulses with capillary refill less than 2 seconds.     Recent Labs     08/22/23  0332   WBC 8.8   RBC 3.40*   HEMOGLOBIN 9.9*   HEMATOCRIT 31.2*   MCV 91.8   MCH 29.1   MCHC 31.7*   RDW 51.3*   PLATELETCT 559*   MPV 10.0       Active Hospital Problems    Diagnosis     Leukocytosis [D72.829]     UTI (urinary tract infection) [N39.0]     Transaminitis [R74.01]     Nontraumatic compartment syndrome of left lower extremity [M79.A22]     JUDY (acute kidney injury) (HCC) [N17.9]     Rhabdomyolysis [M62.82]     Necrotizing fasciitis ([facility]) [M72.6]      Reports 3-day history of left buttock pain after recent injection with testosterone at left buttock, which is tense, swollen, and moderately tender. CT pelvis (8/10/2023) shows subcutaneous inflammatory changes with enlargement of left gluteal musculature.      Bilirubinemia [E80.6]      Mild. Likely due to sepsis. No evidence of anemia on labs.   - Direct bili " ordered. Will monitor.         Acute respiratory failure with hypoxemia ([facility]) [J96.01]      Mild diffuse rhonchi heard on exam. CXR shows peripheral Kerley B lines. NT-proBNP slightly elevated.   - IV Lasix 20mg x1 ordered; strict I/Os, daily standing weights ordered.   - RT/OT protocol; wean from oxygen as tolerated.   - Would consider TTE given likelihood of HFpEF in setting of KACY        Insomnia [G47.00]      History of. On Zolpidem at home.  - Resumed.        GERD (gastroesophageal reflux disease) [K21.9]      History of. On Pantoprazole at home.  - Resumed.         Other constipation [K59.09]     Sepsis due to cellulitis (HCC) [L03.90, A41.9]     Sleep apnea [G47.30]     Obesity [E66.9]        Assessment/Plan:  LLE vac intact without leak  SIERRA drains still with moderate drainage- unable to remove at this time  TTWB  Wound team to do vac exchange and can transition to Prevena plus customizable incisional vac   Not cleared for d/c from ortho POV     POD#6 S/p  1.  Excisional debridement of left leg wound including skin, subcutaneous   tissue and muscle of wound measuring 17x60 cm in maximum dimensions.  2.  Secondary closure of left leg wound measuring 60 cm in length.  3.  Application of wound VAC, less than 50 sq. cm (incisional) left thigh.  Wt bearing status - TTWB LLE  Wound care/Drains - Dressings to be changed  by wound team at 5-7 days postop  Future Procedures - None planned at this time  Lovenox: Start 8/12, Duration-until ambulatory > 150'  Sutures/Staples out- 14-21 days post operatively. Removal will completed by ortho JAYLAN's unless transferred.  PT/OT-initiated  Antibiotics: Zosyn per ID   DVT Prophylaxis- TEDS/SCDs/Foot pumps  Barraza-not needed per ortho  Case Coordination for Discharge Planning - Disposition per therapy recs.

## 2023-08-23 NOTE — CARE PLAN
The patient is Stable - Low risk of patient condition declining or worsening    Shift Goals  Clinical Goals: pain control, IV abx, wound care  Patient Goals: Pain control, Rest  Family Goals: CHRIS    Progress made toward(s) clinical / shift goals:    Problem: Pain - Standard  Goal: Alleviation of pain or a reduction in pain to the patient’s comfort goal  Outcome: Progressing     Problem: Hemodynamics  Goal: Patient's hemodynamics, fluid balance and neurologic status will be stable or improve  Outcome: Progressing     Problem: Incision Care  Goal: Optimal post surgical incision care  Outcome: Progressing     Problem: Skin Integrity  Goal: Skin integrity is maintained or improved  Outcome: Progressing

## 2023-08-23 NOTE — THERAPY
Physical Therapy Contact Note    Patient Name: Abhishek Pacheco  Age:  53 y.o., Sex:  male  Medical Record #: [id number]  Today's Date: 8/23/2023    PT session attempted, pt refused OOB activity at this time. Will re-attempt as able.

## 2023-08-23 NOTE — DISCHARGE PLANNING
HTH/SCP TCN chart review completed. Collaborated with SAM Henderson. Current discharge considerations are dc to home with HH and FWW. As prior, note that PT and OT are recommending post acute placement and pt continues to decline placement. He is agreeable to HH. Noted in PM that [facility] has declined 2' to capacity and anticipate that referral will now need to be sent to [facility]. TCN will continue to follow and collaborate with discharge planning team as additional post acute needs arise. Thank you.    Completed:  PT/OT with recommendations for post acute placement on 8/19  Choice obtained: IRF, SNF, HH, DME (FWW)  [facility] referral not sent

## 2023-08-23 NOTE — FACE TO FACE
Face to Face Supporting Documentation - Home Health    The encounter with this patient was in whole or in part the primary reason for home health admission.    Date of encounter:   Patient:                    MRN:                       YOB: 2022  Abhishek Pacheco  [id number]  1970     Home health to see patient for:  Skilled Nursing care for assessment, interventions & education, Wound Care, and Home health aide    Skilled need for:  Surgical Aftercare wound vac, wound care, wound drain    Skilled nursing interventions to include:  Home IV infusion therapy, Wound Care, and Line/Drain/Airway education and care    Homebound status evidenced by:  Needs the assistance of another person in order to leave the home or Have a condition such that leaving his or her home is medically contraindicated. Leaving home requires a considerable and taxing effort. There is a normal inability to leave the home.    Community Physician to provide follow up care: Calos Valle M.D.     Optional Interventions? No      I certify the face to face encounter for this home health care referral meets the CMS requirements and the encounter/clinical assessment with the patient was, in whole, or in part, for the medical condition(s) listed above, which is the primary reason for home health care. Based on my clinical findings: the service(s) are medically necessary, support the need for home health care, and the homebound criteria are met.  I certify that this patient has had a face to face encounter by myself.  Frank Chery M.D. - NPI: [id number]

## 2023-08-23 NOTE — CARE PLAN
Problem: Pain - Standard  Goal: Alleviation of pain or a reduction in pain to the patient’s comfort goal  Outcome: Progressing     Problem: Hemodynamics  Goal: Patient's hemodynamics, fluid balance and neurologic status will be stable or improve  Outcome: Progressing       The patient is Stable - Low risk of patient condition declining or worsening    Shift Goals  Clinical Goals: Pain control, IV antibiotics  Patient Goals: Pain control  Family Goals: CHRIS    Progress made toward(s) clinical / shift goals:  Taught pt 0-10 pain scale and non-pharmacological method of pain management, encouraged to verbalize when in pain. Administered PRN pain meds as needed. IV antibiotics administered per MAR. Monitoring output for the four SIERRA drains.     Patient is not progressing towards the following goals:

## 2023-08-23 NOTE — DISCHARGE PLANNING
"HTH/SCP TCN chart review completed. Collaborated with URIEL Henderson.  Current discharge considerations are for home with HH services, FWW and close outpatient f/u when medically cleared.  Patient has declined all post-acute placement despite PT/OT recommendations for post-acute placement.  Per chart review note by Cheli Morris R.N. on 8/22/23 at 3:14 AM, \"Wound V.A.C remains in place with minimal output this shift\".  Patient awaiting ortho and medical clearance.  Patient seen at bedside and has increased pain today stating he recently received pain medications.  TCN will continue to follow and collaborate with discharge planning team as additional post acute needs arise. Thank you.    Completed:  PT/OT with recommendations for post acute placement on 8/18  Choice obtained: IRF, SNF, HH, DME (FWW)  [facility] referral not sent   "

## 2023-08-23 NOTE — DISCHARGE PLANNING
Case Management Discharge Planning    Admission Date: 8/10/2023  GMLOS: 9  ALOS: 13    6-Clicks ADL Score: 19  6-Clicks Mobility Score: 15  PT and/or OT Eval ordered: Yes  Post-acute Referrals Ordered: Yes  Post-acute Choice Obtained: Yes  Has referral(s) been sent to post-acute provider:  Yes      Anticipated Discharge Dispo: Discharge Disposition: D/T to home under HHA care in anticipation of covered skilled care (06)    DME Needed: Yes    DME Ordered: No, voalte message sent to attending MD requesting orders.     Action(s) Taken: OTHER    Patient discussed in IDT rounds. Patient is not medically cleared to discharge. Patient reportedly refused post -acute placement and is adamant about discharging home with home health and a FWW. LSW received voicemail from patient's brother Gama [phone] requesting an update on patient's status. LSW met with patient at bedside to obtain consent to disclose progress to Gama. Patient provided verbal consent to speak with Gama and was adamant about discharging home with home health. LSW verbalized understanding.     LSW sent voalte message to attending MD requesting home health orders and order for FWW.     LSW to send referrals once orders are received.       No other CM needs identified at this time.     Escalations Completed: None    Medically Clear: No    Next Steps: Pending orders for HH/DME, medical clearance.     Barriers to Discharge: Medical clearance, DME, and Outpatient referrals pending    Is the patient up for discharge tomorrow: No

## 2023-08-24 PROCEDURE — A9270 NON-COVERED ITEM OR SERVICE: HCPCS

## 2023-08-24 PROCEDURE — 700111 HCHG RX REV CODE 636 W/ 250 OVERRIDE (IP)

## 2023-08-24 PROCEDURE — 97602 WOUND(S) CARE NON-SELECTIVE: CPT

## 2023-08-24 PROCEDURE — 700102 HCHG RX REV CODE 250 W/ 637 OVERRIDE(OP)

## 2023-08-24 PROCEDURE — 770001 HCHG ROOM/CARE - MED/SURG/GYN PRIV*

## 2023-08-24 PROCEDURE — 700102 HCHG RX REV CODE 250 W/ 637 OVERRIDE(OP): Performed by: PHYSICIAN ASSISTANT

## 2023-08-24 PROCEDURE — 700105 HCHG RX REV CODE 258

## 2023-08-24 PROCEDURE — 97530 THERAPEUTIC ACTIVITIES: CPT | Mod: CQ

## 2023-08-24 PROCEDURE — 99231 SBSQ HOSP IP/OBS SF/LOW 25: CPT | Performed by: HOSPITALIST

## 2023-08-24 PROCEDURE — 306591 TRAY SUTURE REMOVAL DISP

## 2023-08-24 PROCEDURE — A9270 NON-COVERED ITEM OR SERVICE: HCPCS | Performed by: PHYSICIAN ASSISTANT

## 2023-08-24 PROCEDURE — 700111 HCHG RX REV CODE 636 W/ 250 OVERRIDE (IP): Mod: JZ

## 2023-08-24 RX ORDER — HYDROMORPHONE HYDROCHLORIDE 1 MG/ML
0.5 INJECTION, SOLUTION INTRAMUSCULAR; INTRAVENOUS; SUBCUTANEOUS ONCE
Status: COMPLETED | OUTPATIENT
Start: 2023-08-24 | End: 2023-08-24

## 2023-08-24 RX ORDER — CETIRIZINE HYDROCHLORIDE 10 MG/1
10 TABLET ORAL DAILY
Status: DISCONTINUED | OUTPATIENT
Start: 2023-08-24 | End: 2023-08-27

## 2023-08-24 RX ADMIN — NAPROXEN 500 MG: 500 TABLET ORAL at 17:46

## 2023-08-24 RX ADMIN — ACETAMINOPHEN 1000 MG: 500 TABLET, FILM COATED ORAL at 05:17

## 2023-08-24 RX ADMIN — ZOLPIDEM TARTRATE 10 MG: 5 TABLET ORAL at 20:49

## 2023-08-24 RX ADMIN — MORPHINE SULFATE 15 MG: 15 TABLET, FILM COATED, EXTENDED RELEASE ORAL at 05:18

## 2023-08-24 RX ADMIN — NAPROXEN 500 MG: 500 TABLET ORAL at 05:17

## 2023-08-24 RX ADMIN — PIPERACILLIN AND TAZOBACTAM 4.5 G: 4; .5 INJECTION, POWDER, FOR SOLUTION INTRAVENOUS at 13:39

## 2023-08-24 RX ADMIN — HEPARIN SODIUM 5000 UNITS: 5000 INJECTION, SOLUTION INTRAVENOUS; SUBCUTANEOUS at 05:16

## 2023-08-24 RX ADMIN — MORPHINE SULFATE 15 MG: 15 TABLET, FILM COATED, EXTENDED RELEASE ORAL at 17:45

## 2023-08-24 RX ADMIN — ACETAMINOPHEN 1000 MG: 500 TABLET, FILM COATED ORAL at 17:46

## 2023-08-24 RX ADMIN — OXYCODONE HYDROCHLORIDE 15 MG: 15 TABLET ORAL at 01:39

## 2023-08-24 RX ADMIN — METAXALONE 400 MG: 800 TABLET ORAL at 12:36

## 2023-08-24 RX ADMIN — Medication 1 APPLICATOR: at 22:02

## 2023-08-24 RX ADMIN — METAXALONE 400 MG: 800 TABLET ORAL at 05:17

## 2023-08-24 RX ADMIN — OMEPRAZOLE 20 MG: 20 CAPSULE, DELAYED RELEASE ORAL at 05:29

## 2023-08-24 RX ADMIN — NICOTINE 14 MG: 14 PATCH TRANSDERMAL at 05:21

## 2023-08-24 RX ADMIN — HEPARIN SODIUM 5000 UNITS: 5000 INJECTION, SOLUTION INTRAVENOUS; SUBCUTANEOUS at 22:04

## 2023-08-24 RX ADMIN — PIPERACILLIN AND TAZOBACTAM 4.5 G: 4; .5 INJECTION, POWDER, FOR SOLUTION INTRAVENOUS at 20:50

## 2023-08-24 RX ADMIN — OXYCODONE HYDROCHLORIDE 10 MG: 10 TABLET ORAL at 09:09

## 2023-08-24 RX ADMIN — OXYCODONE HYDROCHLORIDE 15 MG: 15 TABLET ORAL at 12:38

## 2023-08-24 RX ADMIN — HYDROMORPHONE HYDROCHLORIDE 0.5 MG: 2 TABLET ORAL at 03:27

## 2023-08-24 RX ADMIN — HEPARIN SODIUM 5000 UNITS: 5000 INJECTION, SOLUTION INTRAVENOUS; SUBCUTANEOUS at 13:39

## 2023-08-24 RX ADMIN — PIPERACILLIN AND TAZOBACTAM 4.5 G: 4; .5 INJECTION, POWDER, FOR SOLUTION INTRAVENOUS at 05:32

## 2023-08-24 RX ADMIN — METAXALONE 400 MG: 800 TABLET ORAL at 17:46

## 2023-08-24 RX ADMIN — OXYCODONE HYDROCHLORIDE 15 MG: 15 TABLET ORAL at 22:03

## 2023-08-24 RX ADMIN — CETIRIZINE HYDROCHLORIDE 10 MG: 10 TABLET, FILM COATED ORAL at 10:15

## 2023-08-24 RX ADMIN — OXYCODONE HYDROCHLORIDE 15 MG: 15 TABLET ORAL at 19:43

## 2023-08-24 RX ADMIN — HYDROMORPHONE HYDROCHLORIDE 0.5 MG: 1 INJECTION, SOLUTION INTRAMUSCULAR; INTRAVENOUS; SUBCUTANEOUS at 13:48

## 2023-08-24 RX ADMIN — ACETAMINOPHEN 1000 MG: 500 TABLET, FILM COATED ORAL at 12:36

## 2023-08-24 ASSESSMENT — PATIENT HEALTH QUESTIONNAIRE - PHQ9
SUM OF ALL RESPONSES TO PHQ9 QUESTIONS 1 AND 2: 0
2. FEELING DOWN, DEPRESSED, IRRITABLE, OR HOPELESS: NOT AT ALL
1. LITTLE INTEREST OR PLEASURE IN DOING THINGS: NOT AT ALL

## 2023-08-24 ASSESSMENT — ENCOUNTER SYMPTOMS
COUGH: 0
DIAPHORESIS: 0
DIZZINESS: 0
SHORTNESS OF BREATH: 0
VOMITING: 0
HEARTBURN: 0
HEADACHES: 0
ORTHOPNEA: 0
NAUSEA: 0
FEVER: 0
WEAKNESS: 0
HEMOPTYSIS: 0
BLURRED VISION: 0
DOUBLE VISION: 0
FOCAL WEAKNESS: 0

## 2023-08-24 ASSESSMENT — GAIT ASSESSMENTS
GAIT LEVEL OF ASSIST: SUPERVISED
DISTANCE (FEET): 400
ASSISTIVE DEVICE: FRONT WHEEL WALKER

## 2023-08-24 ASSESSMENT — PAIN SCALES - WONG BAKER
WONGBAKER_NUMERICALRESPONSE: HURTS A WHOLE LOT
WONGBAKER_NUMERICALRESPONSE: HURTS A LITTLE MORE
WONGBAKER_NUMERICALRESPONSE: HURTS A WHOLE LOT
WONGBAKER_NUMERICALRESPONSE: HURTS A WHOLE LOT

## 2023-08-24 ASSESSMENT — PAIN DESCRIPTION - PAIN TYPE
TYPE: ACUTE PAIN;SURGICAL PAIN
TYPE: ACUTE PAIN
TYPE: ACUTE PAIN;SURGICAL PAIN

## 2023-08-24 ASSESSMENT — COGNITIVE AND FUNCTIONAL STATUS - GENERAL
MOBILITY SCORE: 18
STANDING UP FROM CHAIR USING ARMS: A LITTLE
CLIMB 3 TO 5 STEPS WITH RAILING: A LITTLE
WALKING IN HOSPITAL ROOM: A LITTLE
TURNING FROM BACK TO SIDE WHILE IN FLAT BAD: A LITTLE
MOVING TO AND FROM BED TO CHAIR: A LITTLE
MOVING FROM LYING ON BACK TO SITTING ON SIDE OF FLAT BED: A LITTLE
SUGGESTED CMS G CODE MODIFIER MOBILITY: CK

## 2023-08-24 NOTE — THERAPY
Physical Therapy   Discharge     Patient Name: Abhishek Pacheco  Age:  53 y.o., Sex:  male  Medical Record #: [id number]  Today's Date: 8/24/2023     Precautions  Precautions: Fall Risk;Toe Touch Weight Bearing Left Lower Extremity  Comments: JPx4, wound vac    Assessment    Pt seen and greeted for PT session. Pt has been up in room IND for bathroom use. Pt was able to stand, ambulate, and ascend/descend single platform step using FWW and SPV. Pt eager to return home. Pt was able to explain TTWB and maintain precautions during ambulation, VC given to slow down as patient tends to move impulsively/ quickly.     Plan    Reason for Discharge From Therapy: Discharge Secondary to Goals Met    DC Equipment Recommendations: Front-Wheel Walker  Discharge Recommendations: Recommend home health for continued physical therapy services       08/24/23 1033   Cognition    Comments impulsive, cooperative   Balance   Sitting Balance (Static) Fair   Sitting Balance (Dynamic) Fair   Standing Balance (Static) Fair   Standing Balance (Dynamic) Fair   Weight Shift Sitting Fair   Weight Shift Standing Fair   Skilled Intervention Verbal Cuing   Comments w/ FWW   Bed Mobility    Comments pt up in chair, does not need assist for bed mobility.   Gait Analysis   Gait Level Of Assist Supervised   Assistive Device Front Wheel Walker   Distance (Feet) 400   # of Times Distance was Traveled 1   Level of Assist with Stairs Standby Assist   Weight Bearing Status TTWB LLE   Skilled Intervention Verbal Cuing;Sequencing   Comments able to maintain TTWB   Functional Mobility   Sit to Stand Supervised   Bed, Chair, Wheelchair Transfer Supervised   Transfer Method   (step hop)   Mobility chair transfer, ambulation in hallway, single stair w/ FWW   Skilled Intervention Verbal Cuing   Short Term Goals    Short Term Goal # 1 pt will be able to complete supine<>sitting from flat bed with SPV in 6tx in order to dc home   Goal Outcome # 1 Goal met   Short  Term Goal # 2 pt will be able to complete functional transfers with FWW and SPV in 6tx in order to progress to home   Goal Outcome # 2 Goal met   Short Term Goal # 3 pt will be able to ambulate 50ft with FWW and SPV in 6tx in order to dc home   Goal Outcome # 3 Goal met   Short Term Goal # 4 pt will be able to negotiate 2 steps with LRAD and SPV in 6tx in order to enter and exit home   Goal Outcome # 4 Goal met  (pt only has 1 step to enter/exit home)

## 2023-08-24 NOTE — PROGRESS NOTES
[facility] Internal Medicine Daily Progress Note    Date of Service  8/24/2023    R Team: R IM Hill Team   Attending: CRISTIAN Lacy M.d.  Senior Resident: Dr. Joss Mendiola DO  Intern:  Dr. Frank wallace MD  Contact Number: [phone]    Chief Complaint  Abhishek Pacheco is a 53 y.o. male admitted 8/10/2023 with pain, swelling in left buttock radiating down to leg.    Hospital Course  . Abhishek Pacheco is a 53yoM with chronic migraine (on Sumtatriptan), obesity (BMI 31.70), KACY (on nightly CPAP; s/p uvuloplasty, 10/2011) who presented 8/10/2023 with 3-day history of worsening left buttock pain and edema which started 2 days after injecting himself with testosterone in same region. The pain is constant, rated 8/10, worse with movement, and minimally improved with Tramadol and Prednisone (prescribed by PCP). He also reports chills, headache, and night sweats.      Patient reports taking testosterone shots thrice monthly for last 2 years.     Admission findings:  Exam is notable for generalized discomfort, clammy skin, tachycardia (to 112 bpm), hypoxemia (84-85% on RA), bilateral crackles in middle lung fields. No tenderness to abdominal palpation. His left buttock is somewhat tense, swollen, non-erythematous, moderately tender at gluteal cleft.      Labs show severe leukocytosis (33.7 K/uL, 86.3% PMNs), mildly elevated total bilirubin. CT pelvis (8/10/2023) shows subcutaneous inflammatory changes with enlargement of left gluteal musculature. CXR (8/10/2023) shows mildly thickened interlobular septa.    He receives Ancef 2g x1 in ED and is admitted for sepsis due to cellulitis of left buttock.   8/15/23: Left gluteal and thigh necrotizing fasciitis and   myositis, status post fasciectomy and excisional debridement with wound VAC   application.  8/17/23: Repeat incision and drainage by general surgery followed by wound VAC.       Interval Problem Update  No overnight acute event reported by night team.   Patient was seen and evaluated at bedside this AM.    -Significant improvement in pain and tenderness.  -Customizable prevena applied with no leaks detected. Refer to wound picture in media.  -WBC normal, H&H stable    I have discussed this patient's plan of care and discharge plan at IDT rounds today with Case Management, Nursing, Nursing leadership, and other members of the IDT team.    Consultants/Specialty  general surgery    Code Status  DNAR/DNI    Disposition  The patient is not medically cleared for discharge to home or a post-acute facility.      I have placed the appropriate orders for post-discharge needs.    Review of Systems  Review of Systems   Constitutional:  Negative for diaphoresis, fever and malaise/fatigue.   Eyes:  Negative for blurred vision and double vision.   Respiratory:  Negative for cough, hemoptysis and shortness of breath.    Cardiovascular:  Negative for chest pain and orthopnea.   Gastrointestinal:  Negative for heartburn, nausea and vomiting.   Skin:         States I am sweating because of antibiotics   Neurological:  Negative for dizziness, focal weakness, weakness and headaches.        Physical Exam  Temp:  [36.4 °C (97.5 °F)-37 °C (98.6 °F)] 36.5 °C (97.7 °F)  Pulse:  [73-89] 77  Resp:  [16-17] 16  BP: (116-133)/(61-71) 119/71  SpO2:  [94 %-96 %] 95 %    Physical Exam  Constitutional:       General: He is not in acute distress.     Appearance: Normal appearance. He is not ill-appearing.   HENT:      Head: Normocephalic and atraumatic.      Right Ear: External ear normal.      Left Ear: External ear normal.      Mouth/Throat:      Mouth: Mucous membranes are moist.      Pharynx: Oropharynx is clear.   Eyes:      Extraocular Movements: Extraocular movements intact.      Pupils: Pupils are equal, round, and reactive to light.   Cardiovascular:      Rate and Rhythm: Normal rate and regular rhythm.      Pulses: Normal pulses.      Heart sounds: Normal heart sounds.   Pulmonary:       Effort: Pulmonary effort is normal. No respiratory distress.   Abdominal:      General: Abdomen is flat. There is no distension.      Palpations: Abdomen is soft.      Tenderness: There is no abdominal tenderness.   Musculoskeletal:         General: Signs of injury present.      Right lower leg: No edema.      Left lower leg: No edema.      Comments: Wound vac left leg   Neurological:      General: No focal deficit present.      Mental Status: He is alert and oriented to person, place, and time. Mental status is at baseline.   Psychiatric:         Mood and Affect: Mood normal.         Behavior: Behavior normal.         Thought Content: Thought content normal.         Judgment: Judgment normal.         Fluids    Intake/Output Summary (Last 24 hours) at 8/24/2023 1629  Last data filed at 8/24/2023 1200  Gross per 24 hour   Intake --   Output 165 ml   Net -165 ml         Laboratory  Recent Labs     08/22/23  0332   WBC 8.8   RBC 3.40*   HEMOGLOBIN 9.9*   HEMATOCRIT 31.2*   MCV 91.8   MCH 29.1   MCHC 31.7*   RDW 51.3*   PLATELETCT 559*   MPV 10.0       Recent Labs     08/22/23  0332   SODIUM 136   POTASSIUM 4.2   CHLORIDE 104   CO2 25   GLUCOSE 121*   BUN 22   CREATININE 1.06   CALCIUM 7.8*                     Imaging  CT-EXTREMITY, LOWER WITH LEFT   Final Result      1.  Swelling and areas of decreased attenuation are identified within and throughout left gluteus musculature and extending down into the posterior compartment musculature in the left thigh. Consider infection or inflammation such as cellulitis or    myositis.      2.  Induration and subcutaneous fat and intramuscular fat planes is also noted consistent with cellulitis.      3.  No abscess or bone erosion identified.      CT-PELVIS WITH   Final Result      1.  Swelling and areas of decreased attenuation throughout the visualized left gluteus musculature are identified. Consider infection or inflammation such as myositis or cellulitis.      2.  Induration  and subcutaneous fat is identified consistent with cellulitis.      3.  No fluid collection that would suggest abscess is identified at this time. No soft tissue emphysema is noted.      DX-CHEST-PORTABLE (1 VIEW)   Final Result      No acute cardiac or pulmonary abnormalities are identified.      CT-PELVIS WITH   Final Result      1.  Subcutaneous inflammatory changes about the left buttock suspicious for cellulitis.      2.  Enlargement of left gluteal musculature consistent with myositis and/or inflammatory change.             Assessment/Plan  Problem Representation:    * Necrotizing fasciitis (HCC)- (present on admission)  Assessment & Plan  Secondary to Intramuscular inj of  Testosterone in buttock and thigh x3 months    -S/p I&D of large abscess and necrotic tissue with wound vac placement 8/13 by Dr Castro  -continuing IV Zosyn per ID recs.  -WBC in normal range  -Pain management with ibuprofen and oxycodone 10, 15 for moderate pain.  Monitor for adverse effect of narcotic pain medications.  -8/17/23 s/p repeat surgical  debridement followed by wound VAC.  Wound care/consult ongoing.  -Per general surgery: Repeat MRI of the pelvis and the lower extremity to rule out any   residual/recurrent deep infection.    Nontraumatic compartment syndrome of left lower extremity  Assessment & Plan  Secondary to necrotizing fasciitis, tense, tender upper thigh and lower leg  Status post incision and drainage.  Repeat surgical debridement 8/17/23  Continue pain regimen as needed    Rhabdomyolysis  Assessment & Plan  Likely secondary to significant muscle injury in context of muscle abscess in context of other problems.  Possibly contributing to acute kidney injury.  CPK downtrending from approximately 2300.  Most recent CPK equals 171.  Creatinine, potassium normal, resolved at this time and no need for continuing to trend.      JUDY (acute kidney injury) ([facility])  Assessment & Plan  08/15/23    Likely prerenal JUDY due to  hypovolemia secondary to sepsis(distributive shock)  Renal function has significantly improved creatinine trending down back to baseline  Medication dose adjustment as per renal functions  Avoid nephrotoxins.  IV fluids  Continue to monitor closely.  8/17/2023 resolved      Sepsis due to cellulitis (HCC)- (present on admission)  Assessment & Plan  Sepsis secondary to testosterone injection in muscles:    -Empiric therapy for necrotizing fascitis be continued  -Follow-up with CBC, CMP. Adequete hydration, monitor for signs of endorgan dysfunction  -Resolved       Leukocytosis  Assessment & Plan  He found to have leukocytosis most likely secondary to deep tissue infection.  Resolved    Transaminitis  Assessment & Plan  Elevation is likely due to Rhabdo and not intrinsic hepatic  Significant downtrending  Follow-up with CMP    UTI (urinary tract infection)  Assessment & Plan  Urinalysis on 8/12/2023 was positive for nitrite, and a small amount of leukocyte Estrace.  Patient reports burning with initiation of urination.   No acute complaint at this time.  Asymptomatic-appears resolved at this time      Other constipation  Assessment & Plan  Bowel protocol    GERD (gastroesophageal reflux disease)  Assessment & Plan  Obesity likely a contributing factor.  Advised calorie restriction, regular exercise, dinner 4 hours before bedtime  Denies heartburn, chest pain, nausea  Continuing outpatient PPI.    Insomnia  Assessment & Plan  History of. On Zolpidem at home.  -Holding medication in context of significant pain management on board.    Acute respiratory failure with hypoxemia (HCC)- (present on admission)  Assessment & Plan  Likely secondary to sepsis and comorbid KACY     Plan:   Currently he is off from oxygen and maintaining oxygen saturation.  Requiring while asleep for his KACY  O2 sat maintained on room air      Bilirubinemia  Assessment & Plan  Resolved.  Follow-up with CMP if jaundiced, pale stool, dark  urine.    Sleep apnea- (present on admission)  Assessment & Plan  - RT/OT protocol  Uses home CPAP at home  Monitor for polycythemia secondary to hypoxia      Obesity- (present on admission)  Assessment & Plan  BMI 33.44    - Counseling deferred to primary.  -Patient was counseled on decrease calorie intake  -Follow-up with lipid panel, A1c         VTE prophylaxis: SCDs/TEDs    I have performed a physical exam and reviewed and updated ROS and Plan today (8/24/2023). In review of yesterday's note (8/23/2023), there are no changes except as documented above.

## 2023-08-24 NOTE — PROGRESS NOTES
"     Orthopedic PA Progress Note    Interval changes:  Patient doing well.   LLE vac intact without leak  SIERRA drains still with moderate drainage- decreased from 245 to roughly 165 but still unable to remove  TTWB  Wound team to do vac exchange and can transition to Prevena plus customizable incisional vac today  Not cleared for d/c from ortho POV     ROS - Patient denies any new issues.  Denies any numbness or tingling. Pain well controlled.    /61   Pulse 73   Temp 36.7 °C (98.1 °F) (Temporal)   Resp 16   Ht 1.778 m (5' 10\")   Wt 98.2 kg (216 lb 7.9 oz)   SpO2 96%     Patient seen and examined  No acute distress  Breathing non labored  RRR  LLE: SIERRA drains with 165 cc total output last 24 hrs. Wound vac intact without leak. Patient clearly fires tibialis anterior, EHL, and gastrocnemius/soleus. Sensation is intact to light touch throughout superficial peroneal, deep peroneal, tibial, saphenous, and sural nerve distributions. Strong and palpable 2+ dorsalis pedis and posterior tibial pulses with capillary refill less than 2 seconds.     Recent Labs     08/22/23  0332   WBC 8.8   RBC 3.40*   HEMOGLOBIN 9.9*   HEMATOCRIT 31.2*   MCV 91.8   MCH 29.1   MCHC 31.7*   RDW 51.3*   PLATELETCT 559*   MPV 10.0         Active Hospital Problems    Diagnosis     Leukocytosis [D72.829]     UTI (urinary tract infection) [N39.0]     Transaminitis [R74.01]     Nontraumatic compartment syndrome of left lower extremity [M79.A22]     JUDY (acute kidney injury) (HCC) [N17.9]     Rhabdomyolysis [M62.82]     Necrotizing fasciitis ([facility]) [M72.6]      Reports 3-day history of left buttock pain after recent injection with testosterone at left buttock, which is tense, swollen, and moderately tender. CT pelvis (8/10/2023) shows subcutaneous inflammatory changes with enlargement of left gluteal musculature.      Bilirubinemia [E80.6]      Mild. Likely due to sepsis. No evidence of anemia on labs.   - Direct bili ordered. Will monitor. "       Acute respiratory failure with hypoxemia (HCC) [J96.01]      Mild diffuse rhonchi heard on exam. CXR shows peripheral Kerley B lines. NT-proBNP slightly elevated.   - IV Lasix 20mg x1 ordered; strict I/Os, daily standing weights ordered.   - RT/OT protocol; wean from oxygen as tolerated.   - Would consider TTE given likelihood of HFpEF in setting of KACY      Insomnia [G47.00]      History of. On Zolpidem at home.  - Resumed.      GERD (gastroesophageal reflux disease) [K21.9]      History of. On Pantoprazole at home.  - Resumed.       Other constipation [K59.09]     Sepsis due to cellulitis (HCC) [L03.90, A41.9]     Sleep apnea [G47.30]     Obesity [E66.9]        Assessment/Plan:  Patient doing well.   LLE vac intact without leak  SIERRA drains still with moderate drainage- decreased from 245 to roughly 165 but still unable to remove  TTWB  Wound team to do vac exchange and can transition to Prevena plus customizable incisional vac   Not cleared for d/c from ortho POV     POD#7 S/p  1.  Excisional debridement of left leg wound including skin, subcutaneous   tissue and muscle of wound measuring 17x60 cm in maximum dimensions.  2.  Secondary closure of left leg wound measuring 60 cm in length.  3.  Application of wound VAC, less than 50 sq. cm (incisional) left thigh.  Wt bearing status - TTWB LLE  Wound care/Drains - Dressings to be changed  by wound team at 5-7 days postop  Future Procedures - None planned at this time  Lovenox: Start 8/12, Duration-until ambulatory > 150'  Sutures/Staples out- 14-21 days post operatively. Removal will completed by ortho JAYLAN's unless transferred.  PT/OT-initiated  Antibiotics: Zosyn per ID   DVT Prophylaxis- TEDS/SCDs/Foot pumps  Barraza-not needed per ortho  Case Coordination for Discharge Planning - Disposition per therapy recs.

## 2023-08-24 NOTE — WOUND TEAM
Requested by Ortho for placement of customizable prevena.  L hip/buttocks incision CDI with no drainage noted.    Customizable prevena applied with no leaks detected.    NOTE: Prevena wound vacs stay in place over a closed incision for 7 days, at day 7 the prevena wound vac machine will stop functioning. Changing batteries will not make machine turn back on. At that time prevena can be removed and discarded or stay in place as there is a white layer beneath the foam that is imbedded with silver.      Wound team signing off, but available for any further advanced wound care needs. Please defer to Ortho for recommendations after 7 day chidi of prevena (8/31).

## 2023-08-24 NOTE — CARE PLAN
The patient is Stable - Low risk of patient condition declining or worsening    Shift Goals  Clinical Goals: wound vac change; IV ABX; pain control  Patient Goals: discharge lounge; wound vac change; paincontrol  Family Goals: not present    Progress made toward(s) clinical / shift goals:    Problem: Pain - Standard  Goal: Alleviation of pain or a reduction in pain to the patient’s comfort goal  Outcome: Progressing     Problem: Incision Care  Goal: Optimal post surgical incision care  Outcome: Progressing     Problem: Skin Integrity  Goal: Skin integrity is maintained or improved  Outcome: Progressing       Patient is not progressing towards the following goals:

## 2023-08-24 NOTE — CARE PLAN
The patient is Stable - Low risk of patient condition declining or worsening    Shift Goals  Clinical Goals: IV ABX, Pain control  Patient Goals: Comfort  Family Goals: CHRIS    Progress made toward(s) clinical / shift goals:    Problem: Pain - Standard  Goal: Alleviation of pain or a reduction in pain to the patient’s comfort goal 3         Patient will state pain 3 on a scale of 1-10 by the end of the shift.  Outcome: Progressing     Problem: Skin Integrity  Goal: Skin integrity is maintained or improved  Outcome: Progressing     Problem: Incision Care  Goal: Optimal post surgical incision care  Outcome: Progressing       Patient is not progressing towards the following goals:

## 2023-08-25 LAB
ALBUMIN SERPL BCP-MCNC: 3.2 G/DL (ref 3.2–4.9)
ALBUMIN/GLOB SERPL: 0.9 G/DL
ALP SERPL-CCNC: 56 U/L (ref 30–99)
ALT SERPL-CCNC: 29 U/L (ref 2–50)
ANION GAP SERPL CALC-SCNC: 10 MMOL/L (ref 7–16)
AST SERPL-CCNC: 20 U/L (ref 12–45)
BILIRUB SERPL-MCNC: 0.3 MG/DL (ref 0.1–1.5)
BUN SERPL-MCNC: 29 MG/DL (ref 8–22)
CALCIUM ALBUM COR SERPL-MCNC: 9.5 MG/DL (ref 8.5–10.5)
CALCIUM SERPL-MCNC: 8.9 MG/DL (ref 8.5–10.5)
CHLORIDE SERPL-SCNC: 103 MMOL/L (ref 96–112)
CO2 SERPL-SCNC: 24 MMOL/L (ref 20–33)
CREAT SERPL-MCNC: 0.94 MG/DL (ref 0.5–1.4)
ERYTHROCYTE [DISTWIDTH] IN BLOOD BY AUTOMATED COUNT: 50.8 FL (ref 35.9–50)
GFR SERPLBLD CREATININE-BSD FMLA CKD-EPI: 97 ML/MIN/1.73 M 2
GLOBULIN SER CALC-MCNC: 3.7 G/DL (ref 1.9–3.5)
GLUCOSE SERPL-MCNC: 92 MG/DL (ref 65–99)
HCT VFR BLD AUTO: 32.4 % (ref 42–52)
HGB BLD-MCNC: 10.2 G/DL (ref 14–18)
MCH RBC QN AUTO: 28.7 PG (ref 27–33)
MCHC RBC AUTO-ENTMCNC: 31.5 G/DL (ref 32.3–36.5)
MCV RBC AUTO: 91 FL (ref 81.4–97.8)
PLATELET # BLD AUTO: 737 K/UL (ref 164–446)
PMV BLD AUTO: 9.7 FL (ref 9–12.9)
POTASSIUM SERPL-SCNC: 4.3 MMOL/L (ref 3.6–5.5)
PROT SERPL-MCNC: 6.9 G/DL (ref 6–8.2)
RBC # BLD AUTO: 3.56 M/UL (ref 4.7–6.1)
SODIUM SERPL-SCNC: 137 MMOL/L (ref 135–145)
WBC # BLD AUTO: 9.5 K/UL (ref 4.8–10.8)

## 2023-08-25 PROCEDURE — A9270 NON-COVERED ITEM OR SERVICE: HCPCS

## 2023-08-25 PROCEDURE — 770001 HCHG ROOM/CARE - MED/SURG/GYN PRIV*

## 2023-08-25 PROCEDURE — 700111 HCHG RX REV CODE 636 W/ 250 OVERRIDE (IP): Mod: JZ

## 2023-08-25 PROCEDURE — 700105 HCHG RX REV CODE 258

## 2023-08-25 PROCEDURE — 99231 SBSQ HOSP IP/OBS SF/LOW 25: CPT | Mod: 25 | Performed by: HOSPITALIST

## 2023-08-25 PROCEDURE — 700102 HCHG RX REV CODE 250 W/ 637 OVERRIDE(OP)

## 2023-08-25 PROCEDURE — 80053 COMPREHEN METABOLIC PANEL: CPT

## 2023-08-25 PROCEDURE — 36415 COLL VENOUS BLD VENIPUNCTURE: CPT

## 2023-08-25 PROCEDURE — 700111 HCHG RX REV CODE 636 W/ 250 OVERRIDE (IP)

## 2023-08-25 PROCEDURE — 85027 COMPLETE CBC AUTOMATED: CPT

## 2023-08-25 PROCEDURE — 700102 HCHG RX REV CODE 250 W/ 637 OVERRIDE(OP): Performed by: PHYSICIAN ASSISTANT

## 2023-08-25 PROCEDURE — A9270 NON-COVERED ITEM OR SERVICE: HCPCS | Performed by: PHYSICIAN ASSISTANT

## 2023-08-25 RX ADMIN — METAXALONE 400 MG: 800 TABLET ORAL at 05:41

## 2023-08-25 RX ADMIN — ZOLPIDEM TARTRATE 10 MG: 5 TABLET ORAL at 20:44

## 2023-08-25 RX ADMIN — NAPROXEN 500 MG: 500 TABLET ORAL at 17:06

## 2023-08-25 RX ADMIN — NICOTINE 14 MG: 14 PATCH TRANSDERMAL at 05:42

## 2023-08-25 RX ADMIN — PIPERACILLIN AND TAZOBACTAM 4.5 G: 4; .5 INJECTION, POWDER, FOR SOLUTION INTRAVENOUS at 20:49

## 2023-08-25 RX ADMIN — MORPHINE SULFATE 15 MG: 15 TABLET, FILM COATED, EXTENDED RELEASE ORAL at 17:06

## 2023-08-25 RX ADMIN — ACETAMINOPHEN 1000 MG: 500 TABLET, FILM COATED ORAL at 05:40

## 2023-08-25 RX ADMIN — METAXALONE 400 MG: 800 TABLET ORAL at 12:59

## 2023-08-25 RX ADMIN — OXYCODONE HYDROCHLORIDE 10 MG: 10 TABLET ORAL at 10:00

## 2023-08-25 RX ADMIN — METAXALONE 400 MG: 800 TABLET ORAL at 17:06

## 2023-08-25 RX ADMIN — ACETAMINOPHEN 1000 MG: 500 TABLET, FILM COATED ORAL at 12:59

## 2023-08-25 RX ADMIN — OMEPRAZOLE 20 MG: 20 CAPSULE, DELAYED RELEASE ORAL at 05:39

## 2023-08-25 RX ADMIN — PIPERACILLIN AND TAZOBACTAM 4.5 G: 4; .5 INJECTION, POWDER, FOR SOLUTION INTRAVENOUS at 13:07

## 2023-08-25 RX ADMIN — HEPARIN SODIUM 5000 UNITS: 5000 INJECTION, SOLUTION INTRAVENOUS; SUBCUTANEOUS at 05:42

## 2023-08-25 RX ADMIN — OXYCODONE HYDROCHLORIDE 15 MG: 15 TABLET ORAL at 20:44

## 2023-08-25 RX ADMIN — HEPARIN SODIUM 5000 UNITS: 5000 INJECTION, SOLUTION INTRAVENOUS; SUBCUTANEOUS at 15:31

## 2023-08-25 RX ADMIN — PIPERACILLIN AND TAZOBACTAM 4.5 G: 4; .5 INJECTION, POWDER, FOR SOLUTION INTRAVENOUS at 05:48

## 2023-08-25 RX ADMIN — MORPHINE SULFATE 15 MG: 15 TABLET, FILM COATED, EXTENDED RELEASE ORAL at 05:41

## 2023-08-25 RX ADMIN — HEPARIN SODIUM 5000 UNITS: 5000 INJECTION, SOLUTION INTRAVENOUS; SUBCUTANEOUS at 22:00

## 2023-08-25 RX ADMIN — ACETAMINOPHEN 1000 MG: 500 TABLET, FILM COATED ORAL at 17:06

## 2023-08-25 RX ADMIN — OXYCODONE HYDROCHLORIDE 15 MG: 15 TABLET ORAL at 01:07

## 2023-08-25 RX ADMIN — NAPROXEN 500 MG: 500 TABLET ORAL at 05:41

## 2023-08-25 RX ADMIN — CETIRIZINE HYDROCHLORIDE 10 MG: 10 TABLET, FILM COATED ORAL at 05:41

## 2023-08-25 RX ADMIN — OXYCODONE HYDROCHLORIDE 15 MG: 15 TABLET ORAL at 17:05

## 2023-08-25 ASSESSMENT — ENCOUNTER SYMPTOMS
HEMOPTYSIS: 0
HEADACHES: 0
ORTHOPNEA: 0
FOCAL WEAKNESS: 0
WEAKNESS: 0
COUGH: 0
DIAPHORESIS: 0
BLURRED VISION: 0
DOUBLE VISION: 0
HEARTBURN: 0
VOMITING: 0
SHORTNESS OF BREATH: 0
FEVER: 0
NAUSEA: 0
DIZZINESS: 0

## 2023-08-25 ASSESSMENT — PAIN SCALES - WONG BAKER
WONGBAKER_NUMERICALRESPONSE: HURTS A LITTLE MORE
WONGBAKER_NUMERICALRESPONSE: HURTS A WHOLE LOT
WONGBAKER_NUMERICALRESPONSE: HURTS A LITTLE MORE

## 2023-08-25 ASSESSMENT — PAIN DESCRIPTION - PAIN TYPE
TYPE: ACUTE PAIN

## 2023-08-25 ASSESSMENT — PATIENT HEALTH QUESTIONNAIRE - PHQ9
SUM OF ALL RESPONSES TO PHQ9 QUESTIONS 1 AND 2: 0
1. LITTLE INTEREST OR PLEASURE IN DOING THINGS: NOT AT ALL
2. FEELING DOWN, DEPRESSED, IRRITABLE, OR HOPELESS: NOT AT ALL

## 2023-08-25 NOTE — CARE PLAN
The patient is Stable - Low risk of patient condition declining or worsening    Shift Goals  Clinical Goals: wound vac management, drain output, pain management  Patient Goals: comfort, wound care  Family Goals: not present    Progress made toward(s) clinical / shift goals:    Problem: Pain - Standard  Goal: Alleviation of pain or a reduction in pain to the patient’s comfort goal  Outcome: Progressing     Problem: Incision Care  Goal: Optimal post surgical incision care  Outcome: Progressing     Problem: Skin Integrity  Goal: Skin integrity is maintained or improved  Outcome: Progressing       Patient is not progressing towards the following goals:

## 2023-08-25 NOTE — PROGRESS NOTES
"     Orthopedic PA Progress Note    Interval changes:  Patient doing well.   LLE Prevena intact without leak  SIERRA drains still with serosanguineous drainage  Leg wrapped with ace bandages   TTWB  MRI not recommended unless clinically worsening- not indicated at this time  Can possibly remove distal drains tomorrow, hopeful to remove remaining drains in next few days dependent on output  If expedited discharge is pertinent, OK for home with  or SNF for close outpatient follow up with drain removal at Dr. Castro' clinic   Otherwise, can closely observe patient while inpatient and remove drains as appropriate     ROS - Patient denies any new issues.  Denies any numbness or tingling. Pain well controlled.    /61   Pulse 82   Temp 36.6 °C (97.9 °F) (Temporal)   Resp 18   Ht 1.778 m (5' 10\")   Wt 98.2 kg (216 lb 7.9 oz)   SpO2 96%     Patient seen and examined  No acute distress  Breathing non labored  RRR  LLE: SIERRA drains with 135 cc total output last 24 hrs. Serosanguineous fluid- no purulence noted. Wound vac intact without leak. Patient clearly fires tibialis anterior, EHL, and gastrocnemius/soleus. Sensation is intact to light touch throughout superficial peroneal, deep peroneal, tibial, saphenous, and sural nerve distributions. Strong and palpable 2+ dorsalis pedis and posterior tibial pulses with capillary refill less than 2 seconds.     Recent Labs     08/25/23  0459   WBC 9.5   RBC 3.56*   HEMOGLOBIN 10.2*   HEMATOCRIT 32.4*   MCV 91.0   MCH 28.7   MCHC 31.5*   RDW 50.8*   PLATELETCT 737*   MPV 9.7         Active Hospital Problems    Diagnosis     Leukocytosis [D72.829]     UTI (urinary tract infection) [N39.0]     Transaminitis [R74.01]     Nontraumatic compartment syndrome of left lower extremity [M79.A22]     JUDY (acute kidney injury) (HCC) [N17.9]     Rhabdomyolysis [M62.82]     Necrotizing fasciitis (HCC) [M72.6]      Reports 3-day history of left buttock pain after recent injection with " testosterone at left buttock, which is tense, swollen, and moderately tender. CT pelvis (8/10/2023) shows subcutaneous inflammatory changes with enlargement of left gluteal musculature.      Bilirubinemia [E80.6]      Mild. Likely due to sepsis. No evidence of anemia on labs.   - Direct bili ordered. Will monitor.       Acute respiratory failure with hypoxemia (HCC) [J96.01]      Mild diffuse rhonchi heard on exam. CXR shows peripheral Kerley B lines. NT-proBNP slightly elevated.   - IV Lasix 20mg x1 ordered; strict I/Os, daily standing weights ordered.   - RT/OT protocol; wean from oxygen as tolerated.   - Would consider TTE given likelihood of HFpEF in setting of KACY      Insomnia [G47.00]      History of. On Zolpidem at home.  - Resumed.      GERD (gastroesophageal reflux disease) [K21.9]      History of. On Pantoprazole at home.  - Resumed.       Other constipation [K59.09]     Sepsis due to cellulitis (HCC) [L03.90, A41.9]     Sleep apnea [G47.30]     Obesity [E66.9]        Assessment/Plan:  Can possibly remove distal drains tomorrow, hopeful to remove remaining drains in next few days dependent on output  If expedited discharge is pertinent, OK for home with HH or SNF for close outpatient follow up with drain removal at Dr. Castro' clinic     POD#8 S/p  1.  Excisional debridement of left leg wound including skin, subcutaneous   tissue and muscle of wound measuring 17x60 cm in maximum dimensions.  2.  Secondary closure of left leg wound measuring 60 cm in length.  3.  Application of wound VAC, less than 50 sq. cm (incisional) left thigh.  Wt bearing status - TTWB LLE  Wound care/Drains - Dressings to be changed  by wound team at 5-7 days postop  Future Procedures - None planned at this time  Lovenox: Start 8/12, Duration-until ambulatory > 150'  Sutures/Staples out- 14-21 days post operatively. Removal will completed by ortho JAYLAN's unless transferred.  PT/OT-initiated  Antibiotics: Zosyn per ID   DVT  Prophylaxis- TEDS/SCDs/Foot pumps  Barraza-not needed per ortho  Case Coordination for Discharge Planning - Disposition per therapy recs.

## 2023-08-25 NOTE — PROGRESS NOTES
[facility] Internal Medicine Daily Progress Note    Date of Service  8/25/2023    R Team: R IM Hill Team   Attending: CRISTIAN Lacy M.d.  Senior Resident: Dr. Joss Mendiola DO  Intern:  Dr. Frank wallace MD  Contact Number: [phone]    Chief Complaint  Abhishek Pacheco is a 53 y.o. male admitted 8/10/2023 with pain, swelling in left buttock radiating down to leg.    Hospital Course  . Abhishek Pacheco is a 53yoM with chronic migraine (on Sumtatriptan), obesity (BMI 31.70), KACY (on nightly CPAP; s/p uvuloplasty, 10/2011) who presented 8/10/2023 with 3-day history of worsening left buttock pain and edema which started 2 days after injecting himself with testosterone in same region. The pain is constant, rated 8/10, worse with movement, and minimally improved with Tramadol and Prednisone (prescribed by PCP). He also reports chills, headache, and night sweats.      Patient reports taking testosterone shots thrice monthly for last 2 years.     Admission findings:  Exam is notable for generalized discomfort, clammy skin, tachycardia (to 112 bpm), hypoxemia (84-85% on RA), bilateral crackles in middle lung fields. No tenderness to abdominal palpation. His left buttock is somewhat tense, swollen, non-erythematous, moderately tender at gluteal cleft.      Labs show severe leukocytosis (33.7 K/uL, 86.3% PMNs), mildly elevated total bilirubin. CT pelvis (8/10/2023) shows subcutaneous inflammatory changes with enlargement of left gluteal musculature. CXR (8/10/2023) shows mildly thickened interlobular septa.    He receives Ancef 2g x1 in ED and is admitted for sepsis due to cellulitis of left buttock.   8/15/23: Left gluteal and thigh necrotizing fasciitis and   myositis, status post fasciectomy and excisional debridement with wound VAC   application.  8/17/23: Repeat incision and drainage by general surgery followed by wound VAC.       Interval Problem Update  -No overnight acute event reported by night  team.     -Patient notes improvement in pain.  No longer requiring IV Dilaudid.   -Further assessments by Ortho.  See notes.      I have discussed this patient's plan of care and discharge plan at IDT rounds today with Case Management, Nursing, Nursing leadership, and other members of the IDT team.    Consultants/Specialty  general surgery    Code Status  DNAR/DNI    Disposition  Medically Cleared  I have placed the appropriate orders for post-discharge needs.    Review of Systems  Review of Systems   Constitutional:  Negative for diaphoresis, fever and malaise/fatigue.   Eyes:  Negative for blurred vision and double vision.   Respiratory:  Negative for cough, hemoptysis and shortness of breath.    Cardiovascular:  Negative for chest pain and orthopnea.   Gastrointestinal:  Negative for heartburn, nausea and vomiting.   Neurological:  Negative for dizziness, focal weakness, weakness and headaches.        Physical Exam  Temp:  [36.5 °C (97.7 °F)-36.7 °C (98.1 °F)] 36.7 °C (98.1 °F)  Pulse:  [77-96] 96  Resp:  [16-19] 16  BP: (109-127)/(55-79) 127/79  SpO2:  [94 %-98 %] 97 %    Physical Exam  Vitals and nursing note reviewed.   Constitutional:       General: He is not in acute distress.     Appearance: Normal appearance. He is normal weight. He is not ill-appearing.   HENT:      Head: Normocephalic and atraumatic.      Right Ear: External ear normal.      Left Ear: External ear normal.      Mouth/Throat:      Mouth: Mucous membranes are moist.      Pharynx: Oropharynx is clear.   Eyes:      Extraocular Movements: Extraocular movements intact.      Pupils: Pupils are equal, round, and reactive to light.   Cardiovascular:      Rate and Rhythm: Normal rate and regular rhythm.      Pulses: Normal pulses.      Heart sounds: Normal heart sounds.   Pulmonary:      Effort: Pulmonary effort is normal. No respiratory distress.   Abdominal:      General: Abdomen is flat. There is no distension.      Palpations: Abdomen is soft.       Tenderness: There is no abdominal tenderness.   Musculoskeletal:         General: Signs of injury present.      Right lower leg: No edema.      Left lower leg: No edema.      Comments: Wound vac left leg   Skin:     Coloration: Skin is not jaundiced or pale.      Findings: No erythema.   Neurological:      General: No focal deficit present.      Mental Status: He is alert and oriented to person, place, and time. Mental status is at baseline.   Psychiatric:         Mood and Affect: Mood normal.         Behavior: Behavior normal.         Thought Content: Thought content normal.         Judgment: Judgment normal.         Fluids    Intake/Output Summary (Last 24 hours) at 8/25/2023 1510  Last data filed at 8/25/2023 1307  Gross per 24 hour   Intake --   Output 435 ml   Net -435 ml       Laboratory  Recent Labs     08/25/23  0459   WBC 9.5   RBC 3.56*   HEMOGLOBIN 10.2*   HEMATOCRIT 32.4*   MCV 91.0   MCH 28.7   MCHC 31.5*   RDW 50.8*   PLATELETCT 737*   MPV 9.7     Recent Labs     08/25/23  0459   SODIUM 137   POTASSIUM 4.3   CHLORIDE 103   CO2 24   GLUCOSE 92   BUN 29*   CREATININE 0.94   CALCIUM 8.9                   Imaging  CT-EXTREMITY, LOWER WITH LEFT   Final Result      1.  Swelling and areas of decreased attenuation are identified within and throughout left gluteus musculature and extending down into the posterior compartment musculature in the left thigh. Consider infection or inflammation such as cellulitis or    myositis.      2.  Induration and subcutaneous fat and intramuscular fat planes is also noted consistent with cellulitis.      3.  No abscess or bone erosion identified.      CT-PELVIS WITH   Final Result      1.  Swelling and areas of decreased attenuation throughout the visualized left gluteus musculature are identified. Consider infection or inflammation such as myositis or cellulitis.      2.  Induration and subcutaneous fat is identified consistent with cellulitis.      3.  No fluid  collection that would suggest abscess is identified at this time. No soft tissue emphysema is noted.      DX-CHEST-PORTABLE (1 VIEW)   Final Result      No acute cardiac or pulmonary abnormalities are identified.      CT-PELVIS WITH   Final Result      1.  Subcutaneous inflammatory changes about the left buttock suspicious for cellulitis.      2.  Enlargement of left gluteal musculature consistent with myositis and/or inflammatory change.             Assessment/Plan  Problem Representation:    * Necrotizing fasciitis (HCC)- (present on admission)  Assessment & Plan  Secondary to Intramuscular inj of Testosterone in buttock and thigh x3 months    -S/p I&D of large abscess and necrotic tissue with wound vac placement 8/13 by Dr Castro.  See notes regarding further assessments and future care.  -continuing IV Zosyn per ID recs.  -WBC in normal range  -Pain management with ibuprofen and oxycodone 10, 15 for moderate pain.  Monitor for adverse effect of narcotic pain medications.  -Wound care ongoing.      JUDY (acute kidney injury) (HCC)  Assessment & Plan  08/15/23    Likely prerenal JUDY due to hypovolemia secondary to sepsis(distributive shock)  Renal function has significantly improved creatinine trending down back to baseline  Medication dose adjustment as per renal functions  Avoid nephrotoxins.  IV fluids  Continue to monitor closely.  8/17/2023 resolved      Sepsis due to cellulitis (HCC)- (present on admission)  Assessment & Plan  Sepsis secondary to testosterone injection in muscles:    -Empiric therapy for necrotizing fascitis be continued  -Follow-up with CBC, CMP. Adequete hydration, monitor for signs of endorgan dysfunction  -Resolved       Leukocytosis  Assessment & Plan  He found to have leukocytosis most likely secondary to deep tissue infection.  Resolved    Nontraumatic compartment syndrome of left lower extremity  Assessment & Plan  Secondary to necrotizing fasciitis, tense, tender upper thigh and  lower leg  Status post incision and drainage.  Repeat surgical debridement 8/17/23  Continue pain regimen as needed    Transaminitis  Assessment & Plan  Elevation is likely due to Rhabdo and not intrinsic hepatic  Significant downtrending  Follow-up with CMP    UTI (urinary tract infection)  Assessment & Plan  Urinalysis on 8/12/2023 was positive for nitrite, and a small amount of leukocyte Estrace.  Patient reports burning with initiation of urination.   No acute complaint at this time.  Asymptomatic-appears resolved at this time      Rhabdomyolysis  Assessment & Plan  Likely secondary to significant muscle injury in context of muscle abscess in context of other problems.  Possibly contributing to acute kidney injury.  CPK downtrending from approximately 2300.  Most recent CPK equals 171.  Creatinine, potassium normal, resolved at this time and no need for continuing to trend.      Other constipation  Assessment & Plan  Bowel protocol    GERD (gastroesophageal reflux disease)  Assessment & Plan  Obesity likely a contributing factor.  Advised calorie restriction, regular exercise, dinner 4 hours before bedtime  Denies heartburn, chest pain, nausea  Continuing outpatient PPI.    Insomnia  Assessment & Plan  History of. On Zolpidem at home.  -Holding medication in context of significant pain management on board.    Acute respiratory failure with hypoxemia (HCC)- (present on admission)  Assessment & Plan  Likely secondary to sepsis and comorbid KACY     Plan:   Currently he is off from oxygen and maintaining oxygen saturation.  Requiring while asleep for his KACY  O2 sat maintained on room air      Bilirubinemia  Assessment & Plan  Resolved.  Follow-up with CMP if jaundiced, pale stool, dark urine.    Sleep apnea- (present on admission)  Assessment & Plan  - RT/OT protocol  Uses home CPAP at home  Monitor for polycythemia secondary to hypoxia      Obesity- (present on admission)  Assessment & Plan  BMI 33.44    -  Counseling deferred to primary.  -Patient was counseled on decrease calorie intake  -Follow-up with lipid panel, A1c         VTE prophylaxis: SCDs/TEDs    I have performed a physical exam and reviewed and updated ROS and Plan today (8/25/2023). In review of yesterday's note (8/24/2023), there are no changes except as documented above.           If you are a smoker, it is important for your health to stop smoking. Please be aware that second hand smoke is also harmful.

## 2023-08-25 NOTE — PROGRESS NOTES
53yoM with left gluteal/thigh necrotizing fasciitis/myositis s/p fasciectomy/debridement 8/13 and repeat debridement 8/15.  Now s/p repeat debridement and secondary wound closure with incisional VAC and SIERRA drain placement 8/17.    S: Doing okay, still having pain with mobilization left hip and thigh.  Just had hip spica compressive wrap placed to try to reduce accumulation of drain able fluid.    O:    Vitals:    08/25/23 0930   BP: 127/79   Pulse: 96   Resp: 16   Temp: 36.7 °C (98.1 °F)   SpO2: 97%     Exam:  General-NAD, sitting upright in bed  LLE-thigh VAC in place with good seal, NVI distally palp dp pulse, SIERRA drains in place with serosanguinous drainage    A: 53yoM with left gluteal/thigh necrotizing fasciitis/myositis s/p fasciectomy/debridement 8/13 and repeat debridement 8/15.  Now s/p repeat debridement and secondary wound closure with incisional VAC and SIERRA drain placement 8/17.    Recs:  --continue abx per ID recs  --continue incisional VAC   --TTWB LLE  --monitor SIERRA drain output, will plan to sequentially remove drains when output is minimal  --okay to discharge/transfer to home with home health or SNF with outpatient followup and drain management if expedited discharge is necessary, otherwise may be able to remove some or all of the drains over the next several days  --continue PT/OT  --fu 2-3 weeks postop from last surgery for wound check and staple removal

## 2023-08-25 NOTE — DISCHARGE PLANNING
HTH/SCP TCN chart review completed. As prior, current discharge considerations are dc to home with HH and FWW. Note that PTA updated recs yesterday for dc to home with HH and FWW as well. Will monitor for updated OT recs (though note per review, pt has noted functional progression in past few days as appears pain is better controlled at this time and note 6 clicks mobility is now 18). He is agreeable to . As prior, [facility] has declined 2' to capacity and referral was sent to [facility] (pending). TCN will continue to follow and collaborate with discharge planning team as additional post acute needs arise. Thank you.     Completed:  PT with recommendations fas above for HH with FWW  OT with recs for post acute on 8/22; see above; TCN will monitor for updated recs as available  Choice obtained: IRF, SNF, HH, DME (FWW)  [facility] referral not sent

## 2023-08-25 NOTE — CARE PLAN
The patient is Stable - Low risk of patient condition declining or worsening    Shift Goals  Clinical Goals: Wound vac management IV abx, pain control  Patient Goals: Pain control ,wound care  Family Goals: CHRIS not present    Progress made toward(s) clinical / shift goals:  on going    Patient is  progressing towards the following goals:  Problem: Pain - Standard  Goal: Alleviation of pain or a reduction in pain to the patient’s comfort goal  Outcome: Progressing     Problem: Incision Care  Goal: Optimal post surgical incision care  Outcome: Progressing     Problem: Skin Integrity  Goal: Skin integrity is maintained or improved  Outcome: Progressing     Problem: Respiratory  Goal: Patient will achieve/maintain optimum respiratory ventilation and gas exchange  Outcome: Progressing

## 2023-08-25 NOTE — DISCHARGE PLANNING
Case Management Discharge Planning    Admission Date: 8/10/2023  GMLOS: 9  ALOS: 15    6-Clicks ADL Score: 19  6-Clicks Mobility Score: 18      Anticipated Discharge Dispo: Discharge Disposition: D/T to home under A care in anticipation of covered skilled care (06)    DME Needed: Yes, FWW    DME Ordered: No    Action(s) Taken: OTHER    Patient discussed in IDT rounds. Patient is not medically cleared to dc home with home health. Pending acceptance to [facility]. SONGW sent voalte message to attending MD requesting Home health orders.      No other CM needs identfied at this time.       CM will continue to follow and assist with dc planning as needed.     Escalations Completed: None    Medically Clear: No    Next Steps: Pending medical clearance, FWW      Barriers to Discharge: Medical clearance, DME, and Outpatient referrals pending    Is the patient up for discharge tomorrow: No

## 2023-08-26 PROCEDURE — 700111 HCHG RX REV CODE 636 W/ 250 OVERRIDE (IP): Mod: JZ

## 2023-08-26 PROCEDURE — 700102 HCHG RX REV CODE 250 W/ 637 OVERRIDE(OP)

## 2023-08-26 PROCEDURE — A9270 NON-COVERED ITEM OR SERVICE: HCPCS

## 2023-08-26 PROCEDURE — 700102 HCHG RX REV CODE 250 W/ 637 OVERRIDE(OP): Performed by: PHYSICIAN ASSISTANT

## 2023-08-26 PROCEDURE — 700111 HCHG RX REV CODE 636 W/ 250 OVERRIDE (IP)

## 2023-08-26 PROCEDURE — 99231 SBSQ HOSP IP/OBS SF/LOW 25: CPT | Mod: GC | Performed by: HOSPITALIST

## 2023-08-26 PROCEDURE — A9270 NON-COVERED ITEM OR SERVICE: HCPCS | Performed by: PHYSICIAN ASSISTANT

## 2023-08-26 PROCEDURE — 770001 HCHG ROOM/CARE - MED/SURG/GYN PRIV*

## 2023-08-26 PROCEDURE — 700105 HCHG RX REV CODE 258

## 2023-08-26 RX ORDER — AMOXICILLIN AND CLAVULANATE POTASSIUM 875; 125 MG/1; MG/1
1 TABLET, FILM COATED ORAL EVERY 12 HOURS
Status: DISCONTINUED | OUTPATIENT
Start: 2023-08-27 | End: 2023-08-29 | Stop reason: HOSPADM

## 2023-08-26 RX ADMIN — OXYCODONE HYDROCHLORIDE 15 MG: 15 TABLET ORAL at 00:35

## 2023-08-26 RX ADMIN — OXYCODONE HYDROCHLORIDE 10 MG: 10 TABLET ORAL at 19:53

## 2023-08-26 RX ADMIN — OXYCODONE HYDROCHLORIDE 15 MG: 15 TABLET ORAL at 23:17

## 2023-08-26 RX ADMIN — OXYCODONE HYDROCHLORIDE 15 MG: 15 TABLET ORAL at 03:39

## 2023-08-26 RX ADMIN — ACETAMINOPHEN 1000 MG: 500 TABLET, FILM COATED ORAL at 12:47

## 2023-08-26 RX ADMIN — HEPARIN SODIUM 5000 UNITS: 5000 INJECTION, SOLUTION INTRAVENOUS; SUBCUTANEOUS at 15:08

## 2023-08-26 RX ADMIN — ZOLPIDEM TARTRATE 10 MG: 5 TABLET ORAL at 21:05

## 2023-08-26 RX ADMIN — PIPERACILLIN AND TAZOBACTAM 4.5 G: 4; .5 INJECTION, POWDER, FOR SOLUTION INTRAVENOUS at 05:54

## 2023-08-26 RX ADMIN — NAPROXEN 500 MG: 500 TABLET ORAL at 18:16

## 2023-08-26 RX ADMIN — MORPHINE SULFATE 15 MG: 15 TABLET, FILM COATED, EXTENDED RELEASE ORAL at 05:50

## 2023-08-26 RX ADMIN — OXYCODONE HYDROCHLORIDE 15 MG: 15 TABLET ORAL at 09:45

## 2023-08-26 RX ADMIN — OXYCODONE HYDROCHLORIDE 15 MG: 15 TABLET ORAL at 16:34

## 2023-08-26 RX ADMIN — ACETAMINOPHEN 1000 MG: 500 TABLET, FILM COATED ORAL at 18:16

## 2023-08-26 RX ADMIN — CETIRIZINE HYDROCHLORIDE 10 MG: 10 TABLET, FILM COATED ORAL at 05:49

## 2023-08-26 RX ADMIN — NAPROXEN 500 MG: 500 TABLET ORAL at 05:49

## 2023-08-26 RX ADMIN — HEPARIN SODIUM 5000 UNITS: 5000 INJECTION, SOLUTION INTRAVENOUS; SUBCUTANEOUS at 05:55

## 2023-08-26 RX ADMIN — HEPARIN SODIUM 5000 UNITS: 5000 INJECTION, SOLUTION INTRAVENOUS; SUBCUTANEOUS at 21:05

## 2023-08-26 RX ADMIN — ACETAMINOPHEN 1000 MG: 500 TABLET, FILM COATED ORAL at 05:49

## 2023-08-26 RX ADMIN — PIPERACILLIN AND TAZOBACTAM 4.5 G: 4; .5 INJECTION, POWDER, FOR SOLUTION INTRAVENOUS at 21:20

## 2023-08-26 RX ADMIN — NICOTINE 14 MG: 14 PATCH TRANSDERMAL at 05:50

## 2023-08-26 RX ADMIN — METAXALONE 400 MG: 800 TABLET ORAL at 18:16

## 2023-08-26 RX ADMIN — PIPERACILLIN AND TAZOBACTAM 4.5 G: 4; .5 INJECTION, POWDER, FOR SOLUTION INTRAVENOUS at 12:51

## 2023-08-26 RX ADMIN — Medication 1 APPLICATOR: at 06:00

## 2023-08-26 RX ADMIN — METAXALONE 400 MG: 800 TABLET ORAL at 05:50

## 2023-08-26 RX ADMIN — OMEPRAZOLE 20 MG: 20 CAPSULE, DELAYED RELEASE ORAL at 05:50

## 2023-08-26 RX ADMIN — MORPHINE SULFATE 15 MG: 15 TABLET, FILM COATED, EXTENDED RELEASE ORAL at 18:16

## 2023-08-26 RX ADMIN — METAXALONE 400 MG: 800 TABLET ORAL at 12:48

## 2023-08-26 ASSESSMENT — ENCOUNTER SYMPTOMS
DIZZINESS: 0
WEAKNESS: 0
HEMOPTYSIS: 0
SHORTNESS OF BREATH: 0
BLURRED VISION: 0
FEVER: 0
HEARTBURN: 0
DOUBLE VISION: 0
COUGH: 0
DIAPHORESIS: 0
HEADACHES: 0
NAUSEA: 0
FOCAL WEAKNESS: 0
VOMITING: 0
ORTHOPNEA: 0

## 2023-08-26 ASSESSMENT — PAIN DESCRIPTION - PAIN TYPE
TYPE: SURGICAL PAIN
TYPE: ACUTE PAIN;SURGICAL PAIN

## 2023-08-26 NOTE — CARE PLAN
Problem: Pain - Standard  Goal: Alleviation of pain or a reduction in pain to the patient’s comfort goal  Outcome: Progressing     Problem: Respiratory  Goal: Patient will achieve/maintain optimum respiratory ventilation and gas exchange  Outcome: Progressing     Problem: Hemodynamics  Goal: Patient's hemodynamics, fluid balance and neurologic status will be stable or improve  Outcome: Progressing     Problem: Incision Care  Goal: Optimal post surgical incision care  Outcome: Progressing     Problem: Skin Integrity  Goal: Skin integrity is maintained or improved  Outcome: Progressing   The patient is Stable - Low risk of patient condition declining or worsening    Shift Goals  Clinical Goals: Monitor wound vac and drain output  Patient Goals: Pain control, rest  Family Goals: not present    Progress made toward(s) clinical / shift goals:  Patient resting in bed, bed is locked and in lowest position, call bell within reach of patient. PRN pain medication given to patient.    Patient is not progressing towards the following goals:

## 2023-08-26 NOTE — THERAPY
Occupational Therapy  Discharge      Patient Name: Abhishek Pacheco  Age:  53 y.o., Sex:  male  Medical Record #: [id number]  Today's Date: 8/25/2023     Precautions  Precautions: Fall Risk, Toe Touch Weight Bearing Left Lower Extremity  Comments: JPx4, wound vac    Assessment    Attempted to see pt for OT treatment. Explained role of OT. Pt denies having any difficulty completing ADLs and is not worried about managing ADLs at home. Pt denies having any further OT needs at this time and declined to demo ADLs.     Plan    Patient will not be actively followed for occupational therapy services at this time, however may be seen if requested by physician for 1 more visit within 30 days to address any discharge or equipment needs.      Reason for Discharge From Therapy:  (Pt reports he is modified independent with basic ADLs and has no further questions or concerns for OT at this time)    DC Equipment Recommendations: Unable to determine at this time  Discharge Recommendations: Anticipate that the patient will have no further occupational therapy needs after discharge from the hospital

## 2023-08-26 NOTE — PROGRESS NOTES
[facility] Internal Medicine Daily Progress Note    Date of Service  8/26/2023    R Team: R IM Hill Team   Attending: CRISTIAN Lacy M.d.  Senior Resident: Dr. Joss Mendiola DO  Intern:  Dr. Frank wallace MD  Contact Number: [phone]    Chief Complaint  Abhishek Pacheco is a 53 y.o. male admitted 8/10/2023 with pain, swelling in left buttock radiating down to leg.    Hospital Course  . Abhishek Pacheco is a 53yoM with chronic migraine (on Sumtatriptan), obesity (BMI 31.70), KACY (on nightly CPAP; s/p uvuloplasty, 10/2011) who presented 8/10/2023 with 3-day history of worsening left buttock pain and edema which started 2 days after injecting himself with testosterone in same region. The pain is constant, rated 8/10, worse with movement, and minimally improved with Tramadol and Prednisone (prescribed by PCP). He also reports chills, headache, and night sweats.      Patient reports taking testosterone shots thrice monthly for last 2 years.     Admission findings:  Exam is notable for generalized discomfort, clammy skin, tachycardia (to 112 bpm), hypoxemia (84-85% on RA), bilateral crackles in middle lung fields. No tenderness to abdominal palpation. His left buttock is somewhat tense, swollen, non-erythematous, moderately tender at gluteal cleft.      Labs show severe leukocytosis (33.7 K/uL, 86.3% PMNs), mildly elevated total bilirubin. CT pelvis (8/10/2023) shows subcutaneous inflammatory changes with enlargement of left gluteal musculature. CXR (8/10/2023) shows mildly thickened interlobular septa.    He receives Ancef 2g x1 in ED and is admitted for sepsis due to cellulitis of left buttock.   8/15/23: Left gluteal and thigh necrotizing fasciitis and   myositis, status post fasciectomy and excisional debridement with wound VAC   application.  8/17/23: Repeat incision and drainage by general surgery followed by wound VAC.       Interval Problem Update  -No overnight acute event reported by night  team.  Patient was seen and evaluated at bedside this AM.    -Significant improvement in pain and tenderness.  -Customizable prevena applied with no leaks detected. Refer to wound picture in media.  -Minimal drain in 2 of the 4 bulbs, well-healed wound and sutures.      I have discussed this patient's plan of care and discharge plan at IDT rounds today with Case Management, Nursing, Nursing leadership, and other members of the IDT team.    Consultants/Specialty  general surgery    Code Status  DNAR/DNI    Disposition  The patient is not medically cleared for discharge to home or a post-acute facility.      I have placed the appropriate orders for post-discharge needs.    Review of Systems  Review of Systems   Constitutional:  Negative for diaphoresis, fever and malaise/fatigue.   Eyes:  Negative for blurred vision and double vision.   Respiratory:  Negative for cough, hemoptysis and shortness of breath.    Cardiovascular:  Negative for chest pain and orthopnea.   Gastrointestinal:  Negative for heartburn, nausea and vomiting.   Neurological:  Negative for dizziness, focal weakness, weakness and headaches.        Physical Exam  Temp:  [36.6 °C (97.9 °F)-37 °C (98.6 °F)] 36.6 °C (97.9 °F)  Pulse:  [77-88] 77  Resp:  [14-17] 17  BP: (119-132)/(56-71) 119/56  SpO2:  [93 %-95 %] 94 %    Physical Exam  Constitutional:       General: He is not in acute distress.     Appearance: Normal appearance. He is not ill-appearing.   HENT:      Head: Normocephalic and atraumatic.      Right Ear: External ear normal.      Left Ear: External ear normal.      Mouth/Throat:      Mouth: Mucous membranes are moist.      Pharynx: Oropharynx is clear.   Eyes:      Extraocular Movements: Extraocular movements intact.      Pupils: Pupils are equal, round, and reactive to light.   Cardiovascular:      Rate and Rhythm: Normal rate and regular rhythm.      Pulses: Normal pulses.      Heart sounds: Normal heart sounds.   Pulmonary:      Effort:  Pulmonary effort is normal. No respiratory distress.   Abdominal:      General: Abdomen is flat. There is no distension.      Palpations: Abdomen is soft.      Tenderness: There is no abdominal tenderness.   Musculoskeletal:         General: Signs of injury present.      Right lower leg: No edema.      Left lower leg: No edema.      Comments: Wound vac left leg.  Correlate to clinical pictures in media   Neurological:      General: No focal deficit present.      Mental Status: He is alert and oriented to person, place, and time. Mental status is at baseline.   Psychiatric:         Mood and Affect: Mood normal.         Behavior: Behavior normal.         Thought Content: Thought content normal.         Judgment: Judgment normal.         Fluids    Intake/Output Summary (Last 24 hours) at 8/26/2023 1406  Last data filed at 8/26/2023 0804  Gross per 24 hour   Intake --   Output 1345 ml   Net -1345 ml         Laboratory  Recent Labs     08/25/23  0459   WBC 9.5   RBC 3.56*   HEMOGLOBIN 10.2*   HEMATOCRIT 32.4*   MCV 91.0   MCH 28.7   MCHC 31.5*   RDW 50.8*   PLATELETCT 737*   MPV 9.7       Recent Labs     08/25/23  0459   SODIUM 137   POTASSIUM 4.3   CHLORIDE 103   CO2 24   GLUCOSE 92   BUN 29*   CREATININE 0.94   CALCIUM 8.9                     Imaging  CT-EXTREMITY, LOWER WITH LEFT   Final Result      1.  Swelling and areas of decreased attenuation are identified within and throughout left gluteus musculature and extending down into the posterior compartment musculature in the left thigh. Consider infection or inflammation such as cellulitis or    myositis.      2.  Induration and subcutaneous fat and intramuscular fat planes is also noted consistent with cellulitis.      3.  No abscess or bone erosion identified.      CT-PELVIS WITH   Final Result      1.  Swelling and areas of decreased attenuation throughout the visualized left gluteus musculature are identified. Consider infection or inflammation such as myositis or  cellulitis.      2.  Induration and subcutaneous fat is identified consistent with cellulitis.      3.  No fluid collection that would suggest abscess is identified at this time. No soft tissue emphysema is noted.      DX-CHEST-PORTABLE (1 VIEW)   Final Result      No acute cardiac or pulmonary abnormalities are identified.      CT-PELVIS WITH   Final Result      1.  Subcutaneous inflammatory changes about the left buttock suspicious for cellulitis.      2.  Enlargement of left gluteal musculature consistent with myositis and/or inflammatory change.             Assessment/Plan  Problem Representation:    * Necrotizing fasciitis (HCC)- (present on admission)  Assessment & Plan  Secondary to Intramuscular inj of Testosterone in buttock and thigh x3 months    -S/p I&D of large abscess and necrotic tissue with wound vac placement 8/13 by Dr Castro.  See notes regarding further assessments and future care.  -continuing IV Zosyn per ID recs, discharged with oral Augmentin twice daily per ID recs  -WBC in normal range  -Pain management with ibuprofen and oxycodone 10, 15 for moderate pain.  Monitor for adverse effect of narcotic pain medications.  -Wound care ongoing.      Nontraumatic compartment syndrome of left lower extremity  Assessment & Plan  Secondary to necrotizing fasciitis, tense, tender upper thigh and lower leg  Status post incision and drainage.  Repeat surgical debridement 8/17/23  Well-healed wound and sutures minimal drains and SIERRA drain bulbs  Continue pain regimen as needed    Rhabdomyolysis  Assessment & Plan  Likely secondary to significant muscle injury in context of muscle abscess in context of other problems.  Possibly contributing to acute kidney injury.  CPK downtrending from approximately 2300.  Most recent CPK equals 171.  Creatinine, potassium normal, resolved at this time and no need for continuing to trend.      JUDY (acute kidney injury) ([facility])  Assessment & Plan  08/15/23    Likely prerenal  JUDY due to hypovolemia secondary to sepsis(distributive shock)  Renal function has significantly improved creatinine trending down back to baseline  Medication dose adjustment as per renal functions  Avoid nephrotoxins.  IV fluids  Continue to monitor closely.  8/17/2023 resolved      Sepsis due to cellulitis (HCC)- (present on admission)  Assessment & Plan  Sepsis secondary to testosterone injection in muscles:    -Empiric therapy for necrotizing fascitis be continued  -Follow-up with CBC, CMP. Adequete hydration, monitor for signs of endorgan dysfunction  -Resolved       Leukocytosis  Assessment & Plan  He found to have leukocytosis most likely secondary to deep tissue infection.  Resolved    Transaminitis  Assessment & Plan  Elevation is likely due to Rhabdo and not intrinsic hepatic  Significant downtrending  Follow-up with CMP    UTI (urinary tract infection)  Assessment & Plan  Urinalysis on 8/12/2023 was positive for nitrite, and a small amount of leukocyte Estrace.  Patient reports burning with initiation of urination.   No acute complaint at this time.  Asymptomatic-appears resolved at this time      Other constipation  Assessment & Plan  Bowel protocol    GERD (gastroesophageal reflux disease)  Assessment & Plan  Obesity likely a contributing factor.  Advised calorie restriction, regular exercise, dinner 4 hours before bedtime  Denies heartburn, chest pain, nausea  Continuing outpatient PPI.    Insomnia  Assessment & Plan  History of. On Zolpidem at home.  -Holding medication in context of significant pain management on board.    Acute respiratory failure with hypoxemia (HCC)- (present on admission)  Assessment & Plan  Likely secondary to sepsis and comorbid KACY     Currently he is off from oxygen and maintaining oxygen saturation.  Requiring while asleep for his KACY  O2 sat maintained on room air      Bilirubinemia  Assessment & Plan  Resolved.  Follow-up with CMP if jaundiced, pale stool, dark  urine.    Sleep apnea- (present on admission)  Assessment & Plan  - RT/OT protocol  Uses home CPAP at home  Monitor for polycythemia secondary to hypoxia      Obesity- (present on admission)  Assessment & Plan  BMI 33.44    - Counseling deferred to primary.  -Patient was counseled on decrease calorie intake  -Follow-up with lipid panel, A1c         VTE prophylaxis: SCDs/TEDs    I have performed a physical exam and reviewed and updated ROS and Plan today (8/26/2023). In review of yesterday's note (8/25/2023), there are no changes except as documented above.

## 2023-08-26 NOTE — PROGRESS NOTES
"     Orthopedic PA Progress Note    Interval changes:  Patient doing well.   LLE Prevena intact without leak  SIERRA drains still with serosanguineous drainage- output decreasing.   TTWB  Hopeful to remove remaining drains in next couple days dependent on output  If expedited discharge is pertinent, OK for home with HH or SNF for close outpatient follow up with drain removal at Dr. Castro' clinic   Otherwise, can closely observe patient while inpatient and remove drains as appropriate     ROS - Patient denies any new issues.  Denies any numbness or tingling. Pain well controlled.    /57   Pulse 82   Temp 36.7 °C (98.1 °F) (Temporal)   Resp 16   Ht 1.778 m (5' 10\")   Wt 98.2 kg (216 lb 7.9 oz)   SpO2 93%     Patient seen and examined  No acute distress  Breathing non labored  RRR  LLE: SIERRA drains with less than 100 cc total output last 24 hrs. Drains 1 and 4 with minimal output. Drains 2&3 with moderate. Serosanguineous fluid- no purulence noted. Wound vac intact without leak. Patient clearly fires tibialis anterior, EHL, and gastrocnemius/soleus. Sensation is intact to light touch throughout superficial peroneal, deep peroneal, tibial, saphenous, and sural nerve distributions. Strong and palpable 2+ dorsalis pedis and posterior tibial pulses with capillary refill less than 2 seconds.     Recent Labs     08/25/23  0459   WBC 9.5   RBC 3.56*   HEMOGLOBIN 10.2*   HEMATOCRIT 32.4*   MCV 91.0   MCH 28.7   MCHC 31.5*   RDW 50.8*   PLATELETCT 737*   MPV 9.7         Active Hospital Problems    Diagnosis     Leukocytosis [D72.829]     UTI (urinary tract infection) [N39.0]     Transaminitis [R74.01]     Nontraumatic compartment syndrome of left lower extremity [M79.A22]     JUDY (acute kidney injury) (HCC) [N17.9]     Rhabdomyolysis [M62.82]     Necrotizing fasciitis (HCC) [M72.6]      Reports 3-day history of left buttock pain after recent injection with testosterone at left buttock, which is tense, swollen, and " moderately tender. CT pelvis (8/10/2023) shows subcutaneous inflammatory changes with enlargement of left gluteal musculature.      Bilirubinemia [E80.6]      Mild. Likely due to sepsis. No evidence of anemia on labs.   - Direct bili ordered. Will monitor.       Acute respiratory failure with hypoxemia (HCC) [J96.01]      Mild diffuse rhonchi heard on exam. CXR shows peripheral Kerley B lines. NT-proBNP slightly elevated.   - IV Lasix 20mg x1 ordered; strict I/Os, daily standing weights ordered.   - RT/OT protocol; wean from oxygen as tolerated.   - Would consider TTE given likelihood of HFpEF in setting of KACY      Insomnia [G47.00]      History of. On Zolpidem at home.  - Resumed.      GERD (gastroesophageal reflux disease) [K21.9]      History of. On Pantoprazole at home.  - Resumed.       Other constipation [K59.09]     Sepsis due to cellulitis (HCC) [L03.90, A41.9]     Sleep apnea [G47.30]     Obesity [E66.9]        Assessment/Plan:  Patient doing well.   LLE Prevena intact without leak  SIERRA drains still with serosanguineous drainage- output decreasing.   TTWB  Hopeful to remove remaining drains in next couple days dependent on output  If expedited discharge is pertinent, OK for home with HH or SNF for close outpatient follow up with drain removal at Dr. Castro' clinic   Otherwise, can closely observe patient while inpatient and remove drains as appropriate     POD#9 S/p  1.  Excisional debridement of left leg wound including skin, subcutaneous   tissue and muscle of wound measuring 17x60 cm in maximum dimensions.  2.  Secondary closure of left leg wound measuring 60 cm in length.  3.  Application of wound VAC, less than 50 sq. cm (incisional) left thigh.  Wt bearing status - TTWB LLE  Wound care/Drains - Dressings to be changed  by wound team at 5-7 days postop  Future Procedures - None planned at this time  Lovenox: Start 8/12, Duration-until ambulatory > 150'  Sutures/Staples out- 14-21 days post  operatively. Removal will completed by ortho JAYLAN's unless transferred.  PT/OT-initiated  Antibiotics: Zosyn per ID   DVT Prophylaxis- TEDS/SCDs/Foot pumps  Barraza-not needed per ortho  Case Coordination for Discharge Planning - Disposition per therapy recs.

## 2023-08-27 PROCEDURE — A9270 NON-COVERED ITEM OR SERVICE: HCPCS

## 2023-08-27 PROCEDURE — 700105 HCHG RX REV CODE 258

## 2023-08-27 PROCEDURE — 700102 HCHG RX REV CODE 250 W/ 637 OVERRIDE(OP)

## 2023-08-27 PROCEDURE — 770001 HCHG ROOM/CARE - MED/SURG/GYN PRIV*

## 2023-08-27 PROCEDURE — 700111 HCHG RX REV CODE 636 W/ 250 OVERRIDE (IP): Mod: JZ

## 2023-08-27 PROCEDURE — A9270 NON-COVERED ITEM OR SERVICE: HCPCS | Performed by: PHYSICIAN ASSISTANT

## 2023-08-27 PROCEDURE — 700111 HCHG RX REV CODE 636 W/ 250 OVERRIDE (IP)

## 2023-08-27 PROCEDURE — 700102 HCHG RX REV CODE 250 W/ 637 OVERRIDE(OP): Performed by: PHYSICIAN ASSISTANT

## 2023-08-27 PROCEDURE — 99231 SBSQ HOSP IP/OBS SF/LOW 25: CPT | Mod: GC | Performed by: HOSPITALIST

## 2023-08-27 RX ORDER — CETIRIZINE HYDROCHLORIDE 10 MG/1
10 TABLET ORAL 2 TIMES DAILY
Status: DISCONTINUED | OUTPATIENT
Start: 2023-08-27 | End: 2023-08-29 | Stop reason: HOSPADM

## 2023-08-27 RX ADMIN — MORPHINE SULFATE 15 MG: 15 TABLET, FILM COATED, EXTENDED RELEASE ORAL at 05:49

## 2023-08-27 RX ADMIN — ACETAMINOPHEN 1000 MG: 500 TABLET, FILM COATED ORAL at 14:00

## 2023-08-27 RX ADMIN — HEPARIN SODIUM 5000 UNITS: 5000 INJECTION, SOLUTION INTRAVENOUS; SUBCUTANEOUS at 05:49

## 2023-08-27 RX ADMIN — OXYCODONE HYDROCHLORIDE 15 MG: 15 TABLET ORAL at 02:11

## 2023-08-27 RX ADMIN — NICOTINE 14 MG: 14 PATCH TRANSDERMAL at 05:48

## 2023-08-27 RX ADMIN — HEPARIN SODIUM 5000 UNITS: 5000 INJECTION, SOLUTION INTRAVENOUS; SUBCUTANEOUS at 21:56

## 2023-08-27 RX ADMIN — METAXALONE 400 MG: 800 TABLET ORAL at 05:48

## 2023-08-27 RX ADMIN — CETIRIZINE HYDROCHLORIDE 10 MG: 10 TABLET, FILM COATED ORAL at 05:48

## 2023-08-27 RX ADMIN — ACETAMINOPHEN 1000 MG: 500 TABLET, FILM COATED ORAL at 05:48

## 2023-08-27 RX ADMIN — METAXALONE 400 MG: 800 TABLET ORAL at 14:54

## 2023-08-27 RX ADMIN — METAXALONE 400 MG: 800 TABLET ORAL at 17:39

## 2023-08-27 RX ADMIN — NAPROXEN 500 MG: 500 TABLET ORAL at 05:49

## 2023-08-27 RX ADMIN — ZOLPIDEM TARTRATE 10 MG: 5 TABLET ORAL at 22:16

## 2023-08-27 RX ADMIN — ACETAMINOPHEN 1000 MG: 500 TABLET, FILM COATED ORAL at 17:39

## 2023-08-27 RX ADMIN — CETIRIZINE HYDROCHLORIDE 10 MG: 10 TABLET, FILM COATED ORAL at 17:39

## 2023-08-27 RX ADMIN — OXYCODONE HYDROCHLORIDE 15 MG: 15 TABLET ORAL at 05:49

## 2023-08-27 RX ADMIN — NAPROXEN 500 MG: 500 TABLET ORAL at 17:39

## 2023-08-27 RX ADMIN — HEPARIN SODIUM 5000 UNITS: 5000 INJECTION, SOLUTION INTRAVENOUS; SUBCUTANEOUS at 14:55

## 2023-08-27 RX ADMIN — PIPERACILLIN AND TAZOBACTAM 4.5 G: 4; .5 INJECTION, POWDER, FOR SOLUTION INTRAVENOUS at 06:01

## 2023-08-27 RX ADMIN — MORPHINE SULFATE 15 MG: 15 TABLET, FILM COATED, EXTENDED RELEASE ORAL at 17:39

## 2023-08-27 RX ADMIN — OMEPRAZOLE 20 MG: 20 CAPSULE, DELAYED RELEASE ORAL at 05:49

## 2023-08-27 RX ADMIN — OXYCODONE HYDROCHLORIDE 15 MG: 15 TABLET ORAL at 21:55

## 2023-08-27 RX ADMIN — OXYCODONE HYDROCHLORIDE 15 MG: 15 TABLET ORAL at 11:47

## 2023-08-27 RX ADMIN — OXYCODONE HYDROCHLORIDE 15 MG: 15 TABLET ORAL at 18:06

## 2023-08-27 RX ADMIN — OXYCODONE HYDROCHLORIDE 15 MG: 15 TABLET ORAL at 14:54

## 2023-08-27 RX ADMIN — AMOXICILLIN AND CLAVULANATE POTASSIUM 1 TABLET: 875; 125 TABLET, FILM COATED ORAL at 21:55

## 2023-08-27 ASSESSMENT — ENCOUNTER SYMPTOMS
DIZZINESS: 0
VOMITING: 0
BLURRED VISION: 0
HEADACHES: 0
COUGH: 0
FEVER: 0
DOUBLE VISION: 0
NAUSEA: 0
DIAPHORESIS: 0
FOCAL WEAKNESS: 0
HEARTBURN: 0
SHORTNESS OF BREATH: 0
WEAKNESS: 0
ORTHOPNEA: 0
HEMOPTYSIS: 0

## 2023-08-27 ASSESSMENT — PAIN DESCRIPTION - PAIN TYPE
TYPE: ACUTE PAIN;SURGICAL PAIN
TYPE: ACUTE PAIN;SURGICAL PAIN

## 2023-08-27 NOTE — CARE PLAN
Problem: Pain - Standard  Goal: Alleviation of pain or a reduction in pain to the patient’s comfort goal  Outcome: Progressing     Problem: Skin Integrity  Goal: Skin integrity is maintained or improved  Outcome: Progressing   The patient is Stable - Low risk of patient condition declining or worsening    Shift Goals  Clinical Goals: Pain<3/10, sleep comfortably  Patient Goals: sleep, pain control  Family Goals: not present    Progress made toward(s) clinical / shift goals: Pain assessed q4hrs, pain medication given as needed. Pain well controlled on current regiment. Comfort and low stimuli environment provided to allow for adequate sleep.    Patient is not progressing towards the following goals:

## 2023-08-27 NOTE — PROGRESS NOTES
[facility] Internal Medicine Daily Progress Note    Date of Service  8/27/2023    R Team: R IM Hill Team   Attending: CRISTIAN Lacy M.d.  Senior Resident: Dr. Joss Mendiola DO  Intern:  Dr. Frank wallace MD  Contact Number: [phone]    Chief Complaint  Abhishek Pacheco is a 53 y.o. male admitted 8/10/2023 with pain, swelling in left buttock radiating down to leg.    Hospital Course  . Abhishek Pacheco is a 53yoM with chronic migraine (on Sumtatriptan), obesity (BMI 31.70), KACY (on nightly CPAP; s/p uvuloplasty, 10/2011) who presented 8/10/2023 with 3-day history of worsening left buttock pain and edema which started 2 days after injecting himself with testosterone in same region. The pain is constant, rated 8/10, worse with movement, and minimally improved with Tramadol and Prednisone (prescribed by PCP). He also reports chills, headache, and night sweats.      Patient reports taking testosterone shots thrice monthly for last 2 years.     Admission findings:  Exam is notable for generalized discomfort, clammy skin, tachycardia (to 112 bpm), hypoxemia (84-85% on RA), bilateral crackles in middle lung fields. No tenderness to abdominal palpation. His left buttock is somewhat tense, swollen, non-erythematous, moderately tender at gluteal cleft.      Labs show severe leukocytosis (33.7 K/uL, 86.3% PMNs), mildly elevated total bilirubin. CT pelvis (8/10/2023) shows subcutaneous inflammatory changes with enlargement of left gluteal musculature. CXR (8/10/2023) shows mildly thickened interlobular septa.    He receives Ancef 2g x1 in ED and is admitted for sepsis due to cellulitis of left buttock.   8/15/23: Left gluteal and thigh necrotizing fasciitis and   myositis, status post fasciectomy and excisional debridement with wound VAC   application.  8/17/23: Repeat incision and drainage by general surgery followed by wound VAC.       Interval Problem Update  -No overnight acute event reported by night  team.  Patient was seen and evaluated at bedside this AM.    -Significant improvement in pain and tenderness.  -Customizable prevena applied with no leaks detected. Refer to wound picture in media.  -Minimal drain in all 4 bulbs, well-healed wound and sutures.  -Cetrizine for itching     I have discussed this patient's plan of care and discharge plan at IDT rounds today with Case Management, Nursing, Nursing leadership, and other members of the IDT team.    Consultants/Specialty  general surgery    Code Status  DNAR/DNI    Disposition  The patient is not medically cleared for discharge to home or a post-acute facility.      I have placed the appropriate orders for post-discharge needs.    Review of Systems  Review of Systems   Constitutional:  Negative for diaphoresis, fever and malaise/fatigue.   Eyes:  Negative for blurred vision and double vision.   Respiratory:  Negative for cough, hemoptysis and shortness of breath.    Cardiovascular:  Negative for chest pain and orthopnea.   Gastrointestinal:  Negative for heartburn, nausea and vomiting.   Skin:  Positive for itching.   Neurological:  Negative for dizziness, focal weakness, weakness and headaches.        Physical Exam  Temp:  [36.6 °C (97.9 °F)-36.7 °C (98.1 °F)] 36.7 °C (98 °F)  Pulse:  [74-77] 77  Resp:  [18] 18  BP: (108-122)/(57-70) 110/62  SpO2:  [93 %-95 %] 94 %    Physical Exam  Constitutional:       General: He is not in acute distress.     Appearance: Normal appearance. He is not ill-appearing.   HENT:      Head: Normocephalic and atraumatic.      Right Ear: External ear normal.      Left Ear: External ear normal.      Mouth/Throat:      Mouth: Mucous membranes are moist.      Pharynx: Oropharynx is clear.   Eyes:      Extraocular Movements: Extraocular movements intact.      Pupils: Pupils are equal, round, and reactive to light.   Cardiovascular:      Rate and Rhythm: Normal rate and regular rhythm.      Pulses: Normal pulses.      Heart sounds:  Normal heart sounds.   Pulmonary:      Effort: Pulmonary effort is normal. No respiratory distress.   Abdominal:      General: Abdomen is flat. There is no distension.      Palpations: Abdomen is soft.      Tenderness: There is no abdominal tenderness.   Musculoskeletal:         General: Signs of injury present.      Right lower leg: No edema.      Left lower leg: No edema.      Comments: Wound vac left leg.  Correlate to clinical pictures in media   Neurological:      General: No focal deficit present.      Mental Status: He is alert and oriented to person, place, and time. Mental status is at baseline.   Psychiatric:         Mood and Affect: Mood normal.         Behavior: Behavior normal.         Thought Content: Thought content normal.         Judgment: Judgment normal.         Fluids    Intake/Output Summary (Last 24 hours) at 8/27/2023 1509  Last data filed at 8/27/2023 1205  Gross per 24 hour   Intake --   Output 130 ml   Net -130 ml         Laboratory  Recent Labs     08/25/23  0459   WBC 9.5   RBC 3.56*   HEMOGLOBIN 10.2*   HEMATOCRIT 32.4*   MCV 91.0   MCH 28.7   MCHC 31.5*   RDW 50.8*   PLATELETCT 737*   MPV 9.7       Recent Labs     08/25/23  0459   SODIUM 137   POTASSIUM 4.3   CHLORIDE 103   CO2 24   GLUCOSE 92   BUN 29*   CREATININE 0.94   CALCIUM 8.9                     Imaging  CT-EXTREMITY, LOWER WITH LEFT   Final Result      1.  Swelling and areas of decreased attenuation are identified within and throughout left gluteus musculature and extending down into the posterior compartment musculature in the left thigh. Consider infection or inflammation such as cellulitis or    myositis.      2.  Induration and subcutaneous fat and intramuscular fat planes is also noted consistent with cellulitis.      3.  No abscess or bone erosion identified.      CT-PELVIS WITH   Final Result      1.  Swelling and areas of decreased attenuation throughout the visualized left gluteus musculature are identified. Consider  infection or inflammation such as myositis or cellulitis.      2.  Induration and subcutaneous fat is identified consistent with cellulitis.      3.  No fluid collection that would suggest abscess is identified at this time. No soft tissue emphysema is noted.      DX-CHEST-PORTABLE (1 VIEW)   Final Result      No acute cardiac or pulmonary abnormalities are identified.      CT-PELVIS WITH   Final Result      1.  Subcutaneous inflammatory changes about the left buttock suspicious for cellulitis.      2.  Enlargement of left gluteal musculature consistent with myositis and/or inflammatory change.             Assessment/Plan  Problem Representation:    * Necrotizing fasciitis (HCC)- (present on admission)  Assessment & Plan  Secondary to Intramuscular inj of Testosterone in buttock and thigh x3 months    -S/p I&D of large abscess and necrotic tissue with wound vac placement 8/13 by Dr Castro.  See notes regarding further assessments and future care.  -Switched to oral Augmentin twice daily per ID recs  -WBC in normal range  -Pain management with ibuprofen and oxycodone 10, 15 for moderate pain.  Monitor for adverse effect of narcotic pain medications.  -Wound care ongoing.      Nontraumatic compartment syndrome of left lower extremity  Assessment & Plan  Secondary to necrotizing fasciitis, tense, tender upper thigh and lower leg  Status post incision and drainage.  Repeat surgical debridement 8/17/23  Well-healed wound and sutures minimal drains and SIERRA drain bulbs  Continue pain regimen as needed    Rhabdomyolysis  Assessment & Plan  Likely secondary to significant muscle injury in context of muscle abscess in context of other problems.  Possibly contributing to acute kidney injury.  CPK downtrending from approximately 2300.  Most recent CPK equals 171.  Creatinine, potassium normal, resolved at this time and no need for continuing to trend.      JUDY (acute kidney injury) (HCC)  Assessment & Plan  08/15/23    Likely  prerenal JUDY due to hypovolemia secondary to sepsis(distributive shock)  Renal function has significantly improved creatinine trending down back to baseline  Medication dose adjustment as per renal functions  Avoid nephrotoxins.  IV fluids  Continue to monitor closely.  8/17/2023 resolved      Sepsis due to cellulitis (HCC)- (present on admission)  Assessment & Plan  Sepsis secondary to testosterone injection in muscles:    -Empiric therapy for necrotizing fascitis be continued  -Follow-up with CBC, CMP. Adequete hydration, monitor for signs of endorgan dysfunction  -Resolved       Leukocytosis  Assessment & Plan  He found to have leukocytosis most likely secondary to deep tissue infection.  Resolved    Transaminitis  Assessment & Plan  Elevation is likely due to Rhabdo and not intrinsic hepatic  Significant downtrending  Follow-up with CMP    UTI (urinary tract infection)  Assessment & Plan  Urinalysis on 8/12/2023 was positive for nitrite, and a small amount of leukocyte Estrace.  Patient reports burning with initiation of urination.   No acute complaint at this time.  Asymptomatic-appears resolved at this time      Other constipation  Assessment & Plan  Bowel protocol    GERD (gastroesophageal reflux disease)  Assessment & Plan  Obesity likely a contributing factor.  Advised calorie restriction, regular exercise, dinner 4 hours before bedtime  Denies heartburn, chest pain, nausea  Continuing outpatient PPI.    Insomnia  Assessment & Plan  History of. On Zolpidem at home.  -Holding medication in context of significant pain management on board.    Acute respiratory failure with hypoxemia (HCC)- (present on admission)  Assessment & Plan  Likely secondary to sepsis and comorbid KACY     Currently he is off from oxygen and maintaining oxygen saturation.  Requiring while asleep for his KACY  O2 sat maintained on room air      Bilirubinemia  Assessment & Plan  Resolved.  Follow-up with CMP if jaundiced, pale stool, dark  urine.    Sleep apnea- (present on admission)  Assessment & Plan  - RT/OT protocol  Uses home CPAP at home  Monitor for polycythemia secondary to hypoxia      Obesity- (present on admission)  Assessment & Plan  BMI 33.44    - Counseling deferred to primary.  -Patient was counseled on decrease calorie intake  -Follow-up with lipid panel, A1c         VTE prophylaxis: SCDs/TEDs    I have performed a physical exam and reviewed and updated ROS and Plan today (8/27/2023). In review of yesterday's note (8/26/2023), there are no changes except as documented above.

## 2023-08-27 NOTE — CARE PLAN
The patient is Stable - Low risk of patient condition declining or worsening    Shift Goals  Clinical Goals: pain control; mobility  Patient Goals: discharge plan; go home  Family Goals: not present    Progress made toward(s) clinical / shift goals:    Problem: Pain - Standard  Goal: Alleviation of pain or a reduction in pain to the patient’s comfort goal  Outcome: Progressing     Problem: Incision Care  Goal: Optimal post surgical incision care  Outcome: Progressing     Problem: Skin Integrity  Goal: Skin integrity is maintained or improved  Outcome: Progressing       Patient is not progressing towards the following goals:

## 2023-08-27 NOTE — PROGRESS NOTES
"     Orthopedic PA Progress Note    Interval changes:  Patient doing well.   LLE Prevena intact without leak  SIERRA drains 2&4 removed due to relatively dry from output standpoint  Possibly can remove drain #3 tomorrow, drain #1 still with 60 cc output last 24 hrs  If expedited discharge is pertinent, OK for home with HH or SNF for close outpatient follow up with drain removal at Dr. Castro' clinic   Otherwise, can closely observe patient while inpatient and remove drains as appropriate     ROS - Patient denies any new issues.  Denies any numbness or tingling. Pain well controlled.    /57   Pulse 74   Temp 36.7 °C (98.1 °F) (Temporal)   Resp 18   Ht 1.778 m (5' 10\")   Wt 98.2 kg (216 lb 7.9 oz)   SpO2 95%     Patient seen and examined  No acute distress  Breathing non labored  RRR  LLE: SIERRA drains with about 75 cc total output last 24 hrs. Drains 2 and 4 with minimal output- removed. Drain 1 with moderate and drain 3 with minimal. Serosanguineous fluid- no purulence noted. Wound vac intact without leak. Patient clearly fires tibialis anterior, EHL, and gastrocnemius/soleus. Sensation is intact to light touch throughout superficial peroneal, deep peroneal, tibial, saphenous, and sural nerve distributions. Strong and palpable 2+ dorsalis pedis and posterior tibial pulses with capillary refill less than 2 seconds.     Recent Labs     08/25/23  0459   WBC 9.5   RBC 3.56*   HEMOGLOBIN 10.2*   HEMATOCRIT 32.4*   MCV 91.0   MCH 28.7   MCHC 31.5*   RDW 50.8*   PLATELETCT 737*   MPV 9.7         Active Hospital Problems    Diagnosis     Leukocytosis [D72.829]     UTI (urinary tract infection) [N39.0]     Transaminitis [R74.01]     Nontraumatic compartment syndrome of left lower extremity [M79.A22]     JUDY (acute kidney injury) (HCC) [N17.9]     Rhabdomyolysis [M62.82]     Necrotizing fasciitis (HCC) [M72.6]      Reports 3-day history of left buttock pain after recent injection with testosterone at left buttock, which " is tense, swollen, and moderately tender. CT pelvis (8/10/2023) shows subcutaneous inflammatory changes with enlargement of left gluteal musculature.      Bilirubinemia [E80.6]      Mild. Likely due to sepsis. No evidence of anemia on labs.   - Direct bili ordered. Will monitor.       Acute respiratory failure with hypoxemia (HCC) [J96.01]      Mild diffuse rhonchi heard on exam. CXR shows peripheral Kerley B lines. NT-proBNP slightly elevated.   - IV Lasix 20mg x1 ordered; strict I/Os, daily standing weights ordered.   - RT/OT protocol; wean from oxygen as tolerated.   - Would consider TTE given likelihood of HFpEF in setting of KACY      Insomnia [G47.00]      History of. On Zolpidem at home.  - Resumed.      GERD (gastroesophageal reflux disease) [K21.9]      History of. On Pantoprazole at home.  - Resumed.       Other constipation [K59.09]     Sepsis due to cellulitis (HCC) [L03.90, A41.9]     Sleep apnea [G47.30]     Obesity [E66.9]        Assessment/Plan:  LLE Prevena intact without leak  SIERRA drains 2&4 removed due to relatively dry from output standpoint  Possibly can remove drain #3 tomorrow, drain #1 still with 60 cc output last 24 hrs  If expedited discharge is pertinent, OK for home with  or SNF for close outpatient follow up with drain removal at Dr. Castro' clinic   Otherwise, can closely observe patient while inpatient and remove drains as appropriate     POD#10 S/p  1.  Excisional debridement of left leg wound including skin, subcutaneous   tissue and muscle of wound measuring 17x60 cm in maximum dimensions.  2.  Secondary closure of left leg wound measuring 60 cm in length.  3.  Application of wound VAC, less than 50 sq. cm (incisional) left thigh.  Wt bearing status - TTWB LLE  Wound care/Drains - None at this time- prevena to remain in place until follow up   Future Procedures - None planned at this time  Lovenox: Start 8/12, Duration-until ambulatory > 150'  Sutures/Staples out- 14-21 days post  operatively. Removal will completed by ortho JAYLAN's unless transferred.  PT/OT-initiated  Antibiotics: Zosyn per ID   DVT Prophylaxis- TEDS/SCDs/Foot pumps  Barraza-not needed per ortho  Case Coordination for Discharge Planning - Disposition per therapy recs.

## 2023-08-28 LAB
ALBUMIN SERPL BCP-MCNC: 3.2 G/DL (ref 3.2–4.9)
BUN SERPL-MCNC: 20 MG/DL (ref 8–22)
CALCIUM ALBUM COR SERPL-MCNC: 8.9 MG/DL (ref 8.5–10.5)
CALCIUM SERPL-MCNC: 8.3 MG/DL (ref 8.5–10.5)
CHLORIDE SERPL-SCNC: 101 MMOL/L (ref 96–112)
CO2 SERPL-SCNC: 26 MMOL/L (ref 20–33)
CREAT SERPL-MCNC: 0.92 MG/DL (ref 0.5–1.4)
ERYTHROCYTE [DISTWIDTH] IN BLOOD BY AUTOMATED COUNT: 49.6 FL (ref 35.9–50)
GFR SERPLBLD CREATININE-BSD FMLA CKD-EPI: 100 ML/MIN/1.73 M 2
GLUCOSE SERPL-MCNC: 93 MG/DL (ref 65–99)
HCT VFR BLD AUTO: 31.3 % (ref 42–52)
HGB BLD-MCNC: 9.9 G/DL (ref 14–18)
MCH RBC QN AUTO: 28.9 PG (ref 27–33)
MCHC RBC AUTO-ENTMCNC: 31.6 G/DL (ref 32.3–36.5)
MCV RBC AUTO: 91.5 FL (ref 81.4–97.8)
PHOSPHATE SERPL-MCNC: 3.1 MG/DL (ref 2.5–4.5)
PLATELET # BLD AUTO: 627 K/UL (ref 164–446)
PMV BLD AUTO: 9.6 FL (ref 9–12.9)
POTASSIUM SERPL-SCNC: 4.2 MMOL/L (ref 3.6–5.5)
RBC # BLD AUTO: 3.42 M/UL (ref 4.7–6.1)
SODIUM SERPL-SCNC: 134 MMOL/L (ref 135–145)
WBC # BLD AUTO: 6.5 K/UL (ref 4.8–10.8)

## 2023-08-28 PROCEDURE — 700102 HCHG RX REV CODE 250 W/ 637 OVERRIDE(OP)

## 2023-08-28 PROCEDURE — 700111 HCHG RX REV CODE 636 W/ 250 OVERRIDE (IP)

## 2023-08-28 PROCEDURE — 85027 COMPLETE CBC AUTOMATED: CPT

## 2023-08-28 PROCEDURE — 770001 HCHG ROOM/CARE - MED/SURG/GYN PRIV*

## 2023-08-28 PROCEDURE — A9270 NON-COVERED ITEM OR SERVICE: HCPCS

## 2023-08-28 PROCEDURE — 80069 RENAL FUNCTION PANEL: CPT

## 2023-08-28 PROCEDURE — 700102 HCHG RX REV CODE 250 W/ 637 OVERRIDE(OP): Performed by: PHYSICIAN ASSISTANT

## 2023-08-28 PROCEDURE — A9270 NON-COVERED ITEM OR SERVICE: HCPCS | Performed by: PHYSICIAN ASSISTANT

## 2023-08-28 PROCEDURE — 99231 SBSQ HOSP IP/OBS SF/LOW 25: CPT | Mod: GC | Performed by: HOSPITALIST

## 2023-08-28 RX ADMIN — OXYCODONE HYDROCHLORIDE 15 MG: 15 TABLET ORAL at 01:30

## 2023-08-28 RX ADMIN — OXYCODONE HYDROCHLORIDE 15 MG: 15 TABLET ORAL at 17:08

## 2023-08-28 RX ADMIN — CETIRIZINE HYDROCHLORIDE 10 MG: 10 TABLET, FILM COATED ORAL at 17:21

## 2023-08-28 RX ADMIN — NICOTINE 14 MG: 14 PATCH TRANSDERMAL at 05:07

## 2023-08-28 RX ADMIN — METAXALONE 400 MG: 800 TABLET ORAL at 17:21

## 2023-08-28 RX ADMIN — HEPARIN SODIUM 5000 UNITS: 5000 INJECTION, SOLUTION INTRAVENOUS; SUBCUTANEOUS at 20:33

## 2023-08-28 RX ADMIN — METAXALONE 400 MG: 800 TABLET ORAL at 12:06

## 2023-08-28 RX ADMIN — NAPROXEN 500 MG: 500 TABLET ORAL at 17:21

## 2023-08-28 RX ADMIN — ACETAMINOPHEN 1000 MG: 500 TABLET, FILM COATED ORAL at 12:06

## 2023-08-28 RX ADMIN — ZOLPIDEM TARTRATE 10 MG: 5 TABLET ORAL at 20:33

## 2023-08-28 RX ADMIN — OXYCODONE HYDROCHLORIDE 15 MG: 15 TABLET ORAL at 05:06

## 2023-08-28 RX ADMIN — OMEPRAZOLE 20 MG: 20 CAPSULE, DELAYED RELEASE ORAL at 05:05

## 2023-08-28 RX ADMIN — CETIRIZINE HYDROCHLORIDE 10 MG: 10 TABLET, FILM COATED ORAL at 05:06

## 2023-08-28 RX ADMIN — MORPHINE SULFATE 15 MG: 15 TABLET, FILM COATED, EXTENDED RELEASE ORAL at 05:06

## 2023-08-28 RX ADMIN — METAXALONE 400 MG: 800 TABLET ORAL at 05:04

## 2023-08-28 RX ADMIN — OXYCODONE HYDROCHLORIDE 15 MG: 15 TABLET ORAL at 13:38

## 2023-08-28 RX ADMIN — HEPARIN SODIUM 5000 UNITS: 5000 INJECTION, SOLUTION INTRAVENOUS; SUBCUTANEOUS at 13:39

## 2023-08-28 RX ADMIN — ACETAMINOPHEN 1000 MG: 500 TABLET, FILM COATED ORAL at 05:05

## 2023-08-28 RX ADMIN — AMOXICILLIN AND CLAVULANATE POTASSIUM 1 TABLET: 875; 125 TABLET, FILM COATED ORAL at 17:21

## 2023-08-28 RX ADMIN — ACETAMINOPHEN 1000 MG: 500 TABLET, FILM COATED ORAL at 17:21

## 2023-08-28 RX ADMIN — OXYCODONE HYDROCHLORIDE 15 MG: 15 TABLET ORAL at 20:33

## 2023-08-28 RX ADMIN — AMOXICILLIN AND CLAVULANATE POTASSIUM 1 TABLET: 875; 125 TABLET, FILM COATED ORAL at 05:05

## 2023-08-28 RX ADMIN — OXYCODONE HYDROCHLORIDE 15 MG: 15 TABLET ORAL at 23:53

## 2023-08-28 RX ADMIN — HEPARIN SODIUM 5000 UNITS: 5000 INJECTION, SOLUTION INTRAVENOUS; SUBCUTANEOUS at 05:09

## 2023-08-28 RX ADMIN — NAPROXEN 500 MG: 500 TABLET ORAL at 05:05

## 2023-08-28 RX ADMIN — MORPHINE SULFATE 15 MG: 15 TABLET, FILM COATED, EXTENDED RELEASE ORAL at 17:21

## 2023-08-28 RX ADMIN — OXYCODONE HYDROCHLORIDE 15 MG: 15 TABLET ORAL at 08:37

## 2023-08-28 ASSESSMENT — ENCOUNTER SYMPTOMS
HEADACHES: 0
WEAKNESS: 0
VOMITING: 0
SHORTNESS OF BREATH: 0
DOUBLE VISION: 0
FEVER: 0
BLURRED VISION: 0
ORTHOPNEA: 0
NAUSEA: 0
COUGH: 0
DIAPHORESIS: 0
HEARTBURN: 0
DIZZINESS: 0
FOCAL WEAKNESS: 0
HEMOPTYSIS: 0

## 2023-08-28 ASSESSMENT — PAIN DESCRIPTION - PAIN TYPE
TYPE: ACUTE PAIN
TYPE: ACUTE PAIN;SURGICAL PAIN
TYPE: ACUTE PAIN

## 2023-08-28 NOTE — DISCHARGE PLANNING
HTH/SCP TCN chart review completed. Noted patient seen by OT 8/25/2023 with recommendations updated to anticipate that the patient will have no further occupational therapy needs after discharge from the hospital. Voalte sent to URIEL Ayala to collaborate as needed.     Per chart review, noted current discharge considerations are discharge to home with HH and FWW. Appreciate CM following for HH acceptance at [facility] at this time (per previous notes, patient was declined by [facility] secondary to capacity). TCN will continue to follow and collaborate with discharge planning team as additional post acute needs arise. Thank you.    Completed  PT with recommendations fas above for HH with FWW  OT with recs anticipating patient with no further occupational therapy needs after discharge from the hospital on 8/25/2023  Choice obtained: IRF, SNF, HH, DME (FWW)  [facility] referral not sent

## 2023-08-28 NOTE — DISCHARGE PLANNING
Case Management Discharge Planning    Admission Date: 8/10/2023  GMLOS: 9  ALOS: 18    6-Clicks ADL Score: 19  6-Clicks Mobility Score: 18      Anticipated Discharge Dispo: Discharge Disposition: D/T to home under [facility]HA care in anticipation of covered skilled care (06)    DME Needed: No    Action(s) Taken: OTHER    LSW completed chart review. Patient is not medically cleared to discharge home with home health. Patient pending acceptance to [facility]  (need orders). LSW spoke with [facility]  to provide an update.     No other CM needs identified at this time.     Escalations Completed: None    Medically Clear: No    Next Steps: Pending medical clearance     Barriers to Discharge: Medical clearance    Is the patient up for discharge tomorrow: No

## 2023-08-28 NOTE — PROGRESS NOTES
"     Orthopedic PA Progress Note    Interval changes:  Patient doing well.   LLE Prevena intact without leak  SIERRA drains 1&3 remain with about 60 cc total  If expedited discharge is pertinent, OK for home with HH or SNF for close outpatient follow up with drain removal at Dr. Castro' clinic     ROS - Patient denies any new issues.  Denies any numbness or tingling. Pain well controlled.    /60   Pulse 78   Temp 36.5 °C (97.7 °F) (Temporal)   Resp 18   Ht 1.778 m (5' 10\")   Wt 98.2 kg (216 lb 7.9 oz)   SpO2 93%     Patient seen and examined  No acute distress  Breathing non labored  RRR  LLE: SIERRA drains with about 60 cc total output last 24 hrs. Drain 1 with moderate and drain 3 with minimal. Serosanguineous fluid- no purulence noted. Wound vac intact without leak. Patient clearly fires tibialis anterior, EHL, and gastrocnemius/soleus. Sensation is intact to light touch throughout superficial peroneal, deep peroneal, tibial, saphenous, and sural nerve distributions. Strong and palpable 2+ dorsalis pedis and posterior tibial pulses with capillary refill less than 2 seconds.     Recent Labs     08/28/23  0406   WBC 6.5   RBC 3.42*   HEMOGLOBIN 9.9*   HEMATOCRIT 31.3*   MCV 91.5   MCH 28.9   MCHC 31.6*   RDW 49.6   PLATELETCT 627*   MPV 9.6         Active Hospital Problems    Diagnosis     Leukocytosis [D72.829]     UTI (urinary tract infection) [N39.0]     Transaminitis [R74.01]     Nontraumatic compartment syndrome of left lower extremity [M79.A22]     JUDY (acute kidney injury) (HCC) [N17.9]     Rhabdomyolysis [M62.82]     Necrotizing fasciitis (HCC) [M72.6]      Reports 3-day history of left buttock pain after recent injection with testosterone at left buttock, which is tense, swollen, and moderately tender. CT pelvis (8/10/2023) shows subcutaneous inflammatory changes with enlargement of left gluteal musculature.      Bilirubinemia [E80.6]      Mild. Likely due to sepsis. No evidence of anemia on labs.   - " Direct bili ordered. Will monitor.       Acute respiratory failure with hypoxemia (HCC) [J96.01]      Mild diffuse rhonchi heard on exam. CXR shows peripheral Kerley B lines. NT-proBNP slightly elevated.   - IV Lasix 20mg x1 ordered; strict I/Os, daily standing weights ordered.   - RT/OT protocol; wean from oxygen as tolerated.   - Would consider TTE given likelihood of HFpEF in setting of KACY      Insomnia [G47.00]      History of. On Zolpidem at home.  - Resumed.      GERD (gastroesophageal reflux disease) [K21.9]      History of. On Pantoprazole at home.  - Resumed.       Other constipation [K59.09]     Sepsis due to cellulitis (HCC) [L03.90, A41.9]     Sleep apnea [G47.30]     Obesity [E66.9]        Assessment/Plan:  Patient doing well.   LLE Prevena intact without leak  SIERRA drains 1&3 remain with about 60 cc total  If expedited discharge is pertinent, OK for home with HH or SNF for close outpatient follow up with drain removal at Dr. Castro' clinic     POD#11 S/p  1.  Excisional debridement of left leg wound including skin, subcutaneous   tissue and muscle of wound measuring 17x60 cm in maximum dimensions.  2.  Secondary closure of left leg wound measuring 60 cm in length.  3.  Application of wound VAC, less than 50 sq. cm (incisional) left thigh.  Wt bearing status - TTWB LLE  Wound care/Drains - None at this time- prevena to remain in place until follow up   Future Procedures - None planned at this time  Lovenox: Start 8/12, Duration-until ambulatory > 150'  Sutures/Staples out- 14-21 days post operatively. Removal will completed by ortho JAYLAN's unless transferred.  PT/OT-initiated  Antibiotics: Zosyn per ID   DVT Prophylaxis- TEDS/SCDs/Foot pumps  Barraza-not needed per ortho  Case Coordination for Discharge Planning - Disposition per therapy recs.

## 2023-08-28 NOTE — CARE PLAN
The patient is Stable - Low risk of patient condition declining or worsening    Problem: Pain - Standard  Goal: Alleviation of pain or a reduction in pain to the patient’s comfort goal  Outcome: Progressing  Note: Pain will be maintained at a tolerable level using medication as per MAR and other methods such as repositioning. Patient has been instructed in the use of pain scales and is encouraged to voice any concerns related to pain or discomfort.      Problem: Skin Integrity  Goal: Skin integrity is maintained or improved  Outcome: Progressing  Note: Patient's skin integrity will be maintained. Areas at high risk for skin breakdown have been assessed. Patient repositions and mobilizes frequently.      Shift Goals  Clinical Goals: Pain control, monitor drains  Patient Goals: Pain control, comfort  Family Goals: Not present    Progress made toward(s) clinical / shift goals:  pain control addressed    Patient is not progressing towards the following goals:

## 2023-08-28 NOTE — FACE TO FACE
Face to Face Supporting Documentation - Home Health    The encounter with this patient was in whole or in part the primary reason for home health admission.    Date of encounter:   Patient:                    MRN:                       YOB: 2022  Abhishek Pacheco  [id number]  1970     Home health to see patient for:  Skilled Nursing care for assessment, interventions & education, Wound Care, and Physical Therapy evaluation and treatment    Skilled need for:  Comment: Large wound secondary to necrotizing fasciitis    Skilled nursing interventions to include:  Wound Care    Homebound status evidenced by:  Need the aid of supportive devices such as crutches, canes, wheelchairs or walkers, Require the use of special transportation, or Needs the assistance of another person in order to leave the home. Leaving home requires a considerable and taxing effort. There is a normal inability to leave the home.    Community Physician to provide follow up care: Calos Valle M.D.     Optional Interventions? No      I certify the face to face encounter for this home health care referral meets the CMS requirements and the encounter/clinical assessment with the patient was, in whole, or in part, for the medical condition(s) listed above, which is the primary reason for home health care. Based on my clinical findings: the service(s) are medically necessary, support the need for home health care, and the homebound criteria are met.  I certify that this patient has had a face to face encounter by myself.  Jacqueline Gilbert M.D. - NPI: [id number]

## 2023-08-28 NOTE — PROGRESS NOTES
Assumed care of patient. Assessment performed. Patient is mobile and steady. Has two SIERRA drains held in place with ACE wrap round thigh. Pain control concerns addressed. Belongings and call light within reach.

## 2023-08-28 NOTE — CARE PLAN
Problem: Pain - Standard  Goal: Alleviation of pain or a reduction in pain to the patient’s comfort goal  Outcome: Progressing     Problem: Skin Integrity  Goal: Skin integrity is maintained or improved  Outcome: Progressing   The patient is Stable - Low risk of patient condition declining or worsening    Shift Goals  Clinical Goals: SIERRA output  Patient Goals: pain control  Family Goals: Not present    Progress made toward(s) clinical / shift goals:  pt medicated per MAR for pain.  Skin assessment complete.    Patient is not progressing towards the following goals:

## 2023-08-28 NOTE — PROGRESS NOTES
Pt is oriented x 4 with complaints of pain.  Medicated per MAR.  SIERRA x 2 and previna in place.  Call light within reach.

## 2023-08-29 ENCOUNTER — PHARMACY VISIT (OUTPATIENT)
Dept: PHARMACY | Facility: MEDICAL CENTER | Age: 53
End: 2023-08-29
Payer: COMMERCIAL

## 2023-08-29 ENCOUNTER — PATIENT OUTREACH (OUTPATIENT)
Dept: SCHEDULING | Facility: IMAGING CENTER | Age: 53
End: 2023-08-29
Payer: COMMERCIAL

## 2023-08-29 VITALS
HEIGHT: 70 IN | RESPIRATION RATE: 18 BRPM | WEIGHT: 216.49 LBS | BODY MASS INDEX: 30.99 KG/M2 | HEART RATE: 85 BPM | SYSTOLIC BLOOD PRESSURE: 121 MMHG | OXYGEN SATURATION: 95 % | TEMPERATURE: 97.9 F | DIASTOLIC BLOOD PRESSURE: 58 MMHG

## 2023-08-29 LAB
BASOPHILS # BLD AUTO: 0.9 % (ref 0–1.8)
BASOPHILS # BLD: 0.06 K/UL (ref 0–0.12)
EOSINOPHIL # BLD AUTO: 0.07 K/UL (ref 0–0.51)
EOSINOPHIL NFR BLD: 1.1 % (ref 0–6.9)
ERYTHROCYTE [DISTWIDTH] IN BLOOD BY AUTOMATED COUNT: 48.3 FL (ref 35.9–50)
HCT VFR BLD AUTO: 30.2 % (ref 42–52)
HGB BLD-MCNC: 9.8 G/DL (ref 14–18)
IMM GRANULOCYTES # BLD AUTO: 0.06 K/UL (ref 0–0.11)
IMM GRANULOCYTES NFR BLD AUTO: 0.9 % (ref 0–0.9)
LYMPHOCYTES # BLD AUTO: 2.53 K/UL (ref 1–4.8)
LYMPHOCYTES NFR BLD: 39 % (ref 22–41)
MCH RBC QN AUTO: 29.1 PG (ref 27–33)
MCHC RBC AUTO-ENTMCNC: 32.5 G/DL (ref 32.3–36.5)
MCV RBC AUTO: 89.6 FL (ref 81.4–97.8)
MONOCYTES # BLD AUTO: 0.61 K/UL (ref 0–0.85)
MONOCYTES NFR BLD AUTO: 9.4 % (ref 0–13.4)
NEUTROPHILS # BLD AUTO: 3.16 K/UL (ref 1.82–7.42)
NEUTROPHILS NFR BLD: 48.7 % (ref 44–72)
NRBC # BLD AUTO: 0 K/UL
NRBC BLD-RTO: 0 /100 WBC (ref 0–0.2)
PLATELET # BLD AUTO: 570 K/UL (ref 164–446)
PMV BLD AUTO: 9.3 FL (ref 9–12.9)
RBC # BLD AUTO: 3.37 M/UL (ref 4.7–6.1)
WBC # BLD AUTO: 6.5 K/UL (ref 4.8–10.8)

## 2023-08-29 PROCEDURE — A9270 NON-COVERED ITEM OR SERVICE: HCPCS

## 2023-08-29 PROCEDURE — 700102 HCHG RX REV CODE 250 W/ 637 OVERRIDE(OP): Performed by: PHYSICIAN ASSISTANT

## 2023-08-29 PROCEDURE — 85025 COMPLETE CBC W/AUTO DIFF WBC: CPT

## 2023-08-29 PROCEDURE — RXMED WILLOW AMBULATORY MEDICATION CHARGE

## 2023-08-29 PROCEDURE — 700102 HCHG RX REV CODE 250 W/ 637 OVERRIDE(OP)

## 2023-08-29 PROCEDURE — 99239 HOSP IP/OBS DSCHRG MGMT >30: CPT | Mod: GC | Performed by: HOSPITALIST

## 2023-08-29 PROCEDURE — A9270 NON-COVERED ITEM OR SERVICE: HCPCS | Performed by: PHYSICIAN ASSISTANT

## 2023-08-29 RX ORDER — POLYETHYLENE GLYCOL 3350 17 G/17G
17 POWDER, FOR SOLUTION ORAL 2 TIMES DAILY
Qty: 30 EACH | Refills: 0 | Status: SHIPPED | OUTPATIENT
Start: 2023-08-29 | End: 2023-09-13

## 2023-08-29 RX ORDER — OMEPRAZOLE 20 MG/1
20 CAPSULE, DELAYED RELEASE ORAL
Qty: 30 CAPSULE | Refills: 0 | Status: SHIPPED | OUTPATIENT
Start: 2023-08-29 | End: 2023-09-28

## 2023-08-29 RX ORDER — METAXALONE 800 MG/1
400 TABLET ORAL 2 TIMES DAILY
Qty: 15 TABLET | Refills: 0 | Status: SHIPPED | OUTPATIENT
Start: 2023-08-29 | End: 2023-09-13

## 2023-08-29 RX ORDER — AMOXICILLIN AND CLAVULANATE POTASSIUM 875; 125 MG/1; MG/1
1 TABLET, FILM COATED ORAL EVERY 12 HOURS
Qty: 30 TABLET | Refills: 0 | Status: ACTIVE | OUTPATIENT
Start: 2023-08-29 | End: 2023-09-13

## 2023-08-29 RX ORDER — AMOXICILLIN 250 MG
1 CAPSULE ORAL 2 TIMES DAILY
Qty: 30 TABLET | Refills: 0 | Status: SHIPPED | OUTPATIENT
Start: 2023-08-29 | End: 2023-09-13

## 2023-08-29 RX ORDER — NALOXONE HYDROCHLORIDE 4 MG/.1ML
1 SPRAY NASAL PRN
Qty: 2 EACH | Refills: 0 | Status: ON HOLD | OUTPATIENT
Start: 2023-08-29 | End: 2023-12-14

## 2023-08-29 RX ORDER — MORPHINE SULFATE 15 MG/1
15 TABLET, FILM COATED, EXTENDED RELEASE ORAL EVERY 12 HOURS
Qty: 60 TABLET | Refills: 0 | Status: CANCELLED | OUTPATIENT
Start: 2023-08-29

## 2023-08-29 RX ORDER — OXYCODONE HYDROCHLORIDE 5 MG/1
5-10 TABLET ORAL EVERY 6 HOURS PRN
Qty: 60 TABLET | Refills: 0 | Status: SHIPPED | OUTPATIENT
Start: 2023-08-29 | End: 2023-09-08

## 2023-08-29 RX ORDER — ZOLPIDEM TARTRATE 5 MG/1
5 TABLET ORAL NIGHTLY PRN
Qty: 10 TABLET | Refills: 0 | Status: SHIPPED | OUTPATIENT
Start: 2023-08-29 | End: 2023-09-08

## 2023-08-29 RX ORDER — MORPHINE SULFATE 15 MG/1
15 TABLET, FILM COATED, EXTENDED RELEASE ORAL EVERY 12 HOURS
Qty: 20 TABLET | Refills: 0 | Status: SHIPPED | OUTPATIENT
Start: 2023-08-29 | End: 2023-09-08

## 2023-08-29 RX ORDER — PSEUDOEPHED/ACETAMINOPH/DIPHEN 30MG-500MG
500-1000 TABLET ORAL EVERY 6 HOURS PRN
Qty: 30 TABLET | Refills: 0 | Status: SHIPPED | OUTPATIENT
Start: 2023-08-29 | End: 2023-09-08

## 2023-08-29 RX ADMIN — OXYCODONE HYDROCHLORIDE 15 MG: 15 TABLET ORAL at 03:07

## 2023-08-29 RX ADMIN — METAXALONE 400 MG: 800 TABLET ORAL at 04:33

## 2023-08-29 RX ADMIN — MORPHINE SULFATE 15 MG: 15 TABLET, FILM COATED, EXTENDED RELEASE ORAL at 04:33

## 2023-08-29 RX ADMIN — OMEPRAZOLE 20 MG: 20 CAPSULE, DELAYED RELEASE ORAL at 04:32

## 2023-08-29 RX ADMIN — RIVAROXABAN 10 MG: 10 TABLET, FILM COATED ORAL at 05:09

## 2023-08-29 RX ADMIN — Medication 1 APPLICATOR: at 04:32

## 2023-08-29 RX ADMIN — AMOXICILLIN AND CLAVULANATE POTASSIUM 1 TABLET: 875; 125 TABLET, FILM COATED ORAL at 04:33

## 2023-08-29 RX ADMIN — NICOTINE 14 MG: 14 PATCH TRANSDERMAL at 04:33

## 2023-08-29 RX ADMIN — OXYCODONE HYDROCHLORIDE 15 MG: 15 TABLET ORAL at 10:25

## 2023-08-29 RX ADMIN — CETIRIZINE HYDROCHLORIDE 10 MG: 10 TABLET, FILM COATED ORAL at 04:33

## 2023-08-29 RX ADMIN — NAPROXEN 500 MG: 500 TABLET ORAL at 04:33

## 2023-08-29 RX ADMIN — ACETAMINOPHEN 1000 MG: 500 TABLET, FILM COATED ORAL at 04:33

## 2023-08-29 ASSESSMENT — PAIN DESCRIPTION - PAIN TYPE
TYPE: SURGICAL PAIN
TYPE: SURGICAL PAIN

## 2023-08-29 NOTE — DISCHARGE PLANNING
Case Management Discharge Planning    Admission Date: 8/10/2023  GMLOS: 9  ALOS: 19    6-Clicks ADL Score: 19  6-Clicks Mobility Score: 18      Anticipated Discharge Dispo: Discharge Disposition: D/T to home under [facility] care in anticipation of covered skilled care (06)    DME Needed: Yes    DME Ordered: Yes    Action(s) Taken: OTHER    Pt discussed in IDT rounds. Patient cleared to dc home with home health. Patient has been accepted by [facility]. Per resident Jacqueline Gilbert MD, patient can dc home with drains in place with close outpatient follow up. Patient will need walker. DME choice scanned in the chart. Bedside RN ordered walker and had it delivered to bedside.       No other CM needs identified at this time.     Escalations Completed: None    Medically Clear: Yes

## 2023-08-29 NOTE — DISCHARGE INSTRUCTIONS
Dear Mr. Pacheco, you were admitted due to an infection in your leg that was surgically drained. You have 2 drains still in place and need to see orthopedics surgeon in a week to discuss removal of the drains. You will need to continue taking the antibiotics called Augmentin twice daily till all the drains are out. We have prescribed 15 days of the antibiotics for now, but you may need additional pills if the drains aren't removed by then. Please discuss this with your surgeon at the follow up visit.     You will need to see your primary care provider within a week from now for further refills on your pain medications.     We have set up home health care to help you with managing the drain and wounds.

## 2023-08-29 NOTE — CARE PLAN
The patient is Stable - Low risk of patient condition declining or worsening    Shift Goals  Clinical Goals: pain mgmt, drain output  Patient Goals: pain control, rest, comfort  Family Goals: not present    Progress made toward(s) clinical / shift goals:        Problem: Pain - Standard  Goal: Alleviation of pain or a reduction in pain to the patient’s comfort goal  Outcome: Progressing     Problem: Respiratory  Goal: Patient will achieve/maintain optimum respiratory ventilation and gas exchange  Outcome: Progressing     Problem: Hemodynamics  Goal: Patient's hemodynamics, fluid balance and neurologic status will be stable or improve  Outcome: Progressing     Problem: Incision Care  Goal: Optimal post surgical incision care  Outcome: Progressing       Patient is not progressing towards the following goals:

## 2023-08-29 NOTE — PROGRESS NOTES
Discharge order received from MD. Patient agreeable to discharge from [facility]. Patient dressed in personal clothing, all personal belongings present and accounted for. IV removed with tip intact. Patient discharging with two SIERRA drains and Prevena per MD. Patient transported off unit @11:30.

## 2023-08-29 NOTE — DISCHARGE PLANNING
HTH/SCP TCN chart review completed. Collaborated with SAM Henderson. Current discharge considerations are home with [facility], and F[facility].  Noted patient ambulated 2 x 20 feet with [facility], and Walker delivered to patients room per Orthopedic Tech's note at 10:26.     TCN will continue to follow and collaborate with discharge planning team as additional post acute needs arise. Thank you.    Completed:  PT with recommendations fas above for HH and [facility] - 8/24  OT with recs anticipating patient with no further occupational therapy needs after discharge from the hospital on 8/25/2023  Choice obtained: IRF, SNF, HH, DME (FWW)  [facility] referral not sent

## 2023-08-29 NOTE — PROGRESS NOTES
[facility] Internal Medicine Daily Progress Note    Date of Service  8/28/2023    R Team: R IM Alejandre Team   Attending: CRISTIAN Lacy M.d.  Senior Resident: Dr. Jacqueline Gilbert  Intern:  Dr. Kaylee Liu  Contact Number: [phone]    Chief Complaint  Abhishek Pacheco is a 53 y.o. male admitted 8/10/2023 with pain, swelling in left buttock radiating down to leg.    Hospital Course  Mr. Abhishek Pacheco is a 53yoM with chronic migraine (on Sumtatriptan), obesity (BMI 31.70), KACY (on nightly CPAP; s/p uvuloplasty, 10/2011) who presented 8/10/2023 with 3-day history of worsening left buttock pain and edema which started 2 days after injecting himself with testosterone in same region. The pain is constant, rated 8/10, worse with movement, and minimally improved with Tramadol and Prednisone (prescribed by PCP). He also reports chills, headache, and night sweats.      Patient reports taking testosterone shots thrice monthly for last 2 years.     Admission findings:  Exam is notable for generalized discomfort, clammy skin, tachycardia (to 112 bpm), hypoxemia (84-85% on RA), bilateral crackles in middle lung fields. No tenderness to abdominal palpation. His left buttock is somewhat tense, swollen, non-erythematous, moderately tender at gluteal cleft.      Labs show severe leukocytosis (33.7 K/uL, 86.3% PMNs), mildly elevated total bilirubin. CT pelvis (8/10/2023) shows subcutaneous inflammatory changes with enlargement of left gluteal musculature. CXR (8/10/2023) shows mildly thickened interlobular septa.    He receives Ancef 2g x1 in ED and is admitted for sepsis due to cellulitis of left buttock.   8/15/23: Left gluteal and thigh necrotizing fasciitis and   myositis, status post fasciectomy and excisional debridement with wound VAC   application.  8/17/23: Repeat incision and drainage by general surgery followed by wound VAC.       Interval Problem Update  -No overnight acute event reported by night team.   Patient was seen and evaluated at bedside this AM.    -Significant improvement in pain and tenderness.  Able to ambulate with rollator as needed  -Customizable prevena applied with no leaks detected. Refer to wound picture in media.  -SIERRA drains 2 and 4 removed  -Per ID recs Zosyn discontinued and started Augmentin  -Cetrizine for itching     I have discussed this patient's plan of care and discharge plan at IDT rounds today with Case Management, Nursing, Nursing leadership, and other members of the IDT team.    Consultants/Specialty  general surgery    Code Status  DNAR/DNI    Disposition  Medically Cleared  I have placed the appropriate orders for post-discharge needs.    Review of Systems  Review of Systems   Constitutional:  Negative for diaphoresis, fever and malaise/fatigue.   Eyes:  Negative for blurred vision and double vision.   Respiratory:  Negative for cough, hemoptysis and shortness of breath.    Cardiovascular:  Negative for chest pain and orthopnea.   Gastrointestinal:  Negative for heartburn, nausea and vomiting.   Skin:  Negative for itching.   Neurological:  Negative for dizziness, focal weakness, weakness and headaches.        Physical Exam  Temp:  [36.5 °C (97.7 °F)-36.8 °C (98.2 °F)] 36.8 °C (98.2 °F)  Pulse:  [78-83] 83  Resp:  [17-18] 17  BP: ()/(52-71) 118/71  SpO2:  [93 %-95 %] 93 %    Physical Exam  Constitutional:       General: He is not in acute distress.     Appearance: Normal appearance. He is not ill-appearing.   HENT:      Head: Normocephalic and atraumatic.      Right Ear: External ear normal.      Left Ear: External ear normal.      Mouth/Throat:      Mouth: Mucous membranes are moist.      Pharynx: Oropharynx is clear.   Eyes:      Extraocular Movements: Extraocular movements intact.      Pupils: Pupils are equal, round, and reactive to light.   Cardiovascular:      Rate and Rhythm: Normal rate and regular rhythm.      Pulses: Normal pulses.      Heart sounds: Normal heart  sounds.   Pulmonary:      Effort: Pulmonary effort is normal. No respiratory distress.   Abdominal:      General: Abdomen is flat. There is no distension.      Palpations: Abdomen is soft.      Tenderness: There is no abdominal tenderness.   Musculoskeletal:         General: Signs of injury present.      Right lower leg: No edema.      Left lower leg: No edema.      Comments: Wound vac left leg.  Correlate to clinical pictures in media   Neurological:      General: No focal deficit present.      Mental Status: He is alert and oriented to person, place, and time. Mental status is at baseline.   Psychiatric:         Mood and Affect: Mood normal.         Behavior: Behavior normal.         Thought Content: Thought content normal.         Judgment: Judgment normal.         Fluids    Intake/Output Summary (Last 24 hours) at 8/28/2023 2029  Last data filed at 8/28/2023 1955  Gross per 24 hour   Intake 120 ml   Output 90 ml   Net 30 ml       Laboratory  Recent Labs     08/28/23  0406   WBC 6.5   RBC 3.42*   HEMOGLOBIN 9.9*   HEMATOCRIT 31.3*   MCV 91.5   MCH 28.9   MCHC 31.6*   RDW 49.6   PLATELETCT 627*   MPV 9.6     Recent Labs     08/28/23  0406   SODIUM 134*   POTASSIUM 4.2   CHLORIDE 101   CO2 26   GLUCOSE 93   BUN 20   CREATININE 0.92   CALCIUM 8.3*                   Imaging  CT-EXTREMITY, LOWER WITH LEFT   Final Result      1.  Swelling and areas of decreased attenuation are identified within and throughout left gluteus musculature and extending down into the posterior compartment musculature in the left thigh. Consider infection or inflammation such as cellulitis or    myositis.      2.  Induration and subcutaneous fat and intramuscular fat planes is also noted consistent with cellulitis.      3.  No abscess or bone erosion identified.      CT-PELVIS WITH   Final Result      1.  Swelling and areas of decreased attenuation throughout the visualized left gluteus musculature are identified. Consider infection or  inflammation such as myositis or cellulitis.      2.  Induration and subcutaneous fat is identified consistent with cellulitis.      3.  No fluid collection that would suggest abscess is identified at this time. No soft tissue emphysema is noted.      DX-CHEST-PORTABLE (1 VIEW)   Final Result      No acute cardiac or pulmonary abnormalities are identified.      CT-PELVIS WITH   Final Result      1.  Subcutaneous inflammatory changes about the left buttock suspicious for cellulitis.      2.  Enlargement of left gluteal musculature consistent with myositis and/or inflammatory change.             Assessment/Plan  Problem Representation:    * Necrotizing fasciitis (HCC)- (present on admission)  Assessment & Plan  Secondary to Intramuscular inj of Testosterone in buttock and thigh x3 months    -S/p I&D of large abscess and necrotic tissue with wound vac placement 8/13 by Dr Castro.  See notes regarding further assessments and future care.  -Switched to oral Augmentin twice daily per ID recs  -WBC in normal range  -Pain management with ibuprofen and oxycodone 10, 15 for moderate pain.  Monitor for adverse effect of narcotic pain medications.  -SIERRA drains to 2 & 4 removed, per Ortho okay to discharge patient with drains 1 and 3 with close follow-up  -Wound care ongoing.  -Discontinue heparin after 2200 dose 8/29/2023  -Start rivaroxaban 10 mg daily for DVT prophylaxis        Leukocytosis  Assessment & Plan  He found to have leukocytosis most likely secondary to deep tissue infection.  Resolved    Nontraumatic compartment syndrome of left lower extremity  Assessment & Plan  Secondary to necrotizing fasciitis, tense, tender upper thigh and lower leg  Status post incision and drainage.  Repeat surgical debridement 8/17/23  Well-healed wound and sutures minimal drains and SIERRA drain bulbs  Continue pain regimen as needed    Transaminitis  Assessment & Plan  Elevation is likely due to Rhabdo and not intrinsic hepatic  Significant  downtrending  Follow-up with CMP    UTI (urinary tract infection)  Assessment & Plan  Urinalysis on 8/12/2023 was positive for nitrite, and a small amount of leukocyte Estrace.  Patient reports burning with initiation of urination.   No acute complaint at this time.  Asymptomatic-appears resolved at this time      Rhabdomyolysis  Assessment & Plan  Likely secondary to significant muscle injury in context of muscle abscess in context of other problems.  Possibly contributing to acute kidney injury.  CPK downtrending from approximately 2300.  Most recent CPK equals 171.  Creatinine, potassium normal, resolved at this time and no need for continuing to trend.      JUDY (acute kidney injury) (HCC)  Assessment & Plan  08/15/23    Likely prerenal JUDY due to hypovolemia secondary to sepsis(distributive shock)  Renal function has significantly improved creatinine trending down back to baseline  Medication dose adjustment as per renal functions  Avoid nephrotoxins.  IV fluids  Continue to monitor closely.  8/17/2023 resolved      Other constipation  Assessment & Plan  Bowel protocol    GERD (gastroesophageal reflux disease)  Assessment & Plan  Obesity likely a contributing factor.  Advised calorie restriction, regular exercise, dinner 4 hours before bedtime  Denies heartburn, chest pain, nausea  Continuing outpatient PPI.    Insomnia  Assessment & Plan  History of. On Zolpidem at home.  -Holding medication in context of significant pain management on board.    Acute respiratory failure with hypoxemia (HCC)- (present on admission)  Assessment & Plan  Likely secondary to sepsis and comorbid KACY     Currently he is off from oxygen and maintaining oxygen saturation.  Requiring while asleep for his KACY  O2 sat maintained on room air      Bilirubinemia  Assessment & Plan  Resolved.  Follow-up with CMP if jaundiced, pale stool, dark urine.    Sepsis due to cellulitis (HCC)- (present on admission)  Assessment & Plan  Sepsis secondary  to testosterone injection in muscles:    -Empiric therapy for necrotizing fascitis be continued  -Follow-up with CBC, CMP. Adequete hydration, monitor for signs of endorgan dysfunction  -Resolved       Sleep apnea- (present on admission)  Assessment & Plan  - RT/OT protocol  Uses home CPAP at home  Monitor for polycythemia secondary to hypoxia      Obesity- (present on admission)  Assessment & Plan  BMI 33.44    - Counseling deferred to primary.  -Patient was counseled on decrease calorie intake  -Follow-up with lipid panel, A1c         VTE prophylaxis: Heparin    I have performed a physical exam and reviewed and updated ROS and Plan today (8/28/2023). In review of yesterday's note (8/27/2023), there are no changes except as documented above.

## 2023-08-30 NOTE — DISCHARGE SUMMARY
[facility] Discharge Summary    Attending: Dr. Deandre Lacy MD  Senior Resident: Dr. Jacqueline Gilbert MD  Intern:  Dr. Kaylee Liu MD  Contact Number: [phone]    CHIEF COMPLAINT ON ADMISSION  Chief Complaint   Patient presents with    Pain     Left buttock radiating down to leg x3days     Headache       Reason for Admission  leg swelling     Admission Date  8/10/2023    CODE STATUS  DNR/DNI    HPI & HOSPITAL COURSE   Mr. Abhishek Pacheco is a 53yoM with chronic migraine (on Sumtatriptan), obesity (BMI 31.70), KACY (on nightly CPAP; s/p uvuloplasty, 10/2011) who presented 8/10/2023 with 3-day history of worsening left buttock pain and edema, that started 2 days after injecting himself with testosterone in the same region.  This was associated with fevers, chills, headache and night sweats.  His CT pelvis at admission on 8/10 showed subcutaneous inflammatory changes with enlargement of the left gluteal musculature.  There were no signs suggestive of an abscess.  Blood work showed elevated CPK suspected to be secondary to rhabdomyolysis.  Hence patient was started on IV fluids and IV antibiotics.    However, the edema and erythema over the left buttock and thigh started to worsen over the next 2 days, followed by worsening CPK despite IVF and IV antibiotics.  Hence CT pelvis and lower extremity was repeated on 8/13 however it was mostly unchanged, more specifically negative for abscess.  However, due to concerns for necrotizing fasciitis and compartment syndrome, orthopedic surgery was consulted.  He underwent fasciotomies of the left buttock and thigh along with excisional debridement of wound and application of a wound VAC on 8/13/2023.  His antibiotic coverage was broadened, and patient was started on IV linezolid plus Zosyn. Patient underwent repeat debridement and wound VAC change on 8/15/2023.  ID consulted, patient was seen by Dr. Clark.  His cultures eventually grew strep anginosus  and Eikenella, hence antibiotics tapered down to Zosyn which covers both.     Patient started to improve both clinically and on lab work.  He underwent a third procedure with orthopedic surgery on 8/17 that included excisional debridement of the left leg wound, secondary closure of the wound, and application of wound VAC.  He initially had 4 SIERRA drains placed, 2 of them were removed before discharge.  His antibiotics were switched from Zosyn to oral Augmentin on 8/27 per ID recommendations.  Patient declined discharge to inpatient rehab and SNF level care for wound care.  He was amenable to going back home with home health and this was set up prior to discharge.    Per orthopedics reassessment on 8/28, they cleared the patient for discharge with SIERRA drains 1 and 3 days, and recommended close follow-up with Dr. Castro in outpatient clinic.  Discussed this with Dr. Clark from ID who recommended continuing p.o. Augmentin until all the drains were removed.  Hence patient was discharged with 15 days of antibiotics and home health PT/OT + wound care was set up for continued management of the wounds at home.  We also discharged the patient with oral Xarelto 10 mg once daily for DVT prophylaxis until he is fully ambulatory.  Patient was given detailed instructions to follow-up with PCP and Dr. Castro from orthopedics for further management of his lower extremity wounds, and further instructions/refills on duration of Augmentin + Xarelto for DVT prophylaxis.    Therefore, he is discharged in fair and stable condition to home with organized home healthcare and close outpatient follow-up.    The patient met 2-midnight criteria for an inpatient stay at the time of discharge.    Discharge Date  8/29/2023    Physical Exam on Day of Discharge  Physical Exam  Constitutional:       General: He is not in acute distress.     Appearance: Normal appearance. He is not ill-appearing.   HENT:      Head: Normocephalic and atraumatic.       Right Ear: External ear normal.      Left Ear: External ear normal.      Mouth/Throat:      Mouth: Mucous membranes are moist.      Pharynx: Oropharynx is clear.   Eyes:      Extraocular Movements: Extraocular movements intact.      Pupils: Pupils are equal, round, and reactive to light.   Cardiovascular:      Rate and Rhythm: Normal rate and regular rhythm.      Pulses: Normal pulses.      Heart sounds: Normal heart sounds.   Pulmonary:      Effort: Pulmonary effort is normal. No respiratory distress.   Abdominal:      General: Abdomen is flat. There is no distension.      Palpations: Abdomen is soft.      Tenderness: There is no abdominal tenderness.   Musculoskeletal:         General: Signs of injury (Wound VAC and 2 SIERRA drains present over the left lower extremity) present.      Right lower leg: No edema.      Left lower leg: No edema.   Neurological:      General: No focal deficit present.      Mental Status: He is alert and oriented to person, place, and time. Mental status is at baseline.   Psychiatric:         Mood and Affect: Mood normal.         Behavior: Behavior normal.         Thought Content: Thought content normal.         Judgment: Judgment normal.         FOLLOW UP ITEMS POST DISCHARGE  PCP and orthopedic surgery (Dr. Castro) within the next week.  -His Augmentin needs to be continued till all the drains are removed.  -His DVT prophylaxis with Xarelto 10 mg once daily can be discontinued once patient is fully ambulatory, per orthopedics assessment on follow-up.  -Home health PT/OT and wound care has been set up by  prior to discharge.  -He may need refills on pain medications from PCP/Ortho.  We have provided 10 days worth of pain medications at time of discharge.    DISCHARGE DIAGNOSES  Principal Problem:    Necrotizing fasciitis (HCC) (POA: Yes)      Overview: Reports 3-day history of left buttock pain after recent injection with       testosterone at left buttock, which is tense,  "swollen, and moderately       tender. CT pelvis (8/10/2023) shows subcutaneous inflammatory changes with       enlargement of left gluteal musculature.  Active Problems:    Obesity (POA: Yes)    Sleep apnea (POA: Yes)    Sepsis due to cellulitis (HCC) (POA: Yes)    Bilirubinemia (POA: Unknown)      Overview: Mild. Likely due to sepsis. No evidence of anemia on labs.       - Direct bili ordered. Will monitor.     Acute respiratory failure with hypoxemia (HCC) (POA: Yes)      Overview: Mild diffuse rhonchi heard on exam. CXR shows peripheral Kerley B lines.       NT-proBNP slightly elevated.       - IV Lasix 20mg x1 ordered; strict I/Os, daily standing weights ordered.       - RT/OT protocol; wean from oxygen as tolerated.       - Would consider TTE given likelihood of HFpEF in setting of KACY    Insomnia (POA: Unknown)      Overview: History of. On Zolpidem at home.      - Resumed.    GERD (gastroesophageal reflux disease) (POA: Unknown)      Overview: History of. On Pantoprazole at home.      - Resumed.     Other constipation (POA: Unknown)    JUDY (acute kidney injury) (HCC) (POA: Unknown)    Rhabdomyolysis (POA: Unknown)    UTI (urinary tract infection) (POA: Unknown)    Transaminitis (POA: Unknown)    Nontraumatic compartment syndrome of left lower extremity (POA: Unknown)    Leukocytosis (POA: Unknown)  Resolved Problems:    Tobacco dependence (Chronic) (POA: Yes)      Overview: Smokes 4-5 \"puffs\" per day. Has 35 pack-years. Previously was on Chantix,       but not for last few months.       - Smoking cessation encouraged.       - Nicotine patch ordered.       FOLLOW UP  Future Appointments   Date Time Provider Department Center   5/28/2024  9:00 AM [address] 1 OCT JEROD Castro M.D.  [address]  [address]  [facility] [id number] [phone]    Schedule an appointment as soon as possible for a visit in 3 day(s)  For further management of the wounds in your leg    Calos Valle, " M.D.  [address]  [address]  [facility] [address]  [phone]    Go on 9/6/2023  Please go to your appointment with Dr Cavazos on Sept 6 check in at 1:00 pm    [facility] [place]  [address]  [place] [address]  [phone]          MEDICATIONS ON DISCHARGE     Medication List        START taking these medications        Instructions   Acetaminophen Extra Strength 500 MG Tabs   Take 1-2 tabs  (500-1,000 mg) by mouth every 6 hours as needed for Moderate Pain or Mild Pain for up to 10 days.  Dose: 500-1,000 mg     amoxicillin-clavulanate 875-125 MG Tabs  Commonly known as: Augmentin   Take 1 Tablet by mouth every 12 hours for 15 days.  Dose: 1 Tablet     metaxalone 800 MG Tabs  Commonly known as: Skelaxin   Take 0.5 Tablets by mouth 2 times a day for 30 doses.  Dose: 400 mg     morphine ER 15 MG Tbcr tablet  Commonly known as: Ms Contin   Take 1 Tablet by mouth every 12 hours for 10 days.  Dose: 15 mg     Naloxone 4 MG/0.1ML Liqd  Commonly known as: Narcan   Administer 4 mg into affected nostril(S) as needed (OPIOID OVERDOSE) for up to 2 doses.  Dose: 1 Spray     omeprazole 20 MG delayed-release capsule  Commonly known as: PriLOSEC  Replaces: pantoprazole 40 MG Tbec   Take 1 Capsule by mouth every morning before breakfast for 30 days.  Dose: 20 mg     oxyCODONE immediate-release 5 MG Tabs  Commonly known as: Roxicodone   Take 1-2 Tablets by mouth every 6 hours as needed for Severe Pain for up to 60 doses.  Dose: 5-10 mg     PEG 3350 17 g Pack   Take 1 Packet by mouth 2 times a day for 15 days.  Dose: 17 g     Stimulant Laxative 8.6-50 MG Tabs  Generic drug: senna-docusate   Take 1 Tablet by mouth 2 times a day for 15 days.  Dose: 1 Tablet     Xarelto 10 MG Tabs tablet  Start taking on: August 30, 2023  Generic drug: rivaroxaban   Take 1 Tablet by mouth every day for 30 days.  Dose: 10 mg            CHANGE how you take these medications        Instructions   zolpidem 5 MG Tabs  What changed:    medication strength  how much to take  when to take this  reasons to take this  Commonly known as: Ambien   Take 1 Tablet by mouth at bedtime as needed for Sleep for up to 10 doses.  Dose: 5 mg            CONTINUE taking these medications        Instructions   albuterol 108 (90 Base) MCG/ACT Aers inhalation aerosol   Inhale 2 Puffs every 6 hours as needed for Shortness of Breath (cough).  Dose: 2 Puff     Trelegy Ellipta 200-62.5-25 MCG/ACT Aepb  Generic drug: Fluticasone-Umeclidin-Vilant      valACYclovir 500 MG Tabs  Commonly known as: Valtrex             STOP taking these medications      pantoprazole 40 MG Tbec  Commonly known as: Protonix  Replaced by: omeprazole 20 MG delayed-release capsule     testosterone cypionate 200 MG/ML Soln injection  Commonly known as: Depo-Testosterone            ASK your doctor about these medications        Instructions   * Chantix Continuing Month Denise 1 MG tablet  Generic drug: varenicline   Chantix 1 mg tablet   TAKE 1 TABLET BY MOUTH TWICE DAILY     * varenicline 0.5 MG X 11 & 1 MG X 42 tablet  Commonly known as: CHANTIX DENISE            * This list has 2 medication(s) that are the same as other medications prescribed for you. Read the directions carefully, and ask your doctor or other care provider to review them with you.                  Allergies  Allergies   Allergen Reactions    Iodine Hives and Itching     After receiving iodine contrast for CT pt developed hives and itching    Pt developed breakthrough reaction of hives after being premedicated for contrast injection on 12/10/19    Percocet [Oxycodone-Acetaminophen] Nausea     And dizziness       DIET  No orders of the defined types were placed in this encounter.      ACTIVITY  As tolerated and directed by skilled nursing.  Weightbearing as recommended by orthopedics    CONSULTATIONS  Orthopedic surgery  Infectious diseases  PM&R/physiatry    PROCEDURES  1.  8/13/2023:   1.  Left gluteal and thigh fasciectomies.  2.   Left thigh excisional debridement of wound measuring 55x17 cm in maximum   dimensions, extensive devitalized muscle of the short external rotators,   gluteus bobby and a portion of hamstring musculature.  3.  Application of wound VAC, left thigh, greater than 50 square cm.    2.  8/15/2023:  Excisional debridement of left thigh wound including skin, subcutaneous tissue and muscle of wound measuring 60x17 cm in maximum dimensions.  Application of wound VAC to left thigh greater than 50 square cm.    3.  8/17/2023:  Excisional debridement of left leg wound including skin, subcutaneous tissue and muscle of wound measuring 17x60 cm in maximum dimensions.  Secondary closure of left leg wound measuring 60 cm in length.  Application of wound VAC, less than 50 sq. cm (incisional) left thigh.    4.  8/27/2023:   SIERRA drains #2 and #4 were removed by orthopedic surgery. #1 and #3 were left in place.     LABORATORY  Lab Results   Component Value Date    SODIUM 134 (L) 08/28/2023    POTASSIUM 4.2 08/28/2023    CHLORIDE 101 08/28/2023    CO2 26 08/28/2023    GLUCOSE 93 08/28/2023    BUN 20 08/28/2023    CREATININE 0.92 08/28/2023        Lab Results   Component Value Date    WBC 6.5 08/29/2023    HEMOGLOBIN 9.8 (L) 08/29/2023    HEMATOCRIT 30.2 (L) 08/29/2023    PLATELETCT 570 (H) 08/29/2023        Total time of the discharge process exceeds 49 minutes.

## 2023-09-12 LAB
FUNGUS SPEC CULT: NORMAL
FUNGUS SPEC FUNGUS STN: NORMAL
SIGNIFICANT IND 70042: NORMAL
SITE SITE: NORMAL
SOURCE SOURCE: NORMAL

## 2023-09-15 ENCOUNTER — HOSPITAL ENCOUNTER (OUTPATIENT)
Dept: LAB | Facility: MEDICAL CENTER | Age: 53
End: 2023-09-15
Attending: FAMILY MEDICINE
Payer: COMMERCIAL

## 2023-09-15 LAB
ALBUMIN SERPL BCP-MCNC: 4.1 G/DL (ref 3.2–4.9)
ALBUMIN/GLOB SERPL: 1.2 G/DL
ALP SERPL-CCNC: 54 U/L (ref 30–99)
ALT SERPL-CCNC: 24 U/L (ref 2–50)
ANION GAP SERPL CALC-SCNC: 13 MMOL/L (ref 7–16)
AST SERPL-CCNC: 16 U/L (ref 12–45)
BASOPHILS # BLD AUTO: 0.5 % (ref 0–1.8)
BASOPHILS # BLD: 0.05 K/UL (ref 0–0.12)
BILIRUB SERPL-MCNC: 0.2 MG/DL (ref 0.1–1.5)
BUN SERPL-MCNC: 17 MG/DL (ref 8–22)
CALCIUM ALBUM COR SERPL-MCNC: 9.4 MG/DL (ref 8.5–10.5)
CALCIUM SERPL-MCNC: 9.5 MG/DL (ref 8.5–10.5)
CHLORIDE SERPL-SCNC: 102 MMOL/L (ref 96–112)
CO2 SERPL-SCNC: 20 MMOL/L (ref 20–33)
CREAT SERPL-MCNC: 0.89 MG/DL (ref 0.5–1.4)
CRP SERPL HS-MCNC: 0.38 MG/DL (ref 0–0.75)
EOSINOPHIL # BLD AUTO: 0.07 K/UL (ref 0–0.51)
EOSINOPHIL NFR BLD: 0.7 % (ref 0–6.9)
ERYTHROCYTE [DISTWIDTH] IN BLOOD BY AUTOMATED COUNT: 43.2 FL (ref 35.9–50)
GFR SERPLBLD CREATININE-BSD FMLA CKD-EPI: 103 ML/MIN/1.73 M 2
GLOBULIN SER CALC-MCNC: 3.4 G/DL (ref 1.9–3.5)
GLUCOSE SERPL-MCNC: 100 MG/DL (ref 65–99)
HCT VFR BLD AUTO: 39.8 % (ref 42–52)
HGB BLD-MCNC: 13.1 G/DL (ref 14–18)
IMM GRANULOCYTES # BLD AUTO: 0.11 K/UL (ref 0–0.11)
IMM GRANULOCYTES NFR BLD AUTO: 1.1 % (ref 0–0.9)
LYMPHOCYTES # BLD AUTO: 3.72 K/UL (ref 1–4.8)
LYMPHOCYTES NFR BLD: 37.2 % (ref 22–41)
MCH RBC QN AUTO: 28.9 PG (ref 27–33)
MCHC RBC AUTO-ENTMCNC: 32.9 G/DL (ref 32.3–36.5)
MCV RBC AUTO: 87.7 FL (ref 81.4–97.8)
MONOCYTES # BLD AUTO: 0.67 K/UL (ref 0–0.85)
MONOCYTES NFR BLD AUTO: 6.7 % (ref 0–13.4)
NEUTROPHILS # BLD AUTO: 5.37 K/UL (ref 1.82–7.42)
NEUTROPHILS NFR BLD: 53.8 % (ref 44–72)
NRBC # BLD AUTO: 0 K/UL
NRBC BLD-RTO: 0 /100 WBC (ref 0–0.2)
PLATELET # BLD AUTO: 406 K/UL (ref 164–446)
PMV BLD AUTO: 9.7 FL (ref 9–12.9)
POTASSIUM SERPL-SCNC: 4.4 MMOL/L (ref 3.6–5.5)
PROT SERPL-MCNC: 7.5 G/DL (ref 6–8.2)
RBC # BLD AUTO: 4.54 M/UL (ref 4.7–6.1)
SODIUM SERPL-SCNC: 135 MMOL/L (ref 135–145)
WBC # BLD AUTO: 10 K/UL (ref 4.8–10.8)

## 2023-09-15 PROCEDURE — 85025 COMPLETE CBC W/AUTO DIFF WBC: CPT

## 2023-09-15 PROCEDURE — 86140 C-REACTIVE PROTEIN: CPT

## 2023-09-15 PROCEDURE — 80053 COMPREHEN METABOLIC PANEL: CPT

## 2023-09-15 PROCEDURE — 36415 COLL VENOUS BLD VENIPUNCTURE: CPT

## 2023-09-15 PROCEDURE — 84270 ASSAY OF SEX HORMONE GLOBUL: CPT

## 2023-09-15 PROCEDURE — 84403 ASSAY OF TOTAL TESTOSTERONE: CPT

## 2023-09-15 PROCEDURE — 84402 ASSAY OF FREE TESTOSTERONE: CPT

## 2023-09-17 LAB
SHBG SERPL-SCNC: 16 NMOL/L (ref 19–76)
TESTOST FREE MFR SERPL: 2.4 % (ref 1.6–2.9)
TESTOST FREE SERPL-MCNC: 41 PG/ML (ref 47–244)
TESTOST SERPL-MCNC: 171 NG/DL (ref 300–890)

## 2023-09-25 LAB
MYCOBACTERIUM SPEC CULT: NORMAL
MYCOBACTERIUM SPEC CULT: NORMAL
RHODAMINE-AURAMINE STN SPEC: NORMAL
RHODAMINE-AURAMINE STN SPEC: NORMAL
SIGNIFICANT IND 70042: NORMAL
SIGNIFICANT IND 70042: NORMAL
SITE SITE: NORMAL
SITE SITE: NORMAL
SOURCE SOURCE: NORMAL
SOURCE SOURCE: NORMAL

## 2023-10-04 ENCOUNTER — HOSPITAL ENCOUNTER (OUTPATIENT)
Dept: LAB | Facility: MEDICAL CENTER | Age: 53
End: 2023-10-04
Attending: FAMILY MEDICINE
Payer: COMMERCIAL

## 2023-10-04 LAB
BASOPHILS # BLD AUTO: 0.6 % (ref 0–1.8)
BASOPHILS # BLD: 0.06 K/UL (ref 0–0.12)
CRP SERPL HS-MCNC: 0.31 MG/DL (ref 0–0.75)
EOSINOPHIL # BLD AUTO: 0.09 K/UL (ref 0–0.51)
EOSINOPHIL NFR BLD: 0.9 % (ref 0–6.9)
ERYTHROCYTE [DISTWIDTH] IN BLOOD BY AUTOMATED COUNT: 41.2 FL (ref 35.9–50)
HCT VFR BLD AUTO: 45.1 % (ref 42–52)
HGB BLD-MCNC: 14.6 G/DL (ref 14–18)
IMM GRANULOCYTES # BLD AUTO: 0.04 K/UL (ref 0–0.11)
IMM GRANULOCYTES NFR BLD AUTO: 0.4 % (ref 0–0.9)
LYMPHOCYTES # BLD AUTO: 3.98 K/UL (ref 1–4.8)
LYMPHOCYTES NFR BLD: 41.7 % (ref 22–41)
MCH RBC QN AUTO: 27.5 PG (ref 27–33)
MCHC RBC AUTO-ENTMCNC: 32.4 G/DL (ref 32.3–36.5)
MCV RBC AUTO: 84.9 FL (ref 81.4–97.8)
MONOCYTES # BLD AUTO: 0.61 K/UL (ref 0–0.85)
MONOCYTES NFR BLD AUTO: 6.4 % (ref 0–13.4)
NEUTROPHILS # BLD AUTO: 4.76 K/UL (ref 1.82–7.42)
NEUTROPHILS NFR BLD: 50 % (ref 44–72)
NRBC # BLD AUTO: 0 K/UL
NRBC BLD-RTO: 0 /100 WBC (ref 0–0.2)
PLATELET # BLD AUTO: 360 K/UL (ref 164–446)
PMV BLD AUTO: 10.2 FL (ref 9–12.9)
RBC # BLD AUTO: 5.31 M/UL (ref 4.7–6.1)
WBC # BLD AUTO: 9.5 K/UL (ref 4.8–10.8)

## 2023-10-04 PROCEDURE — 86140 C-REACTIVE PROTEIN: CPT

## 2023-10-04 PROCEDURE — 85025 COMPLETE CBC W/AUTO DIFF WBC: CPT

## 2023-10-04 PROCEDURE — 36415 COLL VENOUS BLD VENIPUNCTURE: CPT

## 2023-11-17 ENCOUNTER — HOSPITAL ENCOUNTER (OUTPATIENT)
Dept: RADIOLOGY | Facility: MEDICAL CENTER | Age: 53
End: 2023-11-17
Attending: SURGERY
Payer: COMMERCIAL

## 2023-11-17 DIAGNOSIS — R10.31 RIGHT LOWER QUADRANT PAIN: ICD-10-CM

## 2023-11-17 PROCEDURE — 76857 US EXAM PELVIC LIMITED: CPT

## 2023-11-27 ENCOUNTER — APPOINTMENT (OUTPATIENT)
Dept: ADMISSIONS | Facility: MEDICAL CENTER | Age: 53
End: 2023-11-27
Attending: SURGERY
Payer: COMMERCIAL

## 2023-12-05 ENCOUNTER — PRE-ADMISSION TESTING (OUTPATIENT)
Dept: ADMISSIONS | Facility: MEDICAL CENTER | Age: 53
End: 2023-12-05
Attending: SURGERY
Payer: COMMERCIAL

## 2023-12-05 VITALS — BODY MASS INDEX: 31.06 KG/M2 | HEIGHT: 70 IN

## 2023-12-05 DIAGNOSIS — Z01.812 PRE-OPERATIVE LABORATORY EXAMINATION: ICD-10-CM

## 2023-12-05 RX ORDER — PREGABALIN 75 MG/1
75 CAPSULE ORAL 2 TIMES DAILY
COMMUNITY
Start: 2023-11-13

## 2023-12-05 RX ORDER — TESTOSTERONE 20.25 MG/1.25G
1 GEL TOPICAL 2 TIMES DAILY
COMMUNITY
Start: 2023-10-31

## 2023-12-05 RX ORDER — ZOLPIDEM TARTRATE 10 MG/1
10 TABLET ORAL
COMMUNITY

## 2023-12-05 RX ORDER — OXYCODONE HYDROCHLORIDE 5 MG/1
5 TABLET ORAL EVERY 8 HOURS PRN
Status: ON HOLD | COMMUNITY
End: 2023-12-14

## 2023-12-05 NOTE — PREPROCEDURE INSTRUCTIONS
PreAdmit Telephone Appointment: Reviewed the Preparing for your procedure handout with patient over the phone. Patient instructed per pharmacy guidelines regarding taking, holding or contacting provider for instructions on regularly prescribed medications before surgery. Instructed to take the following medications the day of surgery with a sip of water per pharmacy medication guidelines: oxycodone prn, albuterol prn, lyrica.    Confirmed with patient where to check in morning of surgery. Handouts/Brochure/Video emailed to patient.    Testing appointment 12/6.

## 2023-12-06 ENCOUNTER — PRE-ADMISSION TESTING (OUTPATIENT)
Dept: ADMISSIONS | Facility: MEDICAL CENTER | Age: 53
End: 2023-12-06
Attending: SURGERY
Payer: COMMERCIAL

## 2023-12-06 DIAGNOSIS — Z01.812 PRE-OPERATIVE LABORATORY EXAMINATION: ICD-10-CM

## 2023-12-06 LAB
ANION GAP SERPL CALC-SCNC: 11 MMOL/L (ref 7–16)
BUN SERPL-MCNC: 16 MG/DL (ref 8–22)
CALCIUM SERPL-MCNC: 9.2 MG/DL (ref 8.4–10.2)
CHLORIDE SERPL-SCNC: 105 MMOL/L (ref 96–112)
CO2 SERPL-SCNC: 23 MMOL/L (ref 20–33)
CREAT SERPL-MCNC: 0.84 MG/DL (ref 0.5–1.4)
ERYTHROCYTE [DISTWIDTH] IN BLOOD BY AUTOMATED COUNT: 41.9 FL (ref 35.9–50)
GFR SERPLBLD CREATININE-BSD FMLA CKD-EPI: 105 ML/MIN/1.73 M 2
GLUCOSE SERPL-MCNC: 92 MG/DL (ref 65–99)
HCT VFR BLD AUTO: 48.9 % (ref 42–52)
HGB BLD-MCNC: 16.1 G/DL (ref 14–18)
MCH RBC QN AUTO: 26.2 PG (ref 27–33)
MCHC RBC AUTO-ENTMCNC: 32.9 G/DL (ref 32.3–36.5)
MCV RBC AUTO: 79.5 FL (ref 81.4–97.8)
PLATELET # BLD AUTO: 325 K/UL (ref 164–446)
PMV BLD AUTO: 10.1 FL (ref 9–12.9)
POTASSIUM SERPL-SCNC: 3.9 MMOL/L (ref 3.6–5.5)
RBC # BLD AUTO: 6.15 M/UL (ref 4.7–6.1)
SODIUM SERPL-SCNC: 139 MMOL/L (ref 135–145)
WBC # BLD AUTO: 10.4 K/UL (ref 4.8–10.8)

## 2023-12-06 PROCEDURE — 85027 COMPLETE CBC AUTOMATED: CPT

## 2023-12-06 PROCEDURE — 36415 COLL VENOUS BLD VENIPUNCTURE: CPT

## 2023-12-06 PROCEDURE — 80048 BASIC METABOLIC PNL TOTAL CA: CPT

## 2023-12-13 ENCOUNTER — PRE-ADMISSION TESTING (OUTPATIENT)
Dept: ADMISSIONS | Facility: MEDICAL CENTER | Age: 53
End: 2023-12-13
Payer: COMMERCIAL

## 2023-12-14 ENCOUNTER — ANESTHESIA EVENT (OUTPATIENT)
Dept: SURGERY | Facility: MEDICAL CENTER | Age: 53
End: 2023-12-14
Payer: COMMERCIAL

## 2023-12-14 ENCOUNTER — HOSPITAL ENCOUNTER (OUTPATIENT)
Facility: MEDICAL CENTER | Age: 53
End: 2023-12-14
Attending: SURGERY | Admitting: SURGERY
Payer: COMMERCIAL

## 2023-12-14 ENCOUNTER — ANESTHESIA (OUTPATIENT)
Dept: SURGERY | Facility: MEDICAL CENTER | Age: 53
End: 2023-12-14
Payer: COMMERCIAL

## 2023-12-14 VITALS
OXYGEN SATURATION: 92 % | WEIGHT: 217.37 LBS | HEIGHT: 69 IN | TEMPERATURE: 97 F | RESPIRATION RATE: 16 BRPM | HEART RATE: 96 BPM | DIASTOLIC BLOOD PRESSURE: 94 MMHG | BODY MASS INDEX: 32.2 KG/M2 | SYSTOLIC BLOOD PRESSURE: 138 MMHG

## 2023-12-14 DIAGNOSIS — G89.18 POST-OPERATIVE PAIN: ICD-10-CM

## 2023-12-14 PROCEDURE — 160035 HCHG PACU - 1ST 60 MINS PHASE I: Performed by: SURGERY

## 2023-12-14 PROCEDURE — 700101 HCHG RX REV CODE 250: Performed by: ANESTHESIOLOGY

## 2023-12-14 PROCEDURE — 160048 HCHG OR STATISTICAL LEVEL 1-5: Performed by: SURGERY

## 2023-12-14 PROCEDURE — 160025 RECOVERY II MINUTES (STATS): Performed by: SURGERY

## 2023-12-14 PROCEDURE — 160039 HCHG SURGERY MINUTES - EA ADDL 1 MIN LEVEL 3: Performed by: SURGERY

## 2023-12-14 PROCEDURE — 700111 HCHG RX REV CODE 636 W/ 250 OVERRIDE (IP): Mod: JZ | Performed by: ANESTHESIOLOGY

## 2023-12-14 PROCEDURE — 160009 HCHG ANES TIME/MIN: Performed by: SURGERY

## 2023-12-14 PROCEDURE — 160046 HCHG PACU - 1ST 60 MINS PHASE II: Performed by: SURGERY

## 2023-12-14 PROCEDURE — A9270 NON-COVERED ITEM OR SERVICE: HCPCS | Performed by: ANESTHESIOLOGY

## 2023-12-14 PROCEDURE — 700105 HCHG RX REV CODE 258: Performed by: SURGERY

## 2023-12-14 PROCEDURE — 160028 HCHG SURGERY MINUTES - 1ST 30 MINS LEVEL 3: Performed by: SURGERY

## 2023-12-14 PROCEDURE — 160002 HCHG RECOVERY MINUTES (STAT): Performed by: SURGERY

## 2023-12-14 PROCEDURE — 700111 HCHG RX REV CODE 636 W/ 250 OVERRIDE (IP): Performed by: ANESTHESIOLOGY

## 2023-12-14 PROCEDURE — 700102 HCHG RX REV CODE 250 W/ 637 OVERRIDE(OP): Performed by: ANESTHESIOLOGY

## 2023-12-14 PROCEDURE — C1781 MESH (IMPLANTABLE): HCPCS | Performed by: SURGERY

## 2023-12-14 PROCEDURE — 700111 HCHG RX REV CODE 636 W/ 250 OVERRIDE (IP): Performed by: SURGERY

## 2023-12-14 PROCEDURE — C1729 CATH, DRAINAGE: HCPCS | Performed by: SURGERY

## 2023-12-14 DEVICE — MESH PROGRIP RIGHT (1/EA): Type: IMPLANTABLE DEVICE | Site: GROIN | Status: FUNCTIONAL

## 2023-12-14 RX ORDER — MEPERIDINE HYDROCHLORIDE 25 MG/ML
12.5 INJECTION INTRAMUSCULAR; INTRAVENOUS; SUBCUTANEOUS
Status: DISCONTINUED | OUTPATIENT
Start: 2023-12-14 | End: 2023-12-14 | Stop reason: HOSPADM

## 2023-12-14 RX ORDER — SODIUM CHLORIDE, SODIUM LACTATE, POTASSIUM CHLORIDE, CALCIUM CHLORIDE 600; 310; 30; 20 MG/100ML; MG/100ML; MG/100ML; MG/100ML
INJECTION, SOLUTION INTRAVENOUS CONTINUOUS
Status: ACTIVE | OUTPATIENT
Start: 2023-12-14 | End: 2023-12-14

## 2023-12-14 RX ORDER — EPHEDRINE SULFATE 50 MG/ML
INJECTION, SOLUTION INTRAVENOUS PRN
Status: DISCONTINUED | OUTPATIENT
Start: 2023-12-14 | End: 2023-12-14 | Stop reason: SURG

## 2023-12-14 RX ORDER — DIPHENHYDRAMINE HYDROCHLORIDE 50 MG/ML
12.5 INJECTION INTRAMUSCULAR; INTRAVENOUS
Status: DISCONTINUED | OUTPATIENT
Start: 2023-12-14 | End: 2023-12-14 | Stop reason: HOSPADM

## 2023-12-14 RX ORDER — ROCURONIUM BROMIDE 10 MG/ML
INJECTION, SOLUTION INTRAVENOUS PRN
Status: DISCONTINUED | OUTPATIENT
Start: 2023-12-14 | End: 2023-12-14 | Stop reason: SURG

## 2023-12-14 RX ORDER — BUPIVACAINE HYDROCHLORIDE 2.5 MG/ML
INJECTION, SOLUTION EPIDURAL; INFILTRATION; INTRACAUDAL
Status: DISCONTINUED | OUTPATIENT
Start: 2023-12-14 | End: 2023-12-14 | Stop reason: HOSPADM

## 2023-12-14 RX ORDER — EPHEDRINE SULFATE 50 MG/ML
5 INJECTION, SOLUTION INTRAVENOUS
Status: DISCONTINUED | OUTPATIENT
Start: 2023-12-14 | End: 2023-12-14 | Stop reason: HOSPADM

## 2023-12-14 RX ORDER — LABETALOL HYDROCHLORIDE 5 MG/ML
5 INJECTION, SOLUTION INTRAVENOUS
Status: DISCONTINUED | OUTPATIENT
Start: 2023-12-14 | End: 2023-12-14 | Stop reason: HOSPADM

## 2023-12-14 RX ORDER — CEFAZOLIN SODIUM 1 G/3ML
INJECTION, POWDER, FOR SOLUTION INTRAMUSCULAR; INTRAVENOUS PRN
Status: DISCONTINUED | OUTPATIENT
Start: 2023-12-14 | End: 2023-12-14 | Stop reason: SURG

## 2023-12-14 RX ORDER — ONDANSETRON 2 MG/ML
4 INJECTION INTRAMUSCULAR; INTRAVENOUS
Status: DISCONTINUED | OUTPATIENT
Start: 2023-12-14 | End: 2023-12-14 | Stop reason: HOSPADM

## 2023-12-14 RX ORDER — HALOPERIDOL 5 MG/ML
1 INJECTION INTRAMUSCULAR
Status: DISCONTINUED | OUTPATIENT
Start: 2023-12-14 | End: 2023-12-14 | Stop reason: HOSPADM

## 2023-12-14 RX ORDER — SODIUM CHLORIDE, SODIUM LACTATE, POTASSIUM CHLORIDE, CALCIUM CHLORIDE 600; 310; 30; 20 MG/100ML; MG/100ML; MG/100ML; MG/100ML
INJECTION, SOLUTION INTRAVENOUS CONTINUOUS
Status: DISCONTINUED | OUTPATIENT
Start: 2023-12-14 | End: 2023-12-14 | Stop reason: HOSPADM

## 2023-12-14 RX ORDER — HYDROMORPHONE HYDROCHLORIDE 2 MG/ML
INJECTION, SOLUTION INTRAMUSCULAR; INTRAVENOUS; SUBCUTANEOUS PRN
Status: DISCONTINUED | OUTPATIENT
Start: 2023-12-14 | End: 2023-12-14 | Stop reason: SURG

## 2023-12-14 RX ORDER — OXYCODONE HYDROCHLORIDE 5 MG/1
5 TABLET ORAL EVERY 4 HOURS PRN
Qty: 42 TABLET | Refills: 0 | Status: SHIPPED | OUTPATIENT
Start: 2023-12-14 | End: 2023-12-21

## 2023-12-14 RX ORDER — MIDAZOLAM HYDROCHLORIDE 1 MG/ML
1 INJECTION INTRAMUSCULAR; INTRAVENOUS
Status: DISCONTINUED | OUTPATIENT
Start: 2023-12-14 | End: 2023-12-14 | Stop reason: HOSPADM

## 2023-12-14 RX ORDER — PANTOPRAZOLE SODIUM 40 MG/1
40 TABLET, DELAYED RELEASE ORAL DAILY
COMMUNITY

## 2023-12-14 RX ORDER — LIDOCAINE HYDROCHLORIDE 20 MG/ML
INJECTION, SOLUTION EPIDURAL; INFILTRATION; INTRACAUDAL; PERINEURAL PRN
Status: DISCONTINUED | OUTPATIENT
Start: 2023-12-14 | End: 2023-12-14 | Stop reason: SURG

## 2023-12-14 RX ORDER — HYDROMORPHONE HYDROCHLORIDE 1 MG/ML
0.2 INJECTION, SOLUTION INTRAMUSCULAR; INTRAVENOUS; SUBCUTANEOUS
Status: DISCONTINUED | OUTPATIENT
Start: 2023-12-14 | End: 2023-12-14 | Stop reason: HOSPADM

## 2023-12-14 RX ORDER — IPRATROPIUM BROMIDE AND ALBUTEROL SULFATE 2.5; .5 MG/3ML; MG/3ML
3 SOLUTION RESPIRATORY (INHALATION)
Status: DISCONTINUED | OUTPATIENT
Start: 2023-12-14 | End: 2023-12-14 | Stop reason: HOSPADM

## 2023-12-14 RX ORDER — HYDROMORPHONE HYDROCHLORIDE 1 MG/ML
0.1 INJECTION, SOLUTION INTRAMUSCULAR; INTRAVENOUS; SUBCUTANEOUS
Status: DISCONTINUED | OUTPATIENT
Start: 2023-12-14 | End: 2023-12-14 | Stop reason: HOSPADM

## 2023-12-14 RX ORDER — HYDROMORPHONE HYDROCHLORIDE 1 MG/ML
0.4 INJECTION, SOLUTION INTRAMUSCULAR; INTRAVENOUS; SUBCUTANEOUS
Status: DISCONTINUED | OUTPATIENT
Start: 2023-12-14 | End: 2023-12-14 | Stop reason: HOSPADM

## 2023-12-14 RX ORDER — ESCITALOPRAM OXALATE 10 MG/1
10 TABLET ORAL DAILY
Status: ON HOLD | COMMUNITY
End: 2023-12-14

## 2023-12-14 RX ORDER — HYDRALAZINE HYDROCHLORIDE 20 MG/ML
5 INJECTION INTRAMUSCULAR; INTRAVENOUS
Status: DISCONTINUED | OUTPATIENT
Start: 2023-12-14 | End: 2023-12-14 | Stop reason: HOSPADM

## 2023-12-14 RX ORDER — OXYCODONE HCL 5 MG/5 ML
10 SOLUTION, ORAL ORAL
Status: COMPLETED | OUTPATIENT
Start: 2023-12-14 | End: 2023-12-14

## 2023-12-14 RX ORDER — OXYCODONE HCL 5 MG/5 ML
5 SOLUTION, ORAL ORAL
Status: COMPLETED | OUTPATIENT
Start: 2023-12-14 | End: 2023-12-14

## 2023-12-14 RX ADMIN — HYDROMORPHONE HYDROCHLORIDE 0.5 MG: 2 INJECTION INTRAMUSCULAR; INTRAVENOUS; SUBCUTANEOUS at 14:20

## 2023-12-14 RX ADMIN — FENTANYL CITRATE 50 MCG: 50 INJECTION, SOLUTION INTRAMUSCULAR; INTRAVENOUS at 16:04

## 2023-12-14 RX ADMIN — CEFAZOLIN 2 G: 1 INJECTION, POWDER, FOR SOLUTION INTRAMUSCULAR; INTRAVENOUS at 14:09

## 2023-12-14 RX ADMIN — HYDROMORPHONE HYDROCHLORIDE 0.5 MG: 2 INJECTION INTRAMUSCULAR; INTRAVENOUS; SUBCUTANEOUS at 14:26

## 2023-12-14 RX ADMIN — ROCURONIUM BROMIDE 20 MG: 50 INJECTION, SOLUTION INTRAVENOUS at 14:58

## 2023-12-14 RX ADMIN — LIDOCAINE HYDROCHLORIDE 100 MG: 20 INJECTION, SOLUTION EPIDURAL; INFILTRATION; INTRACAUDAL at 14:04

## 2023-12-14 RX ADMIN — EPHEDRINE SULFATE 15 MG: 50 INJECTION, SOLUTION INTRAVENOUS at 14:41

## 2023-12-14 RX ADMIN — ROCURONIUM BROMIDE 50 MG: 50 INJECTION, SOLUTION INTRAVENOUS at 14:04

## 2023-12-14 RX ADMIN — PROPOFOL 200 MG: 10 INJECTION, EMULSION INTRAVENOUS at 14:04

## 2023-12-14 RX ADMIN — OXYCODONE HYDROCHLORIDE 10 MG: 5 SOLUTION ORAL at 16:06

## 2023-12-14 RX ADMIN — SODIUM CHLORIDE, POTASSIUM CHLORIDE, SODIUM LACTATE AND CALCIUM CHLORIDE: 600; 310; 30; 20 INJECTION, SOLUTION INTRAVENOUS at 11:01

## 2023-12-14 RX ADMIN — SODIUM CHLORIDE, POTASSIUM CHLORIDE, SODIUM LACTATE AND CALCIUM CHLORIDE: 600; 310; 30; 20 INJECTION, SOLUTION INTRAVENOUS at 15:31

## 2023-12-14 RX ADMIN — SUGAMMADEX 200 MG: 100 INJECTION, SOLUTION INTRAVENOUS at 15:31

## 2023-12-14 RX ADMIN — HYDROMORPHONE HYDROCHLORIDE 1 MG: 2 INJECTION INTRAMUSCULAR; INTRAVENOUS; SUBCUTANEOUS at 14:38

## 2023-12-14 ASSESSMENT — FIBROSIS 4 INDEX: FIB4 SCORE: 0.52

## 2023-12-14 ASSESSMENT — PAIN DESCRIPTION - PAIN TYPE
TYPE: SURGICAL PAIN
TYPE: ACUTE PAIN;SURGICAL PAIN
TYPE: CHRONIC PAIN
TYPE: ACUTE PAIN;SURGICAL PAIN

## 2023-12-14 ASSESSMENT — PAIN SCALES - GENERAL: PAIN_LEVEL: 0

## 2023-12-14 NOTE — ANESTHESIA TIME REPORT
Anesthesia Start and Stop Event Times       Date Time Event    12/14/2023 1351 Ready for Procedure     1359 Anesthesia Start     1540 Anesthesia Stop          Responsible Staff  12/14/23      Name Role Begin End    Rojelio Polk M.D. Anesth 1359 1540          Overtime Reason:  no overtime (within assigned shift)    Comments:

## 2023-12-14 NOTE — ANESTHESIA PROCEDURE NOTES
Airway    Date/Time: 12/14/2023 2:06 PM    Performed by: Rojelio Polk M.D.  Authorized by: Rojelio Polk M.D.    Location:  OR  Urgency:  Elective  Difficult Airway: No    Indications for Airway Management:  Anesthesia      Spontaneous Ventilation: absent    Sedation Level:  Deep  Preoxygenated: Yes    Patient Position:  Sniffing  Mask Difficulty Assessment:  1 - vent by mask  Final Airway Type:  Endotracheal airway  Final Endotracheal Airway:  ETT  Cuffed: Yes    Technique Used for Successful ETT Placement:  Direct laryngoscopy  Devices/Methods Used in Placement:  Anterior pressure/BURP and intubating stylet    Insertion Site:  Oral  Blade Type:  Rivera  Laryngoscope Blade/Videolaryngoscope Blade Size:  3  ETT Size (mm):  8.0  Measured from:  Teeth  ETT to Teeth (cm):  25  Placement Verified by: auscultation and capnometry    Cormack-Lehane Classification:  Grade IIb - view of arytenoids or posterior of glottis only  Number of Attempts at Approach:  1

## 2023-12-14 NOTE — DISCHARGE INSTRUCTIONS
HOME CARE INSTRUCTIONS    ACTIVITY: Rest and take it easy for the first 24 hours.  A responsible adult is recommended to remain with you during that time.  It is normal to feel sleepy.  We encourage you to not do anything that requires balance, judgment or coordination.    FOR 24 HOURS DO NOT:  Drive, operate machinery or run household appliances.  Drink beer or alcoholic beverages.  Make important decisions or sign legal documents.    SPECIAL INSTRUCTIONS: Laparoscopic Inguinal Hernia Repair, Adult, Care After  The following information offers guidance on how to care for yourself after your procedure. Your health care provider may also give you more specific instructions. If you have problems or questions, contact your health care provider.  What can I expect after the procedure?  After the procedure, it is common to have:  Pain.  Swelling and bruising around the incision area.  Scrotal swelling, in males.  Some fluid or blood draining from your incisions.  Follow these instructions at home:  Medicines  Take over-the-counter and prescription medicines only as told by your health care provider.  Ask your health care provider if the medicine prescribed to you:  Requires you to avoid driving or using machinery.  Can cause constipation. You may need to take these actions to prevent or treat constipation:  Drink enough fluid to keep your urine pale yellow.  Take over-the-counter or prescription medicines.  Eat foods that are high in fiber, such as beans, whole grains, and fresh fruits and vegetables.  Limit foods that are high in fat and processed sugars, such as fried or sweet foods.  Incision care    Follow instructions from your health care provider about how to take care of your incisions. Make sure you:  Wash your hands with soap and water for at least 20 seconds before and after you change your bandage (dressing). If soap and water are not available, use hand .  Change your dressing as told by your health  care provider.  Leave stitches (sutures), skin glue, or adhesive strips in place. These skin closures may need to stay in place for 2 weeks or longer. If adhesive strip edges start to loosen and curl up, you may trim the loose edges. Do not remove adhesive strips completely unless your health care provider tells you to do that.  Check your incision area every day for signs of infection. Check for:  More redness, swelling, or pain.  More fluid or blood.  Warmth.  Pus or a bad smell.  Wear loose, soft clothing while your incisions heal.  Managing pain and swelling  If directed, put ice on the painful or swollen areas. To do this:  Put ice in a plastic bag.  Place a towel between your skin and the bag.  Leave the ice on for 20 minutes, 2-3 times a day.  Remove the ice if your skin turns bright red. This is very important. If you cannot feel pain, heat, or cold, you have a greater risk of damage to the area.    Activity  Do not lift anything that is heavier than 10 lb (4.5 kg), or the limit that you are told, until your health care provider says that it is safe.  Ask your health care provider what activities are safe for you. A lot of activity during the first week after surgery can increase pain and swelling. For 1 week after your procedure:  Avoid activities that take a lot of effort, such as exercise or sports.  You may walk and climb stairs as needed for daily activity, but avoid long walks or climbing stairs for exercise.  General instructions  If you were given a sedative during the procedure, it can affect you for several hours. Do not drive or operate machinery until your health care provider says that it is safe.  Do not take baths, swim, or use a hot tub until your health care provider approves. Ask your health care provider if you may take showers. You may only be allowed to take sponge baths.  Do not use any products that contain nicotine or tobacco. These products include cigarettes, chewing tobacco, and  vaping devices, such as e-cigarettes. If you need help quitting, ask your health care provider.  Keep all follow-up visits. This is important.  Contact a health care provider if:  You have any of these signs of infection:  More redness, swelling, or pain around your incisions or your groin area.  More fluid or blood coming from an incision.  Warmth coming from an incision.  Pus or a bad smell coming from an incision.  A fever or chills.  You have more swelling in your scrotum, if you are male.  You have severe pain and medicines do not help.  You have abdominal pain or swelling.  You cannot urinate or have a bowel movement.  You faint or feel dizzy.  You have nausea and vomiting.  Get help right away if:  You have redness, warmth, or pain in your leg.  You have chest pain.  You have problems breathing.  These symptoms may represent a serious problem that is an emergency. Do not wait to see if the symptoms will go away. Get medical help right away. Call your local emergency services (911 in the [place] Do not drive yourself to the hospital.  Summary  Pain, swelling, and bruising are common after the procedure.  Check your incision area every day for signs of infection, such as more redness, swelling, or pain.  Put ice on painful or swollen areas for 20 minutes, 2-3 times a day.  This information is not intended to replace advice given to you by your health care provider. Make sure you discuss any questions you have with your health care provider.  Document Revised: 08/17/2021 Document Reviewed: 08/17/2021  [employer] Patient Education © 2023 [employer]      DIET: To avoid nausea, slowly advance diet as tolerated, avoiding spicy or greasy foods for the first day.  Add more substantial food to your diet according to your physician's instructions.  Babies can be fed formula or breast milk as soon as they are hungry.  INCREASE FLUIDS AND FIBER TO AVOID CONSTIPATION.    SURGICAL DRESSING/BATHING: You many shower, do not  submerge in water until cleared by your doctor.    MEDICATIONS: Resume taking daily medication.  Take prescribed pain medication with food.  If no medication is prescribed, you may take non-aspirin pain medication if needed.  PAIN MEDICATION CAN BE VERY CONSTIPATING.  Take a stool softener or laxative such as senokot, pericolace, or milk of magnesia if needed.    Prescription given for oxycodone (narcotic pain medication).  Last pain medication given at 4:06 pm (next available at 8:06 pm).    A follow-up appointment should be arranged with your doctor in 1-2 weeks; call to schedule.    You should CALL YOUR PHYSICIAN if you develop:  Fever greater than 101 degrees F.  Pain not relieved by medication, or persistent nausea or vomiting.  Excessive bleeding (blood soaking through dressing) or unexpected drainage from the wound.  Extreme redness or swelling around the incision site, drainage of pus or foul smelling drainage.  Inability to urinate or empty your bladder within 8 hours.  Problems with breathing or chest pain.    You should call 911 if you develop problems with breathing or chest pain.  If you are unable to contact your doctor or surgical center, you should go to the nearest emergency room or urgent care center.  Physician's telephone #: Dr. Castellon [phone]    MILD FLU-LIKE SYMPTOMS ARE NORMAL.  YOU MAY EXPERIENCE GENERALIZED MUSCLE ACHES, THROAT IRRITATION, HEADACHE AND/OR SOME NAUSEA.    If any questions arise, call your doctor.  If your doctor is not available, please feel free to call the Surgical Center at [phone].  The Center is open Monday through Friday from 7AM to 7PM.      A registered nurse may call you a few days after your surgery to see how you are doing after your procedure.    You may also receive a survey in the mail within the next two weeks and we ask that you take a few moments to complete the survey and return it to us.  Our goal is to provide you with very good care and we  value your comments.     Depression / Suicide Risk    As you are discharged from this [facility] facility, it is important to learn how to keep safe from harming yourself.    Recognize the warning signs:  Abrupt changes in personality, positive or negative- including increase in energy   Giving away possessions  Change in eating patterns- significant weight changes-  positive or negative  Change in sleeping patterns- unable to sleep or sleeping all the time   Unwillingness or inability to communicate  Depression  Unusual sadness, discouragement and loneliness  Talk of wanting to die  Neglect of personal appearance   Rebelliousness- reckless behavior  Withdrawal from people/activities they love  Confusion- inability to concentrate     If you or a loved one observes any of these behaviors or has concerns about self-harm, here's what you can do:  Talk about it- your feelings and reasons for harming yourself  Remove any means that you might use to hurt yourself (examples: pills, rope, extension cords, firearm)  Get professional help from the community (Mental Health, Substance Abuse, psychological counseling)  Do not be alone:Call your Safe Contact- someone whom you trust who will be there for you.  Call your local CRISIS HOTLINE 640-5081 or 430-932-9978  Call your local Children's Mobile Crisis Response Team [place] (558) 642-1737 or www.[employer]  Call the toll free National Suicide Prevention Hotlines   National Suicide Prevention Lifeline 676-276-KAXU (9881)  National Hope Line Network 800-SUICIDE (076-5628)    I acknowledge receipt and understanding of these Home Care instructions.

## 2023-12-14 NOTE — PROGRESS NOTES
Medication history reviewed with PT at bedside    [facility] rec is complete per PT reporting    Allergies reviewed.     Patient denies any outpatient antibiotics in the last 30 days.     Patient is not taking anticoagulants.    Preferred pharmacy for this visit - [facility] on [address] ([phone])

## 2023-12-14 NOTE — ANESTHESIA PREPROCEDURE EVALUATION
Case: [id number] Date/Time: 12/14/23 1245    Procedure: OPEN RIGHT INGUINAL HERNIA REPAIR (Right: Groin)    Anesthesia type: General    Pre-op diagnosis: RECURRENT RIGHT INGUINAL HERNIA    Location: [facility] OR 02 / SURGERY [facility]    Surgeons: Estuardo Castellon M.D.            Relevant Problems   ANESTHESIA   (positive) Sleep apnea      GI   (positive) GERD (gastroesophageal reflux disease)         (positive) JUDY (acute kidney injury) (HCC)      Other   (positive) Pain   (positive) Tobacco abuse       Physical Exam    Airway   Mallampati: III  TM distance: >3 FB  Neck ROM: full       Cardiovascular - normal exam  Rhythm: regular  Rate: normal  (-) murmur     Dental - normal exam           Pulmonary - normal exam  Breath sounds clear to auscultation     Abdominal    Neurological - normal exam                   Anesthesia Plan    ASA 3   ASA physical status 3 criteria: alcohol and/or substance dependence or abuse    Plan - general       Airway plan will be ETT          Induction: intravenous    Postoperative Plan: Postoperative administration of opioids is intended.    Pertinent diagnostic labs and testing reviewed    Informed Consent:    Anesthetic plan and risks discussed with patient.    Use of blood products discussed with: patient whom consented to blood products.

## 2023-12-14 NOTE — ANESTHESIA POSTPROCEDURE EVALUATION
Patient: Abhishek Pacheco    Procedure Summary       Date: 12/14/23 Room / Location: [facility] 02 / SURGERY [facility]    Anesthesia Start: 1359 Anesthesia Stop: 1540    Procedure: OPEN RIGHT INGUINAL HERNIA REPAIR (Right: Groin) Diagnosis: (RECURRENT RIGHT INGUINAL HERNIA)    Surgeons: Estuardo Castellon M.D. Responsible Provider: Rojelio Polk M.D.    Anesthesia Type: general ASA Status: 3            Final Anesthesia Type: general  Last vitals  BP   Blood Pressure: 133/71    Temp   36.6 °C (97.8 °F)    Pulse   94   Resp   16    SpO2   99 %      Anesthesia Post Evaluation    Patient location during evaluation: PACU  Patient participation: complete - patient participated  Level of consciousness: awake and alert  Pain score: 0    Airway patency: patent  Anesthetic complications: no  Cardiovascular status: hemodynamically stable  Respiratory status: acceptable  Hydration status: euvolemic    PONV: none          No notable events documented.     Nurse Pain Score: 7 (NPRS)

## 2023-12-14 NOTE — OP REPORT
OP Note    PreOp Diagnosis:  1.  Recurrent right inguinal hernia    PostOp Diagnosis:   1.  Recurrent right inguinal hernia    Procedure(s):  1.  Open recurrent right inguinal hernia repair with mesh    Wound Class: Clean    Surgeon(s):  Estuardo Castellon M.D.    Assistant:  DIDIER Davenport    Anesthesiologist/Type of Anesthesia:  Anesthesiologist: Rojelio Polk M.D./General    Surgical Staff:  Circulator: Deandre Mckeon R.N.  Scrub Person: Maureen Chappell  First Assist: Zainab Rose R.N.    Specimens removed if any:  * No specimens in log *    Estimated Blood Loss: 30cc    Findings:   Lateral fat pad herniating through internal ring  2.   Very thick spermatic cord and contents    Complications: none observed    Details of procedure:  The patient was met in the preoperative holding area where all of the potential risks and benefits of the procedure were discussed in detail with the patient. All of his questions were answered to his satisfaction and informed consent was signed. The patient was taken back to the operating room and placed supine on the operating room table. General endotracheal anesthesia was induced and all of the patients pressure points were padded appropriately. The patients abdomen and right groin were prepped and draped in the usual sterile fashion.  A timeout was performed which correctly identified the patient and the procedure being performed and preoperative antibiotics were given according to SCIP guidelines.     The procedure was started with infiltration 0.5% Marcaine with epinephrine into the right groin.  Transverse incision was made over the right groin.  Dissection was carried through subcutaneous tissue using electrocautery.  The external oblique was then opened in a transverse direction using Metzenbaum scissors.  The cord was encountered and encircled.  There is a very thick cord that traversed all the way down to the testicle.  This was composed of  significant adipose tissue within the cord itself and was not distinguishable or differentiated from the cord contents.  Gentle dissection revealed significant vascularity.  Continued exploration dissection eventually revealed a small fat pad along the lateral portion of the internal ring.  This was found to freely herniated through the internal ring.  This was then ligated with a 3-0 Vicryl suture.  7 additional tissue from the spermatic cord was removed in order to decrease the bulkiness and adiposity of the spermatic cord.  A ProGrip right-sided anatomical mesh was then encircled around the spermatic cord against the internal fascia.  Much of the external fascia was we can provide tearing.  Could be reapproximated was then sewn back together using a running 3-0 Vicryl suture.  The wound was then irrigated and closed in layers.  Deep dermal 3-0 Vicryl sutures were placed following by 4-0 Monocryl subcuticular.  Dermabond was applied as a dressing.    The patient tolerated the procedure well and was extubated at the conclusion of the case without incident. All of the sponge, needle and instrument counts were correct at the conclusion of the case. After he was awoken from anesthesia he was taken to the recovery room in stable condition.              ____________________________________     DOREEN ASHRAF MD       12/14/2023 3:22 PM Estuardo Castellon M.D.

## 2023-12-14 NOTE — OR NURSING
1538 Patient arrived to PACU from OR.  Report from anesthesia and RN.  Patient arouses to bed, reports tolerable pain, denies nausea.  Laparoscopic site to right groin is intact and covered with dermabond.  Patient updated on plan of care.  1606 Patient medicated per MAR for pain.  1610 Dad called and updated on patient status.  1630 Patient reports tolerable pain.  1640 Report called to Tai HUSAIN.    1645 Patient transferred to Phase 2.

## 2023-12-14 NOTE — OR NURSING
Assume care for pt in pre-op.   Patient allergies and NPO status verified. Belongings secured / at bedside, will lock-up belongings prior going to sx.   Patient verbalizes understanding of pain scale, expected course of stay and plan of care. Surgical procedure verified with patient.   IV access established to L-hand 20G; LR infusing at 10mL/hr  Call light within reach.   No further needs at this time.   Hourly rounding in place.

## 2023-12-15 NOTE — PROGRESS NOTES
Patient arrived in phase II, A&Ox4, pain is 7/10 which patient states is tolerable for him, no complaints of nausea. Right groin incision closed with dermabond, well approximated, no drainage. Patient ambulated to the bathroom and voided without difficulty.    Patient verbalizes readiness for discharge.  Prescription for oxycodone sent to [facility] pharmacy by MD.  Instructions, medication, and follow up appointment reviewed with patient and parents, understanding verbalized.  Discharge instructions signed. IV discontinued. Patient verbalized all belongings had been returned.  Discharged via wheelchair.

## 2024-06-14 ENCOUNTER — OFFICE VISIT (OUTPATIENT)
Dept: URGENT CARE | Facility: CLINIC | Age: 54
End: 2024-06-14
Payer: COMMERCIAL

## 2024-06-14 VITALS
OXYGEN SATURATION: 96 % | BODY MASS INDEX: 31.99 KG/M2 | WEIGHT: 216 LBS | HEIGHT: 69 IN | SYSTOLIC BLOOD PRESSURE: 126 MMHG | RESPIRATION RATE: 14 BRPM | DIASTOLIC BLOOD PRESSURE: 84 MMHG | TEMPERATURE: 98.4 F | HEART RATE: 85 BPM

## 2024-06-14 DIAGNOSIS — F40.240 CLAUSTROPHOBIA: ICD-10-CM

## 2024-06-14 PROCEDURE — 3074F SYST BP LT 130 MM HG: CPT | Performed by: NURSE PRACTITIONER

## 2024-06-14 PROCEDURE — 3079F DIAST BP 80-89 MM HG: CPT | Performed by: NURSE PRACTITIONER

## 2024-06-14 PROCEDURE — 99214 OFFICE O/P EST MOD 30 MIN: CPT | Performed by: NURSE PRACTITIONER

## 2024-06-14 RX ORDER — HYDROXYZINE HYDROCHLORIDE 25 MG/1
TABLET, FILM COATED ORAL
COMMUNITY
Start: 2024-04-19 | End: 2024-06-14

## 2024-06-14 RX ORDER — PREGABALIN 25 MG/1
25 CAPSULE ORAL 2 TIMES DAILY
COMMUNITY
Start: 2024-05-30 | End: 2024-06-14

## 2024-06-14 RX ORDER — LORAZEPAM 1 MG/1
1 TABLET ORAL ONCE
Qty: 1 TABLET | Refills: 0 | Status: SHIPPED | OUTPATIENT
Start: 2024-06-14 | End: 2024-06-14

## 2024-06-14 RX ORDER — OXYCODONE HYDROCHLORIDE AND ACETAMINOPHEN 5; 325 MG/1; MG/1
1 TABLET ORAL 3 TIMES DAILY
COMMUNITY
Start: 2024-05-30

## 2024-06-14 RX ORDER — TRIAMCINOLONE ACETONIDE 1 MG/G
1 CREAM TOPICAL 2 TIMES DAILY
COMMUNITY
End: 2024-06-14

## 2024-06-14 ASSESSMENT — FIBROSIS 4 INDEX: FIB4 SCORE: 0.53

## 2024-06-15 ASSESSMENT — ENCOUNTER SYMPTOMS
NERVOUS/ANXIOUS: 1
INSOMNIA: 0

## 2024-06-15 NOTE — PROGRESS NOTES
Subjective:   Abhishek Pacheco is a 54 y.o. male who presents for Other (Pt requesting sedative prescription for MRI scan )      HPI  Patient is a 54-year-old male who presents urgent care with a request of a medication to help him relax for an MRI he has scheduled on Monday.  Patient has a scheduled appointment of his left hip.  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  Patient is claustrophobic.  Does currently take Ambien to help him sleep and oxycodone acetaminophen for pain.  Ordering provider did not order him the medication to help him with the MRI and he knows he is not going to be able to tolerate being inside the machine.  Review of Systems   Musculoskeletal:  Positive for joint pain.   Psychiatric/Behavioral:  The patient is nervous/anxious. The patient does not have insomnia.        Medications:    albuterol Aers  oxyCODONE-acetaminophen Tabs  Testosterone Gel  zolpidem Tabs    Allergies: Iodine and Percocet [oxycodone-acetaminophen]    Problem List: Abhishek Pacheco does not have any pertinent problems on file.    Surgical History:  Past Surgical History:   Procedure Laterality Date    INGUINAL HERNIA REPAIR Right 12/14/2023    Procedure: OPEN RIGHT INGUINAL HERNIA REPAIR;  Surgeon: Estuardo Castellon M.D.;  Location: [facility];  Service: Gastroenterology    IRRIGATION & DEBRIDEMENT GENERAL Left 08/17/2023    Procedure: IRRIGATION AND DEBRIDEMENT, WOUND - THIGH, SECONDARY WOUND CLOSURE;  Surgeon: Akhil Castro M.D.;  Location: [facility];  Service: Orthopedics    APPLICATION OR REPLACEMENT, WOUND VAC Left 08/17/2023    Procedure: INCISIONAL WOUND VAC APPLICATION;  Surgeon: Akhil Castro M.D.;  Location: [facility];  Service: Orthopedics    IRRIGATION & DEBRIDEMENT GENERAL Left 08/15/2023    Procedure: LEFT THIGH WOUND DEBRIDEMENT AND VAC CHANGE;  Surgeon: Akhil Castro M.D.;  [place];  Service: Orthopedics    FASCIOTOMY Left 08/13/2023    Procedure: FASCIOTOMY - GLUTEAL, THIGH;  Surgeon: Akhil Castro M.D.;  Location: [facility];  Service: Orthopedics    INGUINAL HERNIA REPAIR ROBOTIC XI Bilateral 09/17/2021    Procedure: REPAIR, HERNIA, INGUINAL, ROBOT-ASSISTED, USING DA ELOISE XI - WITH MESH PLACEMENT;  Surgeon: Estuardo Castellon M.D.;  Location: [facility];  Service: Gen Robotic    VT REMOVAL OF SPERM CORD LESION Left 04/29/2021    Procedure: EXCISION,LESION OR LIPOMA,SPERMATIC CORD - SPERMATIC CORD MASS.;  Surgeon: Anant Riddle M.D.;  Location: [facility];  Service: Urology    VT REMOVAL OF SPERM DUCT(S) Bilateral 04/29/2021    Procedure: VASECTOMY;  Surgeon: Anant Riddle M.D.;  Location: [facility];  Service: Urology    SHOULDER ARTHROSCOPY W/ ROTATOR CUFF REPAIR Left 02/07/2018    Procedure: SHOULDER ARTHROSCOPY W/ ROTATOR CUFF REPAIR;  Surgeon: Kiet Ramirez M.D.;  Location: [facility];  Service: Orthopedics    SHOULDER ARTHROSCOPY W/ BICIPITAL TENODESIS REPAIR  02/07/2018    Procedure: SHOULDER ARTHROSCOPY with open BICIPITAL TENODESIS REPAIR;  Surgeon: Kiet Ramirez M.D.;  Location: [facility];  Service: Orthopedics    TONSILLECTOMY AND ADENOIDECTOMY  10/12/2011    Performed by ROBERTO ALEMAN at SURGERY SAME DAY [facility] ORS    SEPTOTURBINOPLASTY  10/12/2011    Performed by ROBERTO ALEMAN at SURGERY SAME DAY [facility] ORS    UVULOPHARYNGOPALATOPLASTY  10/12/2011    Performed by ROBERTO ALEMAN at SURGERY SAME DAY [facility] ORS    DEBRIDEMENT  11/02/2010    Performed by BAUTISTA KURTZ at SURGERY SAME DAY [facility] ORS    CYST EXCISION  11/02/2010    Performed by BAUTISTA KURTZ at SURGERY SAME DAY [facility]    KNEE ARTHROSCOPY  11/02/2010    Performed by BAUTISTA KURTZ at SURGERY SAME DAY [facility] ORS    APPENDECTOMY  2009    OTHER ORTHOPEDIC SURGERY  2007    right shoulder     "RECONSTRUCTION, KNEE, ACL Right 2003    NERVE ULNAR TRANSFER  2001    ARTHROSCOPY, KNEE      LAMINOTOMY      OTHER ORTHOPEDIC SURGERY      spine c2 c3 nerves clipped       Past Social Hx: Abhishek Pacheco  reports that he has been smoking cigarettes. He started smoking about 37 years ago. He has a 54 pack-year smoking history. He has been exposed to tobacco smoke. He has never used smokeless tobacco. He reports that he does not currently use alcohol. He reports that he does not use drugs.     Past Family Hx:  Abhishek Pacheco family history is not on file.     Problem list, medications, and allergies reviewed by myself today in Epic.     Objective:     /84   Pulse 85   Temp 36.9 °C (98.4 °F)   Resp 14   Ht 1.753 m (5' 9\")   Wt 98 kg (216 lb)   SpO2 96%   BMI 31.90 kg/m²     Physical Exam  Constitutional:       Appearance: Normal appearance. He is not ill-appearing or toxic-appearing.   HENT:      Head: Normocephalic.      Right Ear: External ear normal.      Left Ear: External ear normal.      Nose: Nose normal.      Mouth/Throat:      Lips: Pink.   Eyes:      General: Lids are normal.   Pulmonary:      Effort: Pulmonary effort is normal. No accessory muscle usage.   Musculoskeletal:      Cervical back: Full passive range of motion without pain.   Neurological:      Mental Status: He is alert and oriented to person, place, and time.   Psychiatric:         Mood and Affect: Mood normal.         Speech: Speech normal.         Behavior: Behavior normal.         Thought Content: Thought content normal.         Cognition and Memory: Cognition normal.         Judgment: Judgment normal.         Assessment/Plan:     Diagnosis and associated orders:     1. Claustrophobia  LORazepam (ATIVAN) 1 MG Tab         Comments/MDM:     I personally reviewed prior external notes and prior test results pertinent to today's visit.  Evidently patient has a scheduled MRI coming up this Monday is claustrophobic.  Did " review patient's chart Ocelus query was performed to review the .  The patient does obtain prescription for Ambien, Percocet, testosterone.  Discussed at length not taking these medications in combination.  Patient is going to withhold his pain medication day of MRI.  Did recommend holding Ambien the night before.      Discussed management options, risks and benefits, and alternatives to treatment plan agreed upon.   Red flags discussed and indications to immediately call 911 or present to the Emergency Department.   Supportive care, differential diagnoses, and indications for immediate follow-up discussed with patient.    Patient expresses understanding and agrees to plan. Patient denies any other questions or concerns.            Please note that this dictation was created using voice recognition software. I have made a reasonable attempt to correct obvious errors, but I expect that there are errors of grammar and possibly content that I did not discover before finalizing the note.    This note was electronically signed by Scott ADAN

## 2024-06-17 ENCOUNTER — HOSPITAL ENCOUNTER (OUTPATIENT)
Dept: RADIOLOGY | Facility: MEDICAL CENTER | Age: 54
End: 2024-06-17
Attending: FAMILY MEDICINE
Payer: COMMERCIAL

## 2024-06-19 ENCOUNTER — HOSPITAL ENCOUNTER (OUTPATIENT)
Dept: RADIOLOGY | Facility: MEDICAL CENTER | Age: 54
End: 2024-06-19
Attending: FAMILY MEDICINE
Payer: COMMERCIAL

## 2024-06-19 DIAGNOSIS — M72.6 NECROTIZING FASCIITIS (HCC): ICD-10-CM

## 2024-06-19 PROCEDURE — 73720 MRI LWR EXTREMITY W/O&W/DYE: CPT | Mod: LT

## 2024-06-19 PROCEDURE — 700117 HCHG RX CONTRAST REV CODE 255: Performed by: FAMILY MEDICINE

## 2024-06-19 PROCEDURE — A9579 GAD-BASE MR CONTRAST NOS,1ML: HCPCS | Performed by: FAMILY MEDICINE

## 2024-06-19 RX ADMIN — GADOTERIDOL 20 ML: 279.3 INJECTION, SOLUTION INTRAVENOUS at 08:50

## 2024-09-24 ENCOUNTER — HOSPITAL ENCOUNTER (OUTPATIENT)
Dept: LAB | Facility: MEDICAL CENTER | Age: 54
End: 2024-09-24
Attending: FAMILY MEDICINE
Payer: COMMERCIAL

## 2024-09-24 LAB
BASOPHILS # BLD AUTO: 0.7 % (ref 0–1.8)
BASOPHILS # BLD: 0.07 K/UL (ref 0–0.12)
EOSINOPHIL # BLD AUTO: 0.07 K/UL (ref 0–0.51)
EOSINOPHIL NFR BLD: 0.7 % (ref 0–6.9)
ERYTHROCYTE [DISTWIDTH] IN BLOOD BY AUTOMATED COUNT: 39.4 FL (ref 35.9–50)
EST. AVERAGE GLUCOSE BLD GHB EST-MCNC: 114 MG/DL
HBA1C MFR BLD: 5.6 % (ref 4–5.6)
HCT VFR BLD AUTO: 45.9 % (ref 42–52)
HGB BLD-MCNC: 15.8 G/DL (ref 14–18)
IMM GRANULOCYTES # BLD AUTO: 0.09 K/UL (ref 0–0.11)
IMM GRANULOCYTES NFR BLD AUTO: 0.9 % (ref 0–0.9)
LYMPHOCYTES # BLD AUTO: 4.05 K/UL (ref 1–4.8)
LYMPHOCYTES NFR BLD: 38.4 % (ref 22–41)
MCH RBC QN AUTO: 28.9 PG (ref 27–33)
MCHC RBC AUTO-ENTMCNC: 34.4 G/DL (ref 32.3–36.5)
MCV RBC AUTO: 84.1 FL (ref 81.4–97.8)
MONOCYTES # BLD AUTO: 0.65 K/UL (ref 0–0.85)
MONOCYTES NFR BLD AUTO: 6.2 % (ref 0–13.4)
NEUTROPHILS # BLD AUTO: 5.62 K/UL (ref 1.82–7.42)
NEUTROPHILS NFR BLD: 53.1 % (ref 44–72)
NRBC # BLD AUTO: 0 K/UL
NRBC BLD-RTO: 0 /100 WBC (ref 0–0.2)
PLATELET # BLD AUTO: 363 K/UL (ref 164–446)
PMV BLD AUTO: 10.1 FL (ref 9–12.9)
RBC # BLD AUTO: 5.46 M/UL (ref 4.7–6.1)
WBC # BLD AUTO: 10.6 K/UL (ref 4.8–10.8)

## 2024-09-24 PROCEDURE — 80061 LIPID PANEL: CPT

## 2024-09-24 PROCEDURE — 84402 ASSAY OF FREE TESTOSTERONE: CPT

## 2024-09-24 PROCEDURE — 80053 COMPREHEN METABOLIC PANEL: CPT

## 2024-09-24 PROCEDURE — 36415 COLL VENOUS BLD VENIPUNCTURE: CPT

## 2024-09-24 PROCEDURE — 84270 ASSAY OF SEX HORMONE GLOBUL: CPT

## 2024-09-24 PROCEDURE — 83036 HEMOGLOBIN GLYCOSYLATED A1C: CPT

## 2024-09-24 PROCEDURE — 84153 ASSAY OF PSA TOTAL: CPT

## 2024-09-24 PROCEDURE — 84403 ASSAY OF TOTAL TESTOSTERONE: CPT

## 2024-09-24 PROCEDURE — 85025 COMPLETE CBC W/AUTO DIFF WBC: CPT

## 2024-09-25 LAB
ALBUMIN SERPL BCP-MCNC: 4.5 G/DL (ref 3.2–4.9)
ALBUMIN/GLOB SERPL: 1.6 G/DL
ALP SERPL-CCNC: 48 U/L (ref 30–99)
ALT SERPL-CCNC: 37 U/L (ref 2–50)
ANION GAP SERPL CALC-SCNC: 16 MMOL/L (ref 7–16)
AST SERPL-CCNC: 24 U/L (ref 12–45)
BILIRUB SERPL-MCNC: 0.6 MG/DL (ref 0.1–1.5)
BUN SERPL-MCNC: 18 MG/DL (ref 8–22)
CALCIUM ALBUM COR SERPL-MCNC: 8.9 MG/DL (ref 8.5–10.5)
CALCIUM SERPL-MCNC: 9.3 MG/DL (ref 8.5–10.5)
CHLORIDE SERPL-SCNC: 101 MMOL/L (ref 96–112)
CHOLEST SERPL-MCNC: 232 MG/DL (ref 100–199)
CO2 SERPL-SCNC: 21 MMOL/L (ref 20–33)
CREAT SERPL-MCNC: 1.21 MG/DL (ref 0.5–1.4)
FASTING STATUS PATIENT QL REPORTED: NORMAL
GFR SERPLBLD CREATININE-BSD FMLA CKD-EPI: 71 ML/MIN/1.73 M 2
GLOBULIN SER CALC-MCNC: 2.8 G/DL (ref 1.9–3.5)
GLUCOSE SERPL-MCNC: 85 MG/DL (ref 65–99)
HDLC SERPL-MCNC: 30 MG/DL
LDLC SERPL CALC-MCNC: 131 MG/DL
POTASSIUM SERPL-SCNC: 4.5 MMOL/L (ref 3.6–5.5)
PROT SERPL-MCNC: 7.3 G/DL (ref 6–8.2)
PSA SERPL-MCNC: 1.81 NG/ML (ref 0–4)
SODIUM SERPL-SCNC: 138 MMOL/L (ref 135–145)
TRIGL SERPL-MCNC: 356 MG/DL (ref 0–149)

## 2024-09-26 LAB
SHBG SERPL-SCNC: 12 NMOL/L (ref 19–76)
TESTOST FREE MFR SERPL: 2.6 % (ref 1.6–2.9)
TESTOST FREE SERPL-MCNC: 28 PG/ML (ref 47–244)
TESTOST SERPL-MCNC: 108 NG/DL (ref 300–890)

## 2024-09-27 ENCOUNTER — HOSPITAL ENCOUNTER (OUTPATIENT)
Dept: RADIOLOGY | Facility: MEDICAL CENTER | Age: 54
End: 2024-09-27
Attending: FAMILY MEDICINE
Payer: COMMERCIAL

## 2024-09-27 DIAGNOSIS — N50.89 TESTICULAR MASS: ICD-10-CM

## 2024-09-27 PROCEDURE — 76870 US EXAM SCROTUM: CPT

## 2024-10-03 ENCOUNTER — OFFICE VISIT (OUTPATIENT)
Dept: UROLOGY | Facility: MEDICAL CENTER | Age: 54
End: 2024-10-03
Payer: COMMERCIAL

## 2024-10-03 VITALS
HEIGHT: 69 IN | SYSTOLIC BLOOD PRESSURE: 127 MMHG | OXYGEN SATURATION: 97 % | DIASTOLIC BLOOD PRESSURE: 81 MMHG | BODY MASS INDEX: 31.99 KG/M2 | HEART RATE: 88 BPM | WEIGHT: 216 LBS

## 2024-10-03 DIAGNOSIS — D17.79 LIPOMA OF INGUINAL REGION: ICD-10-CM

## 2024-10-03 PROCEDURE — 3074F SYST BP LT 130 MM HG: CPT | Performed by: UROLOGY

## 2024-10-03 PROCEDURE — 3079F DIAST BP 80-89 MM HG: CPT | Performed by: UROLOGY

## 2024-10-03 PROCEDURE — 99203 OFFICE O/P NEW LOW 30 MIN: CPT | Performed by: UROLOGY

## 2024-10-03 ASSESSMENT — FIBROSIS 4 INDEX: FIB4 SCORE: 0.58

## 2024-11-10 ENCOUNTER — OFFICE VISIT (OUTPATIENT)
Dept: URGENT CARE | Facility: CLINIC | Age: 54
End: 2024-11-10
Payer: COMMERCIAL

## 2024-11-10 ENCOUNTER — APPOINTMENT (OUTPATIENT)
Dept: RADIOLOGY | Facility: IMAGING CENTER | Age: 54
End: 2024-11-10
Attending: STUDENT IN AN ORGANIZED HEALTH CARE EDUCATION/TRAINING PROGRAM
Payer: COMMERCIAL

## 2024-11-10 VITALS
RESPIRATION RATE: 16 BRPM | HEIGHT: 68 IN | TEMPERATURE: 97.8 F | DIASTOLIC BLOOD PRESSURE: 90 MMHG | BODY MASS INDEX: 32.58 KG/M2 | OXYGEN SATURATION: 97 % | SYSTOLIC BLOOD PRESSURE: 138 MMHG | HEART RATE: 93 BPM | WEIGHT: 215 LBS

## 2024-11-10 DIAGNOSIS — S40.012A CONTUSION OF LEFT SHOULDER, INITIAL ENCOUNTER: ICD-10-CM

## 2024-11-10 DIAGNOSIS — M19.012 OSTEOARTHRITIS OF LEFT AC (ACROMIOCLAVICULAR) JOINT: ICD-10-CM

## 2024-11-10 DIAGNOSIS — M19.012 OSTEOARTHRITIS OF GLENOHUMERAL JOINT, LEFT: ICD-10-CM

## 2024-11-10 PROCEDURE — 3075F SYST BP GE 130 - 139MM HG: CPT | Performed by: STUDENT IN AN ORGANIZED HEALTH CARE EDUCATION/TRAINING PROGRAM

## 2024-11-10 PROCEDURE — 3080F DIAST BP >= 90 MM HG: CPT | Performed by: STUDENT IN AN ORGANIZED HEALTH CARE EDUCATION/TRAINING PROGRAM

## 2024-11-10 PROCEDURE — 73030 X-RAY EXAM OF SHOULDER: CPT | Mod: TC,LT | Performed by: RADIOLOGY

## 2024-11-10 PROCEDURE — 99213 OFFICE O/P EST LOW 20 MIN: CPT | Performed by: STUDENT IN AN ORGANIZED HEALTH CARE EDUCATION/TRAINING PROGRAM

## 2024-11-10 RX ORDER — HYDROXYZINE HYDROCHLORIDE 25 MG/1
TABLET, FILM COATED ORAL
COMMUNITY

## 2024-11-10 RX ORDER — LORAZEPAM 1 MG/1
TABLET ORAL
COMMUNITY

## 2024-11-10 RX ORDER — FLUCONAZOLE 150 MG/1
TABLET ORAL
COMMUNITY

## 2024-11-10 RX ORDER — VALACYCLOVIR HYDROCHLORIDE 500 MG/1
TABLET, FILM COATED ORAL
COMMUNITY

## 2024-11-10 RX ORDER — LEVOFLOXACIN 500 MG/1
TABLET, FILM COATED ORAL
COMMUNITY

## 2024-11-10 RX ORDER — KETOCONAZOLE 20 MG/G
CREAM TOPICAL
COMMUNITY

## 2024-11-10 RX ORDER — PANTOPRAZOLE SODIUM 40 MG/1
40 TABLET, DELAYED RELEASE ORAL
COMMUNITY
Start: 2024-10-01

## 2024-11-10 RX ORDER — PREGABALIN 75 MG/1
1 CAPSULE ORAL 2 TIMES DAILY
COMMUNITY

## 2024-11-10 RX ORDER — DULOXETIN HYDROCHLORIDE 60 MG/1
60 CAPSULE, DELAYED RELEASE ORAL
COMMUNITY
Start: 2024-10-16

## 2024-11-10 RX ORDER — DOXYCYCLINE HYCLATE 100 MG
TABLET ORAL
COMMUNITY

## 2024-11-10 RX ORDER — DULOXETIN HYDROCHLORIDE 30 MG/1
30 CAPSULE, DELAYED RELEASE ORAL
COMMUNITY
Start: 2024-09-19

## 2024-11-10 RX ORDER — DIAZEPAM 5 MG/1
TABLET ORAL
COMMUNITY

## 2024-11-10 RX ORDER — PREGABALIN 25 MG/1
1 CAPSULE ORAL 2 TIMES DAILY
COMMUNITY

## 2024-11-10 RX ORDER — CEPHALEXIN 250 MG/1
TABLET, FILM COATED ORAL
COMMUNITY

## 2024-11-10 RX ORDER — OXYCODONE HYDROCHLORIDE 5 MG/1
TABLET ORAL
COMMUNITY

## 2024-11-10 ASSESSMENT — FIBROSIS 4 INDEX: FIB4 SCORE: 0.58

## 2024-11-10 NOTE — PROGRESS NOTES
Subjective:   CHIEF COMPLAINT  Chief Complaint   Patient presents with    Shoulder Injury     Tripped and landed on left shoulder 1 week ago, has not been getting better       HPI  Abhishek Pacheco is a 54 y.o. male who presents with a chief complaint of left shoulder pain status post ground-level mechanical fall 1 week ago.  Patient reports he missed a step landing on his left shoulder.  Does not recall any bruising or swelling.  Says symptoms are aggravated with forward flexion and extension.  He is on chronic oxycodone and states his symptoms have been controlled.  Came to urgent care today because it has been 1 week since initial injury with limited improvement of symptoms.  Denies any weakness.  History of arthroscopic rotator cuff repair of his left shoulder; patient could provide limited details about the surgery or indication for surgery.    REVIEW OF SYSTEMS  General: no fever or chills  GI: no nausea or vomiting  See HPI for further details.    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    SURGICAL HISTORY   has a past surgical history that includes appendectomy (2009); nerve ulnar transfer (2001); debridement (11/02/2010); cyst excision (11/02/2010); other orthopedic surgery (2007); other orthopedic surgery; knee arthroscopy (11/02/2010); tonsillectomy and adenoidectomy (10/12/2011); septoturbinoplasty (10/12/2011); uvulopharyngopalatoplasty  (10/12/2011); reconstruction, knee, acl (Right, 2003); shoulder arthroscopy w/ rotator cuff repair (Left, 02/07/2018); shoulder arthroscopy w/ bicipital tenodesis repair (02/07/2018); laminotomy; arthroscopy, knee; removal of sperm cord lesion (Left, 04/29/2021); removal of sperm duct(s) (Bilateral, 04/29/2021); inguinal hernia repair robotic xi (Bilateral, 09/17/2021); fasciotomy (Left, 08/13/2023); irrigation & debridement general (Left, 08/15/2023); irrigation & debridement general (Left, 08/17/2023); application or replacement, wound vac (Left, 08/17/2023); and inguinal hernia repair (Right, 12/14/2023).    ALLERGIES  Allergies   Allergen Reactions    Iodine I 131 Tositumomab Unspecified    Iodine Hives and Itching     After receiving iodine contrast for CT pt developed hives and itching    Pt developed breakthrough reaction of hives after being premedicated for contrast injection on 12/10/19    Percocet [Oxycodone-Acetaminophen] Nausea     And dizziness  Takes oxycodone and acetaminophen regularly       CURRENT MEDICATIONS  Home Medications       Reviewed by Chidi Mancini D.O. (Physician) on 11/10/24 at 1544  Med List Status: <None>     Medication Last Dose Status   albuterol 108 (90 Base) MCG/ACT Aero Soln inhalation aerosol PRN Active   Cephalexin 250 MG Tab Not Taking Flagged for Removal   diazePAM (VALIUM) 5 MG Tab Not Taking Flagged for Removal   doxycycline (VIBRAMYCIN) 100 MG Tab Not Taking Flagged for Removal   DULoxetine (CYMBALTA) 30 MG Cap DR Particles Not Taking Active   DULoxetine (CYMBALTA) 60 MG Cap DR Particles delayed-release capsule Taking Active   fluconazole (DIFLUCAN) 150 MG tablet Not Taking Active   hydrOXYzine HCl (ATARAX) 25 MG Tab Not Taking Flagged for Removal   ketoconazole (NIZORAL) 2 % Cream Not Taking Active   levoFLOXacin (LEVAQUIN) 500 MG tablet Not Taking Flagged for Removal   LORazepam (ATIVAN) 1 MG Tab Not Taking Flagged for Removal   oxyCODONE immediate-release  "(ROXICODONE) 5 MG Tab Taking Active   oxyCODONE-acetaminophen (PERCOCET) 5-325 MG Tab Taking Active   pantoprazole (PROTONIX) 40 MG Tablet Delayed Response Taking Active   pregabalin (LYRICA) 25 MG Cap Not Taking Active   pregabalin (LYRICA) 75 MG Cap Not Taking Active   Testosterone 1.62 % Gel Taking Active   valACYclovir (VALTREX) 500 MG Tab PRN Active   zolpidem (AMBIEN) 10 MG Tab Taking Active                    SOCIAL HISTORY  Social History     Tobacco Use    Smoking status: Every Day     Average packs/day: 1.5 packs/day for 36.0 years (54.0 ttl pk-yrs)     Types: Cigarettes     Start date: 1986     Last attempt to quit: 2022     Years since quittin.9     Passive exposure: Past    Smokeless tobacco: Never    Tobacco comments:     /2 for 10yrs   Vaping Use    Vaping status: Never Used   Substance and Sexual Activity    Alcohol use: Not Currently     Comment: rarely, socially    Drug use: No    Sexual activity: Yes     Partners: Female     Birth control/protection: Condom       FAMILY HISTORY  History reviewed. No pertinent family history.       Objective:   PHYSICAL EXAM  VITAL SIGNS: BP (!) 138/90 (BP Location: Left arm, Patient Position: Sitting, BP Cuff Size: Adult)   Pulse 93   Temp 36.6 °C (97.8 °F) (Temporal)   Resp 16   Ht 1.727 m (5' 8\")   Wt 97.5 kg (215 lb)   SpO2 97%   BMI 32.69 kg/m²     Gen: no acute distress, normal voice  Skin: dry, intact, moist mucosal membranes  Eyes: No conjunctival injection b/l  Neck: Normal range of motion. No meningeal signs.   Lungs: No increased work of breathing.  CTAB w/ symmetric expansion  CV: RRR w/o murmurs or clicks  MSK: Left shoulder without any erythema, ecchymosis, edema or gross deformity.  Scar in horizontal oblique access along the axilla consistent with previous arthroscopic procedure (?  Latarjet). Full range of motion in all planes with mild discernible discomfort with adduction/internal rotation.  TTP along the mid shaft of the " clavicle, distal clavicle and AC joint without any step-off or crepitus.  Also mild TTP along the biceps tendon.  Positive X body adduction test.  Negative empty can.  Negative liftoff test.  No weakness in all planes.  No sensory deficits good.    Assessment/Plan:     1. Contusion of left shoulder, initial encounter  DX-SHOULDER 2+ LEFT      2. Osteoarthritis of glenohumeral joint, left        3. Osteoarthritis of left AC (acromioclavicular) joint        Multiple views of the right shoulder are negative for any acute osseous abnormalities however demonstrated mild to moderate degenerative changes along the AC joint and to a lesser extent the glenohumeral joint.  Patient is already [facility] with [facility] orthoped[facility].  -Provided a handout with home stretches and exercises  -Continue prescription medications as needed  -Activity as tolerated  -Follow-up with orthopedics for continued management  -Return to urgent care any new/worsening symptoms or further questions or concerns.  Patient understood everything discussed.  All questions were answered.        Please note that this dictation was created using voice recognition software. I have made a reasonable attempt to correct obvious errors, but I expect that there are errors of grammar and possibly content that I did not discover before finalizing the note.

## 2024-12-09 ENCOUNTER — OFFICE VISIT (OUTPATIENT)
Dept: URGENT CARE | Facility: CLINIC | Age: 54
End: 2024-12-09
Payer: COMMERCIAL

## 2024-12-09 VITALS
RESPIRATION RATE: 20 BRPM | TEMPERATURE: 97.4 F | HEIGHT: 70 IN | OXYGEN SATURATION: 98 % | HEART RATE: 78 BPM | DIASTOLIC BLOOD PRESSURE: 68 MMHG | BODY MASS INDEX: 31.18 KG/M2 | WEIGHT: 217.8 LBS | SYSTOLIC BLOOD PRESSURE: 122 MMHG

## 2024-12-09 DIAGNOSIS — R11.2 NAUSEA AND VOMITING, UNSPECIFIED VOMITING TYPE: ICD-10-CM

## 2024-12-09 DIAGNOSIS — R19.7 DIARRHEA, UNSPECIFIED TYPE: ICD-10-CM

## 2024-12-09 PROCEDURE — 3074F SYST BP LT 130 MM HG: CPT | Performed by: PHYSICIAN ASSISTANT

## 2024-12-09 PROCEDURE — 99213 OFFICE O/P EST LOW 20 MIN: CPT | Performed by: PHYSICIAN ASSISTANT

## 2024-12-09 PROCEDURE — 3078F DIAST BP <80 MM HG: CPT | Performed by: PHYSICIAN ASSISTANT

## 2024-12-09 RX ORDER — ONDANSETRON 4 MG/1
4 TABLET, ORALLY DISINTEGRATING ORAL EVERY 6 HOURS PRN
Qty: 15 TABLET | Refills: 0 | Status: SHIPPED | OUTPATIENT
Start: 2024-12-09

## 2024-12-09 ASSESSMENT — FIBROSIS 4 INDEX: FIB4 SCORE: 0.58

## 2024-12-09 NOTE — LETTER
December 9, 2024         Patient: Abhishek Pacheco   YOB: 1970   Date of Visit: 12/9/2024           To Whom it May Concern:    Abhishek Pacheco was seen in my clinic on 12/9/2024. Please excuse from work through 12/12/24. May return on 12/12/24 only if feeling better.     If you have any questions or concerns, please don't hesitate to call.        Sincerely,           Jim Pacheco P.A.-C.  Electronically Signed

## 2024-12-09 NOTE — PROGRESS NOTES
Subjective:   Abhishek Pacheco is a 54 y.o. male who presents for Abdominal Pain (X1day Vomiting/diarrhea/body aches/bloated/gassy/burping a lot )      HPI  The patient presents to the Urgent Care with complaints of abdominal pain, nausea, vomiting, diarrhea onset yesterday.  Symptoms started with bodyaches following last night development of nausea, vomiting, and diarrhea.  He has had multiple episodes of vomiting and diarrhea.  Abdomen feels bloated and gassy.  Anterior abdominal pain described as a cramping.  Denies any bloody or black stools. Denies any fever, cough, congestion. Tolerating fluids well. Decreased appetite. Does not drink alcohol.  No known sick contacts but he works with a lot of people. History of abdominal hernia repair.       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     I&D, wound vac    Pain     right knee    Renal disorder 2013    Kidney stone    Rhabdomyolysis     Shortness of breath     Sleep apnea     no CPAP had ENT surgery    Snoring     Sore muscles     Swelling of lower extremity     hernia related    Transaminitis     UTI (urinary tract infection)     Wears glasses     Weight loss      Allergies   Allergen Reactions    Iodine I 131 Tositumomab Unspecified    Iodine Hives and Itching     After receiving iodine contrast for CT pt developed hives and itching    Pt developed breakthrough reaction of hives after being premedicated for contrast  "injection on 12/10/19    Percocet [Oxycodone-Acetaminophen] Nausea     And dizziness  Takes oxycodone and acetaminophen regularly        Objective:     /68   Pulse 78   Temp 36.3 °C (97.4 °F) (Temporal)   Resp 20   Ht 1.778 m (5' 10\")   Wt 98.8 kg (217 lb 12.8 oz)   SpO2 98%   BMI 31.25 kg/m²     Physical Exam  Vitals reviewed.   Constitutional:       General: He is not in acute distress.     Appearance: Normal appearance. He is not ill-appearing or toxic-appearing.   HENT:      Mouth/Throat:      Mouth: Mucous membranes are moist.      Pharynx: Oropharynx is clear.   Eyes:      Conjunctiva/sclera: Conjunctivae normal.   Cardiovascular:      Rate and Rhythm: Normal rate.   Pulmonary:      Effort: Pulmonary effort is normal.   Abdominal:      General: Abdomen is flat. Bowel sounds are increased.      Tenderness: There is generalized abdominal tenderness (mild). There is no guarding or rebound. Negative signs include Scruggs's sign and McBurney's sign.   Musculoskeletal:      Cervical back: Neck supple. No rigidity.   Skin:     General: Skin is warm and dry.   Neurological:      General: No focal deficit present.      Mental Status: He is alert and oriented to person, place, and time.   Psychiatric:         Mood and Affect: Mood normal.         Behavior: Behavior normal.         Diagnosis and associated orders:     1. Nausea and vomiting, unspecified vomiting type  - ondansetron (ZOFRAN ODT) 4 MG TABLET DISPERSIBLE; Take 1 Tablet by mouth every 6 hours as needed for Nausea/Vomiting.  Dispense: 15 Tablet; Refill: 0    2. Diarrhea, unspecified type       Comments/MDM:     The patient's presenting symptoms and exam findings are consistent with a viral gastroenteritis.  Patient overall is well-appearing in no acute distress.  Normal vital signs.  Tolerating fluids well.  I was unable to elicit any significant localized abdominal tenderness on exam.  Recommend increase fluid intake such as sips of fluids and " Pedialyte, bland or BRAT diet and increase as tolerated. Avoid dairy of fatty foods for now. If no improvement in 5 to 7 days or any worsening, recommend returning to the urgent care or following up with PCP for re-evaluation.  Patient understands to present immediately to the ER if any severe abdominal pain, unable to tolerate fluids, or any other concerns.       I personally reviewed prior external notes and test results pertinent to today's visit. Pathogenesis of diagnosis discussed including typical length and natural progression. Supportive care, natural history, differential diagnoses, and indications for immediate follow-up discussed. Patient expresses understanding and agrees to plan. Patient denies any other questions or concerns.     Follow-up with the primary care physician for recheck, reevaluation, and consideration of further management.    Please note that this dictation was created using voice recognition software. I have made a reasonable attempt to correct obvious errors, but I expect that there are errors of grammar and possibly content that I did not discover before finalizing the note.    This note was electronically signed by Jim Pacheco PA-C

## 2025-04-03 ENCOUNTER — OFFICE VISIT (OUTPATIENT)
Dept: URGENT CARE | Facility: CLINIC | Age: 55
End: 2025-04-03
Payer: COMMERCIAL

## 2025-04-03 VITALS
RESPIRATION RATE: 16 BRPM | SYSTOLIC BLOOD PRESSURE: 132 MMHG | TEMPERATURE: 98.4 F | HEART RATE: 85 BPM | WEIGHT: 214.4 LBS | BODY MASS INDEX: 31.76 KG/M2 | DIASTOLIC BLOOD PRESSURE: 96 MMHG | HEIGHT: 69 IN | OXYGEN SATURATION: 95 %

## 2025-04-03 DIAGNOSIS — J06.9 VIRAL UPPER RESPIRATORY ILLNESS: ICD-10-CM

## 2025-04-03 DIAGNOSIS — R05.1 ACUTE COUGH: ICD-10-CM

## 2025-04-03 PROCEDURE — 99213 OFFICE O/P EST LOW 20 MIN: CPT | Performed by: PHYSICIAN ASSISTANT

## 2025-04-03 PROCEDURE — 3080F DIAST BP >= 90 MM HG: CPT | Performed by: PHYSICIAN ASSISTANT

## 2025-04-03 PROCEDURE — 3075F SYST BP GE 130 - 139MM HG: CPT | Performed by: PHYSICIAN ASSISTANT

## 2025-04-03 RX ORDER — METHYLPREDNISOLONE 4 MG/1
4 TABLET ORAL DAILY
Qty: 21 TABLET | Refills: 0 | Status: SHIPPED | OUTPATIENT
Start: 2025-04-03

## 2025-04-03 RX ORDER — BENZONATATE 100 MG/1
100 CAPSULE ORAL 3 TIMES DAILY PRN
Qty: 60 CAPSULE | Refills: 0 | Status: SHIPPED | OUTPATIENT
Start: 2025-04-03

## 2025-04-03 ASSESSMENT — ENCOUNTER SYMPTOMS
DIARRHEA: 0
CHILLS: 0
WHEEZING: 0
SORE THROAT: 0
HEADACHES: 1
FEVER: 0
EYE PAIN: 0
EYE DISCHARGE: 0
COUGH: 1
EYE REDNESS: 0
ABDOMINAL PAIN: 0
CONSTIPATION: 0
SINUS PAIN: 0
VOMITING: 0
NAUSEA: 0
SHORTNESS OF BREATH: 0
DIZZINESS: 0
DIAPHORESIS: 0

## 2025-04-03 ASSESSMENT — FIBROSIS 4 INDEX: FIB4 SCORE: 0.58

## 2025-04-03 NOTE — PROGRESS NOTES
Subjective:     Abhishek Pacheco  is a 54 y.o. male who presents for Cough (W phlegm, congestion, headache, x6 days. Sx worsen x2 days. )       He presents today with cough, sinus congestion and headache ongoing over the last 3 days.  States that the symptoms did get worse in the last 2 days.  Coughing severity does give him abdominal and rib discomfort.  Currently denies fever/chills/sweats, chest pain, shortness of breath, nausea/vomiting, abdominal pain, diarrhea.       Review of Systems   Constitutional:  Negative for chills, diaphoresis, fever and malaise/fatigue.   HENT:  Positive for congestion. Negative for ear discharge, sinus pain and sore throat.    Eyes:  Negative for pain, discharge and redness.   Respiratory:  Positive for cough. Negative for shortness of breath and wheezing.    Cardiovascular:  Negative for chest pain.   Gastrointestinal:  Negative for abdominal pain, constipation, diarrhea, nausea and vomiting.   Neurological:  Positive for headaches. Negative for dizziness.      Allergies   Allergen Reactions    Iodine I 131 Tositumomab Unspecified    Iodine Hives and Itching     After receiving iodine contrast for CT pt developed hives and itching    Pt developed breakthrough reaction of hives after being premedicated for contrast injection on 12/10/19    Percocet [Oxycodone-Acetaminophen] Nausea     And dizziness  Takes oxycodone and acetaminophen regularly     Past Medical History:   Diagnosis Date    JUDY (acute kidney injury) (HCC)     Apnea, sleep     UVPPP, no device    Back pain     Chickenpox     Constipation     Cough     with weather changes    Daytime sleepiness     Dizziness     Frequent headaches     improved with cervical procedure, occ migraines    Gasping for breath     hx r/t heartburn    GERD (gastroesophageal reflux disease)     Heartburn     Insomnia     Leukocytosis     Morning headache     Necrotizing fasciitis (HCC) 2023    after testosterone injection    Nontraumatic  "compartment syndrome of left lower extremity     I&D, wound vac    Pain     right knee    Renal disorder 2013    Kidney stone    Rhabdomyolysis     Shortness of breath     Sleep apnea     no CPAP had ENT surgery    Snoring     Sore muscles     Swelling of lower extremity     hernia related    Transaminitis     UTI (urinary tract infection)     Wears glasses     Weight loss         Objective:   BP (!) 132/96 (BP Location: Left arm, Patient Position: Sitting, BP Cuff Size: Adult)   Pulse 85   Temp 36.9 °C (98.4 °F) (Temporal)   Resp 16   Ht 1.753 m (5' 9\")   Wt 97.3 kg (214 lb 6.4 oz)   SpO2 95%   BMI 31.66 kg/m²   Physical Exam  Vitals and nursing note reviewed.   Constitutional:       General: He is not in acute distress.     Appearance: Normal appearance. He is not ill-appearing, toxic-appearing or diaphoretic.   HENT:      Head: Normocephalic.      Right Ear: Tympanic membrane, ear canal and external ear normal. There is no impacted cerumen.      Left Ear: Tympanic membrane, ear canal and external ear normal. There is no impacted cerumen.      Nose: No congestion or rhinorrhea.      Mouth/Throat:      Mouth: Mucous membranes are moist.      Pharynx: No oropharyngeal exudate or posterior oropharyngeal erythema.   Eyes:      General:         Right eye: No discharge.         Left eye: No discharge.      Conjunctiva/sclera: Conjunctivae normal.   Cardiovascular:      Rate and Rhythm: Normal rate and regular rhythm.   Pulmonary:      Effort: Pulmonary effort is normal. No respiratory distress.      Breath sounds: Normal breath sounds. No stridor. No wheezing, rhonchi or rales.   Musculoskeletal:      Cervical back: Neck supple.   Lymphadenopathy:      Cervical: No cervical adenopathy.   Neurological:      General: No focal deficit present.      Mental Status: He is alert and oriented to person, place, and time.   Psychiatric:         Mood and Affect: Mood normal.         Behavior: Behavior normal.         " Thought Content: Thought content normal.         Judgment: Judgment normal.             Diagnostic testing: None    Assessment/Plan:     Encounter Diagnoses   Name Primary?    Acute cough     Viral upper respiratory illness          Plan for care for today's complaint includes starting patient on Medrol Dosepak and Tessalon Perles for acute cough symptom support.  Patient is likely suffering from a viral upper respiratory illness, no evidence to support antibiotic use at this time.  Encouraged use of over-the-counter medications for additional symptom relief.  Lung auscultation was normal today, no rales, wheezes, rhonchi.  Vital signs were stable during today's office visit, patient was overall well-appearing. Continue to monitor symptoms and return to urgent care or follow-up with primary care provider if symptoms remain ongoing.  Follow-up in the emergency department if symptoms become severe, ER precautions discussed in office today.  Prescription for Medrol Dosepak, Tessalon Perles provided.    See AVS Instructions below for written guidance provided to patient on after-visit management and care in addition to our verbal discussion during the visit.    Please note that this dictation was created using voice recognition software. I have attempted to correct all errors, but there may be sound-alike, spelling, grammar and possibly content errors that I did not discover before finalizing the note.    Jovani Wong PA-C

## 2025-04-03 NOTE — LETTER
[facility]  [id number] [id number]     April 3, 2025    Patient: Abhishek Pacheco   YOB: 1970   Date of Visit: 4/3/2025       To Whom It May Concern:    Abhishek Pacheco was seen and treated in our department on 4/3/2025.  Please excuse from work on 4/2 and 4/3    Sincerely,     Jovani SANTY Wong.

## 2025-07-14 ENCOUNTER — HOSPITAL ENCOUNTER (OUTPATIENT)
Dept: LAB | Facility: MEDICAL CENTER | Age: 55
End: 2025-07-14
Attending: FAMILY MEDICINE
Payer: COMMERCIAL

## 2025-07-14 LAB
ALBUMIN SERPL BCP-MCNC: 4.4 G/DL (ref 3.2–4.9)
ALBUMIN/GLOB SERPL: 1.6 G/DL
ALP SERPL-CCNC: 39 U/L (ref 30–99)
ALT SERPL-CCNC: 45 U/L (ref 2–50)
ANION GAP SERPL CALC-SCNC: 12 MMOL/L (ref 7–16)
AST SERPL-CCNC: 26 U/L (ref 12–45)
BASOPHILS # BLD AUTO: 0.8 % (ref 0–1.8)
BASOPHILS # BLD: 0.06 K/UL (ref 0–0.12)
BILIRUB SERPL-MCNC: 0.3 MG/DL (ref 0.1–1.5)
BUN SERPL-MCNC: 16 MG/DL (ref 8–22)
CALCIUM ALBUM COR SERPL-MCNC: 8.7 MG/DL (ref 8.5–10.5)
CALCIUM SERPL-MCNC: 9 MG/DL (ref 8.5–10.5)
CHLORIDE SERPL-SCNC: 103 MMOL/L (ref 96–112)
CHOLEST SERPL-MCNC: 233 MG/DL (ref 100–199)
CO2 SERPL-SCNC: 22 MMOL/L (ref 20–33)
CREAT SERPL-MCNC: 0.96 MG/DL (ref 0.5–1.4)
EOSINOPHIL # BLD AUTO: 0.07 K/UL (ref 0–0.51)
EOSINOPHIL NFR BLD: 1 % (ref 0–6.9)
ERYTHROCYTE [DISTWIDTH] IN BLOOD BY AUTOMATED COUNT: 41.1 FL (ref 35.9–50)
EST. AVERAGE GLUCOSE BLD GHB EST-MCNC: 120 MG/DL
GFR SERPLBLD CREATININE-BSD FMLA CKD-EPI: 94 ML/MIN/1.73 M 2
GLOBULIN SER CALC-MCNC: 2.8 G/DL (ref 1.9–3.5)
GLUCOSE SERPL-MCNC: 89 MG/DL (ref 65–99)
HBA1C MFR BLD: 5.8 % (ref 4–5.6)
HCT VFR BLD AUTO: 46.6 % (ref 42–52)
HDLC SERPL-MCNC: 40 MG/DL
HGB BLD-MCNC: 15.3 G/DL (ref 14–18)
IMM GRANULOCYTES # BLD AUTO: 0.12 K/UL (ref 0–0.11)
IMM GRANULOCYTES NFR BLD AUTO: 1.6 % (ref 0–0.9)
LDLC SERPL CALC-MCNC: 145 MG/DL
LYMPHOCYTES # BLD AUTO: 2.59 K/UL (ref 1–4.8)
LYMPHOCYTES NFR BLD: 35.2 % (ref 22–41)
MCH RBC QN AUTO: 27.9 PG (ref 27–33)
MCHC RBC AUTO-ENTMCNC: 32.8 G/DL (ref 32.3–36.5)
MCV RBC AUTO: 85 FL (ref 81.4–97.8)
MONOCYTES # BLD AUTO: 0.53 K/UL (ref 0–0.85)
MONOCYTES NFR BLD AUTO: 7.2 % (ref 0–13.4)
NEUTROPHILS # BLD AUTO: 3.99 K/UL (ref 1.82–7.42)
NEUTROPHILS NFR BLD: 54.2 % (ref 44–72)
NRBC # BLD AUTO: 0 K/UL
NRBC BLD-RTO: 0 /100 WBC (ref 0–0.2)
PLATELET # BLD AUTO: 272 K/UL (ref 164–446)
PMV BLD AUTO: 10.1 FL (ref 9–12.9)
POTASSIUM SERPL-SCNC: 4.9 MMOL/L (ref 3.6–5.5)
PROT SERPL-MCNC: 7.2 G/DL (ref 6–8.2)
PSA SERPL DL<=0.01 NG/ML-MCNC: 1.67 NG/ML (ref 0–4)
RBC # BLD AUTO: 5.48 M/UL (ref 4.7–6.1)
SODIUM SERPL-SCNC: 137 MMOL/L (ref 135–145)
TRIGL SERPL-MCNC: 241 MG/DL (ref 0–149)
WBC # BLD AUTO: 7.4 K/UL (ref 4.8–10.8)

## 2025-07-14 PROCEDURE — 84402 ASSAY OF FREE TESTOSTERONE: CPT

## 2025-07-14 PROCEDURE — 85025 COMPLETE CBC W/AUTO DIFF WBC: CPT

## 2025-07-14 PROCEDURE — 80061 LIPID PANEL: CPT

## 2025-07-14 PROCEDURE — 84270 ASSAY OF SEX HORMONE GLOBUL: CPT

## 2025-07-14 PROCEDURE — 84153 ASSAY OF PSA TOTAL: CPT

## 2025-07-14 PROCEDURE — 80053 COMPREHEN METABOLIC PANEL: CPT

## 2025-07-14 PROCEDURE — 83036 HEMOGLOBIN GLYCOSYLATED A1C: CPT

## 2025-07-14 PROCEDURE — 84403 ASSAY OF TOTAL TESTOSTERONE: CPT

## 2025-07-14 PROCEDURE — 36415 COLL VENOUS BLD VENIPUNCTURE: CPT

## 2025-07-15 ENCOUNTER — OFFICE VISIT (OUTPATIENT)
Dept: URGENT CARE | Facility: CLINIC | Age: 55
End: 2025-07-15
Payer: COMMERCIAL

## 2025-07-15 VITALS
HEIGHT: 69 IN | OXYGEN SATURATION: 95 % | DIASTOLIC BLOOD PRESSURE: 84 MMHG | RESPIRATION RATE: 18 BRPM | BODY MASS INDEX: 31.7 KG/M2 | SYSTOLIC BLOOD PRESSURE: 122 MMHG | WEIGHT: 214 LBS | HEART RATE: 91 BPM | TEMPERATURE: 98 F

## 2025-07-15 DIAGNOSIS — L03.113 CELLULITIS OF RIGHT UPPER EXTREMITY: Primary | ICD-10-CM

## 2025-07-15 PROCEDURE — 99213 OFFICE O/P EST LOW 20 MIN: CPT | Performed by: PHYSICIAN ASSISTANT

## 2025-07-15 PROCEDURE — 3074F SYST BP LT 130 MM HG: CPT | Performed by: PHYSICIAN ASSISTANT

## 2025-07-15 PROCEDURE — 3079F DIAST BP 80-89 MM HG: CPT | Performed by: PHYSICIAN ASSISTANT

## 2025-07-15 RX ORDER — DOXYCYCLINE HYCLATE 100 MG
100 TABLET ORAL 2 TIMES DAILY
Qty: 10 TABLET | Refills: 0 | Status: SHIPPED | OUTPATIENT
Start: 2025-07-15 | End: 2025-07-20

## 2025-07-15 ASSESSMENT — ENCOUNTER SYMPTOMS
SENSORY CHANGE: 0
BURN: 1
TINGLING: 0
FEVER: 0

## 2025-07-15 ASSESSMENT — FIBROSIS 4 INDEX: FIB4 SCORE: 0.77
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
## 2025-07-16 LAB
SHBG SERPL-SCNC: 13 NMOL/L (ref 19–76)
TESTOST FREE MFR SERPL: 2.6 % (ref 1.6–2.9)
TESTOST FREE SERPL-MCNC: 47 PG/ML (ref 47–244)
TESTOST SERPL-MCNC: 185 NG/DL (ref 300–890)

## (undated) DEVICE — BOVIE NEEDLE TIP INSULATD NON-SAFETY 2CM (50/PK)

## (undated) DEVICE — MASK ANESTHESIA ADULT  - (100/CA)

## (undated) DEVICE — SUCTION INSTRUMENT YANKAUER OPEN TIP W/O VENT (50EA/CA)

## (undated) DEVICE — DRAPE SURG STERI-DRAPE 7X11OD - (40EA/CA)

## (undated) DEVICE — CANISTER SUCTION 3000ML MECHANICAL FILTER AUTO SHUTOFF MEDI-VAC NONSTERILE LF DISP  (40EA/CA)

## (undated) DEVICE — KIT  I.V. START (100EA/CA)

## (undated) DEVICE — SODIUM CHL. IRRIGATION 0.9% 3000ML (4EA/CA 65CA/PF)

## (undated) DEVICE — WRAP COBAN SELF-ADHERENT 6 IN X  5YDS STERILE TAN (12/CA)

## (undated) DEVICE — ELECTRODE 850 FOAM ADHESIVE - HYDROGEL RADIOTRNSPRNT (50/PK)

## (undated) DEVICE — GOWN WARMING STANDARD FLEX - (30/CA)

## (undated) DEVICE — SUTURE 2-0 VICRYL PLUS SH - 27 INCH (36/BX)

## (undated) DEVICE — ELECTRODE DUAL RETURN W/ CORD - (50/PK)

## (undated) DEVICE — SUCTION INSTRUMENT YANKAUER BULBOUS TIP W/O VENT (50EA/CA)

## (undated) DEVICE — SET LEADWIRE 5 LEAD BEDSIDE DISPOSABLE ECG (1SET OF 5/EA)

## (undated) DEVICE — SENSOR SPO2 NEO LNCS ADHESIVE (20/BX) SEE USER NOTES

## (undated) DEVICE — HUMID-VENT HEAT AND MOISTURE EXCHANGE- (50/BX)

## (undated) DEVICE — SLEEVE VASO CALF MED - (10PR/CA)

## (undated) DEVICE — STAPLER SKIN DISP - (6/BX 10BX/CA) VISISTAT

## (undated) DEVICE — PROTECTOR ULNA NERVE - (36PR/CA)

## (undated) DEVICE — BOVIE BLADE COATED - (50/PK)

## (undated) DEVICE — SPONGE GAUZESTER 4 X 4 4PLY - (128PK/CA)

## (undated) DEVICE — Device

## (undated) DEVICE — LACTATED RINGERS INJ 1000 ML - (14EA/CA 60CA/PF)

## (undated) DEVICE — SUTURE ETHILON 2-0 FSLX 30 (36PK/BX)"

## (undated) DEVICE — GLOVE SZ 6.5 BIOGEL PI MICRO - PF LF (50PR/BX)

## (undated) DEVICE — HEAD HOLDER JUNIOR/ADULT

## (undated) DEVICE — SHEARS MONOPOLAR CURVED  DA VINCI 10X'S REUSABLE

## (undated) DEVICE — KIT ANESTHESIA W/CIRCUIT & 3/LT BAG W/FILTER (20EA/CA)

## (undated) DEVICE — TRAY SRGPRP PVP IOD WT PRP - (20/CA)

## (undated) DEVICE — CANISTER SUCTION RIGID RED 1500CC (40EA/CA)

## (undated) DEVICE — SUTURE 3-0 VICRYL PLUS SH - 27 INCH (36/BX)

## (undated) DEVICE — TOWELS CLOTH SURGICAL - (4/PK 20PK/CA)

## (undated) DEVICE — BLADE SURGICAL #15 - (50/BX 3BX/CA)

## (undated) DEVICE — PAD LAP STERILE 18 X 18 - (5/PK 40PK/CA)

## (undated) DEVICE — SYSTEM CLEARIFY VISUALIZATION (10EA/PK)

## (undated) DEVICE — SLEEVE, VASO, THIGH, MED

## (undated) DEVICE — SODIUM CHL IRRIGATION 0.9% 1000ML (12EA/CA)

## (undated) DEVICE — GLOVE BIOGEL INDICATOR SZ 7SURGICAL PF LTX - (50/BX 4BX/CA)

## (undated) DEVICE — GLOVE BIOGEL SZ 7.5 SURGICAL PF LTX - (50PR/BX 4BX/CA)

## (undated) DEVICE — TUBE CONNECTING SUCTION - CLEAR PLASTIC STERILE 72 IN (50EA/CA)

## (undated) DEVICE — SET EXTENSION WITH 2 PORTS (48EA/CA) ***PART #2C8610 IS A SUBSTITUTE*****

## (undated) DEVICE — CLOSURE SKIN STRIP 1/2 X 4 IN - (STERI STRIP) (50/BX 4BX/CA)

## (undated) DEVICE — SENSOR OXIMETER ADULT SPO2 RD SET (20EA/BX)

## (undated) DEVICE — RESERVOIR SUCTION 100 CC - SILICONE (20EA/CA)

## (undated) DEVICE — DRAPE U ORTHOPEDIC - (10/BX)

## (undated) DEVICE — CHLORAPREP 26 ML APPLICATOR - ORANGE TINT(25/CA)

## (undated) DEVICE — SUTURE 3-0 VICRYL PLUS RB-1 - (36/BX)

## (undated) DEVICE — TUBING CLEARLINK DUO-VENT - C-FLO (48EA/CA)

## (undated) DEVICE — ABLATOR WAND SERFAS 90-S CRUISE

## (undated) DEVICE — DERMABOND ADVANCED - (12EA/BX)

## (undated) DEVICE — SUTURE GENERAL

## (undated) DEVICE — SUTURE 3-0 VICRYL PLUS SH - 8X 18 INCH (12/BX)

## (undated) DEVICE — DRAPE LARGE 3 QUARTER - (20/CA)

## (undated) DEVICE — NEEDLE DAVIS TONSIL 1/2 CIR - (2EA/PK20PK/BX)

## (undated) DEVICE — NEPTUNE 4 PORT MANIFOLD - (20/PK)

## (undated) DEVICE — GLOVE SZ 7 BIOGEL PI MICRO - PF LF (50PR/BX 4BX/CA)

## (undated) DEVICE — WATER IRRIGATION STERILE 1000ML (12EA/CA)

## (undated) DEVICE — DRESSING 3X8 ADAPTIC GAUZE - NON-ADHERING (36/PK 6PK/BX)

## (undated) DEVICE — SUTURE 2-0 PROLENE CT-1 30 (36PK/BX)"

## (undated) DEVICE — BIPOLAR FORCE DA VINCI 12X'S REISABLE

## (undated) DEVICE — SWABSTICK BENZOIN SINGLE (50EA/BX)

## (undated) DEVICE — DRAPE LAPAROTOMY T SHEET - (12EA/CA)

## (undated) DEVICE — SUTURE 4-0 MONOCRYL PLUS PS-1 - 27 INCH (36/BX)

## (undated) DEVICE — DRAIN PENROSE STERILE 1/4 X - 18 IN  (25EA/BX)

## (undated) DEVICE — MONITOR QUICK PRESSURE (6EA/BX)

## (undated) DEVICE — ELECTRODE NEEDLE NON-SAFETY 2.8 IN (150EA/CT)

## (undated) DEVICE — CONTAINER SPECIMEN BAG OR - STERILE 4 OZ W/LID (100EA/CA)

## (undated) DEVICE — BLADE SURGICAL CLIPPER - (50EA/CA)

## (undated) DEVICE — SLEEVE SHOULDER DISP(ARTHREX) - (6/BX)

## (undated) DEVICE — SUTURE 2-0 VICRYL PLUS CT-1 - 8 X 18 INCH(12/BX)

## (undated) DEVICE — CANNULA FULLY THREADED 8 X 75 (5EA/BX)

## (undated) DEVICE — PENCIL ELECTSURG 10FT BTN SWH - (50/CA)

## (undated) DEVICE — GLOVE BIOGEL PI INDICATOR SZ 6.5 SURGICAL PF LF - (50/BX 4BX/CA)

## (undated) DEVICE — COVER LIGHT HANDLE ALC PLUS DISP (18EA/BX)

## (undated) DEVICE — DRESSING XEROFORM 1X8 - (50/BX 4BX/CA)

## (undated) DEVICE — SUTURE 3-0 CHROMIC GUT FS-2 27 (36PK/BX)"

## (undated) DEVICE — SUTURE 2-0 20CM STRATAFIX SPIRAL SH NEEDLE (12/BX)

## (undated) DEVICE — TUBING PATIENT W/CONNECTOR REDEUCE (1EA)

## (undated) DEVICE — SYRINGE ASEPTO - (50EA/CA

## (undated) DEVICE — NEEDLE EXPRESSEW III (5EA/PK)

## (undated) DEVICE — BLOCK

## (undated) DEVICE — GAUZE FLUFF STERILE 2-PLY 36 X 36 (100EA/CA)

## (undated) DEVICE — COVER TIP ENDOWRIST HOT SHEAR - (10EA/BX) DA VINCI

## (undated) DEVICE — SHAVER4.0 AGGRESSIVE + FORMLA (5EA/BX)

## (undated) DEVICE — SEAL 5MM-8MM UNIVERSAL  BOX OF 10

## (undated) DEVICE — DRAPE COLUMN  BOX OF 20

## (undated) DEVICE — TIP INTPLS HFLO ML ORFC BTRY - (12/CS)  FOR SURGILAV

## (undated) DEVICE — PACK MINOR BASIN - (2EA/CA)

## (undated) DEVICE — DRAPE SHOULDER FLUID CONTROL - 77 X 85 (10/CA)

## (undated) DEVICE — NEEDLE DRIVER MEGA SUTURECUT DA VINCI 15X'S REUSABLE

## (undated) DEVICE — TOWEL STOP TIMEOUT SAFETY FLAG (40EA/CA)

## (undated) DEVICE — GLOVE BIOGEL PI ORTHO SZ 7.5 PF LF (40PR/BX)

## (undated) DEVICE — ROBOTIC SURGERY SERVICES

## (undated) DEVICE — ATHLETIC SUPPORTER LARGE - (1/EA)

## (undated) DEVICE — DRAIN JACKSON PRATT 19FR - (10/CS)

## (undated) DEVICE — NEEDLE NON SAFETY 25 GA X 1 1/2 IN HYPO (100EA/BX)

## (undated) DEVICE — DRAPE SURGICAL U 77X120 - (10/CA)

## (undated) DEVICE — CANISTER INFO VAC 1000ML (5EA/CA)

## (undated) DEVICE — GLOVE SIZE 6.5  SURGEON ACCELERATOR FREE GREEN (50PR/BX)

## (undated) DEVICE — SUTURE 2-0 VICRYL PLUS CT-2 - 27 INCH (36/BX)

## (undated) DEVICE — PACK SHOULDER ARTHROSCOPY SM - (2EA/CA)

## (undated) DEVICE — DRESSING KIT V.A.C. SENSA T.R.A.C. LARGE (10EA/CA)

## (undated) DEVICE — SUTURE 4-0 PROLENE RB-1 D/A 36 (36PK/BX)"

## (undated) DEVICE — TUBE CONNECT SUCTION CLEAR 120 X 1/4" (50EA/CA)"

## (undated) DEVICE — SHAVER 5.5 RESECTOR FORMULA (5EA/BX )

## (undated) DEVICE — GLOVE BIOGEL INDICATOR SZ 7.5 SURGICAL PF LTX - (50PR/BX 4BX/CA)

## (undated) DEVICE — TUBE E-T HI-LO CUFF 7.5MM (10EA/PK)

## (undated) DEVICE — CANNULA THREADED 5X75 (5EA/BX)

## (undated) DEVICE — MASK OXYGEN VNYL ADLT MED CONC WITH 7 FOOT TUBING  - (50EA/CA)

## (undated) DEVICE — TAPE XBRAID TT 1.2MM (12EA/BX)

## (undated) DEVICE — SUTURE 3-0 ETHILON FS-1 - (36/BX) 30 INCH

## (undated) DEVICE — SYRINGE 10 ML CONTROL LL (25EA/BX 4BX/CA)

## (undated) DEVICE — DRAPE ARM  BOX OF 20

## (undated) DEVICE — GLOVE, LITE (PAIR)

## (undated) DEVICE — GLOVE BIOGEL INDICATOR SZ 9 SURGICAL PF LTX - (160/CA)

## (undated) DEVICE — CANNULA O2 COMFORT SOFT EAR ADULT 7 FT TUBING (50/CA)

## (undated) DEVICE — NEEDLE INSFL 120MM 14GA VRRS - (20/BX)

## (undated) DEVICE — SYRINGE 30 ML LL (56/BX)

## (undated) DEVICE — SUTURE 0 PDS-2 CTX 36 INCH - (24/BX)

## (undated) DEVICE — GLOVE BIOGEL SZ 7 SURGICAL PF LTX - (50PR/BX 4BX/CA)

## (undated) DEVICE — KIT ROOM DECONTAMINATION

## (undated) DEVICE — DRSG THK1CM SM 10X7.5CM NADH

## (undated) DEVICE — ADHESIVE DERMABOND HVD MINI (12EA/BX)

## (undated) DEVICE — GOWN SURGEONS X-LARGE - DISP. (30/CA)

## (undated) DEVICE — PACK LOWER EXTREMITY - (2/CA)

## (undated) DEVICE — BAG SPONGE COUNT 10.25 X 32 - BLUE (250/CA)

## (undated) DEVICE — OBTURATOR BLADELESS STANDARD 8MM (6EA/BX)

## (undated) DEVICE — DRAPE IOBAN II 23 IN X 33 IN - (10/BX)

## (undated) DEVICE — TUBE E-T HI-LO CUFF 8.0MM (10EA/PK)

## (undated) DEVICE — TUBING PUMP WITH CONNECTOR REDEUCE (1EA)

## (undated) DEVICE — HANDPIECE 10FT INTPLS SCT PLS IRRIGATION HAND CONTROL SET (6/PK)